# Patient Record
Sex: MALE | Race: WHITE | Employment: OTHER | URBAN - NONMETROPOLITAN AREA
[De-identification: names, ages, dates, MRNs, and addresses within clinical notes are randomized per-mention and may not be internally consistent; named-entity substitution may affect disease eponyms.]

---

## 2019-06-25 ENCOUNTER — APPOINTMENT (OUTPATIENT)
Dept: GENERAL RADIOLOGY | Age: 49
End: 2019-06-25
Payer: MEDICARE

## 2019-06-25 ENCOUNTER — HOSPITAL ENCOUNTER (EMERGENCY)
Age: 49
Discharge: HOME | End: 2019-06-25
Attending: EMERGENCY MEDICINE
Payer: MEDICARE

## 2019-06-25 VITALS
SYSTOLIC BLOOD PRESSURE: 153 MMHG | OXYGEN SATURATION: 93 % | DIASTOLIC BLOOD PRESSURE: 90 MMHG | RESPIRATION RATE: 18 BRPM | HEART RATE: 96 BPM | TEMPERATURE: 97.8 F

## 2019-06-25 DIAGNOSIS — R56.9 SEIZURE (HCC): Primary | ICD-10-CM

## 2019-06-25 LAB
ANION GAP SERPL CALCULATED.3IONS-SCNC: 19 MMOL/L (ref 7–19)
BASOPHILS ABSOLUTE: 0.1 K/UL (ref 0–0.2)
BASOPHILS RELATIVE PERCENT: 0.8 % (ref 0–1)
BUN BLDV-MCNC: 14 MG/DL (ref 6–20)
CALCIUM SERPL-MCNC: 10.7 MG/DL (ref 8.6–10)
CHLORIDE BLD-SCNC: 97 MMOL/L (ref 98–111)
CO2: 21 MMOL/L (ref 22–29)
CREAT SERPL-MCNC: 2 MG/DL (ref 0.5–1.2)
EOSINOPHILS ABSOLUTE: 0.1 K/UL (ref 0–0.6)
EOSINOPHILS RELATIVE PERCENT: 1.2 % (ref 0–5)
ETHANOL: <10 MG/DL (ref 0–0.08)
GFR NON-AFRICAN AMERICAN: 36
GLUCOSE BLD-MCNC: 159 MG/DL (ref 74–109)
HCT VFR BLD CALC: 40 % (ref 42–52)
HEMOGLOBIN: 13.5 G/DL (ref 14–18)
INR BLD: 1.07 (ref 0.88–1.18)
LYMPHOCYTES ABSOLUTE: 1.2 K/UL (ref 1.1–4.5)
LYMPHOCYTES RELATIVE PERCENT: 11.9 % (ref 20–40)
MCH RBC QN AUTO: 29.2 PG (ref 27–31)
MCHC RBC AUTO-ENTMCNC: 33.8 G/DL (ref 33–37)
MCV RBC AUTO: 86.6 FL (ref 80–94)
MONOCYTES ABSOLUTE: 0.9 K/UL (ref 0–0.9)
MONOCYTES RELATIVE PERCENT: 8.7 % (ref 0–10)
NEUTROPHILS ABSOLUTE: 7.8 K/UL (ref 1.5–7.5)
NEUTROPHILS RELATIVE PERCENT: 77 % (ref 50–65)
PDW BLD-RTO: 16.4 % (ref 11.5–14.5)
PLATELET # BLD: 332 K/UL (ref 130–400)
PMV BLD AUTO: 8.8 FL (ref 9.4–12.4)
POTASSIUM REFLEX MAGNESIUM: 4.3 MMOL/L (ref 3.5–5)
PRO-BNP: 287 PG/ML (ref 0–450)
PROTHROMBIN TIME: 13.3 SEC (ref 12–14.6)
RBC # BLD: 4.62 M/UL (ref 4.7–6.1)
SODIUM BLD-SCNC: 137 MMOL/L (ref 136–145)
TROPONIN: <0.01 NG/ML (ref 0–0.03)
WBC # BLD: 10.2 K/UL (ref 4.8–10.8)

## 2019-06-25 PROCEDURE — 2580000003 HC RX 258: Performed by: EMERGENCY MEDICINE

## 2019-06-25 PROCEDURE — 36415 COLL VENOUS BLD VENIPUNCTURE: CPT

## 2019-06-25 PROCEDURE — 83880 ASSAY OF NATRIURETIC PEPTIDE: CPT

## 2019-06-25 PROCEDURE — 80048 BASIC METABOLIC PNL TOTAL CA: CPT

## 2019-06-25 PROCEDURE — 96374 THER/PROPH/DIAG INJ IV PUSH: CPT

## 2019-06-25 PROCEDURE — 85610 PROTHROMBIN TIME: CPT

## 2019-06-25 PROCEDURE — 71045 X-RAY EXAM CHEST 1 VIEW: CPT

## 2019-06-25 PROCEDURE — 85025 COMPLETE CBC W/AUTO DIFF WBC: CPT

## 2019-06-25 PROCEDURE — G0480 DRUG TEST DEF 1-7 CLASSES: HCPCS

## 2019-06-25 PROCEDURE — 99284 EMERGENCY DEPT VISIT MOD MDM: CPT | Performed by: EMERGENCY MEDICINE

## 2019-06-25 PROCEDURE — 99284 EMERGENCY DEPT VISIT MOD MDM: CPT

## 2019-06-25 PROCEDURE — 6370000000 HC RX 637 (ALT 250 FOR IP): Performed by: EMERGENCY MEDICINE

## 2019-06-25 PROCEDURE — 84484 ASSAY OF TROPONIN QUANT: CPT

## 2019-06-25 PROCEDURE — 6360000002 HC RX W HCPCS: Performed by: EMERGENCY MEDICINE

## 2019-06-25 PROCEDURE — 93005 ELECTROCARDIOGRAM TRACING: CPT

## 2019-06-25 RX ORDER — DIAZEPAM 5 MG/1
5 TABLET ORAL EVERY 6 HOURS PRN
Qty: 4 TABLET | Refills: 0 | Status: SHIPPED | OUTPATIENT
Start: 2019-06-25 | End: 2019-07-05

## 2019-06-25 RX ORDER — CLOPIDOGREL BISULFATE 75 MG/1
75 TABLET ORAL DAILY
COMMUNITY

## 2019-06-25 RX ORDER — CHLORDIAZEPOXIDE HYDROCHLORIDE 25 MG/1
50 CAPSULE, GELATIN COATED ORAL ONCE
Status: COMPLETED | OUTPATIENT
Start: 2019-06-25 | End: 2019-06-25

## 2019-06-25 RX ORDER — 0.9 % SODIUM CHLORIDE 0.9 %
1000 INTRAVENOUS SOLUTION INTRAVENOUS ONCE
Status: COMPLETED | OUTPATIENT
Start: 2019-06-25 | End: 2019-06-25

## 2019-06-25 RX ORDER — LORAZEPAM 2 MG/ML
2 INJECTION INTRAMUSCULAR ONCE
Status: COMPLETED | OUTPATIENT
Start: 2019-06-25 | End: 2019-06-25

## 2019-06-25 RX ADMIN — SODIUM CHLORIDE 1000 ML: 9 INJECTION, SOLUTION INTRAVENOUS at 15:46

## 2019-06-25 RX ADMIN — LORAZEPAM 2 MG: 2 INJECTION INTRAMUSCULAR; INTRAVENOUS at 15:45

## 2019-06-25 RX ADMIN — CHLORDIAZEPOXIDE HYDROCHLORIDE 50 MG: 25 CAPSULE ORAL at 15:45

## 2019-06-25 ASSESSMENT — ENCOUNTER SYMPTOMS
CHEST TIGHTNESS: 0
CONSTIPATION: 0
ABDOMINAL PAIN: 0
SHORTNESS OF BREATH: 0
DIARRHEA: 0
SORE THROAT: 0
ABDOMINAL DISTENTION: 0
NAUSEA: 0
BLOOD IN STOOL: 0
RHINORRHEA: 0
COUGH: 0
VOMITING: 0
BACK PAIN: 0
TROUBLE SWALLOWING: 0

## 2019-06-25 NOTE — ED PROVIDER NOTES
[facility] EMERGENCY DEPT  EMERGENCY DEPARTMENT ENCOUNTER      Pt Name: Nicolasa Holt  MRN: [id number]  [place] 1970  Date of evaluation: 6/25/2019  Provider: Renee León MD    CHIEF COMPLAINT       Chief Complaint   Patient presents with    Seizures     witnessed x2, pt was working outside, known hx of seizure       HISTORY OF PRESENT ILLNESS   (Location/Symptom, Timing/Onset, Context/Setting, Quality, Duration, Modifying Factors, Severity)  Note limiting factors. Nicolasa Holt is a 50 y.o. male who presents to the emergency department with     Seizure, via EMS. Patient was doing concrete work, pouring concrete, and then he remembers waking up with EMS being around. Bystanders state the patient had a seizure. Patient states that he had had seizures in the past, not one for a while, not under the care of neurologist and not on any antiepileptic drugs. Denies any prodromal symptoms prior to this. Feels his normal self right now. Of note patient does drink daily, used to drink approximately 30 beers a day, states he is down to 6 or 12 a day, last drink was yesterday. No other complaints from patient. Nursing Notes were reviewed. REVIEW OF SYSTEMS    (2-9 systems for level 4,10 or more for level 5)     Review of Systems   Constitutional: Negative for activity change, appetite change, chills and fever. HENT: Negative for congestion, ear pain, nosebleeds, rhinorrhea, sore throat and trouble swallowing. Respiratory: Negative for cough, chest tightness and shortness of breath. Cardiovascular: Negative for chest pain and palpitations. Gastrointestinal: Negative for abdominal distention, abdominal pain, blood in stool, constipation, diarrhea, nausea and vomiting. Genitourinary: Negative for difficulty urinating, dysuria and hematuria. Musculoskeletal: Negative for arthralgias, back pain, joint swelling, myalgias and neck pain. Skin: Negative for rash.    Neurological: Positive for likely benign early re-pole, no reciprocal changes, no ischemic changes the PEEP is in lead V2. There are no contiguous leads with ST elevations. Normal axis and intervals. RADIOLOGY:   Non-plain film images such as CT, Ultrasound and MRI are read by theradiologist. Plain radiographic images are visualized and preliminarily interpreted by the emergency physician with the below findings:      XR CHEST PORTABLE   Final Result   No acute cardiopulmonary process. Signed by Dr Stiven Sneed on 6/25/2019 3:26 PM          LABS:  Labs Reviewed   CBC WITH AUTO DIFFERENTIAL - Abnormal; Notable for the following components:       Result Value    RBC 4.62 (*)     Hemoglobin 13.5 (*)     Hematocrit 40.0 (*)     RDW 16.4 (*)     MPV 8.8 (*)     Neutrophils % 77.0 (*)     Lymphocytes % 11.9 (*)     Neutrophils # 7.8 (*)     All other components within normal limits   BASIC METABOLIC PANEL W/ REFLEX TO MG FOR LOW K - Abnormal; Notable for the following components:    Chloride 97 (*)     CO2 21 (*)     Glucose 159 (*)     CREATININE 2.0 (*)     GFR Non-African American 36 (*)     Calcium 10.7 (*)     All other components within normal limits   BRAIN NATRIURETIC PEPTIDE   PROTIME-INR   TROPONIN   ETHANOL     other labs were within normal range or not returned as of this dictation. EMERGENCY DEPARTMENT COURSE and DIFFERENTIAL DIAGNOSIS/MDM:   Vitals:    Vitals:    06/25/19 1459 06/25/19 1550   BP: 137/88    Pulse: 130    Resp: 18    Temp:  97.8 °F (36.6 °C)   SpO2: 93%        MDM  Number of Diagnoses or Management Options  Diagnosis management comments: 80-year-old male presenting with seizures, spoke with patient about possible alcohol withdrawal seizure, patient does not intend to stop drinking. Plan for IV, fluids supportive care, benzodiazepines and reevaluation, as patient does not plan to stop drinking he may be safe for discharge home to continue drinking to prevent any further alcohol withdrawal symptoms.       ED (electronically signed)  Attending Emergency Physician        Atul Banuelos MD  06/25/19 5701

## 2019-06-27 LAB
EKG P AXIS: 51 DEGREES
EKG P-R INTERVAL: 156 MS
EKG Q-T INTERVAL: 314 MS
EKG QRS DURATION: 96 MS
EKG QTC CALCULATION (BAZETT): 401 MS
EKG T AXIS: 53 DEGREES

## 2019-07-30 ENCOUNTER — TELEPHONE (OUTPATIENT)
Dept: NEUROSURGERY | Age: 49
End: 2019-07-30

## 2019-08-01 ENCOUNTER — TELEPHONE (OUTPATIENT)
Dept: NEUROSURGERY | Age: 49
End: 2019-08-01

## 2019-11-13 ENCOUNTER — TELEPHONE (OUTPATIENT)
Dept: NEUROSURGERY | Age: 49
End: 2019-11-13

## 2020-06-30 ENCOUNTER — NURSE TRIAGE (OUTPATIENT)
Dept: CALL CENTER | Facility: HOSPITAL | Age: 50
End: 2020-06-30

## 2020-06-30 NOTE — TELEPHONE ENCOUNTER
"Reviewed guideline with caller, advises he be seen within 4 hours for pain not relieved after taking pain medication. Caller agrees to follow care advice.     Reason for Disposition  • [1] SEVERE pain (e.g., excruciating, unable to do any normal activities) AND [2] not improved 2 hours after pain medicine    Additional Information  • Negative: Shock suspected (e.g., cold/pale/clammy skin, too weak to stand, low BP, rapid pulse)  • Negative: [1] Similar pain previously AND [2] it was from \"heart attack\"  • Negative: [1] Similar pain previously AND [2] it was from \"angina\" AND [3] not relieved by nitroglycerin  • Negative: Sounds like a life-threatening emergency to the triager  • Negative: Chest pain  • Negative: Toothache followed tooth injury  • Negative: Toothache or mouth pain after tooth extraction  • Negative: Canker sore (i.e., aphthous ulcer)  • Negative: Tongue is very swollen and tender  • Negative: [1] Face is swollen AND [2] fever  • Negative: Patient sounds very sick or weak to the triager    Answer Assessment - Initial Assessment Questions  1. LOCATION: \"Which tooth is hurting?\"  (e.g., right-side/left-side, upper/lower, front/back)      Three right top and bottom  2. ONSET: \"When did the toothache start?\"  (e.g., hours, days)       yesterday  3. SEVERITY: \"How bad is the toothache?\"  (Scale 1-10; mild, moderate or severe)    - MILD (1-3): doesn't interfere with chewing     - MODERATE (4-7): interferes with chewing, interferes with normal activities, awakens from sleep      - SEVERE (8-10): unable to eat, unable to do any normal activities, excruciating pain         10/10  4. SWELLING: \"Is there any visible swelling of your face?\"      No swelling   5. OTHER SYMPTOMS: \"Do you have any other symptoms?\" (e.g., fever)      no  6. PREGNANCY: \"Is there any chance you are pregnant?\" \"When was your last menstrual period?\"      na    Protocols used: TOOTHACHE-ADULT-AH      "

## 2020-09-11 ENCOUNTER — HOSPITAL ENCOUNTER (INPATIENT)
Age: 50
LOS: 3 days | Discharge: HOME | DRG: 897 | End: 2020-09-14
Attending: EMERGENCY MEDICINE | Admitting: INTERNAL MEDICINE
Payer: MEDICARE

## 2020-09-11 PROBLEM — F32.A DEPRESSION: Status: ACTIVE | Noted: 2020-09-11

## 2020-09-11 PROBLEM — E87.6 HYPOKALEMIA: Status: ACTIVE | Noted: 2020-09-11

## 2020-09-11 PROBLEM — F10.229 ALCOHOL INTOXICATION WITH MODERATE OR SEVERE USE DISORDER, WITH UNSPECIFIED COMPLICATION (HCC): Status: ACTIVE | Noted: 2020-09-11

## 2020-09-11 PROBLEM — G40.909 SEIZURE DISORDER (HCC): Status: ACTIVE | Noted: 2020-09-11

## 2020-09-11 PROBLEM — K21.9 GERD (GASTROESOPHAGEAL REFLUX DISEASE): Status: ACTIVE | Noted: 2020-09-11

## 2020-09-11 LAB
ALBUMIN SERPL-MCNC: 3.8 G/DL (ref 3.5–5.2)
ALP BLD-CCNC: 221 U/L (ref 40–130)
ALT SERPL-CCNC: 53 U/L (ref 5–41)
ANION GAP SERPL CALCULATED.3IONS-SCNC: 19 MMOL/L (ref 7–19)
AST SERPL-CCNC: 59 U/L (ref 5–40)
BASOPHILS ABSOLUTE: 0.1 K/UL (ref 0–0.2)
BASOPHILS RELATIVE PERCENT: 1 % (ref 0–1)
BILIRUB SERPL-MCNC: 0.5 MG/DL (ref 0.2–1.2)
BUN BLDV-MCNC: 5 MG/DL (ref 6–20)
CALCIUM SERPL-MCNC: 8.5 MG/DL (ref 8.6–10)
CHLORIDE BLD-SCNC: 93 MMOL/L (ref 98–111)
CO2: 24 MMOL/L (ref 22–29)
CREAT SERPL-MCNC: 0.9 MG/DL (ref 0.5–1.2)
EOSINOPHILS ABSOLUTE: 0.3 K/UL (ref 0–0.6)
EOSINOPHILS RELATIVE PERCENT: 2.8 % (ref 0–5)
ETHANOL: 332 MG/DL (ref 0–0.08)
GFR AFRICAN AMERICAN: >59
GFR NON-AFRICAN AMERICAN: >60
GLUCOSE BLD-MCNC: 159 MG/DL (ref 74–109)
HCT VFR BLD CALC: 39.2 % (ref 42–52)
HEMOGLOBIN: 13.1 G/DL (ref 14–18)
IMMATURE GRANULOCYTES #: 0 K/UL
LYMPHOCYTES ABSOLUTE: 2.6 K/UL (ref 1.1–4.5)
LYMPHOCYTES RELATIVE PERCENT: 27.8 % (ref 20–40)
MAGNESIUM: 1.8 MG/DL (ref 1.6–2.6)
MCH RBC QN AUTO: 30.6 PG (ref 27–31)
MCHC RBC AUTO-ENTMCNC: 33.4 G/DL (ref 33–37)
MCV RBC AUTO: 91.6 FL (ref 80–94)
MONOCYTES ABSOLUTE: 0.7 K/UL (ref 0–0.9)
MONOCYTES RELATIVE PERCENT: 7.8 % (ref 0–10)
NEUTROPHILS ABSOLUTE: 5.7 K/UL (ref 1.5–7.5)
NEUTROPHILS RELATIVE PERCENT: 60.3 % (ref 50–65)
PDW BLD-RTO: 13.5 % (ref 11.5–14.5)
PLATELET # BLD: 242 K/UL (ref 130–400)
PMV BLD AUTO: 9.3 FL (ref 9.4–12.4)
POTASSIUM SERPL-SCNC: 2.6 MMOL/L (ref 3.5–5)
RBC # BLD: 4.28 M/UL (ref 4.7–6.1)
SODIUM BLD-SCNC: 136 MMOL/L (ref 136–145)
TOTAL PROTEIN: 6.6 G/DL (ref 6.6–8.7)
WBC # BLD: 9.4 K/UL (ref 4.8–10.8)

## 2020-09-11 PROCEDURE — 2580000003 HC RX 258: Performed by: PHYSICIAN ASSISTANT

## 2020-09-11 PROCEDURE — 6370000000 HC RX 637 (ALT 250 FOR IP): Performed by: PHYSICIAN ASSISTANT

## 2020-09-11 PROCEDURE — G0480 DRUG TEST DEF 1-7 CLASSES: HCPCS

## 2020-09-11 PROCEDURE — 2580000003 HC RX 258: Performed by: EMERGENCY MEDICINE

## 2020-09-11 PROCEDURE — 1210000000 HC MED SURG R&B

## 2020-09-11 PROCEDURE — 85025 COMPLETE CBC W/AUTO DIFF WBC: CPT

## 2020-09-11 PROCEDURE — 36415 COLL VENOUS BLD VENIPUNCTURE: CPT

## 2020-09-11 PROCEDURE — 6360000002 HC RX W HCPCS: Performed by: EMERGENCY MEDICINE

## 2020-09-11 PROCEDURE — 99284 EMERGENCY DEPT VISIT MOD MDM: CPT

## 2020-09-11 PROCEDURE — 6370000000 HC RX 637 (ALT 250 FOR IP): Performed by: EMERGENCY MEDICINE

## 2020-09-11 PROCEDURE — 6360000002 HC RX W HCPCS: Performed by: PHYSICIAN ASSISTANT

## 2020-09-11 PROCEDURE — 83735 ASSAY OF MAGNESIUM: CPT

## 2020-09-11 PROCEDURE — 99283 EMERGENCY DEPT VISIT LOW MDM: CPT

## 2020-09-11 PROCEDURE — 99999 PR OFFICE/OUTPT VISIT,PROCEDURE ONLY: CPT | Performed by: EMERGENCY MEDICINE

## 2020-09-11 PROCEDURE — 2500000003 HC RX 250 WO HCPCS: Performed by: EMERGENCY MEDICINE

## 2020-09-11 PROCEDURE — 80053 COMPREHEN METABOLIC PANEL: CPT

## 2020-09-11 RX ORDER — MAGNESIUM SULFATE IN WATER 40 MG/ML
2 INJECTION, SOLUTION INTRAVENOUS PRN
Status: DISCONTINUED | OUTPATIENT
Start: 2020-09-11 | End: 2020-09-14 | Stop reason: HOSPADM

## 2020-09-11 RX ORDER — POTASSIUM CHLORIDE 20 MEQ/1
40 TABLET, EXTENDED RELEASE ORAL PRN
Status: DISCONTINUED | OUTPATIENT
Start: 2020-09-11 | End: 2020-09-14 | Stop reason: HOSPADM

## 2020-09-11 RX ORDER — POLYETHYLENE GLYCOL 3350 17 G/17G
17 POWDER, FOR SOLUTION ORAL DAILY PRN
Status: DISCONTINUED | OUTPATIENT
Start: 2020-09-11 | End: 2020-09-14 | Stop reason: HOSPADM

## 2020-09-11 RX ORDER — LORAZEPAM 1 MG/1
2 TABLET ORAL
Status: DISCONTINUED | OUTPATIENT
Start: 2020-09-11 | End: 2020-09-14 | Stop reason: HOSPADM

## 2020-09-11 RX ORDER — ONDANSETRON 2 MG/ML
4 INJECTION INTRAMUSCULAR; INTRAVENOUS EVERY 6 HOURS PRN
Status: DISCONTINUED | OUTPATIENT
Start: 2020-09-11 | End: 2020-09-14 | Stop reason: HOSPADM

## 2020-09-11 RX ORDER — ACETAMINOPHEN 325 MG/1
650 TABLET ORAL EVERY 6 HOURS PRN
Status: DISCONTINUED | OUTPATIENT
Start: 2020-09-11 | End: 2020-09-14 | Stop reason: HOSPADM

## 2020-09-11 RX ORDER — MULTIVITAMIN WITH IRON
1 TABLET ORAL DAILY
Status: DISCONTINUED | OUTPATIENT
Start: 2020-09-11 | End: 2020-09-14 | Stop reason: HOSPADM

## 2020-09-11 RX ORDER — PROMETHAZINE HYDROCHLORIDE 25 MG/1
12.5 TABLET ORAL EVERY 6 HOURS PRN
Status: DISCONTINUED | OUTPATIENT
Start: 2020-09-11 | End: 2020-09-14 | Stop reason: HOSPADM

## 2020-09-11 RX ORDER — LORAZEPAM 2 MG/ML
2 INJECTION INTRAMUSCULAR
Status: DISCONTINUED | OUTPATIENT
Start: 2020-09-11 | End: 2020-09-14 | Stop reason: HOSPADM

## 2020-09-11 RX ORDER — HYDROCODONE BITARTRATE AND ACETAMINOPHEN 10; 325 MG/1; MG/1
1 TABLET ORAL EVERY 6 HOURS PRN
COMMUNITY

## 2020-09-11 RX ORDER — SODIUM CHLORIDE 0.9 % (FLUSH) 0.9 %
10 SYRINGE (ML) INJECTION PRN
Status: DISCONTINUED | OUTPATIENT
Start: 2020-09-11 | End: 2020-09-14 | Stop reason: HOSPADM

## 2020-09-11 RX ORDER — SODIUM CHLORIDE 9 MG/ML
INJECTION, SOLUTION INTRAVENOUS CONTINUOUS
Status: DISCONTINUED | OUTPATIENT
Start: 2020-09-11 | End: 2020-09-14 | Stop reason: HOSPADM

## 2020-09-11 RX ORDER — CHLORDIAZEPOXIDE HYDROCHLORIDE 25 MG/1
25 CAPSULE, GELATIN COATED ORAL 4 TIMES DAILY
Status: COMPLETED | OUTPATIENT
Start: 2020-09-12 | End: 2020-09-12

## 2020-09-11 RX ORDER — OLANZAPINE 5 MG/1
5 TABLET ORAL NIGHTLY
COMMUNITY

## 2020-09-11 RX ORDER — LORAZEPAM 1 MG/1
4 TABLET ORAL
Status: DISCONTINUED | OUTPATIENT
Start: 2020-09-11 | End: 2020-09-14 | Stop reason: HOSPADM

## 2020-09-11 RX ORDER — POTASSIUM CHLORIDE 7.45 MG/ML
10 INJECTION INTRAVENOUS ONCE
Status: COMPLETED | OUTPATIENT
Start: 2020-09-11 | End: 2020-09-11

## 2020-09-11 RX ORDER — AMLODIPINE BESYLATE 10 MG/1
10 TABLET ORAL DAILY
COMMUNITY

## 2020-09-11 RX ORDER — FOLIC ACID 1 MG/1
1 TABLET ORAL DAILY
Status: DISCONTINUED | OUTPATIENT
Start: 2020-09-12 | End: 2020-09-14 | Stop reason: HOSPADM

## 2020-09-11 RX ORDER — LORAZEPAM 1 MG/1
3 TABLET ORAL
Status: DISCONTINUED | OUTPATIENT
Start: 2020-09-11 | End: 2020-09-14 | Stop reason: HOSPADM

## 2020-09-11 RX ORDER — ACETAMINOPHEN 650 MG/1
650 SUPPOSITORY RECTAL EVERY 6 HOURS PRN
Status: DISCONTINUED | OUTPATIENT
Start: 2020-09-11 | End: 2020-09-14 | Stop reason: HOSPADM

## 2020-09-11 RX ORDER — DIAZEPAM 10 MG/1
10 TABLET ORAL EVERY 6 HOURS PRN
Status: ON HOLD | COMMUNITY
End: 2020-10-20 | Stop reason: HOSPADM

## 2020-09-11 RX ORDER — LORAZEPAM 2 MG/ML
4 INJECTION INTRAMUSCULAR
Status: DISCONTINUED | OUTPATIENT
Start: 2020-09-11 | End: 2020-09-14 | Stop reason: HOSPADM

## 2020-09-11 RX ORDER — NICOTINE 21 MG/24HR
1 PATCH, TRANSDERMAL 24 HOURS TRANSDERMAL DAILY
Status: DISCONTINUED | OUTPATIENT
Start: 2020-09-11 | End: 2020-09-14 | Stop reason: HOSPADM

## 2020-09-11 RX ORDER — MAGNESIUM SULFATE IN WATER 40 MG/ML
2 INJECTION, SOLUTION INTRAVENOUS ONCE
Status: COMPLETED | OUTPATIENT
Start: 2020-09-11 | End: 2020-09-11

## 2020-09-11 RX ORDER — METOPROLOL SUCCINATE 25 MG/1
25 TABLET, EXTENDED RELEASE ORAL DAILY
Status: ON HOLD | COMMUNITY
End: 2020-09-14 | Stop reason: HOSPADM

## 2020-09-11 RX ORDER — THIAMINE MONONITRATE (VIT B1) 100 MG
100 TABLET ORAL DAILY
Status: DISCONTINUED | OUTPATIENT
Start: 2020-09-11 | End: 2020-09-14 | Stop reason: HOSPADM

## 2020-09-11 RX ORDER — LORAZEPAM 2 MG/ML
1 INJECTION INTRAMUSCULAR
Status: DISCONTINUED | OUTPATIENT
Start: 2020-09-11 | End: 2020-09-14 | Stop reason: HOSPADM

## 2020-09-11 RX ORDER — POTASSIUM CHLORIDE 7.45 MG/ML
10 INJECTION INTRAVENOUS PRN
Status: DISCONTINUED | OUTPATIENT
Start: 2020-09-11 | End: 2020-09-14 | Stop reason: HOSPADM

## 2020-09-11 RX ORDER — LISINOPRIL 40 MG/1
40 TABLET ORAL DAILY
COMMUNITY

## 2020-09-11 RX ORDER — LORAZEPAM 2 MG/ML
3 INJECTION INTRAMUSCULAR
Status: DISCONTINUED | OUTPATIENT
Start: 2020-09-11 | End: 2020-09-14 | Stop reason: HOSPADM

## 2020-09-11 RX ORDER — SODIUM CHLORIDE 0.9 % (FLUSH) 0.9 %
10 SYRINGE (ML) INJECTION EVERY 12 HOURS SCHEDULED
Status: DISCONTINUED | OUTPATIENT
Start: 2020-09-11 | End: 2020-09-14 | Stop reason: HOSPADM

## 2020-09-11 RX ORDER — ATORVASTATIN CALCIUM 10 MG/1
10 TABLET, FILM COATED ORAL DAILY
COMMUNITY

## 2020-09-11 RX ORDER — PANTOPRAZOLE SODIUM 40 MG/1
40 TABLET, DELAYED RELEASE ORAL
Status: DISCONTINUED | OUTPATIENT
Start: 2020-09-12 | End: 2020-09-14 | Stop reason: HOSPADM

## 2020-09-11 RX ORDER — LORAZEPAM 1 MG/1
1 TABLET ORAL
Status: DISCONTINUED | OUTPATIENT
Start: 2020-09-11 | End: 2020-09-14 | Stop reason: HOSPADM

## 2020-09-11 RX ADMIN — ENOXAPARIN SODIUM 40 MG: 40 INJECTION SUBCUTANEOUS at 18:35

## 2020-09-11 RX ADMIN — SODIUM CHLORIDE: 9 INJECTION, SOLUTION INTRAVENOUS at 17:48

## 2020-09-11 RX ADMIN — POTASSIUM CHLORIDE 10 MEQ: 10 INJECTION, SOLUTION INTRAVENOUS at 21:33

## 2020-09-11 RX ADMIN — POTASSIUM CHLORIDE 10 MEQ: 10 INJECTION, SOLUTION INTRAVENOUS at 17:16

## 2020-09-11 RX ADMIN — FOLIC ACID: 5 INJECTION, SOLUTION INTRAMUSCULAR; INTRAVENOUS; SUBCUTANEOUS at 16:24

## 2020-09-11 RX ADMIN — THIAMINE HCL TAB 100 MG 100 MG: 100 TAB at 18:35

## 2020-09-11 RX ADMIN — POTASSIUM CHLORIDE 10 MEQ: 10 INJECTION, SOLUTION INTRAVENOUS at 19:43

## 2020-09-11 RX ADMIN — POTASSIUM CHLORIDE 10 MEQ: 10 INJECTION, SOLUTION INTRAVENOUS at 18:35

## 2020-09-11 RX ADMIN — POTASSIUM BICARBONATE 40 MEQ: 782 TABLET, EFFERVESCENT ORAL at 16:24

## 2020-09-11 RX ADMIN — MAGNESIUM SULFATE HEPTAHYDRATE 2 G: 40 INJECTION, SOLUTION INTRAVENOUS at 18:37

## 2020-09-11 RX ADMIN — THERA TABS 1 TABLET: TAB at 18:35

## 2020-09-11 ASSESSMENT — ENCOUNTER SYMPTOMS
RHINORRHEA: 0
NAUSEA: 0
BACK PAIN: 0
COUGH: 0
DIARRHEA: 0
SHORTNESS OF BREATH: 0
SORE THROAT: 0
ABDOMINAL PAIN: 0
VOMITING: 0

## 2020-09-11 NOTE — H&P
Cheryl Obrien - History & Physical      PCP: No primary care provider on file. Date of Admission: 9/11/2020    Date of Service: 9/11/2020    Chief Complaint:  Alcohol intoxication     History Of Present Illness: The patient is a 52 y.o. male with PMH CAD s/p PCI to LAD in 2012, Seizures, HTN, GERD, HLD, depression  who presented to [facility] ED complaining of alcohol intoxication, depression and suicidal ideation. Mr. Marquise Vega states he's been drinking alcohol daily since the age of 12. Previously drank a 30 case of beer then weaned himself to a pint and a half of whiskey daily and now he states he's Oldjason BecerraSterling been drinking a pint\" every day. He smokes about 2 PPD. Tells me he wants to quit drinking because he knows it's going to kill him. States he feels depressed but denies suicidal/homicidal ideation. Additionally he does have history of a seizure disorder. He cannot tell me what medication he takes for this and has not seen a Neurologist \"in years\". Ethanol level is 332. Past Medical History:        Diagnosis Date    Alcohol abuse     Coronary artery disease     GERD (gastroesophageal reflux disease)     Hyperlipidemia     Hypertension     Seizures (HCC)      Past Surgical History:        Procedure Laterality Date    CORONARY ANGIOPLASTY WITH STENT PLACEMENT      ELBOW SURGERY      KNEE ARTHROSCOPY       Home Medications:  Prior to Admission medications    Medication Sig Start Date End Date Taking?  Authorizing Provider   OLANZapine (ZYPREXA) 5 MG tablet Take 5 mg by mouth nightly   Yes Historical Provider, MD   amLODIPine (NORVASC) 10 MG tablet Take 10 mg by mouth daily   Yes Historical Provider, MD   metoprolol succinate (TOPROL XL) 25 MG extended release tablet Take 25 mg by mouth daily   Yes Historical Provider, MD   atorvastatin (LIPITOR) 10 MG tablet Take 10 mg by mouth daily   Yes Historical Provider, MD   lisinopril (PRINIVIL;ZESTRIL) 40 MG tablet Take 40 mg by mouth daily Yes Historical Provider, MD   diazePAM (VALIUM) 10 MG tablet Take 10 mg by mouth every 6 hours as needed for Anxiety. Yes Historical Provider, MD   HYDROcodone-acetaminophen (NORCO)  MG per tablet Take 1 tablet by mouth every 6 hours as needed for Pain. Yes Historical Provider, MD   clopidogrel (PLAVIX) 75 MG tablet Take 75 mg by mouth daily   Yes Historical Provider, MD   NONFORMULARY BLOOD PRESSURE MEDICATION    Historical Provider, MD     Allergies:    Patient has no known allergies. Social History:    The patient currently lives at home. Tobacco:   reports that he has been smoking cigarettes. He has been smoking about 1.00 pack per day. He has never used smokeless tobacco.  Alcohol:   reports current alcohol use. Illicit Drugs: denies    Family History:  History reviewed. No pertinent family history. Review of Systems:   Constitutional / general:  Denies fever / chills / sweats  Head:  Denies headache / neck stiffness / trauma / visual change  Eyes:  Denies blurry vision / acute visual change or loss / itching / redness  ENT: Denies sore throat / hoarseness / nasal drainage / ear pain  CV:  Denies chest pain / palpitations/ orthopnea   Respiratory:  Denies cough / shortness of breath / sputum / hemoptysis  GI: Denies nausea / vomiting / abdominal pain / diarrhea / constipation  :  Denies dysuria / hesitancy / urgency / hematuria  +urinary retention   Neuro: Denies paralysis / syncope / seizure / dysphagia / headache / paresthesias  Musculoskeletal:  Denies muscle weakness /joint stiffness / pain  Vascular: Denies edema / claudication / varicosities  Heme / endocrine: Denies easy bruising / bleeding / excessive sweating / heat or cold intolerance  Psychiatric: +depression / Denies anxiety / insomnia / + mood changes  Skin:  Denies new rashes / lesions / skin hair or nail changes    14 point review of systems is negative except as specifically addressed above.     Physical Examination:  BP (!) LFT's-likely 2/2 chronic alcohol use, monitor      Tobacco use-nicotine patch ordered, counseled on cessation       HLD (hyperlipidemia)-hold statin for now with elevated LFT's      HTN (hypertension)-hypotensive upon arrival, hold antihypertensives and monitor      Seizure disorder (HCC)-not currently on antiepileptics per patient.  Has been on Keppra in the past. Will check with his pharmacy      GERD (gastroesophageal reflux disease)-PPI      Depression-consider Psychiatry evaluation when medically stable      Hypokalemia-replace, monitor    Further orders per clinical course/attending    Signed:  Khanh Cleary PA-C

## 2020-09-11 NOTE — PLAN OF CARE
Problem: Falls - Risk of:  Goal: Will remain free from falls  Description: Will remain free from falls  Outcome: Ongoing  Goal: Absence of physical injury  Description: Absence of physical injury  Outcome: Ongoing     Problem: Discharge Planning:  Goal: Discharged to appropriate level of care  Description: Discharged to appropriate level of care  Outcome: Ongoing     Problem: Fluid Volume - Deficit:  Goal: Absence of fluid volume deficit signs and symptoms  Description: Absence of fluid volume deficit signs and symptoms  Outcome: Ongoing     Problem: Nutrition Deficit:  Goal: Ability to achieve adequate nutritional intake will improve  Description: Ability to achieve adequate nutritional intake will improve  Outcome: Ongoing     Problem: Sleep Pattern Disturbance:  Goal: Appears well-rested  Description: Appears well-rested  Outcome: Ongoing     Problem: Violence - Risk of, Self/Other-Directed:  Goal: Knowledge of developmental care interventions  Description: Absence of violence  Outcome: Ongoing     Problem: Safety:  Goal: Ability to remain free from injury will improve  Description: Ability to remain free from injury will improve  Outcome: Ongoing     Problem: Self-Concept:  Goal: Level of anxiety will decrease  Description: Level of anxiety will decrease  Outcome: Ongoing  Goal: Ability to verbalize feelings about condition will improve  Description: Ability to verbalize feelings about condition will improve  Outcome: Ongoing

## 2020-09-11 NOTE — ED PROVIDER NOTES
[facility] 4 ONCOLOGY UNIT  eMERGENCY dEPARTMENT eNCOUnter      Pt Name: Gabrielle Anderson  MRN: [id number]  [place] 1970  Date of evaluation: 9/11/2020  Provider: Dre Pat MD    CHIEF COMPLAINT       Chief Complaint   Patient presents with    Alcohol Intoxication     \" I want to quit\" last drink \"10 minutes ago\"         HISTORY OF PRESENT ILLNESS   (Location/Symptom, Timing/Onset,Context/Setting, Quality, Duration, Modifying Factors, Severity)  Note limiting factors. Gabrielle Anderson is a 52 y.o. male who presents to the emergency department for alcohol intoxication. The patient has been a longtime alcohol abuser and prior was drinking a 30 pack/day but now is drinking multiple pints of whiskey per day. Tells me he is tried to quit within the last couple of months and stop for 1 day and had a seizure. However this is not exactly clear to me because he tells me that he also has had a prior history of seizures and is on Neurontin and another medication. He has not been admitted for alcohol withdrawal in the past.  Does have a prior history of bipolar disorder and admits that he drinks just so he can feel \"normal\". He denies any suicidal thoughts currently but does admit to some depression. HPI    NursingNotes were reviewed. REVIEW OF SYSTEMS    (2-9 systems for level 4, 10 or more for level 5)     Review of Systems   Constitutional: Negative for chills and fever. HENT: Negative for rhinorrhea and sore throat. Respiratory: Negative for cough and shortness of breath. Cardiovascular: Negative for chest pain. Gastrointestinal: Negative for abdominal pain, diarrhea, nausea and vomiting. Genitourinary: Negative for dysuria and frequency. Musculoskeletal: Negative for back pain and neck pain. Neurological: Negative for dizziness and headaches. Psychiatric/Behavioral: Negative for hallucinations and suicidal ideas. All other systems reviewed and are negative.            PAST MEDICALHISTORY Past Medical History:   Diagnosis Date    Alcohol abuse     Coronary artery disease     GERD (gastroesophageal reflux disease)     Hyperlipidemia     Hypertension     Seizures (HCC)          SURGICAL HISTORY       Past Surgical History:   Procedure Laterality Date    CORONARY ANGIOPLASTY WITH STENT PLACEMENT      ELBOW SURGERY      KNEE ARTHROSCOPY           CURRENT MEDICATIONS     Current Discharge Medication List      CONTINUE these medications which have NOT CHANGED    Details   OLANZapine (ZYPREXA) 5 MG tablet Take 5 mg by mouth nightly      amLODIPine (NORVASC) 10 MG tablet Take 10 mg by mouth daily      metoprolol succinate (TOPROL XL) 25 MG extended release tablet Take 25 mg by mouth daily      atorvastatin (LIPITOR) 10 MG tablet Take 10 mg by mouth daily      lisinopril (PRINIVIL;ZESTRIL) 40 MG tablet Take 40 mg by mouth daily      diazePAM (VALIUM) 10 MG tablet Take 10 mg by mouth every 6 hours as needed for Anxiety. HYDROcodone-acetaminophen (NORCO)  MG per tablet Take 1 tablet by mouth every 6 hours as needed for Pain. clopidogrel (PLAVIX) 75 MG tablet Take 75 mg by mouth daily      NONFORMULARY BLOOD PRESSURE MEDICATION             ALLERGIES     Patient has no known allergies. FAMILY HISTORY     History reviewed. No pertinent family history.        SOCIAL HISTORY       Social History     Socioeconomic History    Marital status:      Spouse name: None    Number of children: None    Years of education: None    Highest education level: None   Occupational History    None   Social Needs    Financial resource strain: None    Food insecurity     Worry: None     Inability: None    Transportation needs     Medical: None     Non-medical: None   Tobacco Use    Smoking status: Current Every Day Smoker     Packs/day: 1.00     Types: Cigarettes    Smokeless tobacco: Never Used   Substance and Sexual Activity    Alcohol use: Yes     Comment: alcoholic    Drug use: Not Currently    Sexual activity: None   Lifestyle    Physical activity     Days per week: None     Minutes per session: None    Stress: None   Relationships    Social connections     Talks on phone: None     Gets together: None     Attends Gnosticist service: None     Active member of club or organization: None     Attends meetings of clubs or organizations: None     Relationship status: None    Intimate partner violence     Fear of current or ex partner: None     Emotionally abused: None     Physically abused: None     Forced sexual activity: None   Other Topics Concern    None   Social History Narrative    None       SCREENINGS    Alondra Coma Scale  Eye Opening: Spontaneous  Best Verbal Response: Oriented  Best Motor Response: Obeys commands  Alondra Coma Scale Score: 15        PHYSICAL EXAM    (up to 7 for level 4, 8 or more for level 5)     ED Triage Vitals [09/11/20 1441]   BP Temp Temp src Pulse Resp SpO2 Height Weight   (!) 95/55 97 °F (36.1 °C) -- 87 18 95 % 6' (1.829 m) 278 lb (126.1 kg)       Physical Exam  Vitals signs reviewed. Constitutional:       General: He is not in acute distress. Appearance: Normal appearance. He is well-developed. He is not ill-appearing, toxic-appearing or diaphoretic. HENT:      Head: Normocephalic and atraumatic. Eyes:      Conjunctiva/sclera: Conjunctivae normal.   Neck:      Musculoskeletal: Normal range of motion and neck supple. Trachea: No tracheal deviation. Cardiovascular:      Rate and Rhythm: Normal rate and regular rhythm. Heart sounds: Normal heart sounds. No murmur. Pulmonary:      Breath sounds: Normal breath sounds. No wheezing or rales. Abdominal:      Palpations: Abdomen is soft. There is no mass. Tenderness: There is no abdominal tenderness. Musculoskeletal: Normal range of motion. Right lower leg: Edema present. Left lower leg: Edema present.       Comments: Trace bilateral lower extremity edema   Skin: General: Skin is warm and dry. Neurological:      Mental Status: He is alert and oriented to person, place, and time. Psychiatric:         Mood and Affect: Mood is depressed. Affect is flat. Thought Content: Thought content is not paranoid or delusional. Thought content does not include homicidal or suicidal ideation. DIAGNOSTIC RESULTS       No orders to display           LABS:  Labs Reviewed   CBC WITH AUTO DIFFERENTIAL - Abnormal; Notable for the following components:       Result Value    RBC 4.28 (*)     Hemoglobin 13.1 (*)     Hematocrit 39.2 (*)     MPV 9.3 (*)     All other components within normal limits   COMPREHENSIVE METABOLIC PANEL - Abnormal; Notable for the following components:    Potassium 2.6 (*)     Chloride 93 (*)     Glucose 159 (*)     BUN 5 (*)     Calcium 8.5 (*)     Alkaline Phosphatase 221 (*)     ALT 53 (*)     AST 59 (*)     All other components within normal limits    Narrative:     CALL  [place]  [facility] tel. ,  Chemistry results called to and read back by Kika Uribe in ED, 09/11/2020  15:39, by [id number] [facility] PANEL W/ REFLEX TO MG FOR LOW K   CBC   PHOSPHORUS       All other labs were within normal range or not returned as of this dictation.     EMERGENCY DEPARTMENT COURSE and DIFFERENTIAL DIAGNOSIS/MDM:   Vitals:    Vitals:    09/11/20 1441 09/11/20 1515 09/11/20 1731 09/11/20 1831   BP: (!) 95/55 (!) 93/43 122/77 127/62   Pulse: 87  83 90   Resp: 18  18 20   Temp: 97 °F (36.1 °C)  97.6 °F (36.4 °C) 97.9 °F (36.6 °C)   TempSrc:   Temporal Temporal   SpO2: 95%  96% 96%   Weight: 278 lb (126.1 kg)      Height: 6' (1.829 m)          MDM    Hx of etoh abuse, wants to quit, depressed but no suicidal, has had seizure in past when quit drinking but also has underlying sz disorder at baseline somewhat questionable, d/w Dr. Prachi Marx for admission and once medically clear will need to see [facility] as well      CONSULTS:  IP CONSULT TO SOCIAL WORK    PROCEDURES:  Unless otherwise noted below, none     Procedures    FINAL IMPRESSION      1. Acute alcoholic intoxication without complication ([address]    2. Hypokalemia          DISPOSITION/PLAN   DISPOSITION Decision To Admit 09/11/2020 07:33:02 PM      PATIENT REFERRED TO:  No follow-up provider specified.     DISCHARGE MEDICATIONS:  Current Discharge Medication List             (Please note that portions of this note were completed with a voice recognition program.  Efforts were made to edit thedictations but occasionally words are mis-transcribed.)    Diane Donaldson MD (electronically signed)  Attending Emergency Physician        Merlin Slipper, MD  09/11/20 7024

## 2020-09-12 LAB
ALBUMIN SERPL-MCNC: 3.6 G/DL (ref 3.5–5.2)
ALP BLD-CCNC: 230 U/L (ref 40–130)
ALT SERPL-CCNC: 57 U/L (ref 5–41)
AMPHETAMINE SCREEN, URINE: NEGATIVE
ANION GAP SERPL CALCULATED.3IONS-SCNC: 11 MMOL/L (ref 7–19)
AST SERPL-CCNC: 124 U/L (ref 5–40)
BARBITURATE SCREEN URINE: NEGATIVE
BENZODIAZEPINE SCREEN, URINE: POSITIVE
BILIRUB SERPL-MCNC: 0.6 MG/DL (ref 0.2–1.2)
BUN BLDV-MCNC: 5 MG/DL (ref 6–20)
CALCIUM SERPL-MCNC: 8.4 MG/DL (ref 8.6–10)
CANNABINOID SCREEN URINE: NEGATIVE
CHLORIDE BLD-SCNC: 100 MMOL/L (ref 98–111)
CO2: 29 MMOL/L (ref 22–29)
COCAINE METABOLITE SCREEN URINE: NEGATIVE
CREAT SERPL-MCNC: 0.5 MG/DL (ref 0.5–1.2)
GFR AFRICAN AMERICAN: >59
GFR NON-AFRICAN AMERICAN: >60
GLUCOSE BLD-MCNC: 113 MG/DL (ref 74–109)
HCT VFR BLD CALC: 36.9 % (ref 42–52)
HEMOGLOBIN: 12.4 G/DL (ref 14–18)
Lab: ABNORMAL
MCH RBC QN AUTO: 31 PG (ref 27–31)
MCHC RBC AUTO-ENTMCNC: 33.6 G/DL (ref 33–37)
MCV RBC AUTO: 92.3 FL (ref 80–94)
OPIATE SCREEN URINE: POSITIVE
PDW BLD-RTO: 13.3 % (ref 11.5–14.5)
PHOSPHORUS: 2.3 MG/DL (ref 2.5–4.5)
PLATELET # BLD: 215 K/UL (ref 130–400)
PMV BLD AUTO: 9.5 FL (ref 9.4–12.4)
POTASSIUM REFLEX MAGNESIUM: 3.6 MMOL/L (ref 3.5–5)
RBC # BLD: 4 M/UL (ref 4.7–6.1)
SODIUM BLD-SCNC: 140 MMOL/L (ref 136–145)
TOTAL PROTEIN: 6.3 G/DL (ref 6.6–8.7)
WBC # BLD: 5.1 K/UL (ref 4.8–10.8)

## 2020-09-12 PROCEDURE — 6370000000 HC RX 637 (ALT 250 FOR IP): Performed by: PHYSICIAN ASSISTANT

## 2020-09-12 PROCEDURE — 84100 ASSAY OF PHOSPHORUS: CPT

## 2020-09-12 PROCEDURE — 2500000003 HC RX 250 WO HCPCS: Performed by: INTERNAL MEDICINE

## 2020-09-12 PROCEDURE — 2580000003 HC RX 258: Performed by: PHYSICIAN ASSISTANT

## 2020-09-12 PROCEDURE — 85027 COMPLETE CBC AUTOMATED: CPT

## 2020-09-12 PROCEDURE — 6360000002 HC RX W HCPCS: Performed by: PHYSICIAN ASSISTANT

## 2020-09-12 PROCEDURE — 36415 COLL VENOUS BLD VENIPUNCTURE: CPT

## 2020-09-12 PROCEDURE — 1210000000 HC MED SURG R&B

## 2020-09-12 PROCEDURE — 80053 COMPREHEN METABOLIC PANEL: CPT

## 2020-09-12 PROCEDURE — 80307 DRUG TEST PRSMV CHEM ANLYZR: CPT

## 2020-09-12 PROCEDURE — 6370000000 HC RX 637 (ALT 250 FOR IP): Performed by: INTERNAL MEDICINE

## 2020-09-12 RX ORDER — CHLORDIAZEPOXIDE HYDROCHLORIDE 25 MG/1
25 CAPSULE, GELATIN COATED ORAL 3 TIMES DAILY
Status: COMPLETED | OUTPATIENT
Start: 2020-09-13 | End: 2020-09-13

## 2020-09-12 RX ORDER — HYDRALAZINE HYDROCHLORIDE 20 MG/ML
10 INJECTION INTRAMUSCULAR; INTRAVENOUS EVERY 4 HOURS PRN
Status: DISCONTINUED | OUTPATIENT
Start: 2020-09-12 | End: 2020-09-14 | Stop reason: HOSPADM

## 2020-09-12 RX ORDER — HYDROCODONE BITARTRATE AND ACETAMINOPHEN 10; 325 MG/1; MG/1
1 TABLET ORAL EVERY 6 HOURS PRN
Status: DISCONTINUED | OUTPATIENT
Start: 2020-09-12 | End: 2020-09-14 | Stop reason: HOSPADM

## 2020-09-12 RX ORDER — CLOPIDOGREL BISULFATE 75 MG/1
75 TABLET ORAL DAILY
Status: DISCONTINUED | OUTPATIENT
Start: 2020-09-12 | End: 2020-09-14 | Stop reason: HOSPADM

## 2020-09-12 RX ORDER — DIAZEPAM 10 MG/1
10 TABLET ORAL EVERY 8 HOURS PRN
Status: DISCONTINUED | OUTPATIENT
Start: 2020-09-12 | End: 2020-09-14 | Stop reason: HOSPADM

## 2020-09-12 RX ORDER — OLANZAPINE 10 MG/1
5 TABLET ORAL NIGHTLY
Status: DISCONTINUED | OUTPATIENT
Start: 2020-09-12 | End: 2020-09-14 | Stop reason: HOSPADM

## 2020-09-12 RX ORDER — ATORVASTATIN CALCIUM 10 MG/1
10 TABLET, FILM COATED ORAL DAILY
Status: DISCONTINUED | OUTPATIENT
Start: 2020-09-12 | End: 2020-09-14 | Stop reason: HOSPADM

## 2020-09-12 RX ORDER — LABETALOL HYDROCHLORIDE 5 MG/ML
10 INJECTION, SOLUTION INTRAVENOUS EVERY 4 HOURS PRN
Status: DISCONTINUED | OUTPATIENT
Start: 2020-09-12 | End: 2020-09-14 | Stop reason: HOSPADM

## 2020-09-12 RX ORDER — LISINOPRIL 20 MG/1
40 TABLET ORAL DAILY
Status: DISCONTINUED | OUTPATIENT
Start: 2020-09-12 | End: 2020-09-14 | Stop reason: HOSPADM

## 2020-09-12 RX ORDER — METOPROLOL SUCCINATE 25 MG/1
25 TABLET, EXTENDED RELEASE ORAL DAILY
Status: DISCONTINUED | OUTPATIENT
Start: 2020-09-12 | End: 2020-09-13

## 2020-09-12 RX ORDER — AMLODIPINE BESYLATE 10 MG/1
10 TABLET ORAL DAILY
Status: DISCONTINUED | OUTPATIENT
Start: 2020-09-12 | End: 2020-09-14 | Stop reason: HOSPADM

## 2020-09-12 RX ADMIN — OLANZAPINE 5 MG: 10 TABLET, FILM COATED ORAL at 20:04

## 2020-09-12 RX ADMIN — CHLORDIAZEPOXIDE HYDROCHLORIDE 25 MG: 25 CAPSULE ORAL at 06:19

## 2020-09-12 RX ADMIN — ENOXAPARIN SODIUM 40 MG: 40 INJECTION SUBCUTANEOUS at 17:35

## 2020-09-12 RX ADMIN — HYDROCODONE BITARTRATE AND ACETAMINOPHEN 1 TABLET: 10; 325 TABLET ORAL at 14:40

## 2020-09-12 RX ADMIN — THERA TABS 1 TABLET: TAB at 08:13

## 2020-09-12 RX ADMIN — POTASSIUM CHLORIDE 10 MEQ: 10 INJECTION, SOLUTION INTRAVENOUS at 00:31

## 2020-09-12 RX ADMIN — CLOPIDOGREL BISULFATE 75 MG: 75 TABLET ORAL at 08:44

## 2020-09-12 RX ADMIN — PANTOPRAZOLE SODIUM 40 MG: 40 TABLET, DELAYED RELEASE ORAL at 06:17

## 2020-09-12 RX ADMIN — HYDROCODONE BITARTRATE AND ACETAMINOPHEN 1 TABLET: 10; 325 TABLET ORAL at 20:04

## 2020-09-12 RX ADMIN — METOPROLOL SUCCINATE 25 MG: 25 TABLET, EXTENDED RELEASE ORAL at 08:44

## 2020-09-12 RX ADMIN — FOLIC ACID 1 MG: 1 TABLET ORAL at 08:13

## 2020-09-12 RX ADMIN — Medication 10 MG: at 23:48

## 2020-09-12 RX ADMIN — AMLODIPINE BESYLATE 10 MG: 10 TABLET ORAL at 08:44

## 2020-09-12 RX ADMIN — THIAMINE HCL TAB 100 MG 100 MG: 100 TAB at 08:13

## 2020-09-12 RX ADMIN — HYDROCODONE BITARTRATE AND ACETAMINOPHEN 1 TABLET: 10; 325 TABLET ORAL at 08:45

## 2020-09-12 RX ADMIN — LORAZEPAM 1 MG: 1 TABLET ORAL at 11:51

## 2020-09-12 RX ADMIN — LORAZEPAM 1 MG: 1 TABLET ORAL at 02:15

## 2020-09-12 RX ADMIN — LISINOPRIL 40 MG: 20 TABLET ORAL at 08:45

## 2020-09-12 RX ADMIN — CHLORDIAZEPOXIDE HYDROCHLORIDE 25 MG: 25 CAPSULE ORAL at 20:04

## 2020-09-12 RX ADMIN — CHLORDIAZEPOXIDE HYDROCHLORIDE 25 MG: 25 CAPSULE ORAL at 17:35

## 2020-09-12 RX ADMIN — ATORVASTATIN CALCIUM 10 MG: 10 TABLET, FILM COATED ORAL at 08:44

## 2020-09-12 RX ADMIN — SODIUM CHLORIDE: 9 INJECTION, SOLUTION INTRAVENOUS at 02:15

## 2020-09-12 RX ADMIN — CHLORDIAZEPOXIDE HYDROCHLORIDE 25 MG: 25 CAPSULE ORAL at 11:49

## 2020-09-12 ASSESSMENT — PAIN SCALES - GENERAL
PAINLEVEL_OUTOF10: 3
PAINLEVEL_OUTOF10: 3
PAINLEVEL_OUTOF10: 10
PAINLEVEL_OUTOF10: 8
PAINLEVEL_OUTOF10: 4
PAINLEVEL_OUTOF10: 8

## 2020-09-12 NOTE — PROGRESS NOTES
[facility] Hospitalists    Patient:  Philip Serrano  YOB: 1970  Date of Service: 9/12/2020  MRN: [id number]   Acct: [de-identified]   Primary Care Physician: No primary care provider on file. Advance Directive: Full Code  Admit Date: 9/11/2020       Hospital Day: 1  Portions of this note have been copied forward, however, changed to reflect the most current clinical status of this patient. CHIEF COMPLAINT: Alcohol intoxication     SUBJECTIVE: Mr. SLAUGHTER [facility] states he feels mildly anxious today and is tremulous at times. Denies headache, chest pain, heart palpitations. Denies SI/HI. Cumulative Hospital Course: The patient is a 52 y.o. male with PMH CAD s/p PCI to LAD in 2012, Seizures, HTN, GERD, HLD, depression  who presented to [facility] ED complaining of alcohol intoxication, depression and suicidal ideation. Mr. SLAUGHTER [facility] states he's been drinking alcohol daily since the age of 12. Previously drank a 30 case of beer then weaned himself to a pint and a half of whiskey daily and now he states he's Marlane Cap been drinking a pint\" every day. He smokes about 2 PPD. Tells me he wants to quit drinking because he knows it's going to kill him. States he feels depressed but denies suicidal/homicidal ideation. Additionally he does have history of a seizure disorder. He cannot tell me what medication he takes for this and has not seen a Neurologist \"in years\". Ethanol level is 332. Mr. SLAUGHTER [facility] was admitted to Hospitalist service and placed on CIWA protocol as well as Librium taper. Review of Systems:   14 point review of systems is negative except as specifically addressed above.     Objective:   VITALS:  BP (!) 188/94   Pulse 111   Temp 98.7 °F (37.1 °C) (Temporal)   Resp 20   Ht 6' (1.829 m)   Wt 278 lb (126.1 kg)   SpO2 94%   BMI 37.70 kg/m²   24HR INTAKE/OUTPUT:      Intake/Output Summary (Last 24 hours) at 9/12/2020 1410  Last data filed at 9/12/2020 1005  Gross per 24 hour   Intake 3480 ml   Output 500 ml   Net 2980 ml     General appearance: alert, appears stated age, cooperative and no distress, resting comfortably in bed  Head: Normocephalic, without obvious abnormality, atraumatic  Eyes: conjunctivae/corneas clear. PERRL, EOM's intact. Ears: normal external ears and nose, throat without exudate  Neck: no adenopathy, no carotid bruit, no JVD, supple, symmetrical, trachea midline   Lungs: coarse air entry without rhonchi, soft end expiratory wheezes   Heart: tachycardic, regular rhythm, S1, S2 normal, no murmur  Abdomen: soft, non-tender; non-distended, normal bowel sounds no masses, no organomegaly  Extremities:No lower extremity edema,  No erythema, no tenderness to palpation  Skin: Skin color, texture, turgor normal. No rashes or lesions  Lymphatic: No palpable lymph node enlargment  Neurologic: Alert and oriented X 3, normal strength and tone.  No focal deficits  Psychiatric: Calm, appropriate affect    Medications:      sodium chloride 125 mL/hr at 09/12/20 0215      amLODIPine  10 mg Oral Daily    atorvastatin  10 mg Oral Daily    clopidogrel  75 mg Oral Daily    lisinopril  40 mg Oral Daily    metoprolol succinate  25 mg Oral Daily    OLANZapine  5 mg Oral Nightly    sodium chloride flush  10 mL Intravenous 2 times per day    enoxaparin  40 mg Subcutaneous Q24H    thiamine  100 mg Oral Daily    multivitamin  1 tablet Oral Daily    nicotine  1 patch Transdermal Daily    folic acid  1 mg Oral Daily    chlordiazePOXIDE  25 mg Oral 4x Daily    pantoprazole  40 mg Oral QAM AC     diazePAM, HYDROcodone-acetaminophen, sodium chloride flush, acetaminophen **OR** acetaminophen, polyethylene glycol, promethazine **OR** ondansetron, potassium chloride **OR** potassium alternative oral replacement **OR** potassium chloride, magnesium sulfate, LORazepam **OR** LORazepam **OR** LORazepam **OR** LORazepam **OR** LORazepam **OR** LORazepam **OR** LORazepam **OR** LORazepam  DIET GENERAL;     Lab and other Data: Recent Labs     09/11/20  1457 09/12/20  0410   WBC 9.4 5.1   HGB 13.1* 12.4*    215     Recent Labs     09/11/20  1457 09/12/20  0410    140   K 2.6* 3.6   CL 93* 100   CO2 24 29   BUN 5* 5*   CREATININE 0.9 0.5   GLUCOSE 159* 113*     Recent Labs     09/11/20  1457 09/12/20  0410   AST 59* 124*   ALT 53* 57*   BILITOT 0.5 0.6   ALKPHOS 221* 230*     Assessment/Plan   Principal Problem:    Alcohol intoxication with moderate or severe use disorder, with unspecified complication (HCC)-Received banana bag in ED, continue IVF's, supplement thiamine, multivitamin, folic acid, CIWA protocol ordered, continue Librium taper     Active Problems:    Coronary atherosclerosis w/ history PCI-has been on Plavix in the past and patient uncertain if he still takes, home medications confirmed and resumed, Plavix continued       Elevated LFT's-likely 2/2 chronic alcohol use, if continues to worsen check acute hepatitis panel, monitor        Tobacco use-nicotine patch ordered, counseled on cessation        HLD (hyperlipidemia)-hold statin for now with elevated LFT's, check acute hepatitis panel       HTN (hypertension)-hypotensive upon arrival which has resolved. Zestril, Norvasc, Toprol XL ordered       Seizure disorder (HCC)-has been on Keppra in the past but not currently taking, continue seizure precautions       GERD (gastroesophageal reflux disease)-PPI       Depression-home meds resumed. Denies SI, improved mood today       Hypokalemia-resolved, monitor    Alcohol intoxication with withdrawal symptoms complicated by seizure history formerly on Keppra. Will monitor closely, continue seizure precautions. Once withdrawal symptoms subside and CIWA/Librium discontinued monitor for recurrent seizure prior to discharge.     DVT Prophylaxis: Lovenox    GI prophylaxis: Protonix    Chinyere Perez PA-C

## 2020-09-12 NOTE — PROGRESS NOTES
Comprehensive Nutrition Assessment    Type and Reason for Visit:  Initial, Positive Nutrition Screen    Nutrition Recommendations/Plan: continue current POC    Nutrition Assessment:  Well  nourished appearing gentleman, but at increased risk for nutritional compromise d/t Alcohol abuse and decreased po intake. Doesn't know why nursing said cultural/ethnic food preferences    Malnutrition Assessment:  Malnutrition Status:  No malnutrition    Context:  Acute Illness     Findings of the 6 clinical characteristics of malnutrition:  Energy Intake:  Mild decrease in energy intake (Comment)  Weight Loss:  No significant weight loss     Body Fat Loss:  No significant body fat loss     Muscle Mass Loss:  No significant muscle mass loss    Fluid Accumulation:  No significant fluid accumulation Extremities   Strength:  Not Performed    Nutrition Related Findings:  well nourished      Wounds:  None       Current Nutrition Therapies:    DIET GENERAL; Anthropometric Measures:  · Height: 6' (182.9 cm)  · Current Body Weight: 278 lb (126.1 kg)   · Admission Body Weight: 278 lb (126.1 kg)    · Usual Body Weight: 246 lb (111.6 kg)(2/11/2020)     · Ideal Body Weight: 178 lbs; % Ideal Body Weight     · BMI: 37.7  · Adjusted Body Weight:  ; No Adjustment   · BMI Categories:     Obese class II    Nutrition Diagnosis:   · Inadequate protein-energy intake related to acute injury/trauma, psychological cause or life stress as evidenced by poor intake prior to admission      Nutrition Interventions:   Food and/or Nutrient Delivery:  Continue Current Diet  Nutrition Education/Counseling:  No recommendation at this time   Coordination of Nutrition Care:  Continued Inpatient Monitoring    Goals:  pointake 50% or greater.   Weight stable       Nutrition Monitoring and Evaluation:   Behavioral-Environmental Outcomes:  Readiness for Change   Food/Nutrient Intake Outcomes:  Food and Nutrient Intake  Physical Signs/Symptoms Outcomes: Biochemical Data, Skin, Weight     Discharge Planning:     Too soon to determine     Electronically signed by Karry Phalen, MS, RD, LD on 9/12/20 at 11:18 AM CDT    Contact: [phone]

## 2020-09-13 LAB
ALBUMIN SERPL-MCNC: 3.7 G/DL (ref 3.5–5.2)
ALP BLD-CCNC: 212 U/L (ref 40–130)
ALT SERPL-CCNC: 46 U/L (ref 5–41)
ANION GAP SERPL CALCULATED.3IONS-SCNC: 13 MMOL/L (ref 7–19)
AST SERPL-CCNC: 64 U/L (ref 5–40)
BILIRUB SERPL-MCNC: 0.5 MG/DL (ref 0.2–1.2)
BUN BLDV-MCNC: 4 MG/DL (ref 6–20)
CALCIUM SERPL-MCNC: 8.6 MG/DL (ref 8.6–10)
CHLORIDE BLD-SCNC: 101 MMOL/L (ref 98–111)
CO2: 26 MMOL/L (ref 22–29)
CREAT SERPL-MCNC: 0.5 MG/DL (ref 0.5–1.2)
GFR AFRICAN AMERICAN: >59
GFR NON-AFRICAN AMERICAN: >60
GLUCOSE BLD-MCNC: 108 MG/DL (ref 74–109)
MAGNESIUM: 1.7 MG/DL (ref 1.6–2.6)
POTASSIUM REFLEX MAGNESIUM: 3.1 MMOL/L (ref 3.5–5)
SODIUM BLD-SCNC: 140 MMOL/L (ref 136–145)
TOTAL PROTEIN: 6 G/DL (ref 6.6–8.7)

## 2020-09-13 PROCEDURE — 6370000000 HC RX 637 (ALT 250 FOR IP): Performed by: PHYSICIAN ASSISTANT

## 2020-09-13 PROCEDURE — 36415 COLL VENOUS BLD VENIPUNCTURE: CPT

## 2020-09-13 PROCEDURE — 1210000000 HC MED SURG R&B

## 2020-09-13 PROCEDURE — 6360000002 HC RX W HCPCS: Performed by: PHYSICIAN ASSISTANT

## 2020-09-13 PROCEDURE — 99222 1ST HOSP IP/OBS MODERATE 55: CPT | Performed by: PSYCHIATRY & NEUROLOGY

## 2020-09-13 PROCEDURE — 6370000000 HC RX 637 (ALT 250 FOR IP): Performed by: INTERNAL MEDICINE

## 2020-09-13 PROCEDURE — 6370000000 HC RX 637 (ALT 250 FOR IP): Performed by: PSYCHIATRY & NEUROLOGY

## 2020-09-13 PROCEDURE — 2500000003 HC RX 250 WO HCPCS: Performed by: INTERNAL MEDICINE

## 2020-09-13 PROCEDURE — 83735 ASSAY OF MAGNESIUM: CPT

## 2020-09-13 PROCEDURE — 80053 COMPREHEN METABOLIC PANEL: CPT

## 2020-09-13 PROCEDURE — 6360000002 HC RX W HCPCS: Performed by: INTERNAL MEDICINE

## 2020-09-13 RX ORDER — LORAZEPAM 2 MG/ML
1 INJECTION INTRAMUSCULAR PRN
Status: DISCONTINUED | OUTPATIENT
Start: 2020-09-13 | End: 2020-09-14 | Stop reason: HOSPADM

## 2020-09-13 RX ORDER — METOPROLOL SUCCINATE 50 MG/1
50 TABLET, EXTENDED RELEASE ORAL DAILY
Status: DISCONTINUED | OUTPATIENT
Start: 2020-09-13 | End: 2020-09-14 | Stop reason: HOSPADM

## 2020-09-13 RX ORDER — ISOSORBIDE MONONITRATE 30 MG/1
30 TABLET, EXTENDED RELEASE ORAL DAILY
Status: DISCONTINUED | OUTPATIENT
Start: 2020-09-13 | End: 2020-09-14 | Stop reason: HOSPADM

## 2020-09-13 RX ORDER — LEVETIRACETAM 500 MG/1
500 TABLET ORAL 2 TIMES DAILY
Status: DISCONTINUED | OUTPATIENT
Start: 2020-09-13 | End: 2020-09-14 | Stop reason: HOSPADM

## 2020-09-13 RX ADMIN — HYDROCODONE BITARTRATE AND ACETAMINOPHEN 1 TABLET: 10; 325 TABLET ORAL at 23:59

## 2020-09-13 RX ADMIN — OLANZAPINE 5 MG: 10 TABLET, FILM COATED ORAL at 19:51

## 2020-09-13 RX ADMIN — LISINOPRIL 40 MG: 20 TABLET ORAL at 08:08

## 2020-09-13 RX ADMIN — HYDROCODONE BITARTRATE AND ACETAMINOPHEN 1 TABLET: 10; 325 TABLET ORAL at 11:23

## 2020-09-13 RX ADMIN — THERA TABS 1 TABLET: TAB at 08:08

## 2020-09-13 RX ADMIN — THIAMINE HCL TAB 100 MG 100 MG: 100 TAB at 08:07

## 2020-09-13 RX ADMIN — ACETAMINOPHEN 650 MG: 325 TABLET, FILM COATED ORAL at 21:41

## 2020-09-13 RX ADMIN — FOLIC ACID 1 MG: 1 TABLET ORAL at 08:07

## 2020-09-13 RX ADMIN — ACETAMINOPHEN 650 MG: 325 TABLET, FILM COATED ORAL at 15:34

## 2020-09-13 RX ADMIN — ATORVASTATIN CALCIUM 10 MG: 10 TABLET, FILM COATED ORAL at 08:08

## 2020-09-13 RX ADMIN — CHLORDIAZEPOXIDE HYDROCHLORIDE 25 MG: 25 CAPSULE ORAL at 08:08

## 2020-09-13 RX ADMIN — ISOSORBIDE MONONITRATE 30 MG: 30 TABLET, EXTENDED RELEASE ORAL at 10:17

## 2020-09-13 RX ADMIN — AMLODIPINE BESYLATE 10 MG: 10 TABLET ORAL at 08:08

## 2020-09-13 RX ADMIN — ACETAMINOPHEN 650 MG: 325 TABLET, FILM COATED ORAL at 08:14

## 2020-09-13 RX ADMIN — Medication 10 MG: at 08:08

## 2020-09-13 RX ADMIN — CHLORDIAZEPOXIDE HYDROCHLORIDE 25 MG: 25 CAPSULE ORAL at 19:51

## 2020-09-13 RX ADMIN — CLOPIDOGREL BISULFATE 75 MG: 75 TABLET ORAL at 08:08

## 2020-09-13 RX ADMIN — LORAZEPAM 1 MG: 1 TABLET ORAL at 08:08

## 2020-09-13 RX ADMIN — CHLORDIAZEPOXIDE HYDROCHLORIDE 25 MG: 25 CAPSULE ORAL at 14:59

## 2020-09-13 RX ADMIN — PANTOPRAZOLE SODIUM 40 MG: 40 TABLET, DELAYED RELEASE ORAL at 06:18

## 2020-09-13 RX ADMIN — HYDROCODONE BITARTRATE AND ACETAMINOPHEN 1 TABLET: 10; 325 TABLET ORAL at 17:36

## 2020-09-13 RX ADMIN — HYDRALAZINE HYDROCHLORIDE 10 MG: 20 INJECTION INTRAMUSCULAR; INTRAVENOUS at 02:49

## 2020-09-13 RX ADMIN — ENOXAPARIN SODIUM 40 MG: 40 INJECTION SUBCUTANEOUS at 17:36

## 2020-09-13 RX ADMIN — HYDROCODONE BITARTRATE AND ACETAMINOPHEN 1 TABLET: 10; 325 TABLET ORAL at 05:16

## 2020-09-13 RX ADMIN — POTASSIUM CHLORIDE 40 MEQ: 1500 TABLET, EXTENDED RELEASE ORAL at 06:18

## 2020-09-13 RX ADMIN — LEVETIRACETAM 500 MG: 500 TABLET ORAL at 20:12

## 2020-09-13 RX ADMIN — METOPROLOL SUCCINATE 50 MG: 50 TABLET, EXTENDED RELEASE ORAL at 08:08

## 2020-09-13 ASSESSMENT — PAIN DESCRIPTION - LOCATION
LOCATION: GENERALIZED

## 2020-09-13 ASSESSMENT — PAIN SCALES - GENERAL
PAINLEVEL_OUTOF10: 5
PAINLEVEL_OUTOF10: 8
PAINLEVEL_OUTOF10: 4
PAINLEVEL_OUTOF10: 0
PAINLEVEL_OUTOF10: 5
PAINLEVEL_OUTOF10: 9
PAINLEVEL_OUTOF10: 4
PAINLEVEL_OUTOF10: 0
PAINLEVEL_OUTOF10: 0
PAINLEVEL_OUTOF10: 10
PAINLEVEL_OUTOF10: 10
PAINLEVEL_OUTOF10: 0

## 2020-09-13 ASSESSMENT — PAIN - FUNCTIONAL ASSESSMENT
PAIN_FUNCTIONAL_ASSESSMENT: ACTIVITIES ARE NOT PREVENTED

## 2020-09-13 ASSESSMENT — PAIN DESCRIPTION - ORIENTATION
ORIENTATION: OTHER (COMMENT)

## 2020-09-13 ASSESSMENT — PAIN DESCRIPTION - DESCRIPTORS
DESCRIPTORS: ACHING

## 2020-09-13 ASSESSMENT — PAIN DESCRIPTION - FREQUENCY
FREQUENCY: CONTINUOUS

## 2020-09-13 ASSESSMENT — PAIN DESCRIPTION - PAIN TYPE
TYPE: CHRONIC PAIN

## 2020-09-13 ASSESSMENT — PAIN DESCRIPTION - DIRECTION
RADIATING_TOWARDS: NO

## 2020-09-13 ASSESSMENT — PAIN DESCRIPTION - ONSET
ONSET: ON-GOING

## 2020-09-13 ASSESSMENT — PAIN DESCRIPTION - PROGRESSION
CLINICAL_PROGRESSION: NOT CHANGED

## 2020-09-14 VITALS
TEMPERATURE: 97.3 F | SYSTOLIC BLOOD PRESSURE: 152 MMHG | OXYGEN SATURATION: 96 % | RESPIRATION RATE: 20 BRPM | BODY MASS INDEX: 37.65 KG/M2 | HEART RATE: 89 BPM | WEIGHT: 278 LBS | HEIGHT: 72 IN | DIASTOLIC BLOOD PRESSURE: 85 MMHG

## 2020-09-14 PROBLEM — E87.6 HYPOKALEMIA: Status: RESOLVED | Noted: 2020-09-11 | Resolved: 2020-09-14

## 2020-09-14 PROBLEM — F10.939 ALCOHOL WITHDRAWAL (HCC): Status: ACTIVE | Noted: 2020-09-14

## 2020-09-14 PROBLEM — F10.229 ALCOHOL INTOXICATION WITH MODERATE OR SEVERE USE DISORDER, WITH UNSPECIFIED COMPLICATION (HCC): Status: RESOLVED | Noted: 2020-09-11 | Resolved: 2020-09-14

## 2020-09-14 LAB
ALBUMIN SERPL-MCNC: 3.5 G/DL (ref 3.5–5.2)
ALP BLD-CCNC: 175 U/L (ref 40–130)
ALT SERPL-CCNC: 38 U/L (ref 5–41)
ANION GAP SERPL CALCULATED.3IONS-SCNC: 12 MMOL/L (ref 7–19)
AST SERPL-CCNC: 46 U/L (ref 5–40)
BILIRUB SERPL-MCNC: 0.4 MG/DL (ref 0.2–1.2)
BUN BLDV-MCNC: 8 MG/DL (ref 6–20)
CALCIUM SERPL-MCNC: 8.7 MG/DL (ref 8.6–10)
CHLORIDE BLD-SCNC: 104 MMOL/L (ref 98–111)
CO2: 24 MMOL/L (ref 22–29)
CREAT SERPL-MCNC: 0.5 MG/DL (ref 0.5–1.2)
GFR AFRICAN AMERICAN: >59
GFR NON-AFRICAN AMERICAN: >60
GLUCOSE BLD-MCNC: 105 MG/DL (ref 74–109)
MAGNESIUM: 1.8 MG/DL (ref 1.6–2.6)
POTASSIUM REFLEX MAGNESIUM: 3.1 MMOL/L (ref 3.5–5)
SODIUM BLD-SCNC: 140 MMOL/L (ref 136–145)
TOTAL PROTEIN: 6 G/DL (ref 6.6–8.7)

## 2020-09-14 PROCEDURE — 95819 EEG AWAKE AND ASLEEP: CPT | Performed by: PSYCHIATRY & NEUROLOGY

## 2020-09-14 PROCEDURE — 80053 COMPREHEN METABOLIC PANEL: CPT

## 2020-09-14 PROCEDURE — 36415 COLL VENOUS BLD VENIPUNCTURE: CPT

## 2020-09-14 PROCEDURE — 2580000003 HC RX 258: Performed by: PHYSICIAN ASSISTANT

## 2020-09-14 PROCEDURE — 99232 SBSQ HOSP IP/OBS MODERATE 35: CPT | Performed by: PSYCHIATRY & NEUROLOGY

## 2020-09-14 PROCEDURE — 83735 ASSAY OF MAGNESIUM: CPT

## 2020-09-14 PROCEDURE — 6370000000 HC RX 637 (ALT 250 FOR IP): Performed by: PHYSICIAN ASSISTANT

## 2020-09-14 PROCEDURE — 6370000000 HC RX 637 (ALT 250 FOR IP): Performed by: PSYCHIATRY & NEUROLOGY

## 2020-09-14 PROCEDURE — 95819 EEG AWAKE AND ASLEEP: CPT

## 2020-09-14 PROCEDURE — 6370000000 HC RX 637 (ALT 250 FOR IP): Performed by: INTERNAL MEDICINE

## 2020-09-14 RX ORDER — MULTIVITAMIN WITH IRON
1 TABLET ORAL DAILY
Qty: 30 TABLET | Refills: 0 | Status: SHIPPED | OUTPATIENT
Start: 2020-09-15

## 2020-09-14 RX ORDER — LEVETIRACETAM 500 MG/1
500 TABLET ORAL 2 TIMES DAILY
Qty: 60 TABLET | Refills: 3 | Status: SHIPPED | OUTPATIENT
Start: 2020-09-14

## 2020-09-14 RX ORDER — METOPROLOL SUCCINATE 50 MG/1
50 TABLET, EXTENDED RELEASE ORAL DAILY
Qty: 30 TABLET | Refills: 3 | Status: SHIPPED | OUTPATIENT
Start: 2020-09-15

## 2020-09-14 RX ORDER — CHLORDIAZEPOXIDE HYDROCHLORIDE 25 MG/1
25 CAPSULE, GELATIN COATED ORAL SEE ADMIN INSTRUCTIONS
Qty: 3 CAPSULE | Refills: 0 | Status: SHIPPED | OUTPATIENT
Start: 2020-09-15 | End: 2020-09-16

## 2020-09-14 RX ORDER — FOLIC ACID 1 MG/1
1 TABLET ORAL DAILY
Qty: 30 TABLET | Refills: 3 | Status: SHIPPED | OUTPATIENT
Start: 2020-09-15

## 2020-09-14 RX ORDER — NICOTINE 21 MG/24HR
1 PATCH, TRANSDERMAL 24 HOURS TRANSDERMAL DAILY
Qty: 30 PATCH | Refills: 3 | Status: SHIPPED | OUTPATIENT
Start: 2020-09-15 | End: 2020-10-17 | Stop reason: ALTCHOICE

## 2020-09-14 RX ORDER — LANOLIN ALCOHOL/MO/W.PET/CERES
100 CREAM (GRAM) TOPICAL DAILY
Qty: 30 TABLET | Refills: 3 | Status: SHIPPED | OUTPATIENT
Start: 2020-09-15

## 2020-09-14 RX ORDER — ISOSORBIDE MONONITRATE 30 MG/1
30 TABLET, EXTENDED RELEASE ORAL DAILY
Qty: 30 TABLET | Refills: 1 | Status: SHIPPED | OUTPATIENT
Start: 2020-09-15

## 2020-09-14 RX ADMIN — AMLODIPINE BESYLATE 10 MG: 10 TABLET ORAL at 08:15

## 2020-09-14 RX ADMIN — FOLIC ACID 1 MG: 1 TABLET ORAL at 08:15

## 2020-09-14 RX ADMIN — CLOPIDOGREL BISULFATE 75 MG: 75 TABLET ORAL at 08:15

## 2020-09-14 RX ADMIN — PANTOPRAZOLE SODIUM 40 MG: 40 TABLET, DELAYED RELEASE ORAL at 05:33

## 2020-09-14 RX ADMIN — THIAMINE HCL TAB 100 MG 100 MG: 100 TAB at 08:15

## 2020-09-14 RX ADMIN — SODIUM CHLORIDE: 9 INJECTION, SOLUTION INTRAVENOUS at 04:36

## 2020-09-14 RX ADMIN — LISINOPRIL 40 MG: 20 TABLET ORAL at 08:14

## 2020-09-14 RX ADMIN — HYDROCODONE BITARTRATE AND ACETAMINOPHEN 1 TABLET: 10; 325 TABLET ORAL at 06:21

## 2020-09-14 RX ADMIN — POTASSIUM CHLORIDE 40 MEQ: 1500 TABLET, EXTENDED RELEASE ORAL at 06:21

## 2020-09-14 RX ADMIN — METOPROLOL SUCCINATE 50 MG: 50 TABLET, EXTENDED RELEASE ORAL at 08:15

## 2020-09-14 RX ADMIN — ATORVASTATIN CALCIUM 10 MG: 10 TABLET, FILM COATED ORAL at 08:15

## 2020-09-14 RX ADMIN — ISOSORBIDE MONONITRATE 30 MG: 30 TABLET, EXTENDED RELEASE ORAL at 08:15

## 2020-09-14 RX ADMIN — LEVETIRACETAM 500 MG: 500 TABLET ORAL at 08:14

## 2020-09-14 RX ADMIN — HYDROCODONE BITARTRATE AND ACETAMINOPHEN 1 TABLET: 10; 325 TABLET ORAL at 12:43

## 2020-09-14 RX ADMIN — ACETAMINOPHEN 650 MG: 325 TABLET, FILM COATED ORAL at 11:32

## 2020-09-14 RX ADMIN — THERA TABS 1 TABLET: TAB at 08:15

## 2020-09-14 ASSESSMENT — PAIN DESCRIPTION - LOCATION
LOCATION: GENERALIZED
LOCATION: GENERALIZED

## 2020-09-14 ASSESSMENT — PAIN DESCRIPTION - ONSET
ONSET: ON-GOING
ONSET: ON-GOING

## 2020-09-14 ASSESSMENT — PAIN SCALES - GENERAL
PAINLEVEL_OUTOF10: 9
PAINLEVEL_OUTOF10: 9
PAINLEVEL_OUTOF10: 3
PAINLEVEL_OUTOF10: 7
PAINLEVEL_OUTOF10: 0

## 2020-09-14 ASSESSMENT — PAIN DESCRIPTION - FREQUENCY
FREQUENCY: CONTINUOUS
FREQUENCY: CONTINUOUS

## 2020-09-14 ASSESSMENT — PAIN DESCRIPTION - PROGRESSION
CLINICAL_PROGRESSION: NOT CHANGED
CLINICAL_PROGRESSION: NOT CHANGED

## 2020-09-14 ASSESSMENT — PAIN DESCRIPTION - DESCRIPTORS
DESCRIPTORS: NAGGING
DESCRIPTORS: ACHING

## 2020-09-14 ASSESSMENT — PAIN DESCRIPTION - PAIN TYPE
TYPE: CHRONIC PAIN
TYPE: CHRONIC PAIN

## 2020-09-14 ASSESSMENT — PAIN DESCRIPTION - ORIENTATION: ORIENTATION: OTHER (COMMENT)

## 2020-09-14 ASSESSMENT — PAIN - FUNCTIONAL ASSESSMENT
PAIN_FUNCTIONAL_ASSESSMENT: ACTIVITIES ARE NOT PREVENTED
PAIN_FUNCTIONAL_ASSESSMENT: ACTIVITIES ARE NOT PREVENTED

## 2020-09-14 ASSESSMENT — PAIN DESCRIPTION - DIRECTION: RADIATING_TOWARDS: NO

## 2020-09-14 NOTE — PROGRESS NOTES
[facility] Neurology Progress Note      Patient:   Rosa M Kirby  MR#:    [id number]   Room:    [facility]-   YOB: 1970  Date of Progress Note: 9/14/2020  Time of Note                           7:28 AM  Consulting Physician:  Kristi Farrar DO  Attending Physician: Mariel Garcia MD      INTERVAL HISTORY:  No acute events, no seizures, no new complaints this am.     REVIEW OF SYSTEMS:  Constitutional: No fevers No chills  Neck:No stiffness  Respiratory: No shortness of breath  Cardiovascular: No chest pain No palpitations  Gastrointestinal: No abdominal pain    Genitourinary: No Dysuria  Neurological: No headache, no confusion    PHYSICAL EXAM:    Constitutional -   BP (!) 188/110   Pulse 90   Temp 97.7 °F (36.5 °C) (Temporal)   Resp 20   Ht 6' (1.829 m)   Wt 278 lb (126.1 kg)   SpO2 98%   BMI 37.70 kg/m²   General appearance: No acute distress   EYES -   Conjunctiva normal  Pupillary exam as below, see CN exam in the neurologic exam  ENT-    No scars, masses, or lesions over external nose or ears  Hearing normal bilaterally to finger rub  Neck-   No neck masses noted  Thyroid normal   No jugular vein distension  Cardiovascular -   No clubbing, cyanosis, or edema   Pulmonary-   Good expansion, normal effort without use of accessory muscles  Musculoskeletal -   No significant wasting of muscles noted  Gait as below, see gait exam in the neurologic exam  Muscle strength, tone, stability as below see the motor exam in the neurologic exam.   No bony deformities  Skin -   Warm, dry, and intact to inspection and palpation.     No rash, erythema, or pallor  Psychiatric -   Mood, affect, and behavior appear normal    Memory as below see mental status examination in the neurologic exam      NEUROLOGICAL EXAM    Mental status   [x] Awake, alert, oriented   [x] Affect attention and concentration appear appropriate  [x] Recent and remote memory appears unremarkable  [x] Speech normal without dysarthria or aphasia, comprehension and repetition intact. COMMENTS:   Cranial Nerves [x] No VF deficit to confrontation  [x] PERRLA, EOMI, no nystagmus, conjugate eye movements, no ptosis  [x] Face symmetric  [x] Facial sensation intact  [x] Tongue midline no atrophy or fasciculations present  [x] Palate midline, hearing to finger rub normal  [x] Shoulder shrug and SCM testing normal  COMMENTS:   Motor   [x] 5/5 strength x 4 extremities  [x] Normal bulk and tone  [x] No tremor present  [x] No rigidity or bradykinesia noted  COMMENTS:   Sensory  [x] Sensation intact to light touch, pin prick, vibration, and proprioception BLE  [] Sensation intact to light touch, pin prick, vibration, and proprioception BUE  COMMENTS:   Coordination [x] FTN normal bilaterally   [] HTS normal bilaterally  [] CHUY normal.   COMMENTS:   Reflexes  [x] Symmetric and non-pathological  [x] Toes downgoing bilaterally  [x] No clonus present  COMMENTS:   Gait                  [] Normal steady gait    [] Ataxic    [] Spastic     [] Magnetic     [] Shuffling  [x] Not assessed  COMMENTS:       LABS/IMAGING:    Lab Results   Component Value Date    WBC 5.1 09/12/2020    HGB 12.4 (L) 09/12/2020    HCT 36.9 (L) 09/12/2020    MCV 92.3 09/12/2020     09/12/2020     Lab Results   Component Value Date     09/14/2020    K 3.1 (L) 09/14/2020     09/14/2020    CO2 24 09/14/2020    BUN 8 09/14/2020    CREATININE 0.5 09/14/2020    GLUCOSE 105 09/14/2020    CALCIUM 8.7 09/14/2020    PROT 6.0 (L) 09/14/2020    LABALBU 3.5 09/14/2020    BILITOT 0.4 09/14/2020    ALKPHOS 175 (H) 09/14/2020    AST 46 (H) 09/14/2020    ALT 38 09/14/2020    LABGLOM >60 09/14/2020    GFRAA >59 09/14/2020     Lab Results   Component Value Date    INR 1.07 06/25/2019    PROTIME 13.3 06/25/2019       RECORD REVIEW:   Previous medical records, medications were reviewed at today's visit. Nursing/physician notes, imaging, labs and vitals reviewed.  PT,OT and/or speech

## 2020-09-14 NOTE — CARE COORDINATION
Met with patient to discuss discharge plans. Current Readmission Risk score is 15%. Patient lives at home alone. His mother provides support, when needed. She lives two miles away from patient. Patient does not have any durable medical equipment and is not receiving Home Health services. He reports that he is able to afford his medications at this time. At discharge, his mother will provide transportation. Provided patient with list of Addiction facilities, both inpatient and outpatient. Patient states \"he doesn't need anything right now. Everything is going good right now. \"  Asked patient to keep list for future reference.   Electronically signed by Campos Garza RN on 9/14/2020 at 9:36 AM

## 2020-09-14 NOTE — PROCEDURES
ADULT INPATIENT ELECTROENCEPHALOGRAM REPORT    Patient:   Geovanny Centeno  MR#:    [id number]  Room #:    INPATIENT  YOB: 1970  Date of Evaluation:  9/14/2020  Primary Physician:     TYRONE Lindquist CNP   Referring Physician:   Jacquie Ledezma DO      CLINICAL INFORMATION:     This patient is a 52 y.o. male with a history of seizures. MEDICATIONS:     See MAR. RECORDING CONDITIONS:     This EEG was performed utilizing standard International 10-20 System of electrode placement, with additional channels monitored for eye movement. One channel electrocardiogram was monitored. Data was obtained, stored, and interpreted according to ACNS guidelines (J Clin Neurophysiol 2006;23(2):) utilizing referential montage recording, with reformatting to longitudinal, transverse bipolar, and referential montages as necessary for interpretation, along with the digital/automated EEG analysis. Patient tolerated entire procedure well. Photic stimulation and hyperventilation were utilized as activation procedures unless otherwise specified below. E.E.G. DESCRIPTION:     The resting predominant posterior background frequency is a 9-10 Hz 30-40 uV rhythm. No overt focal, lateralizing, or paroxysmal abnormalities were noted through the recording. Drowsiness was demonstrated by slow rolling eye movements followed by a loss of the background waking activities. Onset of stage I sleep was demonstrated by gradual disappearance of background waking rhythms with gradual symmetric mixed frequency 4-7 Hz slowing. Stage II sleep was characterized by vertex transients, sleep-spindles, and K-complexes, at times with shifting asymmetry demonstrated. Hyperventilation was not performed. Photic stimulation was performed and had little change on the recording. No ECG abnormalities were noted. Muscle, motion, and eye movement artifacts were noted.        EEG INTERPRETATION:    Normal EEG for age in the awake,

## 2020-09-14 NOTE — CONSULTS
disease     GERD (gastroesophageal reflux disease)     Hyperlipidemia     Hypertension     Seizures ([address]        PAST SURGICAL HISTORY:      Procedure Laterality Date    CORONARY ANGIOPLASTY WITH STENT PLACEMENT      ELBOW SURGERY      KNEE ARTHROSCOPY         SOCIAL HISTORY:   TOBACCO:   reports that he has been smoking cigarettes. He has been smoking about 1.00 pack per day. He has never used smokeless tobacco.  ETOH:   reports current alcohol use. DRUG:    Social History     Substance and Sexual Activity   Drug Use Not Currently       FAMILY HISTORY:   History reviewed. No pertinent family history.     MEDICATIONS:      Current Facility-Administered Medications:     metoprolol succinate (TOPROL XL) extended release tablet 50 mg, 50 mg, Oral, Daily, Indigo Casarez MD, 50 mg at 09/13/20 0808    isosorbide mononitrate (IMDUR) extended release tablet 30 mg, 30 mg, Oral, Daily, Indigo Casarez MD, 30 mg at 09/13/20 1017    amLODIPine (NORVASC) tablet 10 mg, 10 mg, Oral, Daily, Indigo Casarez MD, 10 mg at 09/13/20 1301    atorvastatin (LIPITOR) tablet 10 mg, 10 mg, Oral, Daily, Indigo Casarez MD, 10 mg at 09/13/20 0808    clopidogrel (PLAVIX) tablet 75 mg, 75 mg, Oral, Daily, Indigo Casarez MD, 75 mg at 09/13/20 0808    diazePAM (VALIUM) tablet 10 mg, 10 mg, Oral, Q8H PRN, Indigo Casarez MD    HYDROcodone-acetaminophen [facility])  MG per tablet 1 tablet, 1 tablet, Oral, Q6H PRN, Indigo Casarez MD, 1 tablet at 09/13/20 1736    lisinopril (PRINIVIL;ZESTRIL) tablet 40 mg, 40 mg, Oral, Daily, Indigo Casarez MD, 40 mg at 09/13/20 0808    OLANZapine (ZYPREXA) tablet 5 mg, 5 mg, Oral, Nightly, Indigo Casarez MD, 5 mg at 09/13/20 1951    labetalol (NORMODYNE;TRANDATE) injection 10 mg, 10 mg, Intravenous, Q4H PRN, Indigo Casarez MD, 10 mg at 09/13/20 0808    hydrALAZINE (APRESOLINE) injection 10 mg, 10 mg, Intravenous, Q4H PRN, Indigo Casarez MD, 10 mg at 09/13/20 0249    sodium chloride flush LORazepam (ATIVAN) tablet 3 mg, 3 mg, Oral, Q1H PRN **OR** LORazepam (ATIVAN) injection 3 mg, 3 mg, Intravenous, Q1H PRN **OR** LORazepam (ATIVAN) tablet 4 mg, 4 mg, Oral, Q1H PRN **OR** LORazepam (ATIVAN) injection 4 mg, 4 mg, Intravenous, Q1H PRN, Matt Mccullough PA-C    folic acid (FOLVITE) tablet 1 mg, 1 mg, Oral, Daily, Matt Mccullough PA-C, 1 mg at 09/13/20 0807    pantoprazole (PROTONIX) tablet 40 mg, 40 mg, Oral, QAM AC, Matt Mccullough PA-C, 40 mg at 09/13/20 0903    ALLERGIES:    Patient has no known allergies. PHYSICAL EXAM:    Constitutional -   BP (!) 159/91   Pulse 96   Temp 97.5 °F (36.4 °C) (Temporal)   Resp 16   Ht 6' (1.829 m)   Wt 278 lb (126.1 kg)   SpO2 98%   BMI 37.70 kg/m²   General appearance: No acute distress   EYES -   Conjunctiva normal  Pupillary exam as below, see CN exam in the neurologic exam  ENT-    No scars, masses, or lesions over external nose or ears  Oropharynx without erythema, palate midline  Cardiovascular -   No clubbing, cyanosis, or edema   Pulmonary-   Good expansion, normal effort without use of accessory muscles  Musculoskeletal -   No significant wasting of muscles noted  Gait as below, see gait exam in the neurologic exam  Muscle strength, tone, stability as below see the motor exam in the neurologic exam.   No bony deformities  Skin -   Warm, dry, and intact to inspection and palpation. No rash, erythema, or pallor  Psychiatric -   Mood, affect, and behavior appear normal    Memory as below see mental status examination in the neurologic exam      NEUROLOGICAL EXAM    Mental status   [x] Awake, alert, oriented   [x] Affect attention and concentration appear appropriate  [x] Recent and remote memory appears unremarkable  [x] Speech normal without dysarthria or aphasia, comprehension and repetition intact.    COMMENTS:   Cranial Nerves [x] No VF deficit to confrontation  [x] PERRLA, EOMI, no nystagmus, conjugate eye movements, no ptosis  [x] Face symmetric  [x] Facial sensation intact  [x] Tongue midline no atrophy or fasciculations present  [x] Palate midline, hearing to finger rub normal  [x] Shoulder shrug and SCM testing normal  COMMENTS:   Motor   [x] 5/5 strength x 4 extremities  [x] Normal bulk and tone  [x] No tremor present  [x] No rigidity or bradykinesia noted  COMMENTS:   Sensory  [x] Sensation intact to light touch, pin prick, vibration, and proprioception BLE  [] Sensation intact to light touch, pin prick, vibration, and proprioception BUE  COMMENTS:   Coordination [x] FTN normal bilaterally   [] HTS normal bilaterally  [] CHUY normal.   COMMENTS:   Reflexes  [x] Symmetric and non-pathological  [x] Toes downgoing bilaterally  [x] No clonus present  COMMENTS:   Gait                  [] Normal steady gait    [] Ataxic    [] Spastic     [] Magnetic     [] Shuffling  [x] Not assessed  COMMENTS:       LABS/IMAGING:    As below and per HPI    Recent Labs     09/11/20 1457 09/12/20  0410   WBC 9.4 5.1   HGB 13.1* 12.4*    215     Recent Labs     09/11/20 1457 09/12/20  0410 09/13/20  0305    140 140   K 2.6* 3.6 3.1*   CL 93* 100 101   CO2 24 29 26   BUN 5* 5* 4*   CREATININE 0.9 0.5 0.5   GLUCOSE 159* 113* 108     Recent Labs     09/11/20 1457 09/12/20  0410 09/13/20  0305   AST 59* 124* 64*   ALT 53* 57* 46*   BILITOT 0.5 0.6 0.5   ALKPHOS 221* 230* 212*       ASSESSMENT:  52 y.o. admitted with alcohol intoxication and withdrawal symptoms. He also has an underlying seizure history that is likely substance induced. No current seizures noted, but does have a propensity to have seizures during times of alcohol withdrawal.       PLAN:  1. Will add Keppra 500 mg bid for now as his seizures typically occur during times of withdrawal.   2.  EEG  3. MRI brain as an outpatient, exam is non-focal and he has no current focal neurologic complaints, but does need imaging to complete his seizure workup which can be pursued as an outpatient. 4.  Epilepsy precautions and seizure first aid discussed. No driving, heights, swimming, tub baths, open flames, or heavy machinery. 5.  Seizure precautions, prn ativan. 6.  Librium, CIWA, thiamine. Please feel free to call with any questions. [phone] (cell phone).     Pete Listen, DO  Board Certified Neurology

## 2020-09-14 NOTE — DISCHARGE SUMMARY
Gabrielle Anderson  :  1970  MRN:  [id number]    Admit date:  2020  Discharge date:   2020    Discharging Physician:  Dr. Todd Tolentino Directive: Full Code    Consults: neurology    Primary Care Physician:  Enid Baltazar, APRN - CNP    Discharge Diagnoses:  Principal Problem (Resolved):    Alcohol intoxication with moderate or severe use disorder, with unspecified complication ([facility]  Active Problems:    Coronary atherosclerosis    HLD (hyperlipidemia)    HTN (hypertension)    Seizure disorder (HCC)    GERD (gastroesophageal reflux disease)    Depression    Alcohol withdrawal ([facility] [address]  Resolved Problems:    Hypokalemia  Portions of this note have been copied forward, however, changed to reflect the most current clinical status of this patient. Hospital Course: The patient is a 52 y.o. male with PMH CAD s/p PCI to LAD in , Seizures, HTN, GERD, HLD, depression  who presented to [facility] ED complaining of alcohol intoxication, depression and suicidal ideation. Mr. Zee Herman states he's been drinking alcohol daily since the age of 12. Previously drank a 30 case of beer then weaned himself to a pint and a half of whiskey daily and now he states he's [place] been drinking a pint\" every day. He smokes about 2 PPD. Tells me he wants to quit drinking because he knows it's going to kill him. States he feels depressed but denies suicidal/homicidal ideation. Additionally he does have history of a seizure disorder. He cannot tell me what medication he takes for this and has not seen a Neurologist \"in years\". Ethanol level is 332. Mr. Zee Herman was admitted to Hospitalist service and placed on CIWA protocol as well as Librium taper.  Neurology consulted and EEG performed and read as normal. Neuro clearing pt for discharge as well as attending hospitalist.       Significant Diagnostic Studies:   EEG  2020  -Normal EEG for age in the awake, drowsy, and sleep states    Pertinent Labs:   CBC:   Recent Labs 09/12/20  0410   WBC 5.1   HGB 12.4*        BMP:    Recent Labs     09/12/20  0410 09/13/20  0305 09/14/20  0421    140 140   K 3.6 3.1* 3.1*    101 104   CO2 29 26 24   BUN 5* 4* 8   CREATININE 0.5 0.5 0.5   GLUCOSE 113* 108 105       Physical Exam:  Vital Signs: BP (!) 152/85   Pulse 89   Temp 97.3 °F (36.3 °C) (Temporal)   Resp 20   Ht 6' (1.829 m)   Wt 278 lb (126.1 kg)   SpO2 96%   BMI 37.70 kg/m²   General appearance:. Alert and Cooperative   HEENT: Normocephalic. Chest: clear to auscultation bilaterally without wheezes or rhonchi. Cardiac: Normal heart tones with regular rate and rhythm, S1, S2 normal. No murmurs, gallops, or rubs auscultated. Abdomen:soft, non-tender; non-distended normal bowel sounds no masses, no organomegaly. Extremities: No clubbing or cyanosis. No peripheral edema. Peripheral pulses palpable. Neurologic: Grossly intact. Discharge Medications:       [address] Medication Instructions [id number]    Printed on:09/14/20 1630   Medication Information                      amLODIPine (NORVASC) 10 MG tablet  Take 10 mg by mouth daily             atorvastatin (LIPITOR) 10 MG tablet  Take 10 mg by mouth daily             chlordiazePOXIDE (LIBRIUM) 25 MG capsule  Take 1 capsule by mouth See Admin Instructions for 1 day. Starting on 9/15/20 Take 25mg twice daily x1 day, then 25mg daily x1 day             clopidogrel (PLAVIX) 75 MG tablet  Take 75 mg by mouth daily             diazePAM (VALIUM) 10 MG tablet  Take 10 mg by mouth every 6 hours as needed for Anxiety.              folic acid (FOLVITE) 1 MG tablet  Take 1 tablet by mouth daily             HYDROcodone-acetaminophen (NORCO)  MG per tablet  Take 1 tablet by mouth every 6 hours as needed for Pain.             isosorbide mononitrate (IMDUR) 30 MG extended release tablet  Take 1 tablet by mouth daily             levETIRAcetam (KEPPRA) 500 MG tablet  Take 1 tablet by mouth 2 times daily             lisinopril (PRINIVIL;ZESTRIL) 40 MG tablet  Take 40 mg by mouth daily             metoprolol succinate (TOPROL XL) 50 MG extended release tablet  Take 1 tablet by mouth daily             Multiple Vitamin (MULTIVITAMIN) TABS tablet  Take 1 tablet by mouth daily             nicotine (NICODERM CQ) 21 MG/24HR  Place 1 patch onto the skin daily             OLANZapine (ZYPREXA) 5 MG tablet  Take 5 mg by mouth nightly             vitamin B-1 100 MG tablet  Take 1 tablet by mouth daily                 Discharge Instructions: Follow up with PCP in 5-7days. Take medications as directed. Resume activity as tolerated. Diet: DIET GENERAL;     Disposition: Patient is medically stable and will be discharged home. Time spent on discharge 35 minutes spent in assessing patient, reviewing medications, discussion with nursing, confirming safe discharge plan and preparation of discharge summary.     Signed:  Tara Cantu PA-C

## 2020-09-14 NOTE — PROGRESS NOTES
12 hour chart check review completed. Electronically signed by Leonor Moncada LPN on 3/74/7064 at 5:96 AM

## 2020-09-14 NOTE — PLAN OF CARE
Problem: Falls - Risk of:  Goal: Will remain free from falls  Description: Will remain free from falls  Outcome: Ongoing  Goal: Absence of physical injury  Description: Absence of physical injury  Outcome: Ongoing     Problem: Discharge Planning:  Goal: Discharged to appropriate level of care  Description: Discharged to appropriate level of care  Outcome: Ongoing     Problem: Fluid Volume - Deficit:  Goal: Absence of fluid volume deficit signs and symptoms  Description: Absence of fluid volume deficit signs and symptoms  Outcome: Ongoing     Problem: Nutrition Deficit:  Goal: Ability to achieve adequate nutritional intake will improve  Description: Ability to achieve adequate nutritional intake will improve  Outcome: Ongoing     Problem: Sleep Pattern Disturbance:  Goal: Appears well-rested  Description: Appears well-rested  Outcome: Ongoing     Problem: Violence - Risk of, Self/Other-Directed:  Goal: Knowledge of developmental care interventions  Description: Absence of violence  Outcome: Ongoing     Problem: Safety:  Goal: Ability to remain free from injury will improve  Description: Ability to remain free from injury will improve  Outcome: Ongoing     Problem: Self-Concept:  Goal: Level of anxiety will decrease  Description: Level of anxiety will decrease  Outcome: Ongoing  Goal: Ability to verbalize feelings about condition will improve  Description: Ability to verbalize feelings about condition will improve  Outcome: Ongoing     Problem: Pain:  Goal: Pain level will decrease  Description: Pain level will decrease  Outcome: Ongoing  Goal: Control of acute pain  Description: Control of acute pain  Outcome: Ongoing  Goal: Control of chronic pain  Description: Control of chronic pain  Outcome: Ongoing

## 2020-10-17 ENCOUNTER — HOSPITAL ENCOUNTER (OUTPATIENT)
Age: 50
Setting detail: OBSERVATION
Discharge: HOME | End: 2020-10-20
Attending: EMERGENCY MEDICINE | Admitting: INTERNAL MEDICINE
Payer: MEDICARE

## 2020-10-17 PROBLEM — R45.851 SUICIDAL IDEATION: Status: ACTIVE | Noted: 2020-10-17

## 2020-10-17 LAB
ACETAMINOPHEN LEVEL: <15 UG/ML
ALBUMIN SERPL-MCNC: 4.7 G/DL (ref 3.5–5.2)
ALP BLD-CCNC: 120 U/L (ref 40–130)
ALT SERPL-CCNC: 51 U/L (ref 5–41)
AMPHETAMINE SCREEN, URINE: NEGATIVE
ANION GAP SERPL CALCULATED.3IONS-SCNC: 13 MMOL/L (ref 7–19)
AST SERPL-CCNC: 52 U/L (ref 5–40)
BARBITURATE SCREEN URINE: NEGATIVE
BASOPHILS ABSOLUTE: 0.1 K/UL (ref 0–0.2)
BASOPHILS RELATIVE PERCENT: 0.9 % (ref 0–1)
BENZODIAZEPINE SCREEN, URINE: POSITIVE
BILIRUB SERPL-MCNC: 0.3 MG/DL (ref 0.2–1.2)
BUN BLDV-MCNC: 7 MG/DL (ref 6–20)
CALCIUM SERPL-MCNC: 9 MG/DL (ref 8.6–10)
CANNABINOID SCREEN URINE: NEGATIVE
CHLORIDE BLD-SCNC: 95 MMOL/L (ref 98–111)
CO2: 22 MMOL/L (ref 22–29)
COCAINE METABOLITE SCREEN URINE: NEGATIVE
CREAT SERPL-MCNC: 0.8 MG/DL (ref 0.5–1.2)
EOSINOPHILS ABSOLUTE: 0.3 K/UL (ref 0–0.6)
EOSINOPHILS RELATIVE PERCENT: 3.5 % (ref 0–5)
ETHANOL: 62 MG/DL (ref 0–0.08)
GFR AFRICAN AMERICAN: >59
GFR NON-AFRICAN AMERICAN: >60
GLUCOSE BLD-MCNC: 113 MG/DL (ref 74–109)
HCT VFR BLD CALC: 40.9 % (ref 42–52)
HEMOGLOBIN: 13.7 G/DL (ref 14–18)
IMMATURE GRANULOCYTES #: 0 K/UL
LYMPHOCYTES ABSOLUTE: 2.5 K/UL (ref 1.1–4.5)
LYMPHOCYTES RELATIVE PERCENT: 29 % (ref 20–40)
Lab: ABNORMAL
MCH RBC QN AUTO: 30.4 PG (ref 27–31)
MCHC RBC AUTO-ENTMCNC: 33.5 G/DL (ref 33–37)
MCV RBC AUTO: 90.7 FL (ref 80–94)
MONOCYTES ABSOLUTE: 0.7 K/UL (ref 0–0.9)
MONOCYTES RELATIVE PERCENT: 8.4 % (ref 0–10)
NEUTROPHILS ABSOLUTE: 5 K/UL (ref 1.5–7.5)
NEUTROPHILS RELATIVE PERCENT: 58 % (ref 50–65)
OPIATE SCREEN URINE: POSITIVE
PDW BLD-RTO: 13.8 % (ref 11.5–14.5)
PLATELET # BLD: 260 K/UL (ref 130–400)
PMV BLD AUTO: 8.8 FL (ref 9.4–12.4)
POTASSIUM REFLEX MAGNESIUM: 4.2 MMOL/L (ref 3.5–5)
RBC # BLD: 4.51 M/UL (ref 4.7–6.1)
SALICYLATE, SERUM: <3 MG/DL (ref 3–10)
SODIUM BLD-SCNC: 130 MMOL/L (ref 136–145)
TOTAL PROTEIN: 7.5 G/DL (ref 6.6–8.7)
WBC # BLD: 8.6 K/UL (ref 4.8–10.8)

## 2020-10-17 PROCEDURE — 80053 COMPREHEN METABOLIC PANEL: CPT

## 2020-10-17 PROCEDURE — 99999 PR OFFICE/OUTPT VISIT,PROCEDURE ONLY: CPT | Performed by: EMERGENCY MEDICINE

## 2020-10-17 PROCEDURE — 85025 COMPLETE CBC W/AUTO DIFF WBC: CPT

## 2020-10-17 PROCEDURE — G0480 DRUG TEST DEF 1-7 CLASSES: HCPCS

## 2020-10-17 PROCEDURE — 99283 EMERGENCY DEPT VISIT LOW MDM: CPT

## 2020-10-17 PROCEDURE — 99284 EMERGENCY DEPT VISIT MOD MDM: CPT

## 2020-10-17 PROCEDURE — 1210000000 HC MED SURG R&B

## 2020-10-17 PROCEDURE — 6370000000 HC RX 637 (ALT 250 FOR IP): Performed by: NURSE PRACTITIONER

## 2020-10-17 PROCEDURE — 80307 DRUG TEST PRSMV CHEM ANLYZR: CPT

## 2020-10-17 PROCEDURE — 36415 COLL VENOUS BLD VENIPUNCTURE: CPT

## 2020-10-17 RX ORDER — LORAZEPAM 1 MG/1
3 TABLET ORAL
Status: DISCONTINUED | OUTPATIENT
Start: 2020-10-17 | End: 2020-10-20 | Stop reason: HOSPADM

## 2020-10-17 RX ORDER — OLANZAPINE 5 MG/1
5 TABLET ORAL NIGHTLY
Status: DISCONTINUED | OUTPATIENT
Start: 2020-10-18 | End: 2020-10-20 | Stop reason: HOSPADM

## 2020-10-17 RX ORDER — ATORVASTATIN CALCIUM 10 MG/1
10 TABLET, FILM COATED ORAL NIGHTLY
Status: DISCONTINUED | OUTPATIENT
Start: 2020-10-18 | End: 2020-10-20 | Stop reason: HOSPADM

## 2020-10-17 RX ORDER — LORAZEPAM 1 MG/1
1 TABLET ORAL
Status: DISCONTINUED | OUTPATIENT
Start: 2020-10-17 | End: 2020-10-20 | Stop reason: HOSPADM

## 2020-10-17 RX ORDER — ISOSORBIDE MONONITRATE 30 MG/1
30 TABLET, EXTENDED RELEASE ORAL DAILY
Status: DISCONTINUED | OUTPATIENT
Start: 2020-10-18 | End: 2020-10-20 | Stop reason: HOSPADM

## 2020-10-17 RX ORDER — LORAZEPAM 2 MG/ML
3 INJECTION INTRAMUSCULAR
Status: DISCONTINUED | OUTPATIENT
Start: 2020-10-17 | End: 2020-10-20 | Stop reason: HOSPADM

## 2020-10-17 RX ORDER — SODIUM CHLORIDE 0.9 % (FLUSH) 0.9 %
10 SYRINGE (ML) INJECTION PRN
Status: DISCONTINUED | OUTPATIENT
Start: 2020-10-17 | End: 2020-10-18

## 2020-10-17 RX ORDER — LORAZEPAM 2 MG/ML
4 INJECTION INTRAMUSCULAR
Status: DISCONTINUED | OUTPATIENT
Start: 2020-10-17 | End: 2020-10-20 | Stop reason: HOSPADM

## 2020-10-17 RX ORDER — LEVETIRACETAM 500 MG/1
500 TABLET ORAL 2 TIMES DAILY
Status: DISCONTINUED | OUTPATIENT
Start: 2020-10-17 | End: 2020-10-20 | Stop reason: HOSPADM

## 2020-10-17 RX ORDER — LORAZEPAM 2 MG/ML
1 INJECTION INTRAMUSCULAR
Status: DISCONTINUED | OUTPATIENT
Start: 2020-10-17 | End: 2020-10-20 | Stop reason: HOSPADM

## 2020-10-17 RX ORDER — SODIUM CHLORIDE 0.9 % (FLUSH) 0.9 %
10 SYRINGE (ML) INJECTION EVERY 12 HOURS SCHEDULED
Status: DISCONTINUED | OUTPATIENT
Start: 2020-10-18 | End: 2020-10-18

## 2020-10-17 RX ORDER — LORAZEPAM 1 MG/1
4 TABLET ORAL
Status: DISCONTINUED | OUTPATIENT
Start: 2020-10-17 | End: 2020-10-20 | Stop reason: HOSPADM

## 2020-10-17 RX ORDER — LORAZEPAM 1 MG/1
2 TABLET ORAL
Status: DISCONTINUED | OUTPATIENT
Start: 2020-10-17 | End: 2020-10-20 | Stop reason: HOSPADM

## 2020-10-17 RX ORDER — DIAZEPAM 10 MG/1
10 TABLET ORAL EVERY 6 HOURS PRN
Status: DISCONTINUED | OUTPATIENT
Start: 2020-10-17 | End: 2020-10-18

## 2020-10-17 RX ORDER — LISINOPRIL 20 MG/1
40 TABLET ORAL DAILY
Status: DISCONTINUED | OUTPATIENT
Start: 2020-10-18 | End: 2020-10-20 | Stop reason: HOSPADM

## 2020-10-17 RX ORDER — METOPROLOL SUCCINATE 50 MG/1
50 TABLET, EXTENDED RELEASE ORAL DAILY
Status: DISCONTINUED | OUTPATIENT
Start: 2020-10-18 | End: 2020-10-20 | Stop reason: HOSPADM

## 2020-10-17 RX ORDER — LORAZEPAM 2 MG/ML
2 INJECTION INTRAMUSCULAR
Status: DISCONTINUED | OUTPATIENT
Start: 2020-10-17 | End: 2020-10-20 | Stop reason: HOSPADM

## 2020-10-17 RX ORDER — THIAMINE MONONITRATE (VIT B1) 100 MG
100 TABLET ORAL DAILY
Status: DISCONTINUED | OUTPATIENT
Start: 2020-10-18 | End: 2020-10-20 | Stop reason: HOSPADM

## 2020-10-17 RX ORDER — NICOTINE 21 MG/24HR
1 PATCH, TRANSDERMAL 24 HOURS TRANSDERMAL DAILY
Status: DISCONTINUED | OUTPATIENT
Start: 2020-10-17 | End: 2020-10-18

## 2020-10-17 RX ORDER — NICOTINE 21 MG/24HR
1 PATCH, TRANSDERMAL 24 HOURS TRANSDERMAL DAILY
Status: DISCONTINUED | OUTPATIENT
Start: 2020-10-18 | End: 2020-10-20 | Stop reason: HOSPADM

## 2020-10-17 RX ORDER — CLOPIDOGREL BISULFATE 75 MG/1
75 TABLET ORAL DAILY
Status: DISCONTINUED | OUTPATIENT
Start: 2020-10-18 | End: 2020-10-20 | Stop reason: HOSPADM

## 2020-10-17 RX ORDER — AMLODIPINE BESYLATE 10 MG/1
10 TABLET ORAL DAILY
Status: DISCONTINUED | OUTPATIENT
Start: 2020-10-18 | End: 2020-10-20 | Stop reason: HOSPADM

## 2020-10-17 RX ORDER — MULTIVITAMIN WITH IRON
1 TABLET ORAL DAILY
Status: DISCONTINUED | OUTPATIENT
Start: 2020-10-18 | End: 2020-10-20 | Stop reason: HOSPADM

## 2020-10-17 RX ORDER — FOLIC ACID 1 MG/1
1 TABLET ORAL DAILY
Status: DISCONTINUED | OUTPATIENT
Start: 2020-10-18 | End: 2020-10-20 | Stop reason: HOSPADM

## 2020-10-17 ASSESSMENT — ENCOUNTER SYMPTOMS
COUGH: 0
RHINORRHEA: 0
DIARRHEA: 0
VOMITING: 0
NAUSEA: 0
ABDOMINAL PAIN: 0
SORE THROAT: 0
TROUBLE SWALLOWING: 0
CONSTIPATION: 0
SHORTNESS OF BREATH: 0

## 2020-10-18 LAB
ANION GAP SERPL CALCULATED.3IONS-SCNC: 11 MMOL/L (ref 7–19)
BUN BLDV-MCNC: 6 MG/DL (ref 6–20)
CALCIUM SERPL-MCNC: 9.3 MG/DL (ref 8.6–10)
CHLORIDE BLD-SCNC: 102 MMOL/L (ref 98–111)
CO2: 24 MMOL/L (ref 22–29)
CREAT SERPL-MCNC: 0.9 MG/DL (ref 0.5–1.2)
ETHANOL: <10 MG/DL (ref 0–0.08)
GFR AFRICAN AMERICAN: >59
GFR NON-AFRICAN AMERICAN: >60
GLUCOSE BLD-MCNC: 105 MG/DL (ref 74–109)
HCT VFR BLD CALC: 38.9 % (ref 42–52)
HEMOGLOBIN: 12.9 G/DL (ref 14–18)
MCH RBC QN AUTO: 30.3 PG (ref 27–31)
MCHC RBC AUTO-ENTMCNC: 33.2 G/DL (ref 33–37)
MCV RBC AUTO: 91.3 FL (ref 80–94)
PDW BLD-RTO: 13.8 % (ref 11.5–14.5)
PLATELET # BLD: 246 K/UL (ref 130–400)
PMV BLD AUTO: 9 FL (ref 9.4–12.4)
POTASSIUM REFLEX MAGNESIUM: 4.3 MMOL/L (ref 3.5–5)
RBC # BLD: 4.26 M/UL (ref 4.7–6.1)
SODIUM BLD-SCNC: 137 MMOL/L (ref 136–145)
WBC # BLD: 6.3 K/UL (ref 4.8–10.8)

## 2020-10-18 PROCEDURE — 6370000000 HC RX 637 (ALT 250 FOR IP): Performed by: HOSPITALIST

## 2020-10-18 PROCEDURE — 85027 COMPLETE CBC AUTOMATED: CPT

## 2020-10-18 PROCEDURE — 6370000000 HC RX 637 (ALT 250 FOR IP): Performed by: PSYCHIATRY & NEUROLOGY

## 2020-10-18 PROCEDURE — 80048 BASIC METABOLIC PNL TOTAL CA: CPT

## 2020-10-18 PROCEDURE — 36415 COLL VENOUS BLD VENIPUNCTURE: CPT

## 2020-10-18 PROCEDURE — G0378 HOSPITAL OBSERVATION PER HR: HCPCS

## 2020-10-18 PROCEDURE — 1210000000 HC MED SURG R&B

## 2020-10-18 PROCEDURE — 2580000003 HC RX 258: Performed by: HOSPITALIST

## 2020-10-18 PROCEDURE — G0480 DRUG TEST DEF 1-7 CLASSES: HCPCS

## 2020-10-18 PROCEDURE — 6360000002 HC RX W HCPCS: Performed by: HOSPITALIST

## 2020-10-18 PROCEDURE — 99221 1ST HOSP IP/OBS SF/LOW 40: CPT | Performed by: PSYCHIATRY & NEUROLOGY

## 2020-10-18 PROCEDURE — 96372 THER/PROPH/DIAG INJ SC/IM: CPT

## 2020-10-18 RX ORDER — NALOXONE HYDROCHLORIDE 0.4 MG/ML
0.4 INJECTION, SOLUTION INTRAMUSCULAR; INTRAVENOUS; SUBCUTANEOUS PRN
Status: DISCONTINUED | OUTPATIENT
Start: 2020-10-18 | End: 2020-10-20 | Stop reason: HOSPADM

## 2020-10-18 RX ORDER — HYDROXYZINE HYDROCHLORIDE 25 MG/1
25 TABLET, FILM COATED ORAL 3 TIMES DAILY PRN
Status: DISCONTINUED | OUTPATIENT
Start: 2020-10-18 | End: 2020-10-20 | Stop reason: HOSPADM

## 2020-10-18 RX ORDER — POLYETHYLENE GLYCOL 3350 17 G/17G
17 POWDER, FOR SOLUTION ORAL DAILY PRN
Status: DISCONTINUED | OUTPATIENT
Start: 2020-10-18 | End: 2020-10-20 | Stop reason: HOSPADM

## 2020-10-18 RX ORDER — ONDANSETRON 4 MG/1
4 TABLET, ORALLY DISINTEGRATING ORAL EVERY 8 HOURS PRN
Status: DISCONTINUED | OUTPATIENT
Start: 2020-10-18 | End: 2020-10-20 | Stop reason: HOSPADM

## 2020-10-18 RX ORDER — LANOLIN ALCOHOL/MO/W.PET/CERES
3 CREAM (GRAM) TOPICAL NIGHTLY
Status: DISCONTINUED | OUTPATIENT
Start: 2020-10-18 | End: 2020-10-20 | Stop reason: HOSPADM

## 2020-10-18 RX ORDER — SODIUM CHLORIDE 0.9 % (FLUSH) 0.9 %
10 SYRINGE (ML) INJECTION EVERY 12 HOURS SCHEDULED
Status: DISCONTINUED | OUTPATIENT
Start: 2020-10-18 | End: 2020-10-20 | Stop reason: HOSPADM

## 2020-10-18 RX ORDER — HYDROXYZINE HYDROCHLORIDE 10 MG/1
10 TABLET, FILM COATED ORAL 3 TIMES DAILY PRN
Status: DISCONTINUED | OUTPATIENT
Start: 2020-10-18 | End: 2020-10-18

## 2020-10-18 RX ORDER — POTASSIUM CHLORIDE 20 MEQ/1
40 TABLET, EXTENDED RELEASE ORAL PRN
Status: DISCONTINUED | OUTPATIENT
Start: 2020-10-18 | End: 2020-10-20 | Stop reason: HOSPADM

## 2020-10-18 RX ORDER — SODIUM CHLORIDE 0.9 % (FLUSH) 0.9 %
10 SYRINGE (ML) INJECTION PRN
Status: DISCONTINUED | OUTPATIENT
Start: 2020-10-18 | End: 2020-10-20 | Stop reason: HOSPADM

## 2020-10-18 RX ORDER — HYDROCODONE BITARTRATE AND ACETAMINOPHEN 10; 325 MG/1; MG/1
1 TABLET ORAL EVERY 6 HOURS PRN
Status: COMPLETED | OUTPATIENT
Start: 2020-10-18 | End: 2020-10-19

## 2020-10-18 RX ORDER — MAGNESIUM SULFATE IN WATER 40 MG/ML
2 INJECTION, SOLUTION INTRAVENOUS PRN
Status: DISCONTINUED | OUTPATIENT
Start: 2020-10-18 | End: 2020-10-20 | Stop reason: HOSPADM

## 2020-10-18 RX ORDER — POTASSIUM CHLORIDE 7.45 MG/ML
10 INJECTION INTRAVENOUS PRN
Status: DISCONTINUED | OUTPATIENT
Start: 2020-10-18 | End: 2020-10-20 | Stop reason: HOSPADM

## 2020-10-18 RX ORDER — ONDANSETRON 2 MG/ML
4 INJECTION INTRAMUSCULAR; INTRAVENOUS EVERY 6 HOURS PRN
Status: DISCONTINUED | OUTPATIENT
Start: 2020-10-18 | End: 2020-10-20 | Stop reason: HOSPADM

## 2020-10-18 RX ADMIN — SODIUM CHLORIDE, PRESERVATIVE FREE 10 ML: 5 INJECTION INTRAVENOUS at 20:22

## 2020-10-18 RX ADMIN — ATORVASTATIN CALCIUM 10 MG: 10 TABLET, FILM COATED ORAL at 20:21

## 2020-10-18 RX ADMIN — HYDROCODONE BITARTRATE AND ACETAMINOPHEN 1 TABLET: 10; 325 TABLET ORAL at 16:34

## 2020-10-18 RX ADMIN — Medication 3 MG: at 20:22

## 2020-10-18 RX ADMIN — ISOSORBIDE MONONITRATE 30 MG: 30 TABLET, EXTENDED RELEASE ORAL at 10:41

## 2020-10-18 RX ADMIN — SODIUM CHLORIDE, PRESERVATIVE FREE 10 ML: 5 INJECTION INTRAVENOUS at 10:43

## 2020-10-18 RX ADMIN — HYDROCODONE BITARTRATE AND ACETAMINOPHEN 1 TABLET: 10; 325 TABLET ORAL at 10:41

## 2020-10-18 RX ADMIN — ENOXAPARIN SODIUM 40 MG: 40 INJECTION SUBCUTANEOUS at 10:41

## 2020-10-18 RX ADMIN — LEVETIRACETAM 500 MG: 500 TABLET ORAL at 01:22

## 2020-10-18 RX ADMIN — METOPROLOL SUCCINATE 50 MG: 50 TABLET, EXTENDED RELEASE ORAL at 10:41

## 2020-10-18 RX ADMIN — LISINOPRIL 40 MG: 20 TABLET ORAL at 10:40

## 2020-10-18 RX ADMIN — CLOPIDOGREL BISULFATE 75 MG: 75 TABLET ORAL at 10:41

## 2020-10-18 RX ADMIN — AMLODIPINE BESYLATE 10 MG: 10 TABLET ORAL at 10:41

## 2020-10-18 RX ADMIN — LEVETIRACETAM 500 MG: 500 TABLET ORAL at 20:21

## 2020-10-18 RX ADMIN — THIAMINE HCL TAB 100 MG 100 MG: 100 TAB at 10:41

## 2020-10-18 RX ADMIN — LEVETIRACETAM 500 MG: 500 TABLET ORAL at 10:41

## 2020-10-18 RX ADMIN — FOLIC ACID 1 MG: 1 TABLET ORAL at 10:41

## 2020-10-18 RX ADMIN — THERA TABS 1 TABLET: TAB at 10:42

## 2020-10-18 RX ADMIN — HYDROCODONE BITARTRATE AND ACETAMINOPHEN 1 TABLET: 10; 325 TABLET ORAL at 22:37

## 2020-10-18 ASSESSMENT — PAIN SCALES - GENERAL
PAINLEVEL_OUTOF10: 8
PAINLEVEL_OUTOF10: 8
PAINLEVEL_OUTOF10: 1
PAINLEVEL_OUTOF10: 10

## 2020-10-18 NOTE — ED PROVIDER NOTES
[facility] EMERGENCY DEPT  eMERGENCY dEPARTMENT eNCOUnter      Pt Name: Gurpreet Carrillo  MRN: [id number]  [place] 1970  Date of evaluation: 10/17/2020  Provider: TYRONE Hills    CHIEF COMPLAINT       Chief Complaint   Patient presents with   [address] Problem     depression worse today with SI with several plans    Alcohol Problem     pint of whiskey and some rubbing alcohol today         HISTORY OF PRESENT ILLNESS   (Location/Symptom, Timing/Onset,Context/Setting, Quality, Duration, Modifying Factors, Severity)  Note limiting factors. Gurpreet Cousin is a 52 y.o. male who presents to the emergency department with complaints of depression with suicidal thoughts. Patient states that he has chronic depression/bipolar disease. He is followed by Dr. Ellie Calvo.  He states he drinks alcohol daily. His family relates that he will drink as much as he can get. Today has had 2 pints of whiskey and drank rubbing alcohol. He relates he has been having suicidal thoughts of cutting his wrist or shooting self in head with a gun. He was brought in for evaluation by his mother. He relates he has attempted to overdose in the past but taking aspirin and also a bottle of Benadryl. He states he did not seek treatment after either attempt. He states that it is been a long time since he attempted. He denies any increased stressors. He states he has a daughter that does not talk to him. He has no friends and nobody cares for him except for his mother. He denies any auditory or visual hallucinations. He denies any drug use. HPI    NursingNotes were reviewed. REVIEW OF SYSTEMS    (2-9 systems for level 4, 10 or more for level 5)     Review of Systems   Constitutional: Negative for activity change, appetite change, fatigue, fever and unexpected weight change. HENT: Negative for ear pain, rhinorrhea, sore throat and trouble swallowing. Eyes: Negative for visual disturbance.    Respiratory: Negative for cough and shortness of breath. Cardiovascular: Negative for chest pain, palpitations and leg swelling. Gastrointestinal: Negative for abdominal pain, constipation, diarrhea, nausea and vomiting. Genitourinary: Negative for flank pain. Musculoskeletal: Negative for arthralgias, myalgias, neck pain and neck stiffness. Neurological: Negative for headaches. Psychiatric/Behavioral: Positive for dysphoric mood and suicidal ideas. Negative for decreased concentration, hallucinations and sleep disturbance. The patient is not nervous/anxious. A complete review of systems was performed and is negative except as noted above in the HPI. PAST MEDICAL HISTORY     Past Medical History:   Diagnosis Date    Alcohol abuse     Coronary artery disease     GERD (gastroesophageal reflux disease)     Hyperlipidemia     Hypertension     Seizures (HCC)          SURGICAL HISTORY       Past Surgical History:   Procedure Laterality Date    CORONARY ANGIOPLASTY WITH STENT PLACEMENT      ELBOW SURGERY      KNEE ARTHROSCOPY           CURRENT MEDICATIONS       Previous Medications    AMLODIPINE (NORVASC) 10 MG TABLET    Take 10 mg by mouth daily    ATORVASTATIN (LIPITOR) 10 MG TABLET    Take 10 mg by mouth daily    CLOPIDOGREL (PLAVIX) 75 MG TABLET    Take 75 mg by mouth daily    DIAZEPAM (VALIUM) 10 MG TABLET    Take 10 mg by mouth every 6 hours as needed for Anxiety. FOLIC ACID (FOLVITE) 1 MG TABLET    Take 1 tablet by mouth daily    HYDROCODONE-ACETAMINOPHEN (NORCO)  MG PER TABLET    Take 1 tablet by mouth every 6 hours as needed for Pain.     ISOSORBIDE MONONITRATE (IMDUR) 30 MG EXTENDED RELEASE TABLET    Take 1 tablet by mouth daily    LEVETIRACETAM (KEPPRA) 500 MG TABLET    Take 1 tablet by mouth 2 times daily    LISINOPRIL (PRINIVIL;ZESTRIL) 40 MG TABLET    Take 40 mg by mouth daily    METOPROLOL SUCCINATE (TOPROL XL) 50 MG EXTENDED RELEASE TABLET    Take 1 tablet by mouth daily Appearance: Normal appearance. HENT:      Head: Normocephalic and atraumatic. Nose: Nose normal.      Mouth/Throat:      Mouth: Mucous membranes are moist.      Pharynx: Oropharynx is clear. Eyes:      Conjunctiva/sclera: Conjunctivae normal.   Neck:      Musculoskeletal: Normal range of motion and neck supple. Cardiovascular:      Rate and Rhythm: Normal rate and regular rhythm. Pulses: Normal pulses. Heart sounds: Normal heart sounds. Pulmonary:      Effort: Pulmonary effort is normal.      Breath sounds: Normal breath sounds. Abdominal:      General: Bowel sounds are normal. There is no distension. Palpations: Abdomen is soft. Tenderness: There is no abdominal tenderness. There is no guarding. Musculoskeletal: Normal range of motion. Skin:     General: Skin is warm. Neurological:      Mental Status: He is alert and oriented to person, place, and time. Psychiatric:         Attention and Perception: Attention normal.         Mood and Affect: Mood is depressed. Speech: Speech normal.         Behavior: Behavior normal. Behavior is cooperative. Thought Content:  Thought content normal.         Cognition and Memory: Cognition normal.         Judgment: Judgment normal.         DIAGNOSTIC RESULTS     EKG: All EKG's are interpreted by the Emergency Department Physician who either signs or Co-signs this chart in the absence of a cardiologist.        RADIOLOGY:   Non-plain film images such as CT, Ultrasound and MRI are read by the radiologist. Eliverto Ode images are visualized and preliminarily interpreted by the emergency physician with the below findings:        Interpretation per the Radiologist below, if available at the time of this note:    No orders to display         ED BEDSIDE ULTRASOUND:   Performed by ED Physician - none    LABS:  Labs Reviewed   CBC WITH AUTO DIFFERENTIAL - Abnormal; Notable for the following components:       Result Value    RBC 4.51 (*)     Hemoglobin 13.7 (*)     Hematocrit 40.9 (*)     MPV 8.8 (*)     All other components within normal limits   COMPREHENSIVE METABOLIC PANEL W/ REFLEX TO MG FOR LOW K - Abnormal; Notable for the following components:    Sodium 130 (*)     Chloride 95 (*)     Glucose 113 (*)     ALT 51 (*)     AST 52 (*)     All other components within normal limits   URINE DRUG SCREEN - Abnormal; Notable for the following components:    Benzodiazepine Screen, Urine Positive (*)     Opiate Scrn, Ur Positive (*)     All other components within normal limits   ETHANOL   ACETAMINOPHEN LEVEL   SALICYLATE LEVEL       All other labs were within normal range or not returned as of this dictation. EMERGENCY DEPARTMENT COURSE and DIFFERENTIALDIAGNOSIS/MDM:   Vitals:    Vitals:    10/17/20 2009   BP: 117/76   Pulse: 110   Resp: 18   Temp: 98.2 °F (36.8 °C)   SpO2: 93%   Weight: 278 lb (126.1 kg)   Height: 6' (1.829 m)       MDM  Number of Diagnoses or Management Options  Acute alcoholic intoxication without complication ([facility] [address]):   Depression with suicidal ideation:   Diagnosis management comments: Discussed case with Dr. Karyn Alberto the ER attending who is going to follow-up on patient after psych evaluation is complete and disposition. Amount and/or Complexity of Data Reviewed  Clinical lab tests: ordered and reviewed          CONSULTS:  IP CONSULT TO PSYCHIATRY    PROCEDURES:  Unless otherwise notedbelow, none     Procedures    FINAL IMPRESSION     1. Depression with suicidal ideation    2.  Acute alcoholic intoxication without complication ([facility] [address]          DISPOSITION/PLAN   DISPOSITION        PATIENT REFERRED TO:  @FUP@    DISCHARGE MEDICATIONS:  New Prescriptions    No medications on file          (Please note that portions of this note were completed with a voice recognition program.  Efforts were made to edit the dictations butoccasionally words are mis-transcribed.)    TYRONE Shearer (electronically signed)  AttendingEmergency Physician         Caity Ortgea, APRN  10/17/20 6744

## 2020-10-18 NOTE — BH NOTE
and also a bottle of Benadryl. He states he did not seek treatment after either attempt. He states that it is been a long time since he attempted. He denies any increased stressors. He states he has a daughter that does not talk to him. He has no friends and nobody cares for him except for his mother. He denies any auditory or visual hallucinations. He denies any drug use. Duration of symptoms:     Current Stressors: family, marital and drug and alcohol    C-SSRS Completed: yes    SI:  admits to   Plan: yes   Past SI attempts: no   If yes, when and how many times:  Describe suicide attempts:   HI: denies  If yes describe:   Delusions: denies  If yes describe:   Hallucinations: denies   If yes describe:   Risk of Harm to self: Self injurious/self mutilation behaviors:  no   If yes explain:   Was it within the past 6 months:    Risk of Harm to others: no   If yes explain:   Was it within the past 6 months:     Trauma History: denies    Anxiety 1-10:    Explain if increased:   Depression 1-10:    Explain if increased:   Level of function outside hospital decreased: no   If yes explain:       Psychiatric Hospitalizations: No   Where & When:   Outpatient Psychiatric Treatment:    Family History:    Family history of mental illness:no   Family members with suicide attempt: no and yes   If yes explain (attempted or completed): Uncle by OD. Substance Abuse History:     SBIRT Completed: yes  Brief Intervention completed if needed:  (Yes/No)    Current ETOH LEVELS: 62    ETOH Usage:     Amount drinking daily: heavy    Date of last drink: 10/17/2020  Longest period of sobriety:Vague response. Shares after a rehab that was \"clean for a few years\". Substance/Chemical Abuse/Recreational Drug History:  Substance used: denies  Date of last substance use:    Tobacco Use: no and yes   History of rehab treatment: yes  How many times in rehab:once  Last time in rehab:unknow  Family history of substance abuse:    Opiates: It was discussed with pt they would not be receiving opiates unless they were within 3 days post surgery/acute injury. Patient voiced understanding and agreed. Psychiatric Review Of Systems:     Recent Sleep changes: no   Recent appetite changes: no   Recent weight changes/Pounds gained (+) or lost (-): no      Medical History:     Medical Diagnosis/Issues: HTN, Hyperlipidemia, CAD, Seizures, Chronic Pain  CT today in ED:no  Use of 02 or CPAP: no  Ambulatory: yes  Independent or Need assistance with Self Care:     PCP: TYRONE Castaneda - CNP     Current Medications:   Scheduled Meds:   Current Facility-Administered Medications:     nicotine (NICODERM CQ) 21 MG/24HR 1 patch, 1 patch, Transdermal, Daily, TYRONE Sorensen    Current Outpatient Medications:     isosorbide mononitrate (IMDUR) 30 MG extended release tablet, Take 1 tablet by mouth daily, Disp: 30 tablet, Rfl: 1    levETIRAcetam (KEPPRA) 500 MG tablet, Take 1 tablet by mouth 2 times daily, Disp: 60 tablet, Rfl: 3    metoprolol succinate (TOPROL XL) 50 MG extended release tablet, Take 1 tablet by mouth daily, Disp: 30 tablet, Rfl: 3    folic acid (FOLVITE) 1 MG tablet, Take 1 tablet by mouth daily, Disp: 30 tablet, Rfl: 3    Multiple Vitamin (MULTIVITAMIN) TABS tablet, Take 1 tablet by mouth daily, Disp: 30 tablet, Rfl: 0    vitamin B-1 100 MG tablet, Take 1 tablet by mouth daily, Disp: 30 tablet, Rfl: 3    OLANZapine (ZYPREXA) 5 MG tablet, Take 5 mg by mouth nightly, Disp: , Rfl:     amLODIPine (NORVASC) 10 MG tablet, Take 10 mg by mouth daily, Disp: , Rfl:     atorvastatin (LIPITOR) 10 MG tablet, Take 10 mg by mouth daily, Disp: , Rfl:     lisinopril (PRINIVIL;ZESTRIL) 40 MG tablet, Take 40 mg by mouth daily, Disp: , Rfl:     diazePAM (VALIUM) 10 MG tablet, Take 10 mg by mouth every 6 hours as needed for Anxiety. , Disp: , Rfl:     HYDROcodone-acetaminophen (NORCO)  MG per tablet, Take 1 tablet by mouth every 6 hours as needed for Pain., Disp: , Rfl:     clopidogrel (PLAVIX) 75 MG tablet, Take 75 mg by mouth daily, Disp: , Rfl:      Mental Status Evaluation:     Appearance:  age appropriate   Behavior:  Noted to have trembling. Calm during interview. Speech:  normal pitch and normal volume   Mood:  anxious and depressed   Affect:  normal   Thought Process:  circumstantial   Thought Content:  suicidal   Sensorium:  person, place, time/date, situation, day of week and year   Cognition:  grossly intact   Insight:  impaired due to  Alcohol use, depression. Collateral Information:     Patient refuses collateral at this time. Name:   Relationship:   Phone Number:   Collateral:     Current living arrangement: With MotherSenait Chand  Current Support System: Mother  Employment:disabled for approximately 10 years. Disposition:     Choose one of the options below for disposition:     1. Decision to admit to [facility]: no    If yes, which unit Adult or Geriatric Unit:    Is patient voluntary: yes   If no has a 72 hold been initiated:   Admission Diagnosis:   Patient admitted to medical floor for alcohol wd observation with a psychiatric consult. Does the patient have a guardian or Medical POA: no    Has the guardian been notified or Medical POA: na        Other follow up information provided: Patient reports guns in Mother's household available to him. Recommended that guns be removed from household before discharged. Patient agrees.        Kenyatta Dillon RN

## 2020-10-18 NOTE — H&P
Hypertension     Seizures (HCC)      Past Psychiatric History:  As per above    Past Surgical History:   Procedure Laterality Date    CORONARY ANGIOPLASTY WITH STENT PLACEMENT      ELBOW SURGERY      KNEE ARTHROSCOPY       Social History     Tobacco History     Smoking Status  Current Every Day Smoker Smoking Frequency  1 pack/day Smoking Tobacco Type  Cigarettes    Smokeless Tobacco Use  Never Used    Tobacco Comment  pint a day          Alcohol History     Alcohol Use Status  Yes Comment  alcoholic          Drug Use     Drug Use Status  Not Currently           Family History:  Deferred    Allergies:  No Known Allergies    Current Facility-Administered Medications   Medication Dose Route Frequency Provider Last Rate Last Dose    sodium chloride flush 0.9 % injection 10 mL  10 mL Intravenous 2 times per day Uriel Noble MD        sodium chloride flush 0.9 % injection 10 mL  10 mL Intravenous PRN Uriel Noble MD        enoxaparin (LOVENOX) injection 40 mg  40 mg Subcutaneous Daily Uriel Noble MD        magnesium sulfate 2 g in 50 mL IVPB premix  2 g Intravenous PRN Uriel Noble MD        potassium chloride (KLOR-CON M) extended release tablet 40 mEq  40 mEq Oral PRN Uriel Noble MD        Or    potassium bicarb-citric acid (EFFER-K) effervescent tablet 40 mEq  40 mEq Oral PRN Uriel Noble MD        Or    potassium chloride 10 mEq/100 mL IVPB (Peripheral Line)  10 mEq Intravenous PRN Uriel Noble MD        polyethylene glycol [facility]) packet 17 g  17 g Oral Daily PRN Uriel Noble MD        ondansetron (ZOFRAN-ODT) disintegrating tablet 4 mg  4 mg Oral Q8H PRN Uriel Noble MD        Or    ondansetron [facility]) injection 4 mg  4 mg Intravenous Q6H PRN Uriel Noble MD        HYDROcodone-acetaminophen [facility])  MG per tablet 1 tablet  1 tablet Oral Q6H PRORLY Noble MD        naloxone [facility]) injection 0.4 mg  0.4 mg Intravenous PRORLY Noble MD        vitamin B-1 extended release tablet Take 1 tablet by mouth daily 9/15/20  Yes Roger Dietz MD   levETIRAcetam (KEPPRA) 500 MG tablet Take 1 tablet by mouth 2 times daily 9/14/20  Yes Roger Dietz MD   metoprolol succinate (TOPROL XL) 50 MG extended release tablet Take 1 tablet by mouth daily 9/15/20  Yes Roger Dietz MD   folic acid (FOLVITE) 1 MG tablet Take 1 tablet by mouth daily 9/15/20  Yes Roger Dietz MD   Multiple Vitamin (MULTIVITAMIN) TABS tablet Take 1 tablet by mouth daily 9/15/20  Yes Roger Dietz MD   vitamin B-1 100 MG tablet Take 1 tablet by mouth daily 9/15/20  Yes Roger Dietz MD   OLANZapine (ZYPREXA) 5 MG tablet Take 5 mg by mouth nightly   Yes Historical Provider, MD   amLODIPine (NORVASC) 10 MG tablet Take 10 mg by mouth daily   Yes Historical Provider, MD   atorvastatin (LIPITOR) 10 MG tablet Take 10 mg by mouth daily   Yes Historical Provider, MD   lisinopril (PRINIVIL;ZESTRIL) 40 MG tablet Take 40 mg by mouth daily   Yes Historical Provider, MD   diazePAM (VALIUM) 10 MG tablet Take 10 mg by mouth every 6 hours as needed for Anxiety. Yes Historical Provider, MD   HYDROcodone-acetaminophen (NORCO)  MG per tablet Take 1 tablet by mouth every 6 hours as needed for Pain. Yes Historical Provider, MD   clopidogrel (PLAVIX) 75 MG tablet Take 75 mg by mouth daily   Yes Historical Provider, MD         OBJECTIVE:    Vitals:    10/17/20 2009   BP: 117/76   Pulse: 110   Resp: 18   Temp: 98.2 °F (36.8 °C)   SpO2: 93%   breathing on room air    /76   Pulse 110   Temp 98.2 °F (36.8 °C)   Resp 18   Ht 6' (1.829 m)   Wt 278 lb (126.1 kg)   SpO2 93%   BMI 37.70 kg/m²     No intake or output data in the 24 hours ending 10/17/20 2313    Physical Exam  Vitals signs reviewed. Constitutional:       General: He is not in acute distress. Appearance: Normal appearance. He is normal weight. He is not ill-appearing or toxic-appearing. HENT:      Head: Normocephalic and atraumatic. Nose: No congestion or rhinorrhea. Eyes:      General:         Right eye: No discharge. Left eye: No discharge. Neck:      Musculoskeletal: Neck supple. Comments: Trachea appears midline  Cardiovascular:      Rate and Rhythm: Normal rate and regular rhythm. Heart sounds: No murmur. No friction rub. No gallop. Pulmonary:      Effort: Pulmonary effort is normal. No respiratory distress. Breath sounds: No stridor. No wheezing, rhonchi or rales. Chest:      Chest wall: No tenderness. Abdominal:      General: Bowel sounds are normal.      Palpations: Abdomen is soft. Tenderness: There is no abdominal tenderness. There is no guarding or rebound. Musculoskeletal:      Right lower leg: No edema. Left lower leg: No edema. Comments: Has no wasting of fat or muscle stores   Skin:     General: Skin is warm. Comments: nondiaphoretic   Neurological:      Mental Status: He is alert and oriented to person, place, and time. Psychiatric:         Mood and Affect: Mood normal.         Behavior: Behavior normal.       LABORATORY DATA:    CBC:   Recent Labs     10/17/20  2030   WBC 8.6   HGB 13.7*   HCT 40.9*        BMP:   Recent Labs     10/17/20  2030   *   K 4.2   CL 95*   CO2 22   BUN 7   CREATININE 0.8   CALCIUM 9.0     Hepatic Profile:   Recent Labs     10/17/20  2030   AST 52*   ALT 51*   BILITOT 0.3   ALKPHOS 120       IMAGING:  No results found.         ASESSMENTS & PLANS:    Suicidal Ideation: wanted to overdose on meds or cut himself or shoot himself   Alcohol Dependence withOUT Withdrawal: drinks about a pint a day, states he has anxiety when he doesn't drink but nothing more, had withdrawal seizure and last was stated to be a year ago  Tobacco Dependence: 1 PPD  Sitter   Suicide precautions  Psych consultant on case as per EP  No sign whatsoever of withdrawal, but will order a CIWA scale anyway  Multivit & Thiamine & Folic Acid  Nicoderm 57CQE patches  Counseled for tobacco cessation >3 minutes      Chronic Medical Problems:  Continue medical regimen as indicated  OLANZapine  5 mg Oral Nightly   [START ON 10/18/2020] metoprolol succinate  50 mg Oral Daily   [START ON 10/18/2020] lisinopril  40 mg Oral Daily   levETIRAcetam  500 mg Oral BID   [START ON 10/18/2020] isosorbide mononitrate  30 mg Oral Daily   [START ON 10/18/2020] clopidogrel  75 mg Oral Daily   atorvastatin  10 mg Oral Nightly   [START ON 10/18/2020] amLODIPine  10 mg Oral Daily       Supoportive and Prophylactic Txx:  DVT Prophylaxis: Lovenox SQ  GI (PUD) Ppx: not indicated  PT consult for evalutation and Txx as indicated: not indicated  DIET GENERAL; Safety Tray; Safety Tray (Disposables No Utensils)  sodium chloride flush, magnesium sulfate, potassium chloride **OR** potassium alternative oral replacement **OR** potassium chloride, polyethylene glycol, ondansetron **OR** ondansetron, sodium chloride flush, LORazepam **OR** LORazepam **OR** LORazepam **OR** LORazepam **OR** LORazepam **OR** LORazepam **OR** LORazepam **OR** LORazepam, diazePAM      Care time of >45 minutes  Pt seen/examined and admitted to inpatient status. Inpatient status is used for patients with an expected LOS extending past two midnights due to medical therapy and or critical care needs, otherwise patients are placed to OBServation status. Signed:  Electronically signed by Genesis Otero MD on 10/18/20 at 07:52 CDT.

## 2020-10-18 NOTE — PLAN OF CARE
Problem: Suicide risk  Description: Suicide risk  Goal: Provide patient with safe environment  Description: Provide patient with safe environment  Outcome: Ongoing     Problem: Falls - Risk of:  Goal: Will remain free from falls  Description: Will remain free from falls  Outcome: Ongoing  Goal: Absence of physical injury  Description: Absence of physical injury  Outcome: Ongoing

## 2020-10-18 NOTE — PROGRESS NOTES
[facility]    Patient:  Lisandro Burns  YOB: 1970  Date of Service: 10/18/2020  MRN: [id number]   Acct: [de-identified]   Primary Care Physician: TYRONE Salgado CNP  Advance Directive: Full Code  Admit Date: 10/17/2020       Hospital Day: 1      CHIEF COMPLAINT:     Chief Complaint   Patient presents with   [address] Problem     depression worse today with SI with several plans    Alcohol Problem     pint of whiskey and some rubbing alcohol today       10/18/2020 2:56 PM  Subjective / Interval History:   10/18/2020  Seen and examined this AM.  One-to-one sitter at bedside for safety. Laying comfortably in bed in no apparent acute distress. No acute changes or acute overnight event reported. Review of Systems:   Review of Systems  ROS: 14 point review of systems is negative except as specifically addressed above.     DIET GENERAL; Safety Tray; Safety Tray (Disposables No Utensils)  No intake or output data in the 24 hours ending 10/18/20 5066    Medications:  Current Facility-Administered Medications   Medication Dose Route Frequency Provider Last Rate Last Dose    sodium chloride flush 0.9 % injection 10 mL  10 mL Intravenous 2 times per day Alcon Gottlieb MD   10 mL at 10/18/20 1043    sodium chloride flush 0.9 % injection 10 mL  10 mL Intravenous PRN Alcon Gottlieb MD        enoxaparin (LOVENOX) injection 40 mg  40 mg Subcutaneous Daily Alcon Gottlieb MD   40 mg at 10/18/20 1041    magnesium sulfate 2 g in 50 mL IVPB premix  2 g Intravenous PRN Alcon Gottlieb MD        potassium chloride (KLOR-CON M) extended release tablet 40 mEq  40 mEq Oral PRN Alcon Gottlieb MD        Or    potassium bicarb-citric acid (EFFER-K) effervescent tablet 40 mEq  40 mEq Oral PRN Alcon Gottlieb MD        Or    potassium chloride 10 mEq/100 mL IVPB (Peripheral Line)  10 mEq Intravenous PRN Alcon Gottlieb MD        polyethylene glycol [facility]) packet 17 g  17 g Oral Daily PRN Ne Ibarra MD        ondansetron (ZOFRAN-ODT) disintegrating tablet 4 mg  4 mg Oral Q8H PRN Ne Ibarra MD        Or    ondansetron [facility]) injection 4 mg  4 mg Intravenous Q6H PRN Ne Ibarra MD        HYDROcodone-acetaminophen [facility])  MG per tablet 1 tablet  1 tablet Oral Q6H PRN Ne Ibarra MD   1 tablet at 10/18/20 1041    naloxone (NARCAN) injection 0.4 mg  0.4 mg Intravenous PRN Ne Ibarra MD        melatonin tablet 3 mg  3 mg Oral Nightly John Jenkins MD        hydrOXYzine (ATARAX) tablet 25 mg  25 mg Oral TID PRN John Jenkins MD        vitamin B-1 (THIAMINE) tablet 100 mg  100 mg Oral Daily Ne Ibarra MD   100 mg at 10/18/20 1041    multivitamin 1 tablet  1 tablet Oral Daily Ne Ibarra MD   1 tablet at 34/52/99 3059    folic acid (FOLVITE) tablet 1 mg  1 mg Oral Daily Ne Ibarra MD   1 mg at 10/18/20 1041    LORazepam (ATIVAN) tablet 1 mg  1 mg Oral Q1H PRN Ne Ibarra MD        Or    LORazepam (ATIVAN) injection 1 mg  1 mg Intravenous Q1H PRN Ne Ibarra MD        Or    LORazepam (ATIVAN) tablet 2 mg  2 mg Oral Q1H PRN Ne Ibarra MD        Or    LORazepam (ATIVAN) injection 2 mg  2 mg Intravenous Q1H PRN Ne Ibarra MD        Or    LORazepam (ATIVAN) tablet 3 mg  3 mg Oral Q1H PRN Ne Ibarra MD        Or    LORazepam (ATIVAN) injection 3 mg  3 mg Intravenous Q1H PRN Ne Ibarra MD        Or    LORazepam (ATIVAN) tablet 4 mg  4 mg Oral Q1H PRN Ne Ibarra MD        Or    LORazepam (ATIVAN) injection 4 mg  4 mg Intravenous Q1H PRN Ne Ibarra MD        nicotine (NICODERM CQ) 21 MG/24HR 1 patch  1 patch Transdermal Daily Ne Ibarra MD   1 patch at 10/18/20 1041    OLANZapine (ZYPREXA) tablet 5 mg  5 mg Oral Nightly Ne Ibarra MD        metoprolol succinate (TOPROL XL) extended release tablet 50 mg  50 mg Oral Daily Ne Ibarra MD   50 mg at 10/18/20 1041    lisinopril (PRINIVIL;ZESTRIL) tablet 40 mg  40 mg Oral Daily Loyda De Leon MD   40 mg at 10/18/20 1040    levETIRAcetam (KEPPRA) tablet 500 mg  500 mg Oral BID Loyda De Leon MD   500 mg at 10/18/20 1041    isosorbide mononitrate (IMDUR) extended release tablet 30 mg  30 mg Oral Daily Loyda De Leon MD   30 mg at 10/18/20 1041    clopidogrel (PLAVIX) tablet 75 mg  75 mg Oral Daily Loyda De Leon MD   75 mg at 10/18/20 1041    atorvastatin (LIPITOR) tablet 10 mg  10 mg Oral Nightly Loyda De Leon MD        amLODIPine (NORVASC) tablet 10 mg  10 mg Oral Daily Loyda De Leon MD   10 mg at 10/18/20 1041           sodium chloride flush  10 mL Intravenous 2 times per day    enoxaparin  40 mg Subcutaneous Daily    melatonin  3 mg Oral Nightly    thiamine  100 mg Oral Daily    multivitamin  1 tablet Oral Daily    folic acid  1 mg Oral Daily    nicotine  1 patch Transdermal Daily    OLANZapine  5 mg Oral Nightly    metoprolol succinate  50 mg Oral Daily    lisinopril  40 mg Oral Daily    levETIRAcetam  500 mg Oral BID    isosorbide mononitrate  30 mg Oral Daily    clopidogrel  75 mg Oral Daily    atorvastatin  10 mg Oral Nightly    amLODIPine  10 mg Oral Daily     sodium chloride flush, magnesium sulfate, potassium chloride **OR** potassium alternative oral replacement **OR** potassium chloride, polyethylene glycol, ondansetron **OR** ondansetron, HYDROcodone-acetaminophen, naloxone, hydrOXYzine, LORazepam **OR** LORazepam **OR** LORazepam **OR** LORazepam **OR** LORazepam **OR** LORazepam **OR** LORazepam **OR** LORazepam  DIET GENERAL; Safety Tray; Safety Tray (Disposables No Utensils)       Labs:   CBC with DIFF:  Recent Labs     10/17/20  2030 10/18/20  0146   WBC 8.6 6.3   RBC 4.51* 4.26*   HGB 13.7* 12.9*   HCT 40.9* 38.9*   MCV 90.7 91.3   MCH 30.4 30.3   MCHC 33.5 33.2   RDW 13.8 13.8    246   MPV 8.8* 9.0*   NEUTOPHILPCT 58.0  --    LYMPHOPCT 29.0  --    MONOPCT 8.4  --    EOSRELPCT 3.5  --    BASOPCT 0.9  -- NEUTROABS 5.0  --    LYMPHSABS 2.5  --    MONOSABS 0.70  --    EOSABS 0.30  --    BASOSABS 0.10  --        CMP/BMP:  Recent Labs     10/17/20  2030 10/18/20  0146   * 137   K 4.2 4.3   CL 95* 102   CO2 22 24   ANIONGAP 13 11   GLUCOSE 113* 105   BUN 7 6   CREATININE 0.8 0.9   LABGLOM >60 >60   CALCIUM 9.0 9.3   PROT 7.5  --    LABALBU 4.7  --    BILITOT 0.3  --    ALKPHOS 120  --    ALT 51*  --    AST 52*  --          CRP:  No results for input(s): CRP in the last 72 hours. Sed Rate:  No results for input(s): SEDRATE in the last 72 hours. HgBA1c:  No components found for: HGBA1C  FLP:  No results found for: TRIG, HDL, LDLCALC, LDLDIRECT, LABVLDL  TSH:  No results found for: TSH  Troponin T: No results for input(s): TROPONINI in the last 72 hours. Pro-BNP: No results for input(s): BNP in the last 72 hours. INR: No results for input(s): INR in the last 72 hours. ABGs: No results found for: PHART, PO2ART, ZLD3PTT  UA:No results for input(s): NITRITE, COLORU, PHUR, LABCAST, WBCUA, RBCUA, MUCUS, TRICHOMONAS, YEAST, BACTERIA, CLARITYU, SPECGRAV, LEUKOCYTESUR, UROBILINOGEN, BILIRUBINUR, BLOODU, GLUCOSEU, AMORPHOUS in the last 72 hours. Invalid input(s): Eveline Paddy      Culture Results:    No results for input(s): CXSURG in the last 720 hours. Blood Culture Recent: No results for input(s): BC in the last 720 hours. Cultures:   No results for input(s): CULTURE in the last 72 hours. No results for input(s): BC, Kim Jose in the last 72 hours. No results for input(s): CXSURG in the last 72 hours. No results for input(s): MG, PHOS in the last 72 hours.   Recent Labs     10/17/20  2030   AST 52*   ALT 51*   BILITOT 0.3   ALKPHOS 120           Objective:   Vitals:   /60   Pulse 91   Temp 95.7 °F (35.4 °C)   Resp 20   Ht 6' (1.829 m)   Wt 250 lb 9.6 oz (113.7 kg)   SpO2 96%   BMI 33.99 kg/m²       Patient Vitals for the past 24 hrs:   BP Temp Temp src Pulse Resp SpO2 Height Weight 10/18/20 1431 111/60 95.7 °F (35.4 °C) -- 91 20 96 % -- --   10/18/20 0638 130/84 98.5 °F (36.9 °C) -- 111 18 96 % -- --   10/17/20 2335 130/78 96.2 °F (35.7 °C) Temporal 77 20 97 % 6' (1.829 m) 250 lb 9.6 oz (113.7 kg)   10/17/20 2315 128/81 98.2 °F (36.8 °C) Temporal 78 14 97 % -- --   10/17/20 2009 117/76 98.2 °F (36.8 °C) -- 110 18 93 % 6' (1.829 m) 278 lb (126.1 kg)       24HR INTAKE/OUTPUT:  No intake or output data in the 24 hours ending 10/18/20 1456    Physical Exam  Vitals signs and nursing note reviewed. Constitutional:       General: He is not in acute distress. Appearance: Normal appearance. He is not ill-appearing, toxic-appearing or diaphoretic. HENT:      Head: Normocephalic and atraumatic. Right Ear: External ear normal.      Left Ear: External ear normal.      Nose: Nose normal. No congestion or rhinorrhea. Mouth/Throat:      Mouth: Mucous membranes are moist.      Pharynx: Oropharynx is clear. Eyes:      General: No scleral icterus. Right eye: No discharge. Left eye: No discharge. Extraocular Movements: Extraocular movements intact. Conjunctiva/sclera: Conjunctivae normal.      Pupils: Pupils are equal, round, and reactive to light. Neck:      Musculoskeletal: Normal range of motion and neck supple. No neck rigidity or muscular tenderness. Vascular: No carotid bruit. Cardiovascular:      Rate and Rhythm: Normal rate and regular rhythm. Pulses: Normal pulses. Heart sounds: Normal heart sounds. No murmur. No friction rub. No gallop. Pulmonary:      Effort: Pulmonary effort is normal. No respiratory distress. Breath sounds: Normal breath sounds. No stridor. No wheezing, rhonchi or rales. Chest:      Chest wall: No tenderness. Abdominal:      General: Bowel sounds are normal. There is no distension. Palpations: Abdomen is soft. Tenderness: There is no abdominal tenderness. There is no guarding or rebound. Musculoskeletal: Normal range of motion. General: No swelling, tenderness, deformity or signs of injury. Right lower leg: No edema. Left lower leg: No edema. Skin:     General: Skin is warm and dry. Capillary Refill: Capillary refill takes less than 2 seconds. Coloration: Skin is not jaundiced or pale. Findings: No bruising or erythema. Neurological:      General: No focal deficit present. Mental Status: He is alert and oriented to person, place, and time. Cranial Nerves: No cranial nerve deficit. Sensory: No sensory deficit. Motor: No weakness. Coordination: Coordination normal.   Psychiatric:         Mood and Affect: Mood normal.         Behavior: Behavior normal.         Thought Content: Thought content normal.         Judgment: Judgment normal.         Assessment/plan:     Patient Active Problem List    Diagnosis Date Noted    Suicidal ideation 10/17/2020    Alcohol withdrawal ([facility] 09/14/2020    Seizure disorder ([facility] 09/11/2020    GERD (gastroesophageal reflux disease) 09/11/2020    Depression 09/11/2020    Coronary atherosclerosis 11/29/2016    HLD (hyperlipidemia) 11/29/2016    HTN (hypertension) 11/29/2016       Hospital Problems           Last Modified POA    Suicidal ideation 10/17/2020 Yes          Active Problems:    Suicidal ideation  Resolved Problems:    * No resolved hospital problems. *      SI, with the plan  ETOH Withdrawal   Suicide plan reportedly includes; cutting of wrists, and shooting himself in the head.  CIWA Protocol   Larazepam PRN as per CIWA Score   Banana Bag (Thiamine, Folic Acid, Multivatamins)   Seizure Precautions   Suicide precautions   Psych consulted      History of seizure disorder  · Levetiracetam 500 mg p.o. twice daily  · Seizure precautions          Continue management of other chronic medical conditions - see above and orders.             Advance Directive: Full Code    DIET GENERAL; Safety

## 2020-10-18 NOTE — CONSULTS
[facility]  Psychiatry Consult    Reason for Consult: Concern for sucidality    19-year-old white male with history of depression and alcohol abuse, seizures, chronic pain, CAD, HTN, admitted for suicidal ideation. Patient has history of withdrawal seizures, previously managed by medicine, was admitted to the medical floor for withdrawal management this time. UDS positive for benzodiazepine and opiate, BAL 62 on admission. Patient came with hyponatremia which has resolved. Patient presents with dysphoric affect when seen today. Mother and brother, as well as sitter, are in the room. Patient is interviewed one-on-one. He is  cooperative. Presents with dysphoric affect. Tearful. States he has been depressed in the setting of his social stressors. Claims his wife left him in February, and later he lost his house due to mold. He is currently staying with his mother. Patient states he also recently lost his brother who  of alcohol abuse complications. He reports suicidal ideation which comes and goes. \"I have multiple plans. If not for my mom, I would probably hurt myself. \"   Patient states he drinks whenever he has access to alcohol, up to 1 pint daily. At this time he reports headache and tremor as his only withdrawal symptoms. Patient has not been seeing a psychiatrist or therapist.  He has been on Symbyax for many years which he feels is somewhat effective. Psychiatric History:    Diagnoses: Depression  Suicide attempts/gestures: Denies   Prior hospitalizations: 2 N - years ago  Medication trials: on Symbyax (olanzapine/fluoxetine) for 12 years   Mental health contact: Lost to follow-up   Head trauma: Denies    Substance Use History:  Daily EtOH use - up to 1 pint on some days. \"Drinking all my life\". No h/i rehab treatment. Does not like AA. Used meth in his early 42's. Smokes 1 PPD. Allergies:  Patient has no known allergies.     Medical History:  Past Medical History:   Diagnosis Date    Alcohol abuse     Coronary artery disease     GERD (gastroesophageal reflux disease)     Hyperlipidemia     Hypertension     Seizures (HCC)        Family History:  History of alcoholism - both brothers. No SAs. Social History:  , lives with mother. On disability for back pain. Review of Systems: 14 point review of systems negative except as described above    Vitals:    10/18/20 0638   BP: 130/84   Pulse: 111   Resp: 18   Temp: 98.5 °F (36.9 °C)   SpO2: 96%       Mental Status  Appearance: Disheveled and in hospital attire. Made good eye contact. Behavior: Cooperative, tearful  Speech: Normal in tone, volume, and quality. No slurring, dysarthria or pressured speech noted. Mood: \"Depressed\"   Affect: Mood congruent. Thought Process: Appears linear  Thought Content: Endorses suicidal ideation. Denies homicidal ideation. No overt delusions or paranoia appreciated. Perceptions: Denies auditory or visual hallucinations at present time. Not responding to internal stimuli. Concentration: Intact. Orientation: to person, place, date, and situation. Language: Intact. Fund of information: Intact. Memory: Recent and remote appear intact. Impulsivity: Questionable. Neurovegitative: Poor appetite and sleep. Insight: Impaired. Judgment: Impaired. Assessment  DSM-5 DIAGNOSIS:  Impression   Depression unspecified  Suicidal ideation  Alcohol use disorder, severe, with withdrawal  Tobacco use disorder    Patient endorses suicidal ideation. He is high risk for a complicated withdrawal given his history. Continue on CIWA. Continue on one-to-one observation. May give Zyprexa at nighttime to help with mood stabilization and sleep. Discontinue Valium. May give as needed Atarax for episodic anxiety and melatonin for sleep. We will follow. Thank you for consulting our service. Please call us with questions.       Pita Davison MD

## 2020-10-18 NOTE — ED PROVIDER NOTES
Attending Supervisory Note/Shared Visit   I have personally performed a face to face diagnostic evaluation on this patient. I have reviewed the mid-levels findings and agree. Briefly, patient presents to the ED for evaluation regarding feelings of depression. States that he is been having some thoughts about wanting to harm himself. Patient does report that he has a history of alcohol abuse and is been drinking heavily. He describes thoughts about wanting to harm himself by cutting his wrist or shooting himself. He does have a prior history of suicide attempt. On my exam: Patient is alert and speaking. He is in no acute respiratory distress. I have reviewed patient's vital signs which reveal evidence of mild tachycardia with a heart rate of 105. His electrolytes look okay except serum sodium 130. Patient has been seen and evaluated by mental health professional and after discussion with on-call psychiatry, Dr. Marcus Espino, patient was recommended for admission to the medicine service prior to psychiatric admission due to prior history of alcohol withdrawal and previous alcohol withdrawal seizures. Patient does not demonstrate signs or symptoms of acute alcohol withdrawal at this time. His vital signs are stable. He is not tremulous or hypertensive. Case was discussed with Dr. Núñez Staff who is agreeable to accept patient to the hospitalist service with the above plan of care. FINAL IMPRESSION      1. Depression with suicidal ideation    2.  Acute [facility] intoxication without [facility] ([facility]          Bren Nye MD  Attending Emergency Physician        Bren Nye MD  10/18/20 1013

## 2020-10-18 NOTE — PROGRESS NOTES
4 Eyes Skin Assessment    Lisandro Burns is being assessed upon: Admission    I agree that I, Jess Alex, along with Rosaura Waterman (either 2 RN's or 1 LPN and 1 RN) have performed a thorough Head to Toe Skin Assessment on the patient. ALL assessment sites listed below have been assessed. Areas assessed by both nurses:     [x]   Head, Face, and Ears   [x]   Shoulders, Back, and Chest  [x]   Arms, Elbows, and Hands   [x]   Coccyx, Sacrum, and Ischium  [x]   Legs, Feet, and Heels    Does the Patient have Skin Breakdown?  No    Justus Prevention initiated: No  Wound Care Orders initiated: No    [facility] nurse consulted for Pressure Injury (Stage 3,4, Unstageable, DTI, NWPT, and Complex wounds) and New or Established Ostomies: No        Primary Nurse eSignature: Jess Alex RN on 10/18/2020 at 3:22 AM      Co-Signer eSignature: Electronically signed by Kye Galvan RN on 10/18/20 at 3:26 AM CDT

## 2020-10-19 PROCEDURE — 6370000000 HC RX 637 (ALT 250 FOR IP): Performed by: INTERNAL MEDICINE

## 2020-10-19 PROCEDURE — 6370000000 HC RX 637 (ALT 250 FOR IP): Performed by: HOSPITALIST

## 2020-10-19 PROCEDURE — 96372 THER/PROPH/DIAG INJ SC/IM: CPT

## 2020-10-19 PROCEDURE — G0378 HOSPITAL OBSERVATION PER HR: HCPCS

## 2020-10-19 PROCEDURE — 2580000003 HC RX 258: Performed by: HOSPITALIST

## 2020-10-19 PROCEDURE — 6370000000 HC RX 637 (ALT 250 FOR IP): Performed by: PSYCHIATRY & NEUROLOGY

## 2020-10-19 PROCEDURE — 6360000002 HC RX W HCPCS: Performed by: HOSPITALIST

## 2020-10-19 RX ORDER — METOPROLOL SUCCINATE 50 MG/1
50 TABLET, EXTENDED RELEASE ORAL DAILY
Status: CANCELLED | OUTPATIENT
Start: 2020-10-20

## 2020-10-19 RX ORDER — ONDANSETRON 4 MG/1
4 TABLET, ORALLY DISINTEGRATING ORAL EVERY 8 HOURS PRN
Status: CANCELLED | OUTPATIENT
Start: 2020-10-19

## 2020-10-19 RX ORDER — ISOSORBIDE MONONITRATE 30 MG/1
30 TABLET, EXTENDED RELEASE ORAL DAILY
Status: CANCELLED | OUTPATIENT
Start: 2020-10-20

## 2020-10-19 RX ORDER — LORAZEPAM 2 MG/ML
2 INJECTION INTRAMUSCULAR
Status: CANCELLED | OUTPATIENT
Start: 2020-10-19

## 2020-10-19 RX ORDER — NALOXONE HYDROCHLORIDE 0.4 MG/ML
0.4 INJECTION, SOLUTION INTRAMUSCULAR; INTRAVENOUS; SUBCUTANEOUS PRN
Status: CANCELLED | OUTPATIENT
Start: 2020-10-19

## 2020-10-19 RX ORDER — SODIUM CHLORIDE 0.9 % (FLUSH) 0.9 %
10 SYRINGE (ML) INJECTION PRN
Status: CANCELLED | OUTPATIENT
Start: 2020-10-19

## 2020-10-19 RX ORDER — LANOLIN ALCOHOL/MO/W.PET/CERES
3 CREAM (GRAM) TOPICAL NIGHTLY
Status: CANCELLED | OUTPATIENT
Start: 2020-10-19

## 2020-10-19 RX ORDER — ONDANSETRON 2 MG/ML
4 INJECTION INTRAMUSCULAR; INTRAVENOUS EVERY 6 HOURS PRN
Status: CANCELLED | OUTPATIENT
Start: 2020-10-19

## 2020-10-19 RX ORDER — LORAZEPAM 2 MG/ML
1 INJECTION INTRAMUSCULAR
Status: CANCELLED | OUTPATIENT
Start: 2020-10-19

## 2020-10-19 RX ORDER — POTASSIUM CHLORIDE 20 MEQ/1
40 TABLET, EXTENDED RELEASE ORAL PRN
Status: CANCELLED | OUTPATIENT
Start: 2020-10-19

## 2020-10-19 RX ORDER — LORAZEPAM 2 MG/ML
4 INJECTION INTRAMUSCULAR
Status: CANCELLED | OUTPATIENT
Start: 2020-10-19

## 2020-10-19 RX ORDER — LORAZEPAM 1 MG/1
1 TABLET ORAL
Status: CANCELLED | OUTPATIENT
Start: 2020-10-19

## 2020-10-19 RX ORDER — AMLODIPINE BESYLATE 10 MG/1
10 TABLET ORAL DAILY
Status: CANCELLED | OUTPATIENT
Start: 2020-10-20

## 2020-10-19 RX ORDER — HYDROCODONE BITARTRATE AND ACETAMINOPHEN 10; 325 MG/1; MG/1
1 TABLET ORAL EVERY 6 HOURS PRN
Status: CANCELLED | OUTPATIENT
Start: 2020-10-19

## 2020-10-19 RX ORDER — THIAMINE MONONITRATE (VIT B1) 100 MG
100 TABLET ORAL DAILY
Status: CANCELLED | OUTPATIENT
Start: 2020-10-20

## 2020-10-19 RX ORDER — CLOPIDOGREL BISULFATE 75 MG/1
75 TABLET ORAL DAILY
Status: CANCELLED | OUTPATIENT
Start: 2020-10-20

## 2020-10-19 RX ORDER — ATORVASTATIN CALCIUM 10 MG/1
10 TABLET, FILM COATED ORAL NIGHTLY
Status: CANCELLED | OUTPATIENT
Start: 2020-10-19

## 2020-10-19 RX ORDER — POTASSIUM CHLORIDE 7.45 MG/ML
10 INJECTION INTRAVENOUS PRN
Status: CANCELLED | OUTPATIENT
Start: 2020-10-19

## 2020-10-19 RX ORDER — OLANZAPINE 2.5 MG/1
5 TABLET ORAL NIGHTLY
Status: CANCELLED | OUTPATIENT
Start: 2020-10-19

## 2020-10-19 RX ORDER — MAGNESIUM SULFATE IN WATER 40 MG/ML
2 INJECTION, SOLUTION INTRAVENOUS PRN
Status: CANCELLED | OUTPATIENT
Start: 2020-10-19

## 2020-10-19 RX ORDER — LEVETIRACETAM 500 MG/1
500 TABLET ORAL 2 TIMES DAILY
Status: CANCELLED | OUTPATIENT
Start: 2020-10-19

## 2020-10-19 RX ORDER — FOLIC ACID 1 MG/1
1 TABLET ORAL DAILY
Status: CANCELLED | OUTPATIENT
Start: 2020-10-20

## 2020-10-19 RX ORDER — HYDROCODONE BITARTRATE AND ACETAMINOPHEN 10; 325 MG/1; MG/1
1 TABLET ORAL EVERY 6 HOURS PRN
Status: DISCONTINUED | OUTPATIENT
Start: 2020-10-19 | End: 2020-10-20 | Stop reason: HOSPADM

## 2020-10-19 RX ORDER — LORAZEPAM 1 MG/1
3 TABLET ORAL
Status: CANCELLED | OUTPATIENT
Start: 2020-10-19

## 2020-10-19 RX ORDER — LORAZEPAM 1 MG/1
4 TABLET ORAL
Status: CANCELLED | OUTPATIENT
Start: 2020-10-19

## 2020-10-19 RX ORDER — LISINOPRIL 20 MG/1
40 TABLET ORAL DAILY
Status: CANCELLED | OUTPATIENT
Start: 2020-10-20

## 2020-10-19 RX ORDER — POLYETHYLENE GLYCOL 3350 17 G/17G
17 POWDER, FOR SOLUTION ORAL DAILY PRN
Status: CANCELLED | OUTPATIENT
Start: 2020-10-19

## 2020-10-19 RX ORDER — SODIUM CHLORIDE 0.9 % (FLUSH) 0.9 %
10 SYRINGE (ML) INJECTION EVERY 12 HOURS SCHEDULED
Status: CANCELLED | OUTPATIENT
Start: 2020-10-19

## 2020-10-19 RX ORDER — LORAZEPAM 1 MG/1
2 TABLET ORAL
Status: CANCELLED | OUTPATIENT
Start: 2020-10-19

## 2020-10-19 RX ORDER — NICOTINE 21 MG/24HR
1 PATCH, TRANSDERMAL 24 HOURS TRANSDERMAL DAILY
Status: CANCELLED | OUTPATIENT
Start: 2020-10-20

## 2020-10-19 RX ORDER — LORAZEPAM 2 MG/ML
3 INJECTION INTRAMUSCULAR
Status: CANCELLED | OUTPATIENT
Start: 2020-10-19

## 2020-10-19 RX ORDER — MULTIVITAMIN WITH IRON
1 TABLET ORAL DAILY
Status: CANCELLED | OUTPATIENT
Start: 2020-10-20

## 2020-10-19 RX ORDER — HYDROXYZINE HYDROCHLORIDE 25 MG/1
25 TABLET, FILM COATED ORAL 3 TIMES DAILY PRN
Status: CANCELLED | OUTPATIENT
Start: 2020-10-19

## 2020-10-19 RX ADMIN — LEVETIRACETAM 500 MG: 500 TABLET ORAL at 20:04

## 2020-10-19 RX ADMIN — SODIUM CHLORIDE, PRESERVATIVE FREE 10 ML: 5 INJECTION INTRAVENOUS at 09:00

## 2020-10-19 RX ADMIN — METOPROLOL SUCCINATE 50 MG: 50 TABLET, EXTENDED RELEASE ORAL at 08:59

## 2020-10-19 RX ADMIN — THERA TABS 1 TABLET: TAB at 08:58

## 2020-10-19 RX ADMIN — SODIUM CHLORIDE, PRESERVATIVE FREE 10 ML: 5 INJECTION INTRAVENOUS at 20:05

## 2020-10-19 RX ADMIN — HYDROCODONE BITARTRATE AND ACETAMINOPHEN 1 TABLET: 10; 325 TABLET ORAL at 20:04

## 2020-10-19 RX ADMIN — ISOSORBIDE MONONITRATE 30 MG: 30 TABLET, EXTENDED RELEASE ORAL at 08:58

## 2020-10-19 RX ADMIN — HYDROCODONE BITARTRATE AND ACETAMINOPHEN 1 TABLET: 10; 325 TABLET ORAL at 05:32

## 2020-10-19 RX ADMIN — CLOPIDOGREL BISULFATE 75 MG: 75 TABLET ORAL at 08:58

## 2020-10-19 RX ADMIN — ATORVASTATIN CALCIUM 10 MG: 10 TABLET, FILM COATED ORAL at 20:04

## 2020-10-19 RX ADMIN — AMLODIPINE BESYLATE 10 MG: 10 TABLET ORAL at 08:58

## 2020-10-19 RX ADMIN — THIAMINE HCL TAB 100 MG 100 MG: 100 TAB at 08:58

## 2020-10-19 RX ADMIN — LISINOPRIL 40 MG: 20 TABLET ORAL at 08:59

## 2020-10-19 RX ADMIN — HYDROCODONE BITARTRATE AND ACETAMINOPHEN 1 TABLET: 10; 325 TABLET ORAL at 17:37

## 2020-10-19 RX ADMIN — LEVETIRACETAM 500 MG: 500 TABLET ORAL at 08:59

## 2020-10-19 RX ADMIN — OLANZAPINE 5 MG: 5 TABLET, FILM COATED ORAL at 20:37

## 2020-10-19 RX ADMIN — Medication 3 MG: at 20:04

## 2020-10-19 RX ADMIN — ENOXAPARIN SODIUM 40 MG: 40 INJECTION SUBCUTANEOUS at 09:00

## 2020-10-19 RX ADMIN — FOLIC ACID 1 MG: 1 TABLET ORAL at 08:59

## 2020-10-19 RX ADMIN — HYDROCODONE BITARTRATE AND ACETAMINOPHEN 1 TABLET: 10; 325 TABLET ORAL at 11:36

## 2020-10-19 ASSESSMENT — PAIN SCALES - GENERAL
PAINLEVEL_OUTOF10: 10
PAINLEVEL_OUTOF10: 6
PAINLEVEL_OUTOF10: 3
PAINLEVEL_OUTOF10: 10
PAINLEVEL_OUTOF10: 9
PAINLEVEL_OUTOF10: 2

## 2020-10-19 NOTE — PLAN OF CARE
Problem: Suicide risk  Goal: Provide patient with safe environment  Description: Provide patient with safe environment  Outcome: Ongoing     Problem: Falls - Risk of:  Goal: Will remain free from falls  Description: Will remain free from falls  Outcome: Ongoing  Goal: Absence of physical injury  Description: Absence of physical injury  Outcome: Ongoing

## 2020-10-19 NOTE — PROGRESS NOTES
Chelo Marcelino was verified with pt's pharmacy. He is prescribed Norco 10/325 q6h PRN by Dr. Yuvonne Bernheim office.

## 2020-10-19 NOTE — PROGRESS NOTES
[facility] Hospitalists Progress Note    Patient:  Pearl Chris  YOB: 1970  Date of Service: 10/19/2020  MRN: [id number]   Acct: [de-identified]   Primary Care Physician: Dusty Saint, APRN - CNP  Advance Directive: Full Code  Admit Date: 10/17/2020       Hospital Day: 2      CHIEF COMPLAINT:     Chief Complaint   Patient presents with   [address] Problem     depression worse today with SI with several plans    Alcohol Problem     pint of whiskey and some rubbing alcohol today       10/19/2020 4:27 PM  Subjective / Interval History:   10/19/2020  Seen and examined this AM.  Doing well. Denies any acute complaints or distress at this time. No acute change acute overnight event reported. 10/18/2020  Seen and examined this AM.  One-to-one sitter at bedside for safety. Laying comfortably in bed in no apparent acute distress. No acute changes or acute overnight event reported. Brief summary  Mr. Donovan, a 77-year-old male, history of CAD, HTN, EtOH abuse, depression, and seizures, presenting to the [address] [place] [address] ED on account of suicidal ideation with the plan. Urine toxicology, positive for benzodiazepine, as well as opiate. Blood alcohol level of 62 on presentation, however repeat alcohol level of < 10.      Review of Systems:   Review of Systems  ROS: 14 point review of systems is negative except as specifically addressed above.     DIET GENERAL; Safety Tray; Safety Tray (Disposables No Utensils)    Intake/Output Summary (Last 24 hours) at 10/19/2020 1627  Last data filed at 10/19/2020 1300  Gross per 24 hour   Intake 580 ml   Output --   Net 580 ml       Medications:  Current Facility-Administered Medications   Medication Dose Route Frequency Provider Last Rate Last Dose    sodium chloride flush 0.9 % injection 10 mL  10 mL Intravenous 2 times per day Nazia Su MD   10 mL at 10/19/20 0900    sodium chloride flush 0.9 % injection 10 mL  10 mL Intravenous PRN Nazia Su MD  enoxaparin (LOVENOX) injection 40 mg  40 mg Subcutaneous Daily Black Edwards MD   40 mg at 10/19/20 0900    magnesium sulfate 2 g in 50 mL IVPB premix  2 g Intravenous PRN Black Edwards MD        potassium chloride (KLOR-CON M) extended release tablet 40 mEq  40 mEq Oral PRN Black Edwards MD        Or    potassium bicarb-citric acid (EFFER-K) effervescent tablet 40 mEq  40 mEq Oral PRN Black Edwards MD        Or    potassium chloride 10 mEq/100 mL IVPB (Peripheral Line)  10 mEq Intravenous PRN Black Edwards MD        polyethylene glycol [facility]) packet 17 g  17 g Oral Daily PRN Black Edwards MD        ondansetron (ZOFRAN-ODT) disintegrating tablet 4 mg  4 mg Oral Q8H PRN Black Edwards MD        Or    ondansetron [facility]) injection 4 mg  4 mg Intravenous Q6H PRN Black Edwards MD        HYDROcodone-acetaminophen [facility])  MG per tablet 1 tablet  1 tablet Oral Q6H PRN Black Edwards MD   1 tablet at 10/19/20 1136    naloxone (NARCAN) injection 0.4 mg  0.4 mg Intravenous PRN Black Edwards MD        melatonin tablet 3 mg  3 mg Oral Nightly Angelica Callahan MD   3 mg at 10/18/20 2022    hydrOXYzine (ATARAX) tablet 25 mg  25 mg Oral TID PRN Angelica Callahan MD        vitamin B-1 (THIAMINE) tablet 100 mg  100 mg Oral Daily Black Edwards MD   100 mg at 10/19/20 8928    multivitamin 1 tablet  1 tablet Oral Daily Black Edwards MD   1 tablet at 19/46/30 0816    folic acid (FOLVITE) tablet 1 mg  1 mg Oral Daily Black Edwards MD   1 mg at 10/19/20 0859    LORazepam (ATIVAN) tablet 1 mg  1 mg Oral Q1H PRN Black Edwards MD        Or    LORazepam (ATIVAN) injection 1 mg  1 mg Intravenous Q1H PRN Black Edwards MD        Or    LORazepam (ATIVAN) tablet 2 mg  2 mg Oral Q1H PRN Black Edwards MD        Or    LORazepam (ATIVAN) injection 2 mg  2 mg Intravenous Q1H PRN Black Edwards MD        Or    LORazepam (ATIVAN) tablet 3 mg  3 mg Oral Q1H PRN Black Edwards MD        Or    LORazepam ondansetron **OR** ondansetron, HYDROcodone-acetaminophen, naloxone, hydrOXYzine, LORazepam **OR** LORazepam **OR** LORazepam **OR** LORazepam **OR** LORazepam **OR** LORazepam **OR** LORazepam **OR** LORazepam  DIET GENERAL; Safety Tray; Safety Tray (Disposables No Utensils)       Labs:   CBC with DIFF:  Recent Labs     10/17/20  2030 10/18/20  0146   WBC 8.6 6.3   RBC 4.51* 4.26*   HGB 13.7* 12.9*   HCT 40.9* 38.9*   MCV 90.7 91.3   MCH 30.4 30.3   MCHC 33.5 33.2   RDW 13.8 13.8    246   MPV 8.8* 9.0*   NEUTOPHILPCT 58.0  --    LYMPHOPCT 29.0  --    MONOPCT 8.4  --    EOSRELPCT 3.5  --    BASOPCT 0.9  --    NEUTROABS 5.0  --    LYMPHSABS 2.5  --    MONOSABS 0.70  --    EOSABS 0.30  --    BASOSABS 0.10  --        CMP/BMP:  Recent Labs     10/17/20  2030 10/18/20  0146   * 137   K 4.2 4.3   CL 95* 102   CO2 22 24   ANIONGAP 13 11   GLUCOSE 113* 105   BUN 7 6   CREATININE 0.8 0.9   LABGLOM >60 >60   CALCIUM 9.0 9.3   PROT 7.5  --    LABALBU 4.7  --    BILITOT 0.3  --    ALKPHOS 120  --    ALT 51*  --    AST 52*  --          CRP:  No results for input(s): CRP in the last 72 hours. Sed Rate:  No results for input(s): SEDRATE in the last 72 hours. HgBA1c:  No components found for: HGBA1C  FLP:  No results found for: TRIG, HDL, LDLCALC, LDLDIRECT, LABVLDL  TSH:  No results found for: TSH  Troponin T: No results for input(s): TROPONINI in the last 72 hours. Pro-BNP: No results for input(s): BNP in the last 72 hours. INR: No results for input(s): INR in the last 72 hours. ABGs: No results found for: PHART, PO2ART, IGS4RXP  UA:No results for input(s): NITRITE, COLORU, PHUR, LABCAST, WBCUA, RBCUA, MUCUS, TRICHOMONAS, YEAST, BACTERIA, CLARITYU, SPECGRAV, LEUKOCYTESUR, UROBILINOGEN, BILIRUBINUR, BLOODU, GLUCOSEU, AMORPHOUS in the last 72 hours. Invalid input(s): Oneil Plump      Culture Results:    No results for input(s): CXSURG in the last 720 hours.     Blood Culture Recent: No results for input(s): BC in the last 720 hours. Cultures:   No results for input(s): CULTURE in the last 72 hours. No results for input(s): BC, Adan Jewel in the last 72 hours. No results for input(s): CXSURG in the last 72 hours. No results for input(s): MG, PHOS in the last 72 hours. Recent Labs     10/17/20  2030   AST 52*   ALT 51*   BILITOT 0.3   ALKPHOS 120           Objective:   Vitals:   BP (!) 142/89   Pulse 91   Temp 96 °F (35.6 °C)   Resp 20   Ht 6' (1.829 m)   Wt 250 lb 9.6 oz (113.7 kg)   SpO2 93%   BMI 33.99 kg/m²       Patient Vitals for the past 24 hrs:   BP Temp Temp src Pulse Resp SpO2   10/19/20 1042 (!) 142/89 96 °F (35.6 °C) -- 91 20 93 %   10/19/20 0913 -- -- -- 98 -- --   10/19/20 0718 (!) 148/87 97.3 °F (36.3 °C) -- 84 20 92 %   10/19/20 0120 132/84 97.4 °F (36.3 °C) Temporal 85 16 95 %   10/18/20 1934 111/65 -- Temporal 88 16 95 %       24HR INTAKE/OUTPUT:      Intake/Output Summary (Last 24 hours) at 10/19/2020 1627  Last data filed at 10/19/2020 1300  Gross per 24 hour   Intake 580 ml   Output --   Net 580 ml       Physical Exam  Vitals signs and nursing note reviewed. Constitutional:       General: He is not in acute distress. Appearance: Normal appearance. He is not ill-appearing, toxic-appearing or diaphoretic. HENT:      Head: Normocephalic and atraumatic. Right Ear: External ear normal.      Left Ear: External ear normal.      Nose: Nose normal. No congestion or rhinorrhea. Mouth/Throat:      Mouth: Mucous membranes are moist.      Pharynx: Oropharynx is clear. Eyes:      General: No scleral icterus. Right eye: No discharge. Left eye: No discharge. Extraocular Movements: Extraocular movements intact. Conjunctiva/sclera: Conjunctivae normal.      Pupils: Pupils are equal, round, and reactive to light. Neck:      Musculoskeletal: Normal range of motion and neck supple. No neck rigidity or muscular tenderness. Vascular: No carotid bruit. GERD (gastroesophageal reflux disease)    Depression  Resolved Problems:    * No resolved hospital problems. *      SI, with the plan  ETOH Withdrawal   Suicide plan reportedly includes; cutting of wrists, and shooting himself in the head.  CIWA Protocol   Larazepam PRN as per CIWA Score   Banana Bag (Thiamine, Folic Acid, Multivatamins)   Seizure Precautions   Suicide precautions  · Seen by Psych  · Patient currently clinically stable for discharge/transfer to inpatient psych, given patient has never required any lorazepam in >24 hours. ,  Pending psych reevaluation    History of seizure disorder  · Levetiracetam 500 mg p.o. twice daily  · Seizure precautions        Continue management of other chronic medical conditions - see above and orders. Advance Directive: Full Code    DIET GENERAL; Safety Tray; Safety Tray (Disposables No Utensils)     DVT prophylaxis: Enoxaparin    Consults Made:   IP CONSULT TO PSYCHIATRY  IP CONSULT TO SOCIAL WORK  IP CONSULT TO SOCIAL WORK    Discharge planning: tbd  Patient currently clinically stable for discharge/transfer to inpatient psych, given patient has never required any lorazepam in > 24 hours. Pending psych reevaluation    Time Spent is 25 mins in the examination, evaluation, counseling and review of medications, assessment and plan.      Electronically signed by   Ethel Mi MD, MPH,   Internal Medicine Hospitalist   10/19/2020 4:27 PM

## 2020-10-20 VITALS
DIASTOLIC BLOOD PRESSURE: 75 MMHG | TEMPERATURE: 97.4 F | HEIGHT: 72 IN | HEART RATE: 85 BPM | WEIGHT: 250.6 LBS | BODY MASS INDEX: 33.94 KG/M2 | OXYGEN SATURATION: 95 % | SYSTOLIC BLOOD PRESSURE: 122 MMHG | RESPIRATION RATE: 20 BRPM

## 2020-10-20 PROBLEM — R45.851 DEPRESSION WITH SUICIDAL IDEATION: Status: ACTIVE | Noted: 2020-09-11

## 2020-10-20 LAB
ANION GAP SERPL CALCULATED.3IONS-SCNC: 8 MMOL/L (ref 7–19)
BASOPHILS ABSOLUTE: 0.1 K/UL (ref 0–0.2)
BASOPHILS RELATIVE PERCENT: 0.8 % (ref 0–1)
BUN BLDV-MCNC: 7 MG/DL (ref 6–20)
CALCIUM SERPL-MCNC: 9.4 MG/DL (ref 8.6–10)
CHLORIDE BLD-SCNC: 103 MMOL/L (ref 98–111)
CO2: 26 MMOL/L (ref 22–29)
CREAT SERPL-MCNC: 0.8 MG/DL (ref 0.5–1.2)
EOSINOPHILS ABSOLUTE: 0.2 K/UL (ref 0–0.6)
EOSINOPHILS RELATIVE PERCENT: 2.5 % (ref 0–5)
GFR AFRICAN AMERICAN: >59
GFR NON-AFRICAN AMERICAN: >60
GLUCOSE BLD-MCNC: 117 MG/DL (ref 74–109)
HCT VFR BLD CALC: 39.8 % (ref 42–52)
HEMOGLOBIN: 12.8 G/DL (ref 14–18)
IMMATURE GRANULOCYTES #: 0 K/UL
LYMPHOCYTES ABSOLUTE: 1.5 K/UL (ref 1.1–4.5)
LYMPHOCYTES RELATIVE PERCENT: 24.8 % (ref 20–40)
MCH RBC QN AUTO: 30 PG (ref 27–31)
MCHC RBC AUTO-ENTMCNC: 32.2 G/DL (ref 33–37)
MCV RBC AUTO: 93.2 FL (ref 80–94)
MONOCYTES ABSOLUTE: 0.5 K/UL (ref 0–0.9)
MONOCYTES RELATIVE PERCENT: 8.8 % (ref 0–10)
NEUTROPHILS ABSOLUTE: 3.7 K/UL (ref 1.5–7.5)
NEUTROPHILS RELATIVE PERCENT: 62.9 % (ref 50–65)
PDW BLD-RTO: 14.1 % (ref 11.5–14.5)
PLATELET # BLD: 243 K/UL (ref 130–400)
PMV BLD AUTO: 9.4 FL (ref 9.4–12.4)
POTASSIUM REFLEX MAGNESIUM: 4.1 MMOL/L (ref 3.5–5)
RBC # BLD: 4.27 M/UL (ref 4.7–6.1)
SODIUM BLD-SCNC: 137 MMOL/L (ref 136–145)
WBC # BLD: 5.9 K/UL (ref 4.8–10.8)

## 2020-10-20 PROCEDURE — 6370000000 HC RX 637 (ALT 250 FOR IP): Performed by: HOSPITALIST

## 2020-10-20 PROCEDURE — 96372 THER/PROPH/DIAG INJ SC/IM: CPT

## 2020-10-20 PROCEDURE — 99215 OFFICE O/P EST HI 40 MIN: CPT | Performed by: PSYCHIATRY & NEUROLOGY

## 2020-10-20 PROCEDURE — 6360000002 HC RX W HCPCS: Performed by: HOSPITALIST

## 2020-10-20 PROCEDURE — 36415 COLL VENOUS BLD VENIPUNCTURE: CPT

## 2020-10-20 PROCEDURE — 80048 BASIC METABOLIC PNL TOTAL CA: CPT

## 2020-10-20 PROCEDURE — G0378 HOSPITAL OBSERVATION PER HR: HCPCS

## 2020-10-20 PROCEDURE — 6370000000 HC RX 637 (ALT 250 FOR IP): Performed by: INTERNAL MEDICINE

## 2020-10-20 PROCEDURE — 85025 COMPLETE CBC W/AUTO DIFF WBC: CPT

## 2020-10-20 PROCEDURE — 2580000003 HC RX 258: Performed by: HOSPITALIST

## 2020-10-20 RX ORDER — NICOTINE 21 MG/24HR
1 PATCH, TRANSDERMAL 24 HOURS TRANSDERMAL DAILY
Qty: 30 PATCH | Refills: 3 | Status: SHIPPED | OUTPATIENT
Start: 2020-10-21

## 2020-10-20 RX ORDER — HYDROXYZINE HYDROCHLORIDE 25 MG/1
25 TABLET, FILM COATED ORAL 3 TIMES DAILY PRN
Qty: 30 TABLET | Refills: 0 | Status: SHIPPED | OUTPATIENT
Start: 2020-10-20 | End: 2020-10-30

## 2020-10-20 RX ADMIN — AMLODIPINE BESYLATE 10 MG: 10 TABLET ORAL at 08:00

## 2020-10-20 RX ADMIN — HYDROCODONE BITARTRATE AND ACETAMINOPHEN 1 TABLET: 10; 325 TABLET ORAL at 03:04

## 2020-10-20 RX ADMIN — HYDROCODONE BITARTRATE AND ACETAMINOPHEN 1 TABLET: 10; 325 TABLET ORAL at 09:54

## 2020-10-20 RX ADMIN — CLOPIDOGREL BISULFATE 75 MG: 75 TABLET ORAL at 08:00

## 2020-10-20 RX ADMIN — LISINOPRIL 40 MG: 20 TABLET ORAL at 08:00

## 2020-10-20 RX ADMIN — ISOSORBIDE MONONITRATE 30 MG: 30 TABLET, EXTENDED RELEASE ORAL at 08:00

## 2020-10-20 RX ADMIN — ENOXAPARIN SODIUM 40 MG: 40 INJECTION SUBCUTANEOUS at 08:00

## 2020-10-20 RX ADMIN — METOPROLOL SUCCINATE 50 MG: 50 TABLET, EXTENDED RELEASE ORAL at 07:59

## 2020-10-20 RX ADMIN — HYDROCODONE BITARTRATE AND ACETAMINOPHEN 1 TABLET: 10; 325 TABLET ORAL at 16:02

## 2020-10-20 RX ADMIN — THERA TABS 1 TABLET: TAB at 08:00

## 2020-10-20 RX ADMIN — LEVETIRACETAM 500 MG: 500 TABLET ORAL at 07:59

## 2020-10-20 RX ADMIN — THIAMINE HCL TAB 100 MG 100 MG: 100 TAB at 07:59

## 2020-10-20 RX ADMIN — SODIUM CHLORIDE, PRESERVATIVE FREE 10 ML: 5 INJECTION INTRAVENOUS at 08:05

## 2020-10-20 RX ADMIN — FOLIC ACID 1 MG: 1 TABLET ORAL at 08:00

## 2020-10-20 ASSESSMENT — PAIN SCALES - GENERAL
PAINLEVEL_OUTOF10: 10
PAINLEVEL_OUTOF10: 6
PAINLEVEL_OUTOF10: 10

## 2020-10-20 NOTE — SUICIDE SAFETY PLAN
SAFETY PLAN    A suicide Safety Plan is a document that supports someone when they are having thoughts of suicide. Warning Signs that indicate a suicidal crisis may be developing: What (situations, thoughts, feelings, body sensations, behaviors, etc.) do you experience that lets you know you are beginning to think about suicide? 1. Increased alcohol intake    Internal Coping Strategies:  What things can I do (relaxation techniques, hobbies, physical activities, etc.) to take my mind off my problems without contacting another person? 1. Prayer  2. [employer] and social settings that provide distraction: Who can I call or where can I go to distract me? 1. Name: Oma Johnston  Phone: Has in phone  2. Name: Alisha Davis  Phone: [phone]  3. Place: [facility] whom I can ask for help: Who can I call when I need help - for example, friends, family, clergy, someone else? 1. Name: Oma Johnston                Phone: Has in phone  2. Name: Alisha Davis  Phone: [phone]      Professionals or [facility] agencies I can contact during a crisis: Who can I call for help - for example, my doctor, my psychiatrist, my psychologist, a mental health provider, a suicide hotline? 1. Clinician Name: [id number]   Phone: [phone]      Clinician Pager or Emergency Contact #: [phone]    2. Clinician Name: [address]   Phone: [phone]      Clinician Pager or Emergency Contact #: [phone]    3. Suicide Prevention Lifeline: 5-978-061-TALK (7187)    4. [facility] : [phone] Emergency Department      Emergency Services Address: [place] [address] [address] [facility]  Emergency Services Phone: [phone]    Making the environment safe: How can I make my environment (house/apartment/living space) safer? For example, can I remove guns, medications, and other items? 1.  Remove all guns and weapons from the home. 2. Discard any extra medications in the home.

## 2020-10-20 NOTE — BH NOTE
Department of Psychiatry  Attending Progress Note      SUBJECTIVE:    52years old male with previous psychiatric history of depression and alcohol use disorder, complicated by history of withdrawal seizures from alcohol, chronic pain, CAD and hypertension, who has been admitted to medical services with alcohol intoxication, blood alcohol level 62, and suicidal ideations. During the initial evaluation in ER, the patient had hyponatremia, which was addressed by primary treatment team during this hospital stay. Patient has been seen in his room with presence of his mother and one-to-one sitter. Patient reported that his condition significantly improved during this hospital stay. He denies withdrawal symptoms from alcohol today. Patient reported improved quality of sleep, good appetite, denies any other affective symptomatology during the interview. He denies feeling of depression, anxiety or psychosis. Patient denies current active suicidal or homicidal ideations, denies any plans. Also, he denies any auditory and visual hallucinations. Discussed with patient different treatment options of alcohol use disorder. Informed the patient about medication assisted treatment for alcohol use disorder, as well as rehabilitation treatment. Patient reported that he was involved in rehab treatment for alcohol use disorder 30 years ago and at present time he is looking for admission to rehab facility, again. He contacted with rehab facility in [place], [place], where he wants to be admitted after discharge from the hospital.  Patient stated that he is not open for any medications for alcohol use disorder at the present time, however, he would consider it in the future, if rehab treatment would be not successful.     OBJECTIVE    Physical  VITALS:  /75   Pulse 85   Temp 97.4 °F (36.3 °C)   Resp 20   Ht 6' (1.829 m)   Wt 250 lb 9.6 oz (113.7 kg)   SpO2 95%   BMI 33.99 kg/m²   TEMPERATURE:  Current - Temp: 97.4 °F (36.3 °C); Max - Temp  Av.9 °F (36.1 °C)  Min: 96.5 °F (35.8 °C)  Max: 97.4 °F (36.3 °C)  RESPIRATIONS RANGE: Resp  Av  Min: 16  Max: 20  PULSE RANGE: Pulse  Av  Min: 83  Max: 89  BLOOD PRESSURE RANGE:  Systolic (77FZN), MNA:744 , Min:122 , UWX:819   ; Diastolic (84JGP), QVV:59, Min:75, Max:93    PULSE OXIMETRY RANGE: SpO2  Av.8 %  Min: 90 %  Max: 95 %      Mental Status Examination:   Appearance: Ppoperly groomed, unshaved, wearing casual civilian clothes. Made good eye contact. Behavior: Calm, cooperative, friendly and socially appropriate. No psychomotor retardation or agitation appreciated. Gait unremarkable. Speech: Normal in tone, volume, and quality. Mood: \"Much better\"   Affect: Mood congruent. Range is full. Thought Process: Appears linear and goal oriented. Thought Content: Patient does not have any current active suicidal and homicidal ideations. No overt delusions or paranoia appreciated. Perceptions: Seems patient does not have any auditory or visual hallucinations at present time. Patient did not appear to be responding to internal stimuli. No overt psychosis. Executive Functions: Appear intact. Concentration: Slightly decreased. Reasoning: Appears mildly impaired based on interaction from interview   Orientation: to person, place, date, and situation. Alert. Language: Intact. Fund of information: Intact. Memory: recent and remote appear intact. Impulsivity: Improved  Neurovegitative: Fair appetite, fair sleep.   Insight: Present  Judgment: Improved    Data  Labs:    CBC:   Lab Results   Component Value Date    WBC 5.9 10/20/2020    RBC 4.27 10/20/2020    HGB 12.8 10/20/2020    HCT 39.8 10/20/2020    MCV 93.2 10/20/2020    MCH 30.0 10/20/2020    MCHC 32.2 10/20/2020    RDW 14.1 10/20/2020     10/20/2020    MPV 9.4 10/20/2020     CMP:    Lab Results   Component Value Date     10/20/2020    K 4.1 10/20/2020     10/20/2020 CO2 26 10/20/2020    BUN 7 10/20/2020    CREATININE 0.8 10/20/2020    GFRAA >59 10/20/2020    LABGLOM >60 10/20/2020    GLUCOSE 117 10/20/2020    PROT 7.5 10/17/2020    LABALBU 4.7 10/17/2020    CALCIUM 9.4 10/20/2020    BILITOT 0.3 10/17/2020    ALKPHOS 120 10/17/2020    AST 52 10/17/2020    ALT 51 10/17/2020       Medications  Current Facility-Administered Medications: HYDROcodone-acetaminophen (NORCO)  MG per tablet 1 tablet, 1 tablet, Oral, Q6H PRN  sodium chloride flush 0.9 % injection 10 mL, 10 mL, Intravenous, 2 times per day  sodium chloride flush 0.9 % injection 10 mL, 10 mL, Intravenous, PRN  enoxaparin (LOVENOX) injection 40 mg, 40 mg, Subcutaneous, Daily  magnesium sulfate 2 g in 50 mL IVPB premix, 2 g, Intravenous, PRN  potassium chloride (KLOR-CON M) extended release tablet 40 mEq, 40 mEq, Oral, PRN **OR** potassium bicarb-citric acid (EFFER-K) effervescent tablet 40 mEq, 40 mEq, Oral, PRN **OR** potassium chloride 10 mEq/100 mL IVPB (Peripheral Line), 10 mEq, Intravenous, PRN  polyethylene glycol (GLYCOLAX) packet 17 g, 17 g, Oral, Daily PRN  ondansetron (ZOFRAN-ODT) disintegrating tablet 4 mg, 4 mg, Oral, Q8H PRN **OR** ondansetron (ZOFRAN) injection 4 mg, 4 mg, Intravenous, Q6H PRN  naloxone (NARCAN) injection 0.4 mg, 0.4 mg, Intravenous, PRN  melatonin tablet 3 mg, 3 mg, Oral, Nightly  hydrOXYzine (ATARAX) tablet 25 mg, 25 mg, Oral, TID PRN  vitamin B-1 (THIAMINE) tablet 100 mg, 100 mg, Oral, Daily  multivitamin 1 tablet, 1 tablet, Oral, Daily  folic acid (FOLVITE) tablet 1 mg, 1 mg, Oral, Daily  LORazepam (ATIVAN) tablet 1 mg, 1 mg, Oral, Q1H PRN **OR** LORazepam (ATIVAN) injection 1 mg, 1 mg, Intravenous, Q1H PRN **OR** LORazepam (ATIVAN) tablet 2 mg, 2 mg, Oral, Q1H PRN **OR** LORazepam (ATIVAN) injection 2 mg, 2 mg, Intravenous, Q1H PRN **OR** LORazepam (ATIVAN) tablet 3 mg, 3 mg, Oral, Q1H PRN **OR** LORazepam (ATIVAN) injection 3 mg, 3 mg, Intravenous, Q1H PRN **OR** LORazepam (ATIVAN) tablet 4 mg, 4 mg, Oral, Q1H PRN **OR** LORazepam (ATIVAN) injection 4 mg, 4 mg, Intravenous, Q1H PRN  nicotine (NICODERM CQ) 21 MG/24HR 1 patch, 1 patch, Transdermal, Daily  OLANZapine (ZYPREXA) tablet 5 mg, 5 mg, Oral, Nightly  metoprolol succinate (TOPROL XL) extended release tablet 50 mg, 50 mg, Oral, Daily  lisinopril (PRINIVIL;ZESTRIL) tablet 40 mg, 40 mg, Oral, Daily  levETIRAcetam (KEPPRA) tablet 500 mg, 500 mg, Oral, BID  isosorbide mononitrate (IMDUR) extended release tablet 30 mg, 30 mg, Oral, Daily  clopidogrel (PLAVIX) tablet 75 mg, 75 mg, Oral, Daily  atorvastatin (LIPITOR) tablet 10 mg, 10 mg, Oral, Nightly  amLODIPine (NORVASC) tablet 10 mg, 10 mg, Oral, Daily    ASSESSMENT AND PLAN    DSM-5 DIAGNOSIS:   Substance-induced mood disorder  Alcohol use disorder, severe, complicated by history of withdrawal seizures  Suicidal ideations, resolved  Tobacco use disorder, severe    Recommendations:  1. Currently patient is not suicidal, homicidal or psychotic. He denies any withdrawal symptoms from alcohol. Patient does not meet criteria for admission to psychiatric unit. 2.  Patient has a capacity of making his own medical decisions. 3.  Recommended to discontinue one-to-one sitter, at least primary treatment team has other reasons or concerns. 4.  Patient can be discharged from the hospital, when he is medically stable. 5.  Psychiatry will sign off today.

## 2020-10-20 NOTE — DISCHARGE SUMMARY
Zuleyma   :  1970  MRN:  [id number]    Admit date:  10/17/2020  Discharge date:  10/20/2020       Admitting Physician:  Stephanie Loomis MD    Advance Directive: Full Code    Consults Made:   IP CONSULT TO PSYCHIATRY  IP CONSULT TO SOCIAL WORK  IP CONSULT TO SOCIAL WORK      Primary Care Physician:  Joel Arreguin, APRN - CNP    Discharge Diagnoses:  Principal Problem:    Suicidal ideation  Active Problems:    Alcohol withdrawal ([facility]    HLD (hyperlipidemia)    HTN (hypertension)    Seizure disorder ([facility]    GERD (gastroesophageal reflux disease)    Depression with suicidal ideation  Resolved Problems:    * No resolved hospital problems. *        Pertinent Labs:  CBC with DIFF:  Recent Labs     10/17/20  2030 10/18/20  0146 10/20/20  0821   WBC 8.6 6.3 5.9   RBC 4.51* 4.26* 4.27*   HGB 13.7* 12.9* 12.8*   HCT 40.9* 38.9* 39.8*   MCV 90.7 91.3 93.2   MCH 30.4 30.3 30.0   MCHC 33.5 33.2 32.2*   RDW 13.8 13.8 14.1    246 243   MPV 8.8* 9.0* 9.4   NEUTOPHILPCT 58.0  --  62.9   LYMPHOPCT 29.0  --  24.8   MONOPCT 8.4  --  8.8   EOSRELPCT 3.5  --  2.5   BASOPCT 0.9  --  0.8   NEUTROABS 5.0  --  3.7   LYMPHSABS 2.5  --  1.5   MONOSABS 0.70  --  0.50   EOSABS 0.30  --  0.20   BASOSABS 0.10  --  0.10       CMP/BMP:  Recent Labs     10/17/20  2030 10/18/20  0146 10/20/20  0821   * 137 137   K 4.2 4.3 4.1   CL 95* 102 103   CO2 22 24 26   ANIONGAP 13 11 8   GLUCOSE 113* 105 117*   BUN 7 6 7   CREATININE 0.8 0.9 0.8   LABGLOM >60 >60 >60   CALCIUM 9.0 9.3 9.4   PROT 7.5  --   --    LABALBU 4.7  --   --    BILITOT 0.3  --   --    ALKPHOS 120  --   --    ALT 51*  --   --    AST 52*  --   --          CRP:  No results for input(s): CRP in the last 72 hours. Sed Rate:  No results for input(s): SEDRATE in the last 72 hours.       HgBA1c:  No components found for: HGBA1C  FLP:  No results found for: TRIG, HDL, LDLCALC, LDLDIRECT, LABVLDL  TSH:  No results found for: TSH  Troponin T: No results for input(s): TROPONINI in the last 72 hours. Pro-BNP: No results for input(s): BNP in the last 72 hours. INR: No results for input(s): INR in the last 72 hours. ABGs: No results found for: PHART, PO2ART, ZAM8TGP  UA:No results for input(s): NITRITE, COLORU, PHUR, LABCAST, WBCUA, RBCUA, MUCUS, TRICHOMONAS, YEAST, BACTERIA, CLARITYU, SPECGRAV, LEUKOCYTESUR, UROBILINOGEN, BILIRUBINUR, BLOODU, GLUCOSEU, AMORPHOUS in the last 72 hours. Invalid input(s): Ulzachary Bryce      Culture Results:    No results for input(s): CXSURG in the last 720 hours. Blood Culture Recent: No results for input(s): BC in the last 720 hours. Cultures:   No results for input(s): CULTURE in the last 72 hours. No results for input(s): BC, Federico Konig in the last 72 hours. No results for input(s): CXSURG in the last 72 hours. No results for input(s): MG, PHOS in the last 72 hours. Recent Labs     10/17/20  2030   AST 52*   ALT 51*   BILITOT 0.3   ALKPHOS 120           Significant Diagnostic Studies:   No results found. Hospital Course:   Mr. [facility]TER [facility], a 66-year-old male, history of CAD, HTN, EtOH abuse, depression, and seizures, presenting to the [address] [place] [address] ED on account of suicidal ideation with the plan. Urine toxicology, positive for benzodiazepine, as well as opiate. Blood alcohol level of 62 on presentation, however repeat alcohol level of < 10.      SI, with the plan  ETOH Withdrawal  · Suicide plan reportedly includes; cutting of wrists, and shooting himself in the head. · CIWA Protocol  · Larazepam PRN as per CIWA Score  · Banana Bag (Thiamine, Folic Acid, Multivatamins)  · Seizure Precautions  · Suicide precautions  · Seen by Psych  · Patient currently clinically stable for discharge/transfer to inpatient psych, given patient has never required any lorazepam or past 24 hours. Patient reevaluated by psych (10/20/2020)  · Patient deemed not suicidal, or homicidal this time.   · Patient does not meet requirement for admission to inpatient psych  · Okay to discontinue one-to-one sitter from psych standpoint  · Okay for discharge from psych standpoint        History of seizure disorder  · Levetiracetam 500 mg p.o. twice daily  · Seizure precautions              Continued management of other chronic medical conditions - see above and orders. Physical Exam:  Vital Signs: /75   Pulse 85   Temp 97.4 °F (36.3 °C)   Resp 20   Ht 6' (1.829 m)   Wt 250 lb 9.6 oz (113.7 kg)   SpO2 95%   BMI 33.99 kg/m²   Physical Exam  General appearance: alert, appears stated age and cooperative  HEENT: Head: Normocephalic, no lesions, without obvious abnormality. Neck: no carotid bruit, no JVD and supple, symmetrical, trachea midline  Lungs: Patient in no acute respiratory distress, No increased work of breathing clear to auscultation bilaterally  Heart: regular rate and rhythm, S1, S2 normal, no murmur, click, rub or gallop  Abdomen: soft, non-tender; bowel sounds normal; no masses,  no organomegaly  Extremities: extremities normal, atraumatic, no cyanosis or edema  2+ pulses in bilateral lower extremities  Neurologic: Mental status: Alert, oriented, thought content appropriate  Skin: Skin Warm, dry with , color, texture, turgor normal. No rashes or lesions or nodules.      Discharge Medications:       [address] Medication Instructions KMT:[id number]    Printed on:10/20/20 1618   Medication Information                      amLODIPine (NORVASC) 10 MG tablet  Take 10 mg by mouth daily             atorvastatin (LIPITOR) 10 MG tablet  Take 10 mg by mouth daily             clopidogrel (PLAVIX) 75 MG tablet  Take 75 mg by mouth daily             folic acid (FOLVITE) 1 MG tablet  Take 1 tablet by mouth daily             HYDROcodone-acetaminophen (NORCO)  MG per tablet  Take 1 tablet by mouth every 6 hours as needed for Pain.             hydrOXYzine (ATARAX) 25 MG tablet  Take 1 tablet by mouth 3 times daily as needed for Anxiety             isosorbide mononitrate (IMDUR) 30 MG extended release tablet  Take 1 tablet by mouth daily             levETIRAcetam (KEPPRA) 500 MG tablet  Take 1 tablet by mouth 2 times daily             lisinopril (PRINIVIL;ZESTRIL) 40 MG tablet  Take 40 mg by mouth daily             metoprolol succinate (TOPROL XL) 50 MG extended release tablet  Take 1 tablet by mouth daily             Multiple Vitamin (MULTIVITAMIN) TABS tablet  Take 1 tablet by mouth daily             nicotine (NICODERM CQ) 21 MG/24HR  Place 1 patch onto the skin daily             OLANZapine (ZYPREXA) 5 MG tablet  Take 5 mg by mouth nightly             vitamin B-1 100 MG tablet  Take 1 tablet by mouth daily                 Discharge Instructions: Follow up with TYRONE Peng CNP in 7 days. Take medications as directed. Resume activity as tolerated. Diet: DIET GENERAL; Safety Tray; Safety Tray (Disposables No Utensils)        DISCHARGE STATUS:    Condition: Good  Disposition: Patient is medically stable and will be discharged /plans for to inpatient psych    Extended Emergency Contact Information  Primary Emergency Contact: madeleine barfield  Home Phone: [phone]  Relation: Parent   needed? No       Time Spent on discharge is more than 32 mins in the examination, evaluation, counseling and review of medications and discharge plan. Electronically signed by   Doretha Bower MD, MPH,   Internal Medicine Hospitalist   10/20/2020 4:11 PM      Thank you TYRONE Peng CNP for the opportunity to be involved in this patient's care. If you have any questions or concerns please feel free to contact me at [phone]        EMR Dragon/Transcription disclaimer:   Much of this encounter note is an electronic transcription/translation of spoken language to printed text.  The electronic translation of spoken language may permit erroneous, or at times, nonsensical words or phrases to be inadvertently transcribed; although attempts have made to review the note for such errors, some may still exist.

## 2020-10-20 NOTE — CARE COORDINATION
Pt lives at home with family support; SW gave listing of addiction tx facilities    Electronically signed by Ruchi Valverde [facility] on 10/20/2020 at 4:29 PM

## 2020-10-20 NOTE — PROGRESS NOTES
Date: 10/20/20  Patient room:  0319/319-01  Patient MRN number: [id number]    C-SSRS completed  Patient suicidal - no   Since you were last asked, have you actually had thoughts about killing yourself? No    Since you were last asked, have you done anything, started to do anything, or prepared to do anything to end your life? No  Patient suicidal -   no  1:1 sitter at bedside    Spoke to Dr. Capps Coma: Patient denies SI, HI, AVH, not delusional or psychotic. Patient denies any symptoms of alcohol withdrawal, CIWA this am was zero. Patient does request information about alcohol rehab and if financial assistance is available for rehab due to cost keeping him from inpatient rehab in the past.  Patient okay to discharge from psychiatry point of view, please have  meet with patient on options for rehabilitation before discharge. Sitter is not needed for psychiatric reasons; however, continuation/discontinuation of sitter should be determined by patient's treatment team to meet patient medical needs. Called the floor with decision. Spoke with nurse.     Notes:    Electronically signed by Nurys Damon RN on 10/20/2020 at 12:04 PM

## 2020-10-20 NOTE — PROGRESS NOTES
Collateral obtained from: Harpreet Fine, Patient's mother    Immediate Stressors & Time Episode Began: Loss of brother earlier this year and loss of wife this year as well. Patient reports \"I had drank too much started feeling suicidal, haven't had any thoughts for the last two days about hurting myself\". Patient reports history of bipolar disorder diagnosis and has been compliant with his medications. Diagnosis/Hx of compliance with meds: yes compliant with medications    Tx Hx/Past hospitalizations: No previous psychiatric admissions, rehab for drug abuse in the past in [place], [place]. Family hx of psychiatric issues: Mother has history of depression    Substance Abuse: History of drug abuse in the past. Current    Pending Legal: n/a    Safety Issues (Weapons? Hx of attempts): 2 guns in the home. Mother informed it is recommended that these weapons be removed from the home. Mother reports to writer that she will remove guns from home and have another family member keep the guns. Support system/Medication Managed by: The importance of medication management and locking extra medication in a secured location was explained and reccommended to collateral.    Who does the pt live with: Lives with mother, plans to discharge home with mother from hospital.    Additional Info: Mother comfortable with patient coming home from hospital and staying with her at her home.     Electronically signed by Zohaib Lux RN on 10/20/2020 at 11:58 AM

## 2020-10-20 NOTE — PLAN OF CARE
Problem: Suicide risk  Goal: Provide patient with safe environment  Description: Provide patient with safe environment  Outcome: Ongoing     Problem: Falls - Risk of:  Goal: Will remain free from falls  Description: Will remain free from falls  Outcome: Ongoing  Goal: Absence of physical injury  Description: Absence of physical injury  Outcome: Ongoing   Electronically signed by Jake Sweeney RN on 10/20/2020 at 3:45 AM

## 2020-11-06 ENCOUNTER — APPOINTMENT (OUTPATIENT)
Dept: GENERAL RADIOLOGY | Age: 50
DRG: 897 | End: 2020-11-06
Payer: MEDICARE

## 2020-11-06 ENCOUNTER — HOSPITAL ENCOUNTER (INPATIENT)
Age: 50
LOS: 3 days | Discharge: HOME | DRG: 897 | End: 2020-11-09
Attending: EMERGENCY MEDICINE | Admitting: INTERNAL MEDICINE
Payer: MEDICARE

## 2020-11-06 PROBLEM — F10.10 ETOH ABUSE: Status: ACTIVE | Noted: 2020-11-06

## 2020-11-06 LAB
ACETAMINOPHEN LEVEL: <15 UG/ML
ALBUMIN SERPL-MCNC: 4.3 G/DL (ref 3.5–5.2)
ALP BLD-CCNC: 105 U/L (ref 40–130)
ALT SERPL-CCNC: 33 U/L (ref 5–41)
AMPHETAMINE SCREEN, URINE: NEGATIVE
ANION GAP SERPL CALCULATED.3IONS-SCNC: 16 MMOL/L (ref 7–19)
AST SERPL-CCNC: 31 U/L (ref 5–40)
BARBITURATE SCREEN URINE: NEGATIVE
BASOPHILS ABSOLUTE: 0.1 K/UL (ref 0–0.2)
BASOPHILS RELATIVE PERCENT: 1 % (ref 0–1)
BENZODIAZEPINE SCREEN, URINE: POSITIVE
BILIRUB SERPL-MCNC: <0.2 MG/DL (ref 0.2–1.2)
BUN BLDV-MCNC: 10 MG/DL (ref 6–20)
CALCIUM SERPL-MCNC: 9.2 MG/DL (ref 8.6–10)
CANNABINOID SCREEN URINE: NEGATIVE
CHLORIDE BLD-SCNC: 99 MMOL/L (ref 98–111)
CO2: 21 MMOL/L (ref 22–29)
COCAINE METABOLITE SCREEN URINE: NEGATIVE
CREAT SERPL-MCNC: 1.1 MG/DL (ref 0.5–1.2)
EOSINOPHILS ABSOLUTE: 0 K/UL (ref 0–0.6)
EOSINOPHILS RELATIVE PERCENT: 0.4 % (ref 0–5)
ETHANOL: 308 MG/DL (ref 0–0.08)
GFR AFRICAN AMERICAN: >59
GFR NON-AFRICAN AMERICAN: >60
GLUCOSE BLD-MCNC: 157 MG/DL (ref 74–109)
HCT VFR BLD CALC: 39.1 % (ref 42–52)
HEMOGLOBIN: 13 G/DL (ref 14–18)
IMMATURE GRANULOCYTES #: 0 K/UL
LYMPHOCYTES ABSOLUTE: 2.2 K/UL (ref 1.1–4.5)
LYMPHOCYTES RELATIVE PERCENT: 20.7 % (ref 20–40)
Lab: ABNORMAL
MCH RBC QN AUTO: 29.6 PG (ref 27–31)
MCHC RBC AUTO-ENTMCNC: 33.2 G/DL (ref 33–37)
MCV RBC AUTO: 89.1 FL (ref 80–94)
MONOCYTES ABSOLUTE: 0.5 K/UL (ref 0–0.9)
MONOCYTES RELATIVE PERCENT: 4.9 % (ref 0–10)
NEUTROPHILS ABSOLUTE: 7.9 K/UL (ref 1.5–7.5)
NEUTROPHILS RELATIVE PERCENT: 72.7 % (ref 50–65)
OPIATE SCREEN URINE: POSITIVE
PDW BLD-RTO: 13.2 % (ref 11.5–14.5)
PLATELET # BLD: 350 K/UL (ref 130–400)
PMV BLD AUTO: 9.2 FL (ref 9.4–12.4)
POTASSIUM SERPL-SCNC: 3.4 MMOL/L (ref 3.5–5)
RBC # BLD: 4.39 M/UL (ref 4.7–6.1)
SALICYLATE, SERUM: <3 MG/DL (ref 3–10)
SARS-COV-2, NAAT: NOT DETECTED
SODIUM BLD-SCNC: 136 MMOL/L (ref 136–145)
TOTAL PROTEIN: 6.8 G/DL (ref 6.6–8.7)
WBC # BLD: 10.8 K/UL (ref 4.8–10.8)

## 2020-11-06 PROCEDURE — 6360000002 HC RX W HCPCS: Performed by: EMERGENCY MEDICINE

## 2020-11-06 PROCEDURE — 71045 X-RAY EXAM CHEST 1 VIEW: CPT

## 2020-11-06 PROCEDURE — 2580000003 HC RX 258: Performed by: EMERGENCY MEDICINE

## 2020-11-06 PROCEDURE — 36415 COLL VENOUS BLD VENIPUNCTURE: CPT

## 2020-11-06 PROCEDURE — G0480 DRUG TEST DEF 1-7 CLASSES: HCPCS

## 2020-11-06 PROCEDURE — 6370000000 HC RX 637 (ALT 250 FOR IP): Performed by: INTERNAL MEDICINE

## 2020-11-06 PROCEDURE — 99999 PR OFFICE/OUTPT VISIT,PROCEDURE ONLY: CPT | Performed by: EMERGENCY MEDICINE

## 2020-11-06 PROCEDURE — 6360000002 HC RX W HCPCS: Performed by: INTERNAL MEDICINE

## 2020-11-06 PROCEDURE — 1210000000 HC MED SURG R&B

## 2020-11-06 PROCEDURE — U0002 COVID-19 LAB TEST NON-CDC: HCPCS

## 2020-11-06 PROCEDURE — 99284 EMERGENCY DEPT VISIT MOD MDM: CPT

## 2020-11-06 PROCEDURE — 85025 COMPLETE CBC W/AUTO DIFF WBC: CPT

## 2020-11-06 PROCEDURE — G0378 HOSPITAL OBSERVATION PER HR: HCPCS

## 2020-11-06 PROCEDURE — 94640 AIRWAY INHALATION TREATMENT: CPT

## 2020-11-06 PROCEDURE — 96374 THER/PROPH/DIAG INJ IV PUSH: CPT

## 2020-11-06 PROCEDURE — 80053 COMPREHEN METABOLIC PANEL: CPT

## 2020-11-06 PROCEDURE — 80307 DRUG TEST PRSMV CHEM ANLYZR: CPT

## 2020-11-06 PROCEDURE — 2500000003 HC RX 250 WO HCPCS: Performed by: EMERGENCY MEDICINE

## 2020-11-06 PROCEDURE — 2580000003 HC RX 258: Performed by: INTERNAL MEDICINE

## 2020-11-06 PROCEDURE — 96375 TX/PRO/DX INJ NEW DRUG ADDON: CPT

## 2020-11-06 RX ORDER — METOPROLOL SUCCINATE 50 MG/1
50 TABLET, EXTENDED RELEASE ORAL DAILY
Status: DISCONTINUED | OUTPATIENT
Start: 2020-11-07 | End: 2020-11-09 | Stop reason: HOSPADM

## 2020-11-06 RX ORDER — LORAZEPAM 2 MG/ML
2 INJECTION INTRAMUSCULAR
Status: DISCONTINUED | OUTPATIENT
Start: 2020-11-06 | End: 2020-11-09 | Stop reason: HOSPADM

## 2020-11-06 RX ORDER — ISOSORBIDE MONONITRATE 30 MG/1
30 TABLET, EXTENDED RELEASE ORAL DAILY
Status: DISCONTINUED | OUTPATIENT
Start: 2020-11-06 | End: 2020-11-09 | Stop reason: HOSPADM

## 2020-11-06 RX ORDER — POTASSIUM CHLORIDE 7.45 MG/ML
10 INJECTION INTRAVENOUS PRN
Status: DISCONTINUED | OUTPATIENT
Start: 2020-11-06 | End: 2020-11-09 | Stop reason: HOSPADM

## 2020-11-06 RX ORDER — PROMETHAZINE HYDROCHLORIDE 25 MG/1
12.5 TABLET ORAL EVERY 6 HOURS PRN
Status: DISCONTINUED | OUTPATIENT
Start: 2020-11-06 | End: 2020-11-09 | Stop reason: HOSPADM

## 2020-11-06 RX ORDER — NICOTINE 21 MG/24HR
1 PATCH, TRANSDERMAL 24 HOURS TRANSDERMAL DAILY
Status: DISCONTINUED | OUTPATIENT
Start: 2020-11-06 | End: 2020-11-09 | Stop reason: HOSPADM

## 2020-11-06 RX ORDER — SODIUM CHLORIDE 9 MG/ML
INJECTION, SOLUTION INTRAVENOUS CONTINUOUS
Status: DISCONTINUED | OUTPATIENT
Start: 2020-11-06 | End: 2020-11-09 | Stop reason: HOSPADM

## 2020-11-06 RX ORDER — LEVETIRACETAM 500 MG/1
500 TABLET ORAL 2 TIMES DAILY
Status: DISCONTINUED | OUTPATIENT
Start: 2020-11-06 | End: 2020-11-09 | Stop reason: HOSPADM

## 2020-11-06 RX ORDER — FOLIC ACID 1 MG/1
1 TABLET ORAL DAILY
Status: DISCONTINUED | OUTPATIENT
Start: 2020-11-06 | End: 2020-11-09 | Stop reason: HOSPADM

## 2020-11-06 RX ORDER — LORAZEPAM 1 MG/1
2 TABLET ORAL
Status: DISCONTINUED | OUTPATIENT
Start: 2020-11-06 | End: 2020-11-09 | Stop reason: HOSPADM

## 2020-11-06 RX ORDER — ACETAMINOPHEN 325 MG/1
650 TABLET ORAL EVERY 6 HOURS PRN
Status: DISCONTINUED | OUTPATIENT
Start: 2020-11-06 | End: 2020-11-09 | Stop reason: HOSPADM

## 2020-11-06 RX ORDER — MULTIVITAMIN WITH IRON
1 TABLET ORAL DAILY
Status: DISCONTINUED | OUTPATIENT
Start: 2020-11-06 | End: 2020-11-09 | Stop reason: HOSPADM

## 2020-11-06 RX ORDER — HYDROCODONE BITARTRATE AND ACETAMINOPHEN 10; 325 MG/1; MG/1
1 TABLET ORAL EVERY 6 HOURS PRN
Status: DISCONTINUED | OUTPATIENT
Start: 2020-11-06 | End: 2020-11-09 | Stop reason: HOSPADM

## 2020-11-06 RX ORDER — ONDANSETRON 2 MG/ML
4 INJECTION INTRAMUSCULAR; INTRAVENOUS EVERY 6 HOURS PRN
Status: DISCONTINUED | OUTPATIENT
Start: 2020-11-06 | End: 2020-11-09 | Stop reason: HOSPADM

## 2020-11-06 RX ORDER — LORAZEPAM 2 MG/ML
3 INJECTION INTRAMUSCULAR
Status: DISCONTINUED | OUTPATIENT
Start: 2020-11-06 | End: 2020-11-09 | Stop reason: HOSPADM

## 2020-11-06 RX ORDER — AMLODIPINE BESYLATE 10 MG/1
10 TABLET ORAL DAILY
Status: DISCONTINUED | OUTPATIENT
Start: 2020-11-07 | End: 2020-11-09 | Stop reason: HOSPADM

## 2020-11-06 RX ORDER — LORAZEPAM 1 MG/1
4 TABLET ORAL
Status: DISCONTINUED | OUTPATIENT
Start: 2020-11-06 | End: 2020-11-09 | Stop reason: HOSPADM

## 2020-11-06 RX ORDER — LISINOPRIL 10 MG/1
40 TABLET ORAL DAILY
Status: DISCONTINUED | OUTPATIENT
Start: 2020-11-07 | End: 2020-11-09 | Stop reason: HOSPADM

## 2020-11-06 RX ORDER — THIAMINE HYDROCHLORIDE 100 MG/ML
100 INJECTION, SOLUTION INTRAMUSCULAR; INTRAVENOUS DAILY
Status: DISCONTINUED | OUTPATIENT
Start: 2020-11-06 | End: 2020-11-09 | Stop reason: HOSPADM

## 2020-11-06 RX ORDER — BUDESONIDE 0.5 MG/2ML
0.5 INHALANT ORAL 2 TIMES DAILY
Status: DISCONTINUED | OUTPATIENT
Start: 2020-11-06 | End: 2020-11-09 | Stop reason: HOSPADM

## 2020-11-06 RX ORDER — POTASSIUM CHLORIDE 20 MEQ/1
40 TABLET, EXTENDED RELEASE ORAL PRN
Status: DISCONTINUED | OUTPATIENT
Start: 2020-11-06 | End: 2020-11-09 | Stop reason: HOSPADM

## 2020-11-06 RX ORDER — ARFORMOTEROL TARTRATE 15 UG/2ML
15 SOLUTION RESPIRATORY (INHALATION) 2 TIMES DAILY
Status: DISCONTINUED | OUTPATIENT
Start: 2020-11-06 | End: 2020-11-09 | Stop reason: HOSPADM

## 2020-11-06 RX ORDER — POLYETHYLENE GLYCOL 3350 17 G/17G
17 POWDER, FOR SOLUTION ORAL DAILY PRN
Status: DISCONTINUED | OUTPATIENT
Start: 2020-11-06 | End: 2020-11-09 | Stop reason: HOSPADM

## 2020-11-06 RX ORDER — LORAZEPAM 1 MG/1
3 TABLET ORAL
Status: DISCONTINUED | OUTPATIENT
Start: 2020-11-06 | End: 2020-11-09 | Stop reason: HOSPADM

## 2020-11-06 RX ORDER — LORAZEPAM 1 MG/1
1 TABLET ORAL
Status: DISCONTINUED | OUTPATIENT
Start: 2020-11-06 | End: 2020-11-09 | Stop reason: HOSPADM

## 2020-11-06 RX ORDER — LORAZEPAM 2 MG/ML
1 INJECTION INTRAMUSCULAR
Status: DISCONTINUED | OUTPATIENT
Start: 2020-11-06 | End: 2020-11-09 | Stop reason: HOSPADM

## 2020-11-06 RX ORDER — ATORVASTATIN CALCIUM 10 MG/1
10 TABLET, FILM COATED ORAL DAILY
Status: DISCONTINUED | OUTPATIENT
Start: 2020-11-06 | End: 2020-11-09 | Stop reason: HOSPADM

## 2020-11-06 RX ORDER — MAGNESIUM SULFATE IN WATER 40 MG/ML
2 INJECTION, SOLUTION INTRAVENOUS PRN
Status: DISCONTINUED | OUTPATIENT
Start: 2020-11-06 | End: 2020-11-09 | Stop reason: HOSPADM

## 2020-11-06 RX ORDER — ACETAMINOPHEN 650 MG/1
650 SUPPOSITORY RECTAL EVERY 6 HOURS PRN
Status: DISCONTINUED | OUTPATIENT
Start: 2020-11-06 | End: 2020-11-09 | Stop reason: HOSPADM

## 2020-11-06 RX ORDER — LORAZEPAM 2 MG/ML
4 INJECTION INTRAMUSCULAR
Status: DISCONTINUED | OUTPATIENT
Start: 2020-11-06 | End: 2020-11-09 | Stop reason: HOSPADM

## 2020-11-06 RX ORDER — CLOPIDOGREL BISULFATE 75 MG/1
75 TABLET ORAL DAILY
Status: DISCONTINUED | OUTPATIENT
Start: 2020-11-06 | End: 2020-11-09 | Stop reason: HOSPADM

## 2020-11-06 RX ORDER — OLANZAPINE 5 MG/1
5 TABLET ORAL NIGHTLY
Status: DISCONTINUED | OUTPATIENT
Start: 2020-11-06 | End: 2020-11-09 | Stop reason: HOSPADM

## 2020-11-06 RX ORDER — SODIUM CHLORIDE 0.9 % (FLUSH) 0.9 %
10 SYRINGE (ML) INJECTION EVERY 12 HOURS SCHEDULED
Status: DISCONTINUED | OUTPATIENT
Start: 2020-11-06 | End: 2020-11-09 | Stop reason: HOSPADM

## 2020-11-06 RX ORDER — IPRATROPIUM BROMIDE AND ALBUTEROL SULFATE 2.5; .5 MG/3ML; MG/3ML
1 SOLUTION RESPIRATORY (INHALATION)
Status: DISCONTINUED | OUTPATIENT
Start: 2020-11-06 | End: 2020-11-09 | Stop reason: HOSPADM

## 2020-11-06 RX ORDER — SODIUM CHLORIDE 0.9 % (FLUSH) 0.9 %
10 SYRINGE (ML) INJECTION PRN
Status: DISCONTINUED | OUTPATIENT
Start: 2020-11-06 | End: 2020-11-09 | Stop reason: HOSPADM

## 2020-11-06 RX ADMIN — FOLIC ACID: 5 INJECTION, SOLUTION INTRAMUSCULAR; INTRAVENOUS; SUBCUTANEOUS at 12:18

## 2020-11-06 RX ADMIN — SODIUM CHLORIDE, PRESERVATIVE FREE 10 ML: 5 INJECTION INTRAVENOUS at 20:40

## 2020-11-06 RX ADMIN — HYDROCODONE BITARTRATE AND ACETAMINOPHEN 1 TABLET: 10; 325 TABLET ORAL at 15:20

## 2020-11-06 RX ADMIN — THIAMINE HYDROCHLORIDE 100 MG: 100 INJECTION, SOLUTION INTRAMUSCULAR; INTRAVENOUS at 15:25

## 2020-11-06 RX ADMIN — LORAZEPAM 1 MG: 1 TABLET ORAL at 21:27

## 2020-11-06 RX ADMIN — SODIUM CHLORIDE: 9 INJECTION, SOLUTION INTRAVENOUS at 15:19

## 2020-11-06 RX ADMIN — IPRATROPIUM BROMIDE AND ALBUTEROL SULFATE 1 AMPULE: .5; 3 SOLUTION RESPIRATORY (INHALATION) at 21:16

## 2020-11-06 RX ADMIN — LEVETIRACETAM 500 MG: 500 TABLET ORAL at 20:40

## 2020-11-06 RX ADMIN — HYDROCODONE BITARTRATE AND ACETAMINOPHEN 1 TABLET: 10; 325 TABLET ORAL at 21:26

## 2020-11-06 RX ADMIN — BUDESONIDE 500 MCG: 0.5 INHALANT RESPIRATORY (INHALATION) at 21:12

## 2020-11-06 RX ADMIN — OLANZAPINE 5 MG: 5 TABLET ORAL at 20:40

## 2020-11-06 RX ADMIN — ARFORMOTEROL TARTRATE 15 MCG: 15 SOLUTION RESPIRATORY (INHALATION) at 21:12

## 2020-11-06 ASSESSMENT — ENCOUNTER SYMPTOMS
SORE THROAT: 0
VOMITING: 0
NAUSEA: 0
ABDOMINAL PAIN: 0
BACK PAIN: 0
COUGH: 0
DIARRHEA: 0
SHORTNESS OF BREATH: 0
RHINORRHEA: 0

## 2020-11-06 ASSESSMENT — PAIN DESCRIPTION - PAIN TYPE: TYPE: ACUTE PAIN;CHRONIC PAIN

## 2020-11-06 ASSESSMENT — PAIN SCALES - GENERAL
PAINLEVEL_OUTOF10: 6
PAINLEVEL_OUTOF10: 8
PAINLEVEL_OUTOF10: 10
PAINLEVEL_OUTOF10: 2

## 2020-11-06 ASSESSMENT — PAIN DESCRIPTION - LOCATION: LOCATION: BACK

## 2020-11-06 NOTE — ED NOTES
Patient came in with mother. States he drank a 2.5 pints of fireball this morning. Patients mother states that she had remove all of his firearms and knifes because he was attempting to kill himself. Patient has stated several times that he's suicidal, but wants help. Patient is very cooperative. Placed in purple scrubs. Sitter at bedside.       Isaac Rivas RN  11/06/20 [id number]

## 2020-11-06 NOTE — ED PROVIDER NOTES
HISTORY       Social History     Socioeconomic History    Marital status:      Spouse name: None    Number of children: None    Years of education: None    Highest education level: None   Occupational History    None   Social Needs    Financial resource strain: None    Food insecurity     Worry: None     Inability: None    Transportation needs     Medical: None     Non-medical: None   Tobacco Use    Smoking status: Current Every Day Smoker     Packs/day: 1.00     Types: Cigarettes    Smokeless tobacco: Never Used    Tobacco comment: pint a day   Substance and Sexual Activity    Alcohol use: Yes     Comment: alcoholic    Drug use: Not Currently    Sexual activity: None   Lifestyle    Physical activity     Days per week: None     Minutes per session: None    Stress: None   Relationships    Social connections     Talks on phone: None     Gets together: None     Attends Buddhism service: None     Active member of club or organization: None     Attends meetings of clubs or organizations: None     Relationship status: None    Intimate partner violence     Fear of current or ex partner: None     Emotionally abused: None     Physically abused: None     Forced sexual activity: None   Other Topics Concern    None   Social History Narrative    None       SCREENINGS    [place] Coma Scale  Eye Opening: Spontaneous  Best Verbal Response: Oriented  Best Motor Response: Obeys commands  [place] Coma Scale Score: 15        PHYSICAL EXAM    (up to 7 for level 4, 8 or more for level 5)     ED Triage Vitals [11/06/20 1052]   BP Temp Temp src Pulse Resp SpO2 Height Weight   99/69 97.6 °F (36.4 °C) -- 104 18 95 % 6' (1.829 m) 265 lb (120.2 kg)       Physical Exam  Vitals signs and nursing note reviewed. Constitutional:       General: He is not in acute distress. Appearance: He is well-developed. He is not ill-appearing, toxic-appearing or diaphoretic.       Comments: Seems intoxicated slightly slurred speech   HENT:      Head: Normocephalic and atraumatic. Nose: Nose normal.      Mouth/Throat:      Mouth: Mucous membranes are moist.   Eyes:      Conjunctiva/sclera: Conjunctivae normal.   Neck:      Musculoskeletal: Normal range of motion and neck supple. Trachea: No tracheal deviation. Cardiovascular:      Rate and Rhythm: Normal rate and regular rhythm. Heart sounds: Normal heart sounds. No murmur. Pulmonary:      Breath sounds: Normal breath sounds. No wheezing or rales. Abdominal:      Palpations: Abdomen is soft. There is no mass. Tenderness: There is no abdominal tenderness. Musculoskeletal: Normal range of motion. Skin:     General: Skin is warm and dry. Neurological:      Mental Status: He is alert and oriented to person, place, and time. Cranial Nerves: No facial asymmetry. Motor: No abnormal muscle tone. Psychiatric:         Mood and Affect: Affect is flat. Behavior: Behavior is cooperative. Thought Content: Thought content is not paranoid or delusional. Thought content includes suicidal ideation. Thought content does not include homicidal ideation. Thought content includes suicidal plan. DIAGNOSTIC RESULTS         No orders to display           LABS:  Labs Reviewed   CBC WITH AUTO DIFFERENTIAL - Abnormal; Notable for the following components:       Result Value    RBC 4.39 (*)     Hemoglobin 13.0 (*)     Hematocrit 39.1 (*)     MPV 9.2 (*)     Neutrophils % 72.7 (*)     Neutrophils Absolute 7.9 (*)     All other components within normal limits   COMPREHENSIVE METABOLIC PANEL - Abnormal; Notable for the following components:    Potassium 3.4 (*)     CO2 21 (*)     Glucose 157 (*)     All other components within normal limits   ACETAMINOPHEN LEVEL   ETHANOL   SALICYLATE LEVEL   URINE DRUG SCREEN       All other labs were within normal range or not returned as of this dictation.     EMERGENCY DEPARTMENT COURSE and DIFFERENTIAL DIAGNOSIS/MDM:   Vitals:    Vitals:    11/06/20 1052 11/06/20 1247   BP: 99/69 112/61   Pulse: 104 89   Resp: 18 18   Temp: 97.6 °F (36.4 °C) 97.4 °F (36.3 °C)   TempSrc:  Temporal   SpO2: 95% 91%   Weight: 265 lb (120.2 kg)    Height: 6' (1.829 m)        MDM  Number of Diagnoses or Management Options     Amount and/or Complexity of Data Reviewed  Clinical lab tests: ordered and reviewed  Discuss the patient with other providers: yes      VSS, etoh intoxication, SI w/plan, hx of withdrawal and seizure, given hx and level of 308 have d/w hospitalist for medical admission and once clear BH can see patient      CONSULTS:  IP CONSULT TO SOCIAL WORK    PROCEDURES:  Unless otherwise noted below, none     Procedures    FINAL IMPRESSION      1. Depression with suicidal ideation    2. Acute alcoholic intoxication without complication ([facility]          DISPOSITION/PLAN   DISPOSITION Admitted 11/06/2020 12:05:07 PM      PATIENT REFERRED TO:  No follow-up provider specified.     DISCHARGE MEDICATIONS:  Current Discharge Medication List             (Please note that portions of this note were completed with a voice recognition program.  Efforts were made to edit thedictations but occasionally words are mis-transcribed.)    John Dickerson MD (electronically signed)  Attending Emergency Physician        Lacey Sorto MD  11/06/20 [phone]

## 2020-11-06 NOTE — H&P
levETIRAcetam (KEPPRA) 500 MG tablet Take 1 tablet by mouth 2 times daily 9/14/20   Pro Johnson MD   metoprolol succinate (TOPROL XL) 50 MG extended release tablet Take 1 tablet by mouth daily 9/15/20   Pro Johnson MD   folic acid (FOLVITE) 1 MG tablet Take 1 tablet by mouth daily 9/15/20   Pro Johnson MD   Multiple Vitamin (MULTIVITAMIN) TABS tablet Take 1 tablet by mouth daily 9/15/20   Pro Johnson MD   vitamin B-1 100 MG tablet Take 1 tablet by mouth daily 9/15/20   Pro Johnson MD   OLANZapine (ZYPREXA) 5 MG tablet Take 5 mg by mouth nightly    Historical Provider, MD   amLODIPine (NORVASC) 10 MG tablet Take 10 mg by mouth daily    Historical Provider, MD   atorvastatin (LIPITOR) 10 MG tablet Take 10 mg by mouth daily    Historical Provider, MD   lisinopril (PRINIVIL;ZESTRIL) 40 MG tablet Take 40 mg by mouth daily    Historical Provider, MD   clopidogrel (PLAVIX) 75 MG tablet Take 75 mg by mouth daily    Historical Provider, MD       Allergies:    Patient has no known allergies. Social History:   Tobacco:   reports that he has been smoking cigarettes. He has been smoking about 1.00 pack per day. He has never used smokeless tobacco.  Alcohol:   reports current alcohol use. Illicit Drugs: denies    Family History:  History reviewed. No pertinent family history.     Review of Systems:   Constitutional / general:  No fever / chills / sweats  Head:  No headache  Eyes:  No blurry vision  ENT: No sore throat  CV:  No chest pain  Respiratory:  No shortness of breath  GI: No nausea / vomiting / abdominal pain / diarrhea / constipation  :  No dysuria  Neuro: No syncope / seizure  Musculoskeletal:  No muscle weakness  Vascular: No edema  Heme / endocrine: No easy bruising / bleeding  Psychiatric:  +suicidal ideation  Skin:  No new rashes        Physical Examination:  /61   Pulse 89   Temp 97.4 °F (36.3 °C) (Temporal)   Resp 18   Ht 6' (1.829 m)   Wt 265 lb (120.2 kg)   SpO2 91%   BMI 35.94 kg/m²        General appearance: Alert  Head: NC/AT  Eyes: EOMI   Ears: normal external ears  Neck: Supple  Lungs: BLAE, coarse breath sounds b/l   Heart: regular rate and rhythm, S1, S2 normal, no murmurs appreciated  Abdomen: BS+, soft, NT, ND  Extremities: no edema, pulses+  Skin: warm  Neurologic: AAOx3 (person, place, time), gross motor and sensory function intact  Psychiatric:  Mood appropriate        Diagnostic Data:     CBC:  Recent Labs     11/06/20  1121   WBC 10.8   HGB 13.0*   HCT 39.1*        BMP:  Recent Labs     11/06/20  1121      K 3.4*   CL 99   CO2 21*   BUN 10   CREATININE 1.1   CALCIUM 9.2     Recent Labs     11/06/20  1121   AST 31   ALT 33   BILITOT <0.2   ALKPHOS 105     Coag Panel: No results for input(s): INR, PROTIME, APTT in the last 72 hours. Cardiac Enzymes: No results for input(s): Abelardo Han in the last 72 hours. ABGs:No results found for: PHART, PO2ART, QGT4UZL  Urinalysis:No results found for: [place], [address], BACTERIA, RBCUA, BLOODU, [address], [address] [place] Problems    Diagnosis Date Noted    ETOH abuse [F10.10] 11/06/2020    COPD (chronic obstructive [facility] disease) ([facility] [J44.9]     Suicidal ideation [R45.851] 10/17/2020    GERD (gastroesophageal reflux disease) [K21.9] 09/11/2020    Seizure disorder ([facility] [G40.909] 09/11/2020    Depression with suicidal ideation [F32.9, R45.851] 09/11/2020    HLD (hyperlipidemia) [E78.5] 11/29/2016    HTN (hypertension) [I10] 11/29/2016    Coronary artery disease [I25.10] 11/29/2016       MPRESSION / PLAN:  Principal Problem:    ETOH abuse  Active Problems:    Coronary artery disease    HLD (hyperlipidemia)    HTN (hypertension)    Seizure disorder (HCC)    GERD (gastroesophageal reflux disease)    Depression with suicidal ideation    Suicidal ideation    COPD (chronic obstructive pulmonary disease) ([address]  Resolved Problems:    * No resolved hospital problems. *    EtOH Abuse: CIWA.  Folic Acid/Thiamine/MVI. IVF. Fall/Seizure precautions. Repeat EtOH level in AM.    Depression/Suicidal Ideation: Suicide precautions. Sitter at bedside. Psychiatry consult when no longer intoxicated. CAD: Plavix/Staitn. HTN: Home mediations resumed. Monitor BP and adjust medications PRN. HLD: Statin. Seizure d/o: Keppra. COPD: Not in acute exacerbation. Bronchodilators. O2 PRN. Supportive management. DVT ppx: Lovenox  Full Code.     Henrry Ibarra MD  11/6/2020

## 2020-11-07 LAB
ALBUMIN SERPL-MCNC: 3.9 G/DL (ref 3.5–5.2)
ALP BLD-CCNC: 97 U/L (ref 40–130)
ALT SERPL-CCNC: 28 U/L (ref 5–41)
ANION GAP SERPL CALCULATED.3IONS-SCNC: 12 MMOL/L (ref 7–19)
AST SERPL-CCNC: 27 U/L (ref 5–40)
BASOPHILS ABSOLUTE: 0.1 K/UL (ref 0–0.2)
BASOPHILS RELATIVE PERCENT: 1.3 % (ref 0–1)
BILIRUB SERPL-MCNC: 0.4 MG/DL (ref 0.2–1.2)
BUN BLDV-MCNC: 9 MG/DL (ref 6–20)
CALCIUM SERPL-MCNC: 8.7 MG/DL (ref 8.6–10)
CHLORIDE BLD-SCNC: 100 MMOL/L (ref 98–111)
CHOLESTEROL, TOTAL: 168 MG/DL (ref 160–199)
CO2: 24 MMOL/L (ref 22–29)
CREAT SERPL-MCNC: 0.7 MG/DL (ref 0.5–1.2)
EOSINOPHILS ABSOLUTE: 0.2 K/UL (ref 0–0.6)
EOSINOPHILS RELATIVE PERCENT: 2.8 % (ref 0–5)
ETHANOL: <10 MG/DL (ref 0–0.08)
GFR AFRICAN AMERICAN: >59
GFR NON-AFRICAN AMERICAN: >60
GLUCOSE BLD-MCNC: 107 MG/DL (ref 74–109)
HBA1C MFR BLD: 5.9 % (ref 4–6)
HCT VFR BLD CALC: 38.7 % (ref 42–52)
HDLC SERPL-MCNC: 36 MG/DL (ref 55–121)
HEMOGLOBIN: 13 G/DL (ref 14–18)
IMMATURE GRANULOCYTES #: 0 K/UL
LDL CHOLESTEROL CALCULATED: ABNORMAL MG/DL
LDL CHOLESTEROL DIRECT: 73 MG/DL
LYMPHOCYTES ABSOLUTE: 1.9 K/UL (ref 1.1–4.5)
LYMPHOCYTES RELATIVE PERCENT: 30.7 % (ref 20–40)
MAGNESIUM: 2 MG/DL (ref 1.6–2.6)
MCH RBC QN AUTO: 29.6 PG (ref 27–31)
MCHC RBC AUTO-ENTMCNC: 33.6 G/DL (ref 33–37)
MCV RBC AUTO: 88.2 FL (ref 80–94)
MONOCYTES ABSOLUTE: 0.4 K/UL (ref 0–0.9)
MONOCYTES RELATIVE PERCENT: 7 % (ref 0–10)
NEUTROPHILS ABSOLUTE: 3.5 K/UL (ref 1.5–7.5)
NEUTROPHILS RELATIVE PERCENT: 58 % (ref 50–65)
PDW BLD-RTO: 13.1 % (ref 11.5–14.5)
PLATELET # BLD: 276 K/UL (ref 130–400)
PMV BLD AUTO: 9.3 FL (ref 9.4–12.4)
POTASSIUM SERPL-SCNC: 3.7 MMOL/L (ref 3.5–5)
RBC # BLD: 4.39 M/UL (ref 4.7–6.1)
SODIUM BLD-SCNC: 136 MMOL/L (ref 136–145)
TOTAL PROTEIN: 6.5 G/DL (ref 6.6–8.7)
TRIGL SERPL-MCNC: 470 MG/DL (ref 0–149)
WBC # BLD: 6.1 K/UL (ref 4.8–10.8)

## 2020-11-07 PROCEDURE — 83721 ASSAY OF BLOOD LIPOPROTEIN: CPT

## 2020-11-07 PROCEDURE — G0378 HOSPITAL OBSERVATION PER HR: HCPCS

## 2020-11-07 PROCEDURE — 80061 LIPID PANEL: CPT

## 2020-11-07 PROCEDURE — 2500000003 HC RX 250 WO HCPCS: Performed by: INTERNAL MEDICINE

## 2020-11-07 PROCEDURE — 96372 THER/PROPH/DIAG INJ SC/IM: CPT

## 2020-11-07 PROCEDURE — 99221 1ST HOSP IP/OBS SF/LOW 40: CPT | Performed by: PSYCHIATRY & NEUROLOGY

## 2020-11-07 PROCEDURE — 83735 ASSAY OF MAGNESIUM: CPT

## 2020-11-07 PROCEDURE — G0480 DRUG TEST DEF 1-7 CLASSES: HCPCS

## 2020-11-07 PROCEDURE — 36415 COLL VENOUS BLD VENIPUNCTURE: CPT

## 2020-11-07 PROCEDURE — 2580000003 HC RX 258: Performed by: INTERNAL MEDICINE

## 2020-11-07 PROCEDURE — 6370000000 HC RX 637 (ALT 250 FOR IP): Performed by: INTERNAL MEDICINE

## 2020-11-07 PROCEDURE — 94640 AIRWAY INHALATION TREATMENT: CPT

## 2020-11-07 PROCEDURE — 6360000002 HC RX W HCPCS: Performed by: INTERNAL MEDICINE

## 2020-11-07 PROCEDURE — 1210000000 HC MED SURG R&B

## 2020-11-07 PROCEDURE — 96376 TX/PRO/DX INJ SAME DRUG ADON: CPT

## 2020-11-07 PROCEDURE — 80053 COMPREHEN METABOLIC PANEL: CPT

## 2020-11-07 PROCEDURE — 85025 COMPLETE CBC W/AUTO DIFF WBC: CPT

## 2020-11-07 PROCEDURE — 83036 HEMOGLOBIN GLYCOSYLATED A1C: CPT

## 2020-11-07 RX ADMIN — LEVETIRACETAM 500 MG: 500 TABLET ORAL at 20:04

## 2020-11-07 RX ADMIN — THIAMINE HYDROCHLORIDE 100 MG: 100 INJECTION, SOLUTION INTRAMUSCULAR; INTRAVENOUS at 09:35

## 2020-11-07 RX ADMIN — ATORVASTATIN CALCIUM 10 MG: 10 TABLET, FILM COATED ORAL at 09:34

## 2020-11-07 RX ADMIN — FOLIC ACID: 5 INJECTION, SOLUTION INTRAMUSCULAR; INTRAVENOUS; SUBCUTANEOUS at 09:36

## 2020-11-07 RX ADMIN — ARFORMOTEROL TARTRATE 15 MCG: 15 SOLUTION RESPIRATORY (INHALATION) at 19:10

## 2020-11-07 RX ADMIN — IPRATROPIUM BROMIDE AND ALBUTEROL SULFATE 1 AMPULE: .5; 3 SOLUTION RESPIRATORY (INHALATION) at 19:10

## 2020-11-07 RX ADMIN — LEVETIRACETAM 500 MG: 500 TABLET ORAL at 09:34

## 2020-11-07 RX ADMIN — AMLODIPINE BESYLATE 10 MG: 10 TABLET ORAL at 09:34

## 2020-11-07 RX ADMIN — OLANZAPINE 5 MG: 5 TABLET ORAL at 20:04

## 2020-11-07 RX ADMIN — IPRATROPIUM BROMIDE AND ALBUTEROL SULFATE 1 AMPULE: .5; 3 SOLUTION RESPIRATORY (INHALATION) at 14:22

## 2020-11-07 RX ADMIN — ARFORMOTEROL TARTRATE 15 MCG: 15 SOLUTION RESPIRATORY (INHALATION) at 07:54

## 2020-11-07 RX ADMIN — LORAZEPAM 1 MG: 1 TABLET ORAL at 20:04

## 2020-11-07 RX ADMIN — HYDROCODONE BITARTRATE AND ACETAMINOPHEN 1 TABLET: 10; 325 TABLET ORAL at 17:51

## 2020-11-07 RX ADMIN — METOPROLOL SUCCINATE 50 MG: 50 TABLET, EXTENDED RELEASE ORAL at 09:35

## 2020-11-07 RX ADMIN — HYDROCODONE BITARTRATE AND ACETAMINOPHEN 1 TABLET: 10; 325 TABLET ORAL at 11:44

## 2020-11-07 RX ADMIN — SODIUM CHLORIDE: 9 INJECTION, SOLUTION INTRAVENOUS at 18:23

## 2020-11-07 RX ADMIN — SODIUM CHLORIDE, PRESERVATIVE FREE 10 ML: 5 INJECTION INTRAVENOUS at 09:35

## 2020-11-07 RX ADMIN — CLOPIDOGREL BISULFATE 75 MG: 75 TABLET ORAL at 09:34

## 2020-11-07 RX ADMIN — IPRATROPIUM BROMIDE AND ALBUTEROL SULFATE 1 AMPULE: .5; 3 SOLUTION RESPIRATORY (INHALATION) at 11:19

## 2020-11-07 RX ADMIN — BUDESONIDE 500 MCG: 0.5 INHALANT RESPIRATORY (INHALATION) at 19:10

## 2020-11-07 RX ADMIN — ENOXAPARIN SODIUM 40 MG: 40 INJECTION SUBCUTANEOUS at 09:35

## 2020-11-07 RX ADMIN — ISOSORBIDE MONONITRATE 30 MG: 30 TABLET, EXTENDED RELEASE ORAL at 09:34

## 2020-11-07 RX ADMIN — BUDESONIDE 500 MCG: 0.5 INHALANT RESPIRATORY (INHALATION) at 07:54

## 2020-11-07 RX ADMIN — THERA TABS 1 TABLET: TAB at 09:34

## 2020-11-07 RX ADMIN — HYDROCODONE BITARTRATE AND ACETAMINOPHEN 1 TABLET: 10; 325 TABLET ORAL at 05:29

## 2020-11-07 RX ADMIN — LISINOPRIL 40 MG: 10 TABLET ORAL at 09:34

## 2020-11-07 RX ADMIN — FOLIC ACID 1 MG: 1 TABLET ORAL at 09:34

## 2020-11-07 RX ADMIN — SODIUM CHLORIDE, PRESERVATIVE FREE 10 ML: 5 INJECTION INTRAVENOUS at 20:04

## 2020-11-07 RX ADMIN — IPRATROPIUM BROMIDE AND ALBUTEROL SULFATE 1 AMPULE: .5; 3 SOLUTION RESPIRATORY (INHALATION) at 07:48

## 2020-11-07 ASSESSMENT — PAIN SCALES - GENERAL
PAINLEVEL_OUTOF10: 10
PAINLEVEL_OUTOF10: 0
PAINLEVEL_OUTOF10: 10
PAINLEVEL_OUTOF10: 10
PAINLEVEL_OUTOF10: 8

## 2020-11-07 ASSESSMENT — PAIN DESCRIPTION - PAIN TYPE: TYPE: ACUTE PAIN

## 2020-11-07 NOTE — CARE COORDINATION
EASTON met with pt at bedside regarding alcohol rehab. SW asked pt if he had ever been to rehab before. Pt stated no. SW asked pt if this was something he would think about. Pt stated yes. SW provided pt a list of local alcohol rehabs. SW informed pt that she would call the facilities for him, but they will ask SW questions about the pt that she doesn't know, therefore pt will have to call himself. Pt understood. SW asked pt if he had any questions. Pt stated no. EASTON informed pt to please let her know if he has any issues with calling or has any questions. Pt stated he would.

## 2020-11-07 NOTE — CONSULTS
[facility]  Psychiatry Consult    Reason for Consult: Concern suicidal ideations    The primary source(s) of information include(s):  Patient      The patient is a 52 y.o. male with previous psychiatric history of depression, alcohol use disorder, complicated by history of withdrawal seizures from alcohol, chronic pain syndrome, hypertension, coronary artery disease, who was admitted to medical services secondary to alcohol intoxication, blood alcohol level 308 and suicidal ideations. Patient has been seen in his room with presence of one-to-one sitter. Patient reported that after last discharge from the hospital 2.5 weeks ago, he stayed sober for 2 weeks, but then relapsed in drinking alcohol due to having stresses at home with his wife. Patient stated that he was drinking pint of vodka a day during the last 5-7 days. Patient stated that yesterday he became drunk and started to experience suicidal ideations and decided to come to the hospital and ask for help. Today during the interview patient denies significant affective symptomatology, he endorses withdrawal symptoms from alcohol - hand tremor, body shakes, headache, increased level of anxiety, irritability. Patient denies current active suicidal or homicidal ideations, denies any plans. Also, he denies any auditory and visual hallucinations, denies any paranoid ideations. Discussed with patient different treatment options. Patient expressed a desire to get into rehabilitation treatment for alcohol use disorder after discharge from the hospital.    PSYCHIATRIC HISTORY:  Diagnoses: Alcohol use disorder, depression  Suicide attempts/gestures: Denies   Prior hospitalizations: Denies   Medication trials: Norco, Zyprexa. Mental health contact: Primary care provider manages patient's medications. Allergies:  Patient has no known allergies. Mental Status  Appearance: Properly groomed, wearing hospital attire.   Made good eye contact. Behavior: Slightly anxious, cooperative with interview, socially appropriate. Mild psychomotor agitation appreciated. Speech: Normal in tone, volume, and quality. No pressured speech noted. Mood: \"Anxious\"   Affect: Mood congruent. Range is restricted. Thought Process: Appears linear and goal oriented. Thought Content: Patient does not have any current active suicidal and homicidal ideations. No overt delusions or paranoia appreciated. Perceptions: Seems patient does not have any auditory or visual hallucinations at present time. Patient did not appear to be responding to internal stimuli. No overt psychosis. Executive Functions: Appear mildly impaired. Concentration: Decreased. Reasoning: Appears mildly impaired based on interaction from interview   Orientation: to person, place, date, and situation. Alert. Language: Intact. Fund of information: Intact. Memory: recent and remote appear intact. Impulsivity: Limited. Insight: Impaired. Judgment: Impaired. Assessment  DSM 5 DIAGNOSIS:  Substance-induced mood disorder  Alcohol use disorder, severe, complicated by history of withdrawal seizures  Suicidal ideations, resolved  Tobacco use disorder, severe  Chronic pain syndrome      Recommendations  1. Currently patient is not suicidal, homicidal or psychotic. Patient does not meet criteria for psychiatric hospitalization. 2. Patient does have a capacity of making his own medical decisions. 3.  Recommended to discontinue one-to-one sitter, at least primary treatment team has other reasons or concerns. 4.  Patient endorses withdrawal symptoms from alcohol. Considering that patient has a history of withdrawal seizures from alcohol, he is still at high risk of having withdrawal seizures. I would recommend to keep patient in the hospital at least for 48 hours before considering discharge from the hospital and meanwhile, address withdrawal symptoms from alcohol.   5.  Patient can be discharged from the hospital when he is medically stable.  please provide patient with available rehab facilities in our area for admission, per patient's request  6. Psychiatry will sign off today.     Bethany Saint, MD

## 2020-11-07 NOTE — PROGRESS NOTES
[facility]        Hospitalist Progress Note  11/7/2020 9:48 AM  Subjective:   Admit Date: 11/6/2020  PCP: TYRONE Ramirez CNP    Chief Complaint: EtOH intoxication/Suicidal ideation with plan    Subjective: Patient seen and examined at bedside with sitter present. Denies chest pain or SOB. States he feels ok and slept well overnight. Tremulous on examination. Cumulative Hospital History: The patient is a 52 y.o. male with a PMH of CAD s/p stenting (>1 year ago - only on plavix at home), HTN, seizure d/o, depression, EtOH abuse, COPD who continues to smoke who presented to the ED complaining of alcohol intoxication and suicidal ideation with a plan. Repeat EtOH level <10. Psychiatry consulted. ROS: 14 point review of systems is negative except as specifically addressed above.     DIET CARB CONTROL; Safety Tray; Safety Tray (Disposables)    Intake/Output Summary (Last 24 hours) at 11/7/2020 0948  Last data filed at 11/6/2020 1918  Gross per 24 hour   Intake 720 ml   Output --   Net 720 ml     Medications:   sodium chloride 125 mL/hr at 11/06/20 1519     Current Facility-Administered Medications   Medication Dose Route Frequency Provider Last Rate Last Dose    sodium chloride flush 0.9 % injection 10 mL  10 mL Intravenous 2 times per day Wilver Craig MD   10 mL at 11/07/20 0935    sodium chloride flush 0.9 % injection 10 mL  10 mL Intravenous PRN Wilver Craig MD   10 mL at 11/06/20 2040    acetaminophen (TYLENOL) tablet 650 mg  650 mg Oral Q6H PRN Wilver Craig MD        Or    acetaminophen (TYLENOL) suppository 650 mg  650 mg Rectal Q6H PRN Wilver Craig MD        polyethylene glycol [facility]) packet 17 g  17 g Oral Daily PRN Wilver Craig MD        promethazine (PHENERGAN) tablet 12.5 mg  12.5 mg Oral Q6H PRORLY Craig MD        Or    [facility]dansetron [facility]) injection 4 mg  4 mg Intravenous Q6H PRN Wilver Craig MD        enoxaparin (LOVENOX) injection 40 mg  40 mg Subcutaneous Daily Hyacinth Mcclellan MD   40 mg at 11/07/20 0935    multivitamin 1 tablet  1 tablet Oral Daily Hyacinth Mcclellan MD   1 tablet at 11/07/20 0934    thiamine (B-1) injection 100 mg  100 mg Intravenous Daily Hyacinth Mcclellan MD   100 mg at 11/07/20 0935    magnesium sulfate 2 g in 50 mL IVPB premix  2 g Intravenous PRN Hyacinth Mcclellan MD        potassium chloride (KLOR-CON M) extended release tablet 40 mEq  40 mEq Oral PRN Hyacinth Mcclellan MD        Or    potassium bicarb-citric acid (EFFER-K) effervescent tablet 40 mEq  40 mEq Oral PRN Hyacinth Mcclellan MD        Or    potassium chloride 10 mEq/100 mL IVPB (Peripheral Line)  10 mEq Intravenous PRN Hyacinth Mcclellan MD        LORazepam (ATIVAN) tablet 1 mg  1 mg Oral Q1H PRN Hyacinth Mcclellan MD   1 mg at 11/06/20 2127    Or    LORazepam (ATIVAN) injection 1 mg  1 mg Intravenous Q1H PRN Hyacinth Mcclellan MD        Or    LORazepam (ATIVAN) tablet 2 mg  2 mg Oral Q1H PRN Hyacinth Mcclellan MD        Or    LORazepam (ATIVAN) injection 2 mg  2 mg Intravenous Q1H PRN Hyacinth Mcclellan MD        Or    LORazepam (ATIVAN) tablet 3 mg  3 mg Oral Q1H PRN Hyacinth Mcclellan MD        Or    LORazepam (ATIVAN) injection 3 mg  3 mg Intravenous Q1H PRN Hyacinth Mcclellan MD        Or    LORazepam (ATIVAN) tablet 4 mg  4 mg Oral Q1H PRN Hyacinth Mcclellan MD        Or    LORazepam (ATIVAN) injection 4 mg  4 mg Intravenous Q1H PRN Hyacinth Mcclellan MD        folic acid (FOLVITE) tablet 1 mg  1 mg Oral Daily Hyacinth Mcclellan MD   1 mg at 11/07/20 0934    sodium chloride 0.9 % 8,721 mL with folic acid 1 mg, adult multi-vitamin with vitamin k 10 mL, thiamine 100 mg   Intravenous Daily Hyacinth Mcclellan  mL/hr at 11/07/20 0936      0.9 % sodium chloride infusion   Intravenous Continuous Hyacinth Mcclellan  mL/hr at 11/06/20 1519      amLODIPine (NORVASC) tablet 10 mg  10 mg Oral Daily Hyacinth Mcclellan MD   10 mg at 11/07/20 0934    atorvastatin (LIPITOR) tablet 10 mg  10 mg Oral Daily Hyacinth Mcclellan MD   10 mg at No results for input(s): TROPONINI in the last 72 hours. INR: No results for input(s): INR in the last 72 hours. Lactic Acid: No results for input(s): LACTA in the last 72 hours. Objective:   Vitals: BP (!) 180/92   Pulse 96   Temp 98.9 °F (37.2 °C)   Resp 19   Ht 6' (1.829 m)   Wt 265 lb (120.2 kg)   SpO2 97%   BMI 35.94 kg/m²   24HR INTAKE/OUTPUT:      Intake/Output Summary (Last 24 hours) at 11/7/2020 0948  Last data filed at 11/6/2020 1918  Gross per 24 hour   Intake 720 ml   Output --   Net 720 ml     General appearance: Alert  Head: NC/AT  Eyes: EOMI   Ears: normal external ears  Neck: Supple  Lungs: BLAE, coarse breath sounds b/l   Heart: regular rate and rhythm, S1, S2 normal, no murmurs appreciated  Abdomen: BS+, soft, NT, ND  Extremities: no edema, pulses+  Skin: warm  Neurologic: AAOx3 (person, place, time), gross motor and sensory function intact  Psychiatric:  Mood appropriate    Assessment and Plan:   Principal Problem:    ETOH abuse  Active Problems:    Coronary artery disease    HLD (hyperlipidemia)    HTN (hypertension)    Seizure disorder (HCC)    GERD (gastroesophageal reflux disease)    Depression with suicidal ideation    Suicidal ideation    COPD (chronic obstructive pulmonary disease) ([facility] [address]  Resolved Problems:    * No resolved hospital problems. *    EtOH Abuse/withdrawal: CIWA. Folic Acid/Thiamine/MVI. IVF. Fall/Seizure precautions. Repeat EtOH level <10.     Depression/Suicidal Ideation: Suicide precautions. Sitter at bedside. Psychiatry consult placed today.     CAD: Plavix/Staitn.     HTN: Home mediations resumed. Monitor BP and adjust medications PRN.     HLD: Statin.     Seizure d/o: Keppra.     COPD: Not in acute exacerbation. Bronchodilators. O2 PRN.     Advance Directive: Full Code    DVT prophylaxis: Lovenox    Discharge planning: TBD      Signed:  Javier Rascon MD 11/7/2020 9:48 AM  Rounding Hospitalist

## 2020-11-08 LAB
ALBUMIN SERPL-MCNC: 3.7 G/DL (ref 3.5–5.2)
ALP BLD-CCNC: 88 U/L (ref 40–130)
ALT SERPL-CCNC: 29 U/L (ref 5–41)
ANION GAP SERPL CALCULATED.3IONS-SCNC: 13 MMOL/L (ref 7–19)
AST SERPL-CCNC: 26 U/L (ref 5–40)
BASOPHILS ABSOLUTE: 0.1 K/UL (ref 0–0.2)
BASOPHILS RELATIVE PERCENT: 1.5 % (ref 0–1)
BILIRUB SERPL-MCNC: 0.3 MG/DL (ref 0.2–1.2)
BUN BLDV-MCNC: 5 MG/DL (ref 6–20)
CALCIUM SERPL-MCNC: 8.6 MG/DL (ref 8.6–10)
CHLORIDE BLD-SCNC: 106 MMOL/L (ref 98–111)
CO2: 21 MMOL/L (ref 22–29)
CREAT SERPL-MCNC: 0.8 MG/DL (ref 0.5–1.2)
EOSINOPHILS ABSOLUTE: 0.3 K/UL (ref 0–0.6)
EOSINOPHILS RELATIVE PERCENT: 6.6 % (ref 0–5)
GFR AFRICAN AMERICAN: >59
GFR NON-AFRICAN AMERICAN: >60
GLUCOSE BLD-MCNC: 103 MG/DL (ref 74–109)
HCT VFR BLD CALC: 36.1 % (ref 42–52)
HEMOGLOBIN: 12 G/DL (ref 14–18)
IMMATURE GRANULOCYTES #: 0 K/UL
LYMPHOCYTES ABSOLUTE: 1.6 K/UL (ref 1.1–4.5)
LYMPHOCYTES RELATIVE PERCENT: 31.3 % (ref 20–40)
MAGNESIUM: 1.9 MG/DL (ref 1.6–2.6)
MCH RBC QN AUTO: 29.9 PG (ref 27–31)
MCHC RBC AUTO-ENTMCNC: 33.2 G/DL (ref 33–37)
MCV RBC AUTO: 90 FL (ref 80–94)
MONOCYTES ABSOLUTE: 0.3 K/UL (ref 0–0.9)
MONOCYTES RELATIVE PERCENT: 6.6 % (ref 0–10)
NEUTROPHILS ABSOLUTE: 2.8 K/UL (ref 1.5–7.5)
NEUTROPHILS RELATIVE PERCENT: 53.6 % (ref 50–65)
PDW BLD-RTO: 13.2 % (ref 11.5–14.5)
PLATELET # BLD: 237 K/UL (ref 130–400)
PMV BLD AUTO: 9.4 FL (ref 9.4–12.4)
POTASSIUM SERPL-SCNC: 3.9 MMOL/L (ref 3.5–5)
RBC # BLD: 4.01 M/UL (ref 4.7–6.1)
SODIUM BLD-SCNC: 140 MMOL/L (ref 136–145)
TOTAL PROTEIN: 5.6 G/DL (ref 6.6–8.7)
WBC # BLD: 5.2 K/UL (ref 4.8–10.8)

## 2020-11-08 PROCEDURE — 80053 COMPREHEN METABOLIC PANEL: CPT

## 2020-11-08 PROCEDURE — G0378 HOSPITAL OBSERVATION PER HR: HCPCS

## 2020-11-08 PROCEDURE — 1210000000 HC MED SURG R&B

## 2020-11-08 PROCEDURE — 6360000002 HC RX W HCPCS: Performed by: INTERNAL MEDICINE

## 2020-11-08 PROCEDURE — 2580000003 HC RX 258: Performed by: INTERNAL MEDICINE

## 2020-11-08 PROCEDURE — 94640 AIRWAY INHALATION TREATMENT: CPT

## 2020-11-08 PROCEDURE — 6370000000 HC RX 637 (ALT 250 FOR IP): Performed by: INTERNAL MEDICINE

## 2020-11-08 PROCEDURE — 2500000003 HC RX 250 WO HCPCS: Performed by: INTERNAL MEDICINE

## 2020-11-08 PROCEDURE — 36415 COLL VENOUS BLD VENIPUNCTURE: CPT

## 2020-11-08 PROCEDURE — 83735 ASSAY OF MAGNESIUM: CPT

## 2020-11-08 PROCEDURE — 96372 THER/PROPH/DIAG INJ SC/IM: CPT

## 2020-11-08 PROCEDURE — 96376 TX/PRO/DX INJ SAME DRUG ADON: CPT

## 2020-11-08 PROCEDURE — 85025 COMPLETE CBC W/AUTO DIFF WBC: CPT

## 2020-11-08 RX ADMIN — FOLIC ACID: 5 INJECTION, SOLUTION INTRAMUSCULAR; INTRAVENOUS; SUBCUTANEOUS at 07:57

## 2020-11-08 RX ADMIN — LEVETIRACETAM 500 MG: 500 TABLET ORAL at 07:56

## 2020-11-08 RX ADMIN — BUDESONIDE 500 MCG: 0.5 INHALANT RESPIRATORY (INHALATION) at 18:33

## 2020-11-08 RX ADMIN — LEVETIRACETAM 500 MG: 500 TABLET ORAL at 20:06

## 2020-11-08 RX ADMIN — LORAZEPAM 1 MG: 1 TABLET ORAL at 20:08

## 2020-11-08 RX ADMIN — IPRATROPIUM BROMIDE AND ALBUTEROL SULFATE 1 AMPULE: .5; 3 SOLUTION RESPIRATORY (INHALATION) at 07:08

## 2020-11-08 RX ADMIN — FOLIC ACID 1 MG: 1 TABLET ORAL at 07:56

## 2020-11-08 RX ADMIN — HYDROCODONE BITARTRATE AND ACETAMINOPHEN 1 TABLET: 10; 325 TABLET ORAL at 23:58

## 2020-11-08 RX ADMIN — ARFORMOTEROL TARTRATE 15 MCG: 15 SOLUTION RESPIRATORY (INHALATION) at 18:33

## 2020-11-08 RX ADMIN — THERA TABS 1 TABLET: TAB at 07:56

## 2020-11-08 RX ADMIN — IPRATROPIUM BROMIDE AND ALBUTEROL SULFATE 1 AMPULE: .5; 3 SOLUTION RESPIRATORY (INHALATION) at 18:33

## 2020-11-08 RX ADMIN — ACETAMINOPHEN 650 MG: 325 TABLET ORAL at 14:20

## 2020-11-08 RX ADMIN — ENOXAPARIN SODIUM 40 MG: 40 INJECTION SUBCUTANEOUS at 07:57

## 2020-11-08 RX ADMIN — OLANZAPINE 5 MG: 5 TABLET ORAL at 20:06

## 2020-11-08 RX ADMIN — IPRATROPIUM BROMIDE AND ALBUTEROL SULFATE 1 AMPULE: .5; 3 SOLUTION RESPIRATORY (INHALATION) at 10:55

## 2020-11-08 RX ADMIN — LISINOPRIL 40 MG: 10 TABLET ORAL at 07:57

## 2020-11-08 RX ADMIN — ARFORMOTEROL TARTRATE 15 MCG: 15 SOLUTION RESPIRATORY (INHALATION) at 07:21

## 2020-11-08 RX ADMIN — BUDESONIDE 500 MCG: 0.5 INHALANT RESPIRATORY (INHALATION) at 07:21

## 2020-11-08 RX ADMIN — CLOPIDOGREL BISULFATE 75 MG: 75 TABLET ORAL at 07:57

## 2020-11-08 RX ADMIN — AMLODIPINE BESYLATE 10 MG: 10 TABLET ORAL at 07:56

## 2020-11-08 RX ADMIN — HYDROCODONE BITARTRATE AND ACETAMINOPHEN 1 TABLET: 10; 325 TABLET ORAL at 12:01

## 2020-11-08 RX ADMIN — IPRATROPIUM BROMIDE AND ALBUTEROL SULFATE 1 AMPULE: .5; 3 SOLUTION RESPIRATORY (INHALATION) at 15:23

## 2020-11-08 RX ADMIN — METOPROLOL SUCCINATE 50 MG: 50 TABLET, EXTENDED RELEASE ORAL at 07:57

## 2020-11-08 RX ADMIN — ATORVASTATIN CALCIUM 10 MG: 10 TABLET, FILM COATED ORAL at 07:56

## 2020-11-08 RX ADMIN — ISOSORBIDE MONONITRATE 30 MG: 30 TABLET, EXTENDED RELEASE ORAL at 07:56

## 2020-11-08 RX ADMIN — HYDROCODONE BITARTRATE AND ACETAMINOPHEN 1 TABLET: 10; 325 TABLET ORAL at 18:13

## 2020-11-08 RX ADMIN — HYDROCODONE BITARTRATE AND ACETAMINOPHEN 1 TABLET: 10; 325 TABLET ORAL at 05:19

## 2020-11-08 RX ADMIN — SODIUM CHLORIDE, PRESERVATIVE FREE 10 ML: 5 INJECTION INTRAVENOUS at 20:06

## 2020-11-08 RX ADMIN — THIAMINE HYDROCHLORIDE 100 MG: 100 INJECTION, SOLUTION INTRAMUSCULAR; INTRAVENOUS at 07:56

## 2020-11-08 ASSESSMENT — PAIN SCALES - GENERAL
PAINLEVEL_OUTOF10: 2
PAINLEVEL_OUTOF10: 10
PAINLEVEL_OUTOF10: 2
PAINLEVEL_OUTOF10: 7
PAINLEVEL_OUTOF10: 0
PAINLEVEL_OUTOF10: 10
PAINLEVEL_OUTOF10: 8

## 2020-11-08 NOTE — PROGRESS NOTES
[facility]        Hospitalist Progress Note  11/8/2020 9:25 AM  Subjective:   Admit Date: 11/6/2020  PCP: TYRONE Salinas CNP    Chief Complaint: EtOH intoxication/Suicidal ideation with plan    Subjective: Patient seen and examined at bedside with sitter present. Denies chest pain or SOB. Feels well. Watching TV. Cumulative Hospital History: The patient is a 52 y.o. male with a PMH of CAD s/p stenting (>1 year ago - only on plavix at home), HTN, seizure d/o, depression, EtOH abuse, COPD who continues to smoke who presented to the ED complaining of alcohol intoxication and suicidal ideation with a plan. Repeat EtOH level <10. Psychiatry consulted. ROS: 14 point review of systems is negative except as specifically addressed above.     DIET CARB CONTROL; Safety Tray; Safety Tray (Disposables)    Intake/Output Summary (Last 24 hours) at 11/8/2020 0925  Last data filed at 11/8/2020 0129  Gross per 24 hour   Intake 1320 ml   Output --   Net 1320 ml     Medications:   sodium chloride 125 mL/hr at 11/07/20 1823     Current Facility-Administered Medications   Medication Dose Route Frequency Provider Last Rate Last Dose    sodium chloride flush 0.9 % injection 10 mL  10 mL Intravenous 2 times per day Umm Ha MD   10 mL at 11/07/20 2004    sodium chloride flush 0.9 % injection 10 mL  10 mL Intravenous PRN Umm Ha MD   10 mL at 11/06/20 2040    acetaminophen (TYLENOL) tablet 650 mg  650 mg Oral Q6H PRN Umm Ha MD        Or    acetaminophen (TYLENOL) suppository 650 mg  650 mg Rectal Q6H PRN Umm Ha MD        polyethylene glycol [facility]) packet 17 g  17 g Oral Daily PRN Umm Ha MD        promethazine (PHENERGAN) tablet 12.5 mg  12.5 mg Oral Q6H PRN Umm Ha MD        Or    ondansetron [facility]) injection 4 mg  4 mg Intravenous Q6H PRN Umm Ha MD        enoxaparin (LOVENOX) injection 40 mg  40 mg Subcutaneous Daily Umm Ha MD   40 mg at 11/08/20 0757    multivitamin 1 tablet  1 tablet Oral Daily Galileo De La Vega MD   1 tablet at 11/08/20 0756    thiamine (B-1) injection 100 mg  100 mg Intravenous Daily Galileo De La Vega MD   100 mg at 11/08/20 0756    magnesium sulfate 2 g in 50 mL IVPB premix  2 g Intravenous PRN Galileo De La Vega MD        potassium chloride (KLOR-CON M) extended release tablet 40 mEq  40 mEq Oral PRN Galileo De La Vega MD        Or    potassium bicarb-citric acid (EFFER-K) effervescent tablet 40 mEq  40 mEq Oral PRN Galileo De La Vega MD        Or    potassium chloride 10 mEq/100 mL IVPB (Peripheral Line)  10 mEq Intravenous PRN Galileo De La Vega MD        LORazepam (ATIVAN) tablet 1 mg  1 mg Oral Q1H PRN Galileo De La Vega MD   1 mg at 11/07/20 2004    Or    LORazepam (ATIVAN) injection 1 mg  1 mg Intravenous Q1H PRN Galileo De La Vega MD        Or    LORazepam (ATIVAN) tablet 2 mg  2 mg Oral Q1H PRN Galileo De La Vega MD        Or    LORazepam (ATIVAN) injection 2 mg  2 mg Intravenous Q1H PRN Galileo De La Vega MD        Or    LORazepam (ATIVAN) tablet 3 mg  3 mg Oral Q1H PRN Galileo De La Vega MD        Or    LORazepam (ATIVAN) injection 3 mg  3 mg Intravenous Q1H PRN Galileo De La Vega MD        Or    LORazepam (ATIVAN) tablet 4 mg  4 mg Oral Q1H PRN Galileo De La Vega MD        Or    LORazepam (ATIVAN) injection 4 mg  4 mg Intravenous Q1H PRN Galileo De La Vega MD        folic acid (FOLVITE) tablet 1 mg  1 mg Oral Daily Galileo De La Vega MD   1 mg at 11/08/20 0756    sodium chloride 0.9 % 7,696 mL with folic acid 1 mg, adult multi-vitamin with vitamin k 10 mL, thiamine 100 mg   Intravenous Daily Galileo De La Vega  mL/hr at 11/08/20 0757      0.9 % sodium chloride infusion   Intravenous Continuous Galileo De La Vega  mL/hr at 11/07/20 1823      amLODIPine (NORVASC) tablet 10 mg  10 mg Oral Daily Galileo De La Vega MD   10 mg at 11/08/20 0756    atorvastatin (LIPITOR) tablet 10 mg  10 mg Oral Daily Galileo De La Vega MD   10 mg at 11/08/20 0756    clopidogrel (PLAVIX) tablet 75 mg ABGs:No results for input(s): PH, PO2, PCO2, HCO3, BE, O2SAT in the last 72 hours. Troponin T: No results for input(s): TROPONINI in the last 72 hours. INR: No results for input(s): INR in the last 72 hours. Lactic Acid: No results for input(s): LACTA in the last 72 hours. Objective:   Vitals: BP (!) 190/98 Comment: steve TRACY notified  Pulse 96   Temp 98.5 °F (36.9 °C) (Temporal)   Resp 18   Ht 6' (1.829 m)   Wt 258 lb 9.6 oz (117.3 kg)   SpO2 96%   BMI 35.07 kg/m²   24HR INTAKE/OUTPUT:      Intake/Output Summary (Last 24 hours) at 11/8/2020 2201  Last data filed at 11/8/2020 0129  Gross per 24 hour   Intake 1320 ml   Output --   Net 1320 ml     General appearance: Alert  Head: NC/AT  Eyes: EOMI   Ears: normal external ears  Neck: Supple  Lungs: BLAE, coarse breath sounds b/l   Heart: regular rate and rhythm, S1, S2 normal, no murmurs appreciated  Abdomen: BS+, soft, NT, ND  Extremities: no edema, pulses+  Skin: warm  Neurologic: AAOx3 (person, place, time), gross motor and sensory function intact  Psychiatric:  Mood appropriate    Assessment and Plan:   Principal Problem:    ETOH abuse  Active Problems:    Coronary artery disease    HLD (hyperlipidemia)    HTN (hypertension)    Seizure disorder (HCC)    GERD (gastroesophageal reflux disease)    Depression with suicidal ideation    Suicidal ideation    COPD (chronic obstructive pulmonary disease) ([facility] [address]  Resolved Problems:    * No resolved hospital problems. *    EtOH Abuse/withdrawal: CIWA. Folic Acid/Thiamine/MVI. IVF. Fall/Seizure precautions. Required minimal ativan overnight.     Depression/Suicidal Ideation: Psychiatry has evaluated during admission - no indication for inpatient psych.     CAD: Plavix/Staitn.     HTN: Home mediations resumed. Monitor BP and adjust medications PRN.     HLD: Statin.     Seizure d/o: Keppra.     COPD: Not in acute exacerbation. Bronchodilators. O2 PRN.     Advance Directive: Full Code    DVT prophylaxis: Lovenox    Discharge planning: TBD - likely home in AM.      Signed:  Umm Ha MD 11/8/2020 9:25 AM  Rounding Hospitalist

## 2020-11-08 NOTE — PLAN OF CARE
Problem: Falls - Risk of:  Goal: Will remain free from falls  Description: Will remain free from falls  Outcome: Ongoing  Goal: Absence of physical injury  Description: Absence of physical injury  Outcome: Ongoing     Problem: Suicide risk  Goal: Provide patient with safe environment  Description: Provide patient with safe environment  Outcome: Ongoing     Problem: Pain:  Goal: Pain level will decrease  Description: Pain level will decrease  Outcome: Ongoing  Goal: Control of acute pain  Description: Control of acute pain  Outcome: Ongoing  Goal: Control of chronic pain  Description: Control of chronic pain  Outcome: Ongoing   Electronically signed by George Preciado RN on 11/8/2020 at 2:09 AM

## 2020-11-08 NOTE — PROGRESS NOTES
Pt awake, alert and oriented x4. He states \"I think I'm going to get to go home today. \" He was given coffee and had no concerns at this time.

## 2020-11-09 VITALS
OXYGEN SATURATION: 93 % | BODY MASS INDEX: 35.03 KG/M2 | DIASTOLIC BLOOD PRESSURE: 92 MMHG | HEIGHT: 72 IN | TEMPERATURE: 98.5 F | RESPIRATION RATE: 16 BRPM | SYSTOLIC BLOOD PRESSURE: 156 MMHG | WEIGHT: 258.6 LBS | HEART RATE: 90 BPM

## 2020-11-09 LAB
ALBUMIN SERPL-MCNC: 3.7 G/DL (ref 3.5–5.2)
ALP BLD-CCNC: 81 U/L (ref 40–130)
ALT SERPL-CCNC: 32 U/L (ref 5–41)
ANION GAP SERPL CALCULATED.3IONS-SCNC: 11 MMOL/L (ref 7–19)
AST SERPL-CCNC: 31 U/L (ref 5–40)
BASOPHILS ABSOLUTE: 0.1 K/UL (ref 0–0.2)
BASOPHILS RELATIVE PERCENT: 1.2 % (ref 0–1)
BILIRUB SERPL-MCNC: <0.2 MG/DL (ref 0.2–1.2)
BUN BLDV-MCNC: 5 MG/DL (ref 6–20)
CALCIUM SERPL-MCNC: 8.6 MG/DL (ref 8.6–10)
CHLORIDE BLD-SCNC: 106 MMOL/L (ref 98–111)
CO2: 22 MMOL/L (ref 22–29)
CREAT SERPL-MCNC: 0.8 MG/DL (ref 0.5–1.2)
EOSINOPHILS ABSOLUTE: 0.4 K/UL (ref 0–0.6)
EOSINOPHILS RELATIVE PERCENT: 7.4 % (ref 0–5)
GFR AFRICAN AMERICAN: >59
GFR NON-AFRICAN AMERICAN: >60
GLUCOSE BLD-MCNC: 107 MG/DL (ref 74–109)
HCT VFR BLD CALC: 34.7 % (ref 42–52)
HEMOGLOBIN: 11.6 G/DL (ref 14–18)
IMMATURE GRANULOCYTES #: 0 K/UL
LYMPHOCYTES ABSOLUTE: 1.9 K/UL (ref 1.1–4.5)
LYMPHOCYTES RELATIVE PERCENT: 32.2 % (ref 20–40)
MAGNESIUM: 1.8 MG/DL (ref 1.6–2.6)
MCH RBC QN AUTO: 30.1 PG (ref 27–31)
MCHC RBC AUTO-ENTMCNC: 33.4 G/DL (ref 33–37)
MCV RBC AUTO: 90.1 FL (ref 80–94)
MONOCYTES ABSOLUTE: 0.4 K/UL (ref 0–0.9)
MONOCYTES RELATIVE PERCENT: 7.2 % (ref 0–10)
NEUTROPHILS ABSOLUTE: 3.1 K/UL (ref 1.5–7.5)
NEUTROPHILS RELATIVE PERCENT: 51.8 % (ref 50–65)
PDW BLD-RTO: 13.2 % (ref 11.5–14.5)
PLATELET # BLD: 228 K/UL (ref 130–400)
PMV BLD AUTO: 9.3 FL (ref 9.4–12.4)
POTASSIUM SERPL-SCNC: 3.9 MMOL/L (ref 3.5–5)
RBC # BLD: 3.85 M/UL (ref 4.7–6.1)
SODIUM BLD-SCNC: 139 MMOL/L (ref 136–145)
TOTAL PROTEIN: 5.9 G/DL (ref 6.6–8.7)
WBC # BLD: 6 K/UL (ref 4.8–10.8)

## 2020-11-09 PROCEDURE — 6360000002 HC RX W HCPCS: Performed by: INTERNAL MEDICINE

## 2020-11-09 PROCEDURE — 94640 AIRWAY INHALATION TREATMENT: CPT

## 2020-11-09 PROCEDURE — 6370000000 HC RX 637 (ALT 250 FOR IP): Performed by: INTERNAL MEDICINE

## 2020-11-09 PROCEDURE — 85025 COMPLETE CBC W/AUTO DIFF WBC: CPT

## 2020-11-09 PROCEDURE — 83735 ASSAY OF MAGNESIUM: CPT

## 2020-11-09 PROCEDURE — 36415 COLL VENOUS BLD VENIPUNCTURE: CPT

## 2020-11-09 PROCEDURE — 80053 COMPREHEN METABOLIC PANEL: CPT

## 2020-11-09 RX ADMIN — BUDESONIDE 500 MCG: 0.5 INHALANT RESPIRATORY (INHALATION) at 06:40

## 2020-11-09 RX ADMIN — THERA TABS 1 TABLET: TAB at 08:34

## 2020-11-09 RX ADMIN — FOLIC ACID 1 MG: 1 TABLET ORAL at 08:35

## 2020-11-09 RX ADMIN — LISINOPRIL 40 MG: 10 TABLET ORAL at 08:34

## 2020-11-09 RX ADMIN — ATORVASTATIN CALCIUM 10 MG: 10 TABLET, FILM COATED ORAL at 08:35

## 2020-11-09 RX ADMIN — ARFORMOTEROL TARTRATE 15 MCG: 15 SOLUTION RESPIRATORY (INHALATION) at 06:40

## 2020-11-09 RX ADMIN — IPRATROPIUM BROMIDE AND ALBUTEROL SULFATE 1 AMPULE: .5; 3 SOLUTION RESPIRATORY (INHALATION) at 06:28

## 2020-11-09 RX ADMIN — METOPROLOL SUCCINATE 50 MG: 50 TABLET, EXTENDED RELEASE ORAL at 08:34

## 2020-11-09 RX ADMIN — AMLODIPINE BESYLATE 10 MG: 10 TABLET ORAL at 08:34

## 2020-11-09 RX ADMIN — CLOPIDOGREL BISULFATE 75 MG: 75 TABLET ORAL at 08:35

## 2020-11-09 RX ADMIN — LEVETIRACETAM 500 MG: 500 TABLET ORAL at 08:35

## 2020-11-09 RX ADMIN — ISOSORBIDE MONONITRATE 30 MG: 30 TABLET, EXTENDED RELEASE ORAL at 08:34

## 2020-11-09 NOTE — PLAN OF CARE
Problem: Falls - Risk of:  Goal: Will remain free from falls  Description: Will remain free from falls  Outcome: Ongoing  Goal: Absence of physical injury  Description: Absence of physical injury  Outcome: Ongoing     Problem: Suicide risk  Goal: Provide patient with safe environment  Description: Provide patient with safe environment  Outcome: Ongoing     Problem: Pain:  Goal: Pain level will decrease  Description: Pain level will decrease  Outcome: Ongoing  Goal: Control of acute pain  Description: Control of acute pain  Outcome: Ongoing  Goal: Control of chronic pain  Description: Control of chronic pain  Outcome: Ongoing   Electronically signed by George Preciado RN on 11/9/2020 at 3:58 AM

## 2020-11-09 NOTE — PROGRESS NOTES
Pt was assisted to the chair using a Sara[employer] with the help of GAMALIEL Javier. Pt tolerated well and was able to stand with minimal assist. He stated that \"it is so good to finally get out of that bed. \"  Sister remained at bedside and assists pt with care.

## 2020-11-09 NOTE — DISCHARGE SUMMARY
Discharge Summary      Leandra Mahoney  :  1970  MRN:  [id number]    Admit date:  2020  Discharge date:      Admitting Physician:  Valente Patel MD    Advance Directive: Full Code    Consults: psychiatry    Primary Care Physician:  iKara Davidson APRN - CNP    Discharge Diagnoses:  Principal Problem:    ETOH abuse  Active Problems:    Coronary artery disease    HLD (hyperlipidemia)    HTN (hypertension)    Seizure disorder (HCC)    GERD (gastroesophageal reflux disease)    Depression with suicidal ideation    Suicidal ideation    COPD (chronic obstructive pulmonary disease) ([facility] [address]  Resolved Problems:    * No resolved hospital problems. *      Significant Diagnostic Studies:   Xr Chest Portable    Result Date: 2020  XR CHEST PORTABLE 2020 7:59 PM History: Short of breath. One view chest x-ray compared with 2019. The heart size is normal. The mediastinum is within normal limits. The lungs are adequately expanded with no pneumonia or pneumothorax. There is mild chronic interstitial disease with scattered calcified granulomas. There is no significant pleural fluid. No congestive failure changes. 1. Stable mild chronic change. 2. No acute disease. Signed by Dr Oseas Rondon on 2020 8:00 PM      Pertinent Labs:   CBC:   Recent Labs     20  0409 20  0154 20  0152   WBC 6.1 5.2 6.0   HGB 13.0* 12.0* 11.6*    237 228     BMP:    Recent Labs     20  0409 20  0154 20  0152    140 139   K 3.7 3.9 3.9    106 106   CO2 24 21* 22   BUN 9 5* 5*   CREATININE 0.7 0.8 0.8   GLUCOSE 107 103 107     INR: No results for input(s): INR in the last 72 hours. ABGs:No results for input(s): PH, PO2, PCO2, HCO3, BE, O2SAT in the last 72 hours. Lactic Acid:No results for input(s): LACTA in the last 72 hours. Procedures: None  Hospital Course:  The patient is N 80 y.o. male with a PMH of CAD s/p stenting (>1 year ago - only on plavix at home), HTN, seizure d/o, depression, EtOH abuse, COPD who continues to smoke who presented to the ED complaining of alcohol intoxication and suicidal ideation with a plan. Repeat EtOH level <10. Psychiatry consulted. Patient does not meet criteria for inpatient psychiatric hospitalization. Patient with minimal alcohol withdrawal requiring very little Ativan. Patient is currently hemodynamically stable for discharge home today to follow-up with PCP and psychiatry as an outpatient.     Physical Exam:  Vitals: BP (!) 156/92   Pulse 90   Temp 98.5 °F (36.9 °C) (Temporal)   Resp 16   Ht 6' (1.829 m)   Wt 258 lb 9.6 oz (117.3 kg)   SpO2 93%   BMI 35.07 kg/m²   24HR INTAKE/OUTPUT:      Intake/Output Summary (Last 24 hours) at 11/9/2020 6396  Last data filed at 11/9/2020 0418  Gross per 24 hour   Intake 1360 ml   Output --   Net 1360 ml     General appearance: Alert  Head: NC/AT  Eyes: EOMI   Ears: normal external ears  Neck: Supple  Lungs: BLAE, no wheezing   Heart: regular rate and rhythm, S1, S2 normal, no murmurs appreciated  Abdomen: BS+, soft, NT, ND  Extremities: no edema, pulses+  Skin: warm  Neurologic: AAOx3 (person, place, time), gross motor and sensory function intact  Psychiatric:  Mood appropriate       Discharge Medications:       [place], [address] [place] [address] Medication Instructions RBA:[id number]    Printed on:11/09/20 8983   Medication Information                      amLODIPine (NORVASC) 10 MG tablet  Take 10 mg by mouth daily             atorvastatin (LIPITOR) 10 MG tablet  Take 10 mg by mouth daily             clopidogrel (PLAVIX) 75 MG tablet  Take 75 mg by mouth daily             folic acid (FOLVITE) 1 MG tablet  Take 1 tablet by mouth daily             HYDROcodone-acetaminophen (NORCO)  MG per tablet  Take 1 tablet by mouth every 6 hours as needed for Pain.             isosorbide mononitrate (IMDUR) 30 MG extended release tablet  Take 1 tablet by mouth daily             levETIRAcetam (KEPPRA) 500 MG tablet  Take 1 tablet by mouth 2 times daily             lisinopril (PRINIVIL;ZESTRIL) 40 MG tablet  Take 40 mg by mouth daily             metoprolol succinate (TOPROL XL) 50 MG extended release tablet  Take 1 tablet by mouth daily             Multiple Vitamin (MULTIVITAMIN) TABS tablet  Take 1 tablet by mouth daily             nicotine (NICODERM CQ) 21 MG/24HR  Place 1 patch onto the skin daily             OLANZapine (ZYPREXA) 5 MG tablet  Take 5 mg by mouth nightly             vitamin B-1 100 MG tablet  Take 1 tablet by mouth daily                 Discharge Instructions: Follow up with TYRONE Cervantes CNP in 7 days. Take medications as directed. Resume activity as tolerated. Diet: DIET CARB CONTROL; Safety Tray; Safety Tray (Disposables)     Disposition: Patient is medically stable and will be discharged Home. Time spent on discharge 35 minutes.     Signed:  Jamel Daniels MD 11/9/2020 9:18 AM

## 2021-01-11 ENCOUNTER — HOSPITAL ENCOUNTER (INPATIENT)
Age: 51
LOS: 2 days | Discharge: HOME | DRG: 897 | End: 2021-01-14
Attending: EMERGENCY MEDICINE | Admitting: INTERNAL MEDICINE
Payer: MEDICARE

## 2021-01-11 DIAGNOSIS — R45.851 DEPRESSION WITH SUICIDAL IDEATION: Primary | ICD-10-CM

## 2021-01-11 DIAGNOSIS — F10.920 ACUTE ALCOHOLIC INTOXICATION WITHOUT COMPLICATION (HCC): ICD-10-CM

## 2021-01-11 DIAGNOSIS — F32.A DEPRESSION WITH SUICIDAL IDEATION: Primary | ICD-10-CM

## 2021-01-11 PROBLEM — F10.121 ACUTE ALCOHOL INTOXICATION DELIRIUM WITH MILD USE DISORDER (HCC): Status: ACTIVE | Noted: 2021-01-11

## 2021-01-11 LAB
ALBUMIN SERPL-MCNC: 4.6 G/DL (ref 3.5–5.2)
ALP BLD-CCNC: 119 U/L (ref 40–130)
ALT SERPL-CCNC: 27 U/L (ref 5–41)
ANION GAP SERPL CALCULATED.3IONS-SCNC: 13 MMOL/L (ref 7–19)
AST SERPL-CCNC: 25 U/L (ref 5–40)
BASOPHILS ABSOLUTE: 0.1 K/UL (ref 0–0.2)
BASOPHILS RELATIVE PERCENT: 1.4 % (ref 0–1)
BILIRUB SERPL-MCNC: <0.2 MG/DL (ref 0.2–1.2)
BUN BLDV-MCNC: 8 MG/DL (ref 6–20)
CALCIUM SERPL-MCNC: 8.9 MG/DL (ref 8.6–10)
CHLORIDE BLD-SCNC: 108 MMOL/L (ref 98–111)
CO2: 22 MMOL/L (ref 22–29)
CREAT SERPL-MCNC: 0.7 MG/DL (ref 0.5–1.2)
EOSINOPHILS ABSOLUTE: 0.3 K/UL (ref 0–0.6)
EOSINOPHILS RELATIVE PERCENT: 4.1 % (ref 0–5)
ETHANOL: 309 MG/DL (ref 0–0.08)
GFR AFRICAN AMERICAN: >59
GFR NON-AFRICAN AMERICAN: >60
GLUCOSE BLD-MCNC: 130 MG/DL (ref 74–109)
HCT VFR BLD CALC: 41.4 % (ref 42–52)
HEMOGLOBIN: 13.4 G/DL (ref 14–18)
IMMATURE GRANULOCYTES #: 0 K/UL
LYMPHOCYTES ABSOLUTE: 2.4 K/UL (ref 1.1–4.5)
LYMPHOCYTES RELATIVE PERCENT: 36.9 % (ref 20–40)
MCH RBC QN AUTO: 28.9 PG (ref 27–31)
MCHC RBC AUTO-ENTMCNC: 32.4 G/DL (ref 33–37)
MCV RBC AUTO: 89.2 FL (ref 80–94)
MONOCYTES ABSOLUTE: 0.7 K/UL (ref 0–0.9)
MONOCYTES RELATIVE PERCENT: 10.1 % (ref 0–10)
NEUTROPHILS ABSOLUTE: 3.1 K/UL (ref 1.5–7.5)
NEUTROPHILS RELATIVE PERCENT: 47.2 % (ref 50–65)
PDW BLD-RTO: 15.7 % (ref 11.5–14.5)
PLATELET # BLD: 296 K/UL (ref 130–400)
PMV BLD AUTO: 8.6 FL (ref 9.4–12.4)
POTASSIUM SERPL-SCNC: 3.6 MMOL/L (ref 3.5–5)
RBC # BLD: 4.64 M/UL (ref 4.7–6.1)
SARS-COV-2, NAAT: NOT DETECTED
SODIUM BLD-SCNC: 143 MMOL/L (ref 136–145)
TOTAL PROTEIN: 7.4 G/DL (ref 6.6–8.7)
WBC # BLD: 6.6 K/UL (ref 4.8–10.8)

## 2021-01-11 PROCEDURE — 99283 EMERGENCY DEPT VISIT LOW MDM: CPT

## 2021-01-11 PROCEDURE — 36415 COLL VENOUS BLD VENIPUNCTURE: CPT

## 2021-01-11 PROCEDURE — 6370000000 HC RX 637 (ALT 250 FOR IP): Performed by: EMERGENCY MEDICINE

## 2021-01-11 PROCEDURE — 80053 COMPREHEN METABOLIC PANEL: CPT

## 2021-01-11 PROCEDURE — 6360000002 HC RX W HCPCS: Performed by: EMERGENCY MEDICINE

## 2021-01-11 PROCEDURE — 2500000003 HC RX 250 WO HCPCS: Performed by: EMERGENCY MEDICINE

## 2021-01-11 PROCEDURE — 6360000002 HC RX W HCPCS

## 2021-01-11 PROCEDURE — 2580000003 HC RX 258: Performed by: EMERGENCY MEDICINE

## 2021-01-11 PROCEDURE — 85025 COMPLETE CBC W/AUTO DIFF WBC: CPT

## 2021-01-11 PROCEDURE — 2580000003 HC RX 258: Performed by: INTERNAL MEDICINE

## 2021-01-11 PROCEDURE — U0002 COVID-19 LAB TEST NON-CDC: HCPCS

## 2021-01-11 PROCEDURE — 96374 THER/PROPH/DIAG INJ IV PUSH: CPT

## 2021-01-11 PROCEDURE — 82077 ASSAY SPEC XCP UR&BREATH IA: CPT

## 2021-01-11 PROCEDURE — G0378 HOSPITAL OBSERVATION PER HR: HCPCS

## 2021-01-11 RX ORDER — 0.9 % SODIUM CHLORIDE 0.9 %
500 INTRAVENOUS SOLUTION INTRAVENOUS ONCE
Status: COMPLETED | OUTPATIENT
Start: 2021-01-11 | End: 2021-01-11

## 2021-01-11 RX ORDER — LORAZEPAM 2 MG/ML
1 INJECTION INTRAMUSCULAR ONCE
Status: COMPLETED | OUTPATIENT
Start: 2021-01-11 | End: 2021-01-11

## 2021-01-11 RX ORDER — ACETAMINOPHEN 500 MG
1000 TABLET ORAL ONCE
Status: COMPLETED | OUTPATIENT
Start: 2021-01-11 | End: 2021-01-11

## 2021-01-11 RX ORDER — LORAZEPAM 2 MG/ML
INJECTION INTRAMUSCULAR
Status: COMPLETED
Start: 2021-01-11 | End: 2021-01-11

## 2021-01-11 RX ADMIN — LORAZEPAM 1 MG: 2 INJECTION INTRAMUSCULAR; INTRAVENOUS at 22:53

## 2021-01-11 RX ADMIN — SODIUM CHLORIDE 500 ML: 9 INJECTION, SOLUTION INTRAVENOUS at 22:52

## 2021-01-11 RX ADMIN — ACETAMINOPHEN 1000 MG: 500 TABLET, FILM COATED ORAL at 22:22

## 2021-01-11 RX ADMIN — LORAZEPAM 1 MG: 2 INJECTION INTRAMUSCULAR at 22:53

## 2021-01-11 RX ADMIN — FOLIC ACID: 5 INJECTION, SOLUTION INTRAMUSCULAR; INTRAVENOUS; SUBCUTANEOUS at 22:22

## 2021-01-11 ASSESSMENT — ENCOUNTER SYMPTOMS
SHORTNESS OF BREATH: 0
NAUSEA: 0
ABDOMINAL PAIN: 0
VOMITING: 0

## 2021-01-11 ASSESSMENT — PAIN SCALES - GENERAL: PAINLEVEL_OUTOF10: 8

## 2021-01-11 NOTE — LETTER
Alvaro Day  at [facility]  [phone] phone  [phone] fax  [phone] CELL ([address])    Alvaro Day  at [facility]  [phone] phone  [phone] fax  [phone] CELL ([address])

## 2021-01-12 ENCOUNTER — APPOINTMENT (OUTPATIENT)
Dept: GENERAL RADIOLOGY | Age: 51
DRG: 897 | End: 2021-01-12
Payer: MEDICARE

## 2021-01-12 LAB
AMPHETAMINE SCREEN, URINE: NEGATIVE
BARBITURATE SCREEN URINE: NEGATIVE
BENZODIAZEPINE SCREEN, URINE: POSITIVE
BILIRUBIN URINE: NEGATIVE
BLOOD, URINE: NEGATIVE
CANNABINOID SCREEN URINE: NEGATIVE
CLARITY: CLEAR
COCAINE METABOLITE SCREEN URINE: NEGATIVE
COLOR: YELLOW
ETHANOL: 183 MG/DL (ref 0–0.08)
GLUCOSE URINE: NEGATIVE MG/DL
KETONES, URINE: NEGATIVE MG/DL
LEUKOCYTE ESTERASE, URINE: NEGATIVE
Lab: ABNORMAL
NITRITE, URINE: NEGATIVE
OPIATE SCREEN URINE: POSITIVE
PH UA: 6 (ref 5–8)
PROTEIN UA: ABNORMAL MG/DL
SPECIFIC GRAVITY UA: 1.02 (ref 1–1.03)
UROBILINOGEN, URINE: 0.2 E.U./DL

## 2021-01-12 PROCEDURE — 6370000000 HC RX 637 (ALT 250 FOR IP): Performed by: INTERNAL MEDICINE

## 2021-01-12 PROCEDURE — 71045 X-RAY EXAM CHEST 1 VIEW: CPT

## 2021-01-12 PROCEDURE — 6360000002 HC RX W HCPCS: Performed by: INTERNAL MEDICINE

## 2021-01-12 PROCEDURE — 81003 URINALYSIS AUTO W/O SCOPE: CPT

## 2021-01-12 PROCEDURE — 82077 ASSAY SPEC XCP UR&BREATH IA: CPT

## 2021-01-12 PROCEDURE — 36415 COLL VENOUS BLD VENIPUNCTURE: CPT

## 2021-01-12 PROCEDURE — 2500000003 HC RX 250 WO HCPCS: Performed by: INTERNAL MEDICINE

## 2021-01-12 PROCEDURE — 1210000000 HC MED SURG R&B

## 2021-01-12 PROCEDURE — P9047 ALBUMIN (HUMAN), 25%, 50ML: HCPCS | Performed by: INTERNAL MEDICINE

## 2021-01-12 PROCEDURE — 80307 DRUG TEST PRSMV CHEM ANLYZR: CPT

## 2021-01-12 PROCEDURE — 2580000003 HC RX 258: Performed by: INTERNAL MEDICINE

## 2021-01-12 RX ORDER — NICOTINE 21 MG/24HR
1 PATCH, TRANSDERMAL 24 HOURS TRANSDERMAL DAILY
Status: DISCONTINUED | OUTPATIENT
Start: 2021-01-12 | End: 2021-01-14 | Stop reason: HOSPADM

## 2021-01-12 RX ORDER — LORAZEPAM 2 MG/ML
1 INJECTION INTRAMUSCULAR EVERY 4 HOURS PRN
Status: DISCONTINUED | OUTPATIENT
Start: 2021-01-12 | End: 2021-01-14 | Stop reason: HOSPADM

## 2021-01-12 RX ORDER — LORAZEPAM 2 MG/ML
3 INJECTION INTRAMUSCULAR
Status: DISCONTINUED | OUTPATIENT
Start: 2021-01-12 | End: 2021-01-14 | Stop reason: HOSPADM

## 2021-01-12 RX ORDER — ACETAMINOPHEN 650 MG/1
650 SUPPOSITORY RECTAL EVERY 6 HOURS PRN
Status: DISCONTINUED | OUTPATIENT
Start: 2021-01-12 | End: 2021-01-14 | Stop reason: HOSPADM

## 2021-01-12 RX ORDER — SODIUM CHLORIDE 0.9 % (FLUSH) 0.9 %
10 SYRINGE (ML) INJECTION EVERY 12 HOURS SCHEDULED
Status: DISCONTINUED | OUTPATIENT
Start: 2021-01-12 | End: 2021-01-14 | Stop reason: HOSPADM

## 2021-01-12 RX ORDER — LEVETIRACETAM 500 MG/1
500 TABLET ORAL 2 TIMES DAILY
Status: DISCONTINUED | OUTPATIENT
Start: 2021-01-12 | End: 2021-01-14 | Stop reason: HOSPADM

## 2021-01-12 RX ORDER — LORAZEPAM 1 MG/1
4 TABLET ORAL
Status: DISCONTINUED | OUTPATIENT
Start: 2021-01-12 | End: 2021-01-14 | Stop reason: HOSPADM

## 2021-01-12 RX ORDER — FOLIC ACID 1 MG/1
1 TABLET ORAL DAILY
Status: DISCONTINUED | OUTPATIENT
Start: 2021-01-12 | End: 2021-01-12

## 2021-01-12 RX ORDER — LORAZEPAM 1 MG/1
1 TABLET ORAL
Status: DISCONTINUED | OUTPATIENT
Start: 2021-01-12 | End: 2021-01-14 | Stop reason: HOSPADM

## 2021-01-12 RX ORDER — MULTIVITAMIN WITH IRON
1 TABLET ORAL DAILY
Status: DISCONTINUED | OUTPATIENT
Start: 2021-01-12 | End: 2021-01-14 | Stop reason: HOSPADM

## 2021-01-12 RX ORDER — ISOSORBIDE MONONITRATE 30 MG/1
30 TABLET, EXTENDED RELEASE ORAL DAILY
Status: DISCONTINUED | OUTPATIENT
Start: 2021-01-12 | End: 2021-01-14 | Stop reason: HOSPADM

## 2021-01-12 RX ORDER — ALBUMIN (HUMAN) 12.5 G/50ML
25 SOLUTION INTRAVENOUS ONCE
Status: COMPLETED | OUTPATIENT
Start: 2021-01-12 | End: 2021-01-12

## 2021-01-12 RX ORDER — SODIUM CHLORIDE 9 MG/ML
INJECTION, SOLUTION INTRAVENOUS CONTINUOUS
Status: DISCONTINUED | OUTPATIENT
Start: 2021-01-12 | End: 2021-01-14 | Stop reason: HOSPADM

## 2021-01-12 RX ORDER — LORAZEPAM 1 MG/1
2 TABLET ORAL
Status: DISCONTINUED | OUTPATIENT
Start: 2021-01-12 | End: 2021-01-14 | Stop reason: HOSPADM

## 2021-01-12 RX ORDER — METOPROLOL SUCCINATE 50 MG/1
50 TABLET, EXTENDED RELEASE ORAL DAILY
Status: DISCONTINUED | OUTPATIENT
Start: 2021-01-12 | End: 2021-01-14 | Stop reason: HOSPADM

## 2021-01-12 RX ORDER — LORAZEPAM 1 MG/1
3 TABLET ORAL
Status: DISCONTINUED | OUTPATIENT
Start: 2021-01-12 | End: 2021-01-14 | Stop reason: HOSPADM

## 2021-01-12 RX ORDER — PROMETHAZINE HYDROCHLORIDE 12.5 MG/1
12.5 TABLET ORAL EVERY 6 HOURS PRN
Status: DISCONTINUED | OUTPATIENT
Start: 2021-01-12 | End: 2021-01-14 | Stop reason: HOSPADM

## 2021-01-12 RX ORDER — LORAZEPAM 2 MG/ML
1 INJECTION INTRAMUSCULAR
Status: DISCONTINUED | OUTPATIENT
Start: 2021-01-12 | End: 2021-01-14 | Stop reason: HOSPADM

## 2021-01-12 RX ORDER — HYDROCODONE BITARTRATE AND ACETAMINOPHEN 10; 325 MG/1; MG/1
1 TABLET ORAL EVERY 6 HOURS PRN
Status: DISCONTINUED | OUTPATIENT
Start: 2021-01-12 | End: 2021-01-14 | Stop reason: HOSPADM

## 2021-01-12 RX ORDER — ACETAMINOPHEN 325 MG/1
650 TABLET ORAL EVERY 6 HOURS PRN
Status: DISCONTINUED | OUTPATIENT
Start: 2021-01-12 | End: 2021-01-14 | Stop reason: HOSPADM

## 2021-01-12 RX ORDER — LORAZEPAM 2 MG/ML
2 INJECTION INTRAMUSCULAR
Status: DISCONTINUED | OUTPATIENT
Start: 2021-01-12 | End: 2021-01-14 | Stop reason: HOSPADM

## 2021-01-12 RX ORDER — SODIUM CHLORIDE 0.9 % (FLUSH) 0.9 %
10 SYRINGE (ML) INJECTION PRN
Status: DISCONTINUED | OUTPATIENT
Start: 2021-01-12 | End: 2021-01-14 | Stop reason: HOSPADM

## 2021-01-12 RX ORDER — LANOLIN ALCOHOL/MO/W.PET/CERES
100 CREAM (GRAM) TOPICAL DAILY
Status: DISCONTINUED | OUTPATIENT
Start: 2021-01-12 | End: 2021-01-12

## 2021-01-12 RX ORDER — ONDANSETRON 2 MG/ML
4 INJECTION INTRAMUSCULAR; INTRAVENOUS EVERY 6 HOURS PRN
Status: DISCONTINUED | OUTPATIENT
Start: 2021-01-12 | End: 2021-01-14 | Stop reason: HOSPADM

## 2021-01-12 RX ORDER — FOLIC ACID 1 MG/1
1 TABLET ORAL DAILY
Status: DISCONTINUED | OUTPATIENT
Start: 2021-01-12 | End: 2021-01-14 | Stop reason: HOSPADM

## 2021-01-12 RX ORDER — GAUZE BANDAGE 2" X 2"
100 BANDAGE TOPICAL DAILY
Status: DISCONTINUED | OUTPATIENT
Start: 2021-01-12 | End: 2021-01-14 | Stop reason: HOSPADM

## 2021-01-12 RX ORDER — OLANZAPINE 5 MG/1
5 TABLET ORAL NIGHTLY
Status: DISCONTINUED | OUTPATIENT
Start: 2021-01-12 | End: 2021-01-14 | Stop reason: HOSPADM

## 2021-01-12 RX ORDER — LORAZEPAM 2 MG/ML
4 INJECTION INTRAMUSCULAR
Status: DISCONTINUED | OUTPATIENT
Start: 2021-01-12 | End: 2021-01-14 | Stop reason: HOSPADM

## 2021-01-12 RX ORDER — POLYETHYLENE GLYCOL 3350 17 G/17G
17 POWDER, FOR SOLUTION ORAL DAILY PRN
Status: DISCONTINUED | OUTPATIENT
Start: 2021-01-12 | End: 2021-01-14 | Stop reason: HOSPADM

## 2021-01-12 RX ORDER — MULTIVITAMIN WITH IRON
1 TABLET ORAL DAILY
Status: DISCONTINUED | OUTPATIENT
Start: 2021-01-12 | End: 2021-01-12

## 2021-01-12 RX ORDER — AMLODIPINE BESYLATE 10 MG/1
10 TABLET ORAL DAILY
Status: DISCONTINUED | OUTPATIENT
Start: 2021-01-12 | End: 2021-01-14 | Stop reason: HOSPADM

## 2021-01-12 RX ADMIN — FOLIC ACID 1 MG: 1 TABLET ORAL at 08:18

## 2021-01-12 RX ADMIN — AMLODIPINE BESYLATE 10 MG: 10 TABLET ORAL at 08:18

## 2021-01-12 RX ADMIN — HYDROCODONE BITARTRATE AND ACETAMINOPHEN 1 TABLET: 10; 325 TABLET ORAL at 01:36

## 2021-01-12 RX ADMIN — HYDROCODONE BITARTRATE AND ACETAMINOPHEN 1 TABLET: 10; 325 TABLET ORAL at 21:17

## 2021-01-12 RX ADMIN — HYDROCODONE BITARTRATE AND ACETAMINOPHEN 1 TABLET: 10; 325 TABLET ORAL at 08:21

## 2021-01-12 RX ADMIN — LEVETIRACETAM 500 MG: 500 TABLET ORAL at 01:35

## 2021-01-12 RX ADMIN — THERA TABS 1 TABLET: TAB at 08:18

## 2021-01-12 RX ADMIN — SODIUM CHLORIDE, PRESERVATIVE FREE 10 ML: 5 INJECTION INTRAVENOUS at 19:39

## 2021-01-12 RX ADMIN — LEVETIRACETAM 500 MG: 500 TABLET ORAL at 19:29

## 2021-01-12 RX ADMIN — FAMOTIDINE 20 MG: 10 INJECTION, SOLUTION INTRAVENOUS at 07:53

## 2021-01-12 RX ADMIN — LORAZEPAM 1 MG: 2 INJECTION INTRAMUSCULAR; INTRAVENOUS at 22:39

## 2021-01-12 RX ADMIN — THIAMINE HCL TAB 100 MG 100 MG: 100 TAB at 08:18

## 2021-01-12 RX ADMIN — FAMOTIDINE 20 MG: 10 INJECTION, SOLUTION INTRAVENOUS at 01:35

## 2021-01-12 RX ADMIN — SODIUM CHLORIDE, PRESERVATIVE FREE 10 ML: 5 INJECTION INTRAVENOUS at 01:36

## 2021-01-12 RX ADMIN — SODIUM CHLORIDE: 9 INJECTION, SOLUTION INTRAVENOUS at 09:44

## 2021-01-12 RX ADMIN — FOLIC ACID: 5 INJECTION, SOLUTION INTRAMUSCULAR; INTRAVENOUS; SUBCUTANEOUS at 02:17

## 2021-01-12 RX ADMIN — METOPROLOL SUCCINATE 50 MG: 50 TABLET, EXTENDED RELEASE ORAL at 08:18

## 2021-01-12 RX ADMIN — FAMOTIDINE 20 MG: 10 INJECTION, SOLUTION INTRAVENOUS at 19:29

## 2021-01-12 RX ADMIN — OLANZAPINE 5 MG: 5 TABLET ORAL at 19:29

## 2021-01-12 RX ADMIN — ALBUMIN (HUMAN) 25 G: 0.25 INJECTION, SOLUTION INTRAVENOUS at 01:40

## 2021-01-12 RX ADMIN — HYDROCODONE BITARTRATE AND ACETAMINOPHEN 1 TABLET: 10; 325 TABLET ORAL at 15:03

## 2021-01-12 RX ADMIN — SODIUM CHLORIDE, PRESERVATIVE FREE 10 ML: 5 INJECTION INTRAVENOUS at 08:33

## 2021-01-12 RX ADMIN — ISOSORBIDE MONONITRATE 30 MG: 30 TABLET, EXTENDED RELEASE ORAL at 08:18

## 2021-01-12 RX ADMIN — SODIUM CHLORIDE: 9 INJECTION, SOLUTION INTRAVENOUS at 19:39

## 2021-01-12 RX ADMIN — ENOXAPARIN SODIUM 40 MG: 40 INJECTION SUBCUTANEOUS at 01:49

## 2021-01-12 RX ADMIN — OLANZAPINE 5 MG: 5 TABLET ORAL at 01:35

## 2021-01-12 RX ADMIN — SODIUM CHLORIDE, PRESERVATIVE FREE 10 ML: 5 INJECTION INTRAVENOUS at 22:40

## 2021-01-12 RX ADMIN — LEVETIRACETAM 500 MG: 500 TABLET ORAL at 08:18

## 2021-01-12 ASSESSMENT — PAIN SCALES - GENERAL
PAINLEVEL_OUTOF10: 6
PAINLEVEL_OUTOF10: 3
PAINLEVEL_OUTOF10: 6
PAINLEVEL_OUTOF10: 10
PAINLEVEL_OUTOF10: 8

## 2021-01-12 ASSESSMENT — PAIN DESCRIPTION - ORIENTATION: ORIENTATION: RIGHT

## 2021-01-12 ASSESSMENT — PAIN DESCRIPTION - DESCRIPTORS: DESCRIPTORS: ACHING;DISCOMFORT

## 2021-01-12 ASSESSMENT — PAIN DESCRIPTION - PAIN TYPE: TYPE: CHRONIC PAIN

## 2021-01-12 ASSESSMENT — PAIN DESCRIPTION - LOCATION: LOCATION: KNEE

## 2021-01-12 NOTE — PROGRESS NOTES
4 Eyes Skin Assessment    Meka Herzog is being assessed upon: Admission    I agree that I, Tyesha Corona, along with [facility] RN (either 2 RN's or 1 LPN and 1 RN) have performed a thorough Head to Toe Skin Assessment on the patient. ALL assessment sites listed below have been assessed. Areas assessed by both nurses:     [x]   Head, Face, and Ears   [x]   Shoulders, Back, and Chest  [x]   Arms, Elbows, and Hands   [x]   Coccyx, Sacrum, and Ischium  [x]   Legs, Feet, and Heels    Does the Patient have Skin Breakdown? No    Justus Prevention initiated: No  Wound Care Orders initiated: No    WOC nurse consulted for Pressure Injury (Stage 3,4, Unstageable, DTI, NWPT, and Complex wounds) and New or Established Ostomies: No    Healing abrasions to bilateral knees from fall.     Primary Nurse eSignature: Tyesha Corona RN on 1/12/2021 at 12:40 AM      Co-Signer eSignature: Electronically signed by Lissa Kim RN on 1/12/21 at 12:42 AM CST

## 2021-01-12 NOTE — PROGRESS NOTES
Jakub Bob arrived to room # 95 [id number]. Presented with: Alcohol Intoxication/ SI  Mental Status: Patient is disoriented and alert. Vitals:    01/11/21 2354   BP: (!) 153/99   Pulse: 95   Resp: 16   Temp: 98.1 °F (36.7 °C)   SpO2: 96%     Patient safety contract and falls prevention contract reviewed with patient Yes. Oriented Patient to room. Call light within reach. Yes.   Needs, issues or concerns expressed at this time: no.      Electronically signed by Miri Eckert RN on 1/12/2021 at 12:08 AM

## 2021-01-12 NOTE — PLAN OF CARE
Problem: Falls - Risk of:  Goal: Will remain free from falls  Description: Will remain free from falls  Outcome: Ongoing  Goal: Absence of physical injury  Description: Absence of physical injury  Outcome: Ongoing     Problem: Suicide risk  Goal: Provide patient with safe environment  Description: Provide patient with safe environment  Outcome: Ongoing

## 2021-01-12 NOTE — PROGRESS NOTES
[facility] Hospital[facility]        Hospitalist Progress Note  1/12/2021 10:37 AM  Subjective:   Admit Date: 1/11/2021  PCP: TYRONE Flanagan CNP    Chief Complaint: Alcohol intoxication, suicidal ideation    Subjective: Patient seen and examined at bedside. Denies chest pain or shortness of breath. Appears relatively comfortable. Cumulative Hospital History: 49-year-old obese male with a past medical history of alcohol and tobacco abuse presented to the hospital for alcohol intoxication and suicidal ideation with a plan. Psychiatry consulted on admission. ROS: 14 point review of systems is negative except as specifically addressed above.     Diet NPO Effective Now Exceptions are: Ice Chips, Sips with Meds, Popsicles    Intake/Output Summary (Last 24 hours) at 1/12/2021 1037  Last data filed at 1/12/2021 0630  Gross per 24 hour   Intake 311.41 ml   Output --   Net 311.41 ml     Medications:   folic acid, thiamine, multi-vitamin with vitamin K infusion      sodium chloride 100 mL/hr at 01/12/21 0944     Current Facility-Administered Medications   Medication Dose Route Frequency Provider Last Rate Last Admin    LORazepam (ATIVAN) injection 1 mg  1 mg Intravenous Q4H PRN Gladis Roberts MD        amLODIPine (NORVASC) tablet 10 mg  10 mg Oral Daily Gladis Roberts MD   10 mg at 01/12/21 0818    isosorbide mononitrate (IMDUR) extended release tablet 30 mg  30 mg Oral Daily Gladis Roberts MD   30 mg at 01/12/21 0818    levETIRAcetam (KEPPRA) tablet 500 mg  500 mg Oral BID Gladis Roberts MD   500 mg at 01/12/21 0818    metoprolol succinate (TOPROL XL) extended release tablet 50 mg  50 mg Oral Daily Gladis Roberts MD   50 mg at 01/12/21 0818    nicotine (NICODERM CQ) 21 MG/24HR 1 patch  1 patch Transdermal Daily Gladis Roberts MD   1 patch at 01/12/21 0818    OLANZapine (ZYPREXA) tablet 5 mg  5 mg Oral Nightly Gladis Roberts MD   5 mg at 01/12/21 0149    HYDROcodone-acetaminophen (NORCO)  MG per tablet 1 tablet  1 tablet Oral Q6H PRN Sarah Watts MD   1 tablet at 72/97/01 1142    folic acid (FOLVITE) tablet 1 mg  1 mg Oral Daily Vidhya Burnham MD   1 mg at 01/12/21 0818    multivitamin 1 tablet  1 tablet Oral Daily Vidhya Burnham MD   1 tablet at 01/12/21 0818    0.9 % sodium chloride infusion   Intravenous Continuous Vidhya Burnham  mL/hr at 01/12/21 0944 New Bag at 01/12/21 0944    thiamine mononitrate tablet 100 mg  100 mg Oral Daily Vidhya Burnham MD   100 mg at 01/12/21 0818        Labs:     Recent Labs     01/11/21 2117   WBC 6.6   RBC 4.64*   HGB 13.4*   HCT 41.4*   MCV 89.2   MCH 28.9   MCHC 32.4*        Recent Labs     01/11/21 2117      K 3.6   ANIONGAP 13      CO2 22   BUN 8   CREATININE 0.7   GLUCOSE 130*   CALCIUM 8.9     No results for input(s): MG, PHOS in the last 72 hours. Recent Labs     01/11/21 2117   AST 25   ALT 27   BILITOT <0.2   ALKPHOS 119     ABGs:No results for input(s): PH, PO2, PCO2, HCO3, BE, O2SAT in the last 72 hours. Troponin T: No results for input(s): TROPONINI in the last 72 hours. INR: No results for input(s): INR in the last 72 hours. Lactic Acid: No results for input(s): LACTA in the last 72 hours. Objective:   Vitals: BP (!) 148/88   Pulse 121   Temp 98.9 °F (37.2 °C) (Temporal)   Resp 20   Ht 6' (1.829 m)   Wt 259 lb 2 oz (117.5 kg)   SpO2 96%   BMI 35.14 kg/m²   24HR INTAKE/OUTPUT:      Intake/Output Summary (Last 24 hours) at 1/12/2021 1037  Last data filed at 1/12/2021 0630  Gross per 24 hour   Intake 311.41 ml   Output --   Net 311.41 ml     General appearance: Alert  HEENT: AT/NC  Lungs: no increased work of breathing, BLAE  Heart: RRR  Abdomen: BS+  Extremities: pulses+  Neurologic: Alert  Skin: warm    Assessment and Plan:    Active Problems:    Coronary artery disease    HLD (hyperlipidemia)    Seizure disorder (HCC)    Depression with suicidal ideation    Alcohol withdrawal ([facility] [address]    ETOH abuse    COPD (chronic obstructive pulmonary disease) (HCC)    Acute alcohol intoxication delirium with mild use disorder ([facility] [address]  Resolved Problems:    * No resolved hospital problems. *    EtOH/tobacco abuse: [facility] protocol. Nicotine patch. MVI/folate/thiamine. IV fluids. Seizure precautions. Fall precautions. Suicidal ideation: Psychiatry consulted. Sitter at bedside. Suicide precautions. Supportive management.     Advance Directive: Full Code    DVT prophylaxis: Lovenox    Discharge planning: TBD      Signed:  Addis Hall MD 1/12/2021 10:37 AM  Rounding Hospitalist

## 2021-01-12 NOTE — ED NOTES
Intoxicated, patient's family states that he was suicidal at home, drank anywhere from a fifth to a gallon [facility] [address] [facility] [address] [place] [address]  01/11/21 2127
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
## 2021-01-12 NOTE — ED NOTES
Bed: 17  Expected date:   Expected time:   Means of arrival:   Comments:  ETOH SI        Marie Hawley, ROSSANA  01/11/21 2107

## 2021-01-12 NOTE — ED PROVIDER NOTES
Hypertension     Seizures ([facility] [address]          SURGICAL HISTORY       Past Surgical History:   Procedure Laterality Date    COLONOSCOPY      CORONARY ANGIOPLASTY WITH STENT PLACEMENT      ELBOW SURGERY      ENDOSCOPY, COLON, DIAGNOSTIC      KNEE ARTHROSCOPY           CURRENT MEDICATIONS     Previous Medications    AMLODIPINE (NORVASC) 10 MG TABLET    Take 10 mg by mouth daily    ATORVASTATIN (LIPITOR) 10 MG TABLET    Take 10 mg by mouth daily    CLOPIDOGREL (PLAVIX) 75 MG TABLET    Take 75 mg by mouth daily    FOLIC ACID (FOLVITE) 1 MG TABLET    Take 1 tablet by mouth daily    HYDROCODONE-ACETAMINOPHEN (NORCO)  MG PER TABLET    Take 1 tablet by mouth every 6 hours as needed for Pain. ISOSORBIDE MONONITRATE (IMDUR) 30 MG EXTENDED RELEASE TABLET    Take 1 tablet by mouth daily    LEVETIRACETAM (KEPPRA) 500 MG TABLET    Take 1 tablet by mouth 2 times daily    LISINOPRIL (PRINIVIL;ZESTRIL) 40 MG TABLET    Take 40 mg by mouth daily    METOPROLOL SUCCINATE (TOPROL XL) 50 MG EXTENDED RELEASE TABLET    Take 1 tablet by mouth daily    MULTIPLE VITAMIN (MULTIVITAMIN) TABS TABLET    Take 1 tablet by mouth daily    NICOTINE (NICODERM CQ) 21 MG/24HR    Place 1 patch onto the skin daily    OLANZAPINE (ZYPREXA) 5 MG TABLET    Take 5 mg by mouth nightly    VITAMIN B-1 100 MG TABLET    Take 1 tablet by mouth daily       ALLERGIES     Patient has no known allergies. FAMILY HISTORY     History reviewed. No pertinent family history.        SOCIAL HISTORY       Social History     Socioeconomic History    Marital status:      Spouse name: None    Number of children: None    Years of education: None    Highest education level: None   Occupational History    None   Social Needs    Financial resource strain: None    Food insecurity     Worry: None     Inability: None    Transportation needs     Medical: None     Non-medical: None   Tobacco Use    Smoking status: Current Every Day Smoker     Packs/day: 1.00 Types: Cigarettes    Smokeless tobacco: Never Used    Tobacco comment: pint a day   Substance and Sexual Activity    Alcohol use: Yes     Comment: alcoholic    Drug use: Not Currently    Sexual activity: None   Lifestyle    Physical activity     Days per week: None     Minutes per session: None    Stress: None   Relationships    Social connections     Talks on phone: None     Gets together: None     Attends Tenri[employer] service: None     Active member of club or organization: None     Attends meetings of clubs or organizations: None     Relationship status: None    Intimate partner violence     Fear of current or ex partner: None     Emotionally abused: None     Physically abused: None     Forced sexual activity: None   Other Topics Concern    None   Social History Narrative    None       SCREENINGS             PHYSICAL EXAM    (up to 7 for level 4, 8 or more for level 5)     ED Triage Vitals [01/11/21 2112]   BP Temp Temp Source Pulse Resp SpO2 Height Weight   (!) 144/81 96.6 °F (35.9 °C) Infrared 101 20 95 % 6' (1.829 m) 280 lb (127 kg)       Physical Exam  Vitals signs and nursing note reviewed. HENT:      Head: Atraumatic. Mouth/Throat:      Mouth: Mucous membranes are not dry. Cardiovascular:      Rate and Rhythm: Normal rate and regular rhythm. Heart sounds: Normal heart sounds. Pulmonary:      Effort: Pulmonary effort is normal. No respiratory distress. Breath sounds: Normal breath sounds. Abdominal:      Palpations: Abdomen is soft. Tenderness: There is no abdominal tenderness. Neurological:      Mental Status: He is alert. Comments: Moving upper and lower extremities equally. No focal deficits identified. Psychiatric:         Behavior: Behavior is cooperative. Thought Content: Thought content includes suicidal ideation. Thought content includes suicidal plan.          DIAGNOSTIC RESULTS         LABS:  Labs Reviewed   CBC WITH AUTO DIFFERENTIAL - Abnormal; Notable for the following components:       Result Value    RBC 4.64 (*)     Hemoglobin 13.4 (*)     Hematocrit 41.4 (*)     MCHC 32.4 (*)     RDW 15.7 (*)     MPV 8.6 (*)     Neutrophils % 47.2 (*)     Monocytes % 10.1 (*)     Basophils % 1.4 (*)     All other components within normal limits   COMPREHENSIVE METABOLIC PANEL - Abnormal; Notable for the following components:    Glucose 130 (*)     All other components within normal limits   ETHANOL   URINE RT REFLEX TO CULTURE   URINE DRUG SCREEN   COVID-19       All other labs were within normal range or not returned as of this dictation. EMERGENCY DEPARTMENT COURSE and DIFFERENTIAL DIAGNOSIS/MDM:   Vitals:    Vitals:    01/11/21 2112   BP: (!) 144/81   Pulse: 101   Resp: 20   Temp: 96.6 °F (35.9 °C)   TempSrc: Infrared   SpO2: 95%   Weight: 280 lb (127 kg)   Height: 6' (1.829 m)       MDM    Reassessment    Patient was placed in a safe and secure environment here in the ED. He will undergo laboratory studies for medical screening. Given his statements regarding suicide he was placed on a one-to-one sitter. Patient serum alcohol is greater than 300 at this time. He will require admission to the medical service for stabilization prior to psychiatry consultation and evaluation. CONSULTS:    Case was discussed with Dr. Jessa Siu regarding inpatient admission to the hospitalist service. PROCEDURES:  Unless otherwise noted below, none     Procedures    FINAL IMPRESSION      1. Depression with suicidal ideation    2.  Acute alcoholic intoxication without complication ([facility] [address]          DISPOSITION/PLAN   DISPOSITION  Admitted      (Please note that portions of this note were completed with a voice recognition program.  Efforts were made to edit thedictations but occasionally words are mis-transcribed.)    Sheba Franklin MD (electronically signed)  Attending Emergency Physician          Sheba Franklin MD  01/11/21 2701
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
## 2021-01-13 LAB
ALBUMIN SERPL-MCNC: 3.8 G/DL (ref 3.5–5.2)
ALP BLD-CCNC: 97 U/L (ref 40–130)
ALT SERPL-CCNC: 21 U/L (ref 5–41)
ANION GAP SERPL CALCULATED.3IONS-SCNC: 10 MMOL/L (ref 7–19)
AST SERPL-CCNC: 20 U/L (ref 5–40)
BILIRUB SERPL-MCNC: 0.4 MG/DL (ref 0.2–1.2)
BUN BLDV-MCNC: 10 MG/DL (ref 6–20)
CALCIUM SERPL-MCNC: 8.2 MG/DL (ref 8.6–10)
CHLORIDE BLD-SCNC: 107 MMOL/L (ref 98–111)
CO2: 23 MMOL/L (ref 22–29)
CREAT SERPL-MCNC: 0.7 MG/DL (ref 0.5–1.2)
ETHANOL: <10 MG/DL (ref 0–0.08)
GFR AFRICAN AMERICAN: >59
GFR NON-AFRICAN AMERICAN: >60
GLUCOSE BLD-MCNC: 94 MG/DL (ref 74–109)
HCT VFR BLD CALC: 35.2 % (ref 42–52)
HEMOGLOBIN: 12 G/DL (ref 14–18)
INR BLD: 1.05 (ref 0.88–1.18)
LACTIC ACID: 1.4 MMOL/L (ref 0.5–1.9)
MCH RBC QN AUTO: 30 PG (ref 27–31)
MCHC RBC AUTO-ENTMCNC: 34.1 G/DL (ref 33–37)
MCV RBC AUTO: 88 FL (ref 80–94)
PDW BLD-RTO: 14.6 % (ref 11.5–14.5)
PLATELET # BLD: 229 K/UL (ref 130–400)
PMV BLD AUTO: 8.8 FL (ref 9.4–12.4)
POTASSIUM REFLEX MAGNESIUM: 3.6 MMOL/L (ref 3.5–5)
PROTHROMBIN TIME: 13.6 SEC (ref 12–14.6)
RBC # BLD: 4 M/UL (ref 4.7–6.1)
SODIUM BLD-SCNC: 140 MMOL/L (ref 136–145)
TOTAL PROTEIN: 5.8 G/DL (ref 6.6–8.7)
WBC # BLD: 5.3 K/UL (ref 4.8–10.8)

## 2021-01-13 PROCEDURE — 85610 PROTHROMBIN TIME: CPT

## 2021-01-13 PROCEDURE — 6360000002 HC RX W HCPCS: Performed by: INTERNAL MEDICINE

## 2021-01-13 PROCEDURE — 2500000003 HC RX 250 WO HCPCS: Performed by: INTERNAL MEDICINE

## 2021-01-13 PROCEDURE — 2580000003 HC RX 258: Performed by: INTERNAL MEDICINE

## 2021-01-13 PROCEDURE — 85027 COMPLETE CBC AUTOMATED: CPT

## 2021-01-13 PROCEDURE — 83605 ASSAY OF LACTIC ACID: CPT

## 2021-01-13 PROCEDURE — 1210000000 HC MED SURG R&B

## 2021-01-13 PROCEDURE — 80053 COMPREHEN METABOLIC PANEL: CPT

## 2021-01-13 PROCEDURE — 6370000000 HC RX 637 (ALT 250 FOR IP): Performed by: INTERNAL MEDICINE

## 2021-01-13 PROCEDURE — 82077 ASSAY SPEC XCP UR&BREATH IA: CPT

## 2021-01-13 PROCEDURE — 36415 COLL VENOUS BLD VENIPUNCTURE: CPT

## 2021-01-13 PROCEDURE — 99221 1ST HOSP IP/OBS SF/LOW 40: CPT | Performed by: PSYCHIATRY & NEUROLOGY

## 2021-01-13 RX ADMIN — ENOXAPARIN SODIUM 40 MG: 40 INJECTION SUBCUTANEOUS at 08:09

## 2021-01-13 RX ADMIN — LEVETIRACETAM 500 MG: 500 TABLET ORAL at 08:09

## 2021-01-13 RX ADMIN — OLANZAPINE 5 MG: 5 TABLET ORAL at 21:03

## 2021-01-13 RX ADMIN — LEVETIRACETAM 500 MG: 500 TABLET ORAL at 21:03

## 2021-01-13 RX ADMIN — THIAMINE HCL TAB 100 MG 100 MG: 100 TAB at 08:09

## 2021-01-13 RX ADMIN — HYDROCODONE BITARTRATE AND ACETAMINOPHEN 1 TABLET: 10; 325 TABLET ORAL at 03:58

## 2021-01-13 RX ADMIN — LORAZEPAM 1 MG: 1 TABLET ORAL at 15:57

## 2021-01-13 RX ADMIN — FAMOTIDINE 20 MG: 10 INJECTION, SOLUTION INTRAVENOUS at 21:04

## 2021-01-13 RX ADMIN — HYDROCODONE BITARTRATE AND ACETAMINOPHEN 1 TABLET: 10; 325 TABLET ORAL at 15:51

## 2021-01-13 RX ADMIN — LORAZEPAM 1 MG: 1 TABLET ORAL at 21:03

## 2021-01-13 RX ADMIN — SODIUM CHLORIDE, PRESERVATIVE FREE 10 ML: 5 INJECTION INTRAVENOUS at 08:12

## 2021-01-13 RX ADMIN — HYDROCODONE BITARTRATE AND ACETAMINOPHEN 1 TABLET: 10; 325 TABLET ORAL at 10:01

## 2021-01-13 RX ADMIN — METOPROLOL SUCCINATE 50 MG: 50 TABLET, EXTENDED RELEASE ORAL at 08:09

## 2021-01-13 RX ADMIN — FAMOTIDINE 20 MG: 10 INJECTION, SOLUTION INTRAVENOUS at 08:15

## 2021-01-13 RX ADMIN — ACETAMINOPHEN 650 MG: 325 TABLET ORAL at 12:37

## 2021-01-13 RX ADMIN — ISOSORBIDE MONONITRATE 30 MG: 30 TABLET, EXTENDED RELEASE ORAL at 08:09

## 2021-01-13 RX ADMIN — THERA TABS 1 TABLET: TAB at 08:09

## 2021-01-13 RX ADMIN — HYDROCODONE BITARTRATE AND ACETAMINOPHEN 1 TABLET: 10; 325 TABLET ORAL at 21:49

## 2021-01-13 RX ADMIN — AMLODIPINE BESYLATE 10 MG: 10 TABLET ORAL at 08:09

## 2021-01-13 RX ADMIN — FOLIC ACID 1 MG: 1 TABLET ORAL at 08:09

## 2021-01-13 RX ADMIN — SODIUM CHLORIDE, PRESERVATIVE FREE 10 ML: 5 INJECTION INTRAVENOUS at 21:03

## 2021-01-13 ASSESSMENT — PAIN SCALES - GENERAL
PAINLEVEL_OUTOF10: 6
PAINLEVEL_OUTOF10: 4
PAINLEVEL_OUTOF10: 3
PAINLEVEL_OUTOF10: 6
PAINLEVEL_OUTOF10: 10
PAINLEVEL_OUTOF10: 6
PAINLEVEL_OUTOF10: 3

## 2021-01-13 NOTE — PROGRESS NOTES
[facility]        Hospitalist Progress Note  1/13/2021 10:29 AM  Subjective:   Admit Date: 1/11/2021  PCP: TYRONE Goldman CNP    Chief Complaint: Alcohol intoxication, suicidal ideation    Subjective: Patient seen and examined at bedside. Comfortable. Sitter at bedside. Cumulative Hospital History: 55-year-old obese male with a past medical history of alcohol and tobacco abuse presented to the hospital for alcohol intoxication and suicidal ideation with a plan. Psychiatry consulted on admission. ROS: 14 point review of systems is negative except as specifically addressed above. DIET CARB CONTROL;     Intake/Output Summary (Last 24 hours) at 1/13/2021 1029  Last data filed at 1/13/2021 0800  Gross per 24 hour   Intake 810 ml   Output 200 ml   Net 610 ml     Medications:   folic acid, thiamine, multi-vitamin with vitamin K infusion      sodium chloride 100 mL/hr at 01/12/21 1939     Current Facility-Administered Medications   Medication Dose Route Frequency Provider Last Rate Last Admin    LORazepam (ATIVAN) injection 1 mg  1 mg Intravenous Q4H PRN Sarah Moreno MD        amLODIPine (NORVASC) tablet 10 mg  10 mg Oral Daily Sarah Moreno MD   10 mg at 01/13/21 0809    isosorbide mononitrate (IMDUR) extended release tablet 30 mg  30 mg Oral Daily Sarah Moreno MD   30 mg at 01/13/21 0809    levETIRAcetam (KEPPRA) tablet 500 mg  500 mg Oral BID Sarah Moreno MD   500 mg at 01/13/21 0809    metoprolol succinate (TOPROL XL) extended release tablet 50 mg  50 mg Oral Daily Sarah Moreno MD   50 mg at 01/13/21 0809    nicotine (NICODERM CQ) 21 MG/24HR 1 patch  1 patch Transdermal Daily Sarah Moreno MD   1 patch at 01/13/21 0810    OLANZapine (ZYPREXA) tablet 5 mg  5 mg Oral Nightly Sarah Moreno MD   5 mg at 01/12/21 1929    sodium chloride flush 0.9 % injection 10 mL  10 mL Intravenous 2 times per day Peri BUCK MD Cleo   10 mL at 01/13/21 9946    sodium chloride flush 0.9 % injection 10 mL  10 mL Intravenous PRN Evette Gowers, MD   10 mL at 01/12/21 2240    polyethylene glycol (GLYCOLAX) packet 17 g  17 g Oral Daily PRN Evette Gowers, MD        acetaminophen (TYLENOL) tablet 650 mg  650 mg Oral Q6H PRN Evette Gowers, MD        Or    acetaminophen (TYLENOL) suppository 650 mg  650 mg Rectal Q6H PRN Evette Gowers, MD        famotidine (PEPCID) injection 20 mg  20 mg Intravenous BID Evette Gowers, MD   20 mg at 01/13/21 0815    promethazine (PHENERGAN) tablet 12.5 mg  12.5 mg Oral Q6H PRN Evette Gowers, MD        Or    ondansetron (ZOFRAN) injection 4 mg  4 mg Intravenous Q6H PRN Evette Gowers, MD        LORazepam (ATIVAN) tablet 1 mg  1 mg Oral Q1H PRN Evette Gowers, MD        Or    LORazepam (ATIVAN) injection 1 mg  1 mg Intravenous Q1H PRN Evette Gowers, MD   1 mg at 01/12/21 2239    Or    LORazepam (ATIVAN) tablet 2 mg  2 mg Oral Q1H PRN Evette Gowers, MD        Or    LORazepam (ATIVAN) injection 2 mg  2 mg Intravenous Q1H PRN Evette Gowers, MD        Or    LORazepam (ATIVAN) tablet 3 mg  3 mg Oral Q1H PRN Evette Gowers, MD        Or    LORazepam (ATIVAN) injection 3 mg  3 mg Intravenous Q1H PRN Evette Gowers, MD        Or    LORazepam (ATIVAN) tablet 4 mg  4 mg Oral Q1H PRN Evette Gowers, MD        Or    LORazepam (ATIVAN) injection 4 mg  4 mg Intravenous Q1H PRN Evette Gowers, MD        sodium chloride 0.9 % 2,461 mL with folic acid 1 mg, adult multi-vitamin with vitamin k 10 mL, thiamine 100 mg   Intravenous Continuous Evette Gowers, MD   New Bag at 01/12/21 0217    enoxaparin (LOVENOX) injection 40 mg  40 mg Subcutaneous Daily Evette Gowers, MD   40 mg at 01/13/21 0809    HYDROcodone-acetaminophen (NORCO)  MG per tablet 1 tablet  1 tablet Oral Q6H PRN Austyn Dumont MD   1 tablet at 66/34/86 3603    folic acid (FOLVITE) tablet 1 mg  1 mg Oral Daily Bita Diaz MD   1 mg at 01/13/21 0809    multivitamin 1 tablet  1 tablet Oral Daily Bita Diaz MD   1 tablet at 01/13/21 0809    0.9 % sodium chloride infusion   Intravenous Continuous Bita Diaz  mL/hr at 01/12/21 1939 New Bag at 01/12/21 1939    thiamine mononitrate tablet 100 mg  100 mg Oral Daily Bita Diaz MD   100 mg at 01/13/21 0809        Labs:     Recent Labs     01/11/21 2117 01/13/21 0349   WBC 6.6 5.3   RBC 4.64* 4.00*   HGB 13.4* 12.0*   HCT 41.4* 35.2*   MCV 89.2 88.0   MCH 28.9 30.0   MCHC 32.4* 34.1    229     Recent Labs     01/11/21 2117 01/13/21 0349    140   K 3.6 3.6   ANIONGAP 13 10    107   CO2 22 23   BUN 8 10   CREATININE 0.7 0.7   GLUCOSE 130* 94   CALCIUM 8.9 8.2*     No results for input(s): MG, PHOS in the last 72 hours. Recent Labs     01/11/21 2117 01/13/21 0349   AST 25 20   ALT 27 21   BILITOT <0.2 0.4   ALKPHOS 119 97     ABGs:No results for input(s): PH, PO2, PCO2, HCO3, BE, O2SAT in the last 72 hours. Troponin T: No results for input(s): TROPONINI in the last 72 hours. INR:   Recent Labs     01/13/21 0349   INR 1.05     Lactic Acid:   Recent Labs     01/13/21 0349   LACTA 1.4       Objective:   Vitals: BP (!) 168/91   Pulse 101   Temp 98.4 °F (36.9 °C) (Temporal)   Resp 16   Ht 6' (1.829 m)   Wt 259 lb 2 oz (117.5 kg)   SpO2 96%   BMI 35.14 kg/m²   24HR INTAKE/OUTPUT:      Intake/Output Summary (Last 24 hours) at 1/13/2021 1029  Last data filed at 1/13/2021 0800  Gross per 24 hour   Intake 810 ml   Output 200 ml   Net 610 ml     General appearance: Alert  HEENT: AT/NC  Lungs: no increased work of breathing, BLAE  Heart: RRR  Abdomen: BS+  Extremities: pulses+  Neurologic: Alert  Skin: warm    Assessment and Plan:    Active Problems:    Coronary artery disease    HLD (hyperlipidemia)    Seizure

## 2021-01-13 NOTE — CONSULTS
[facility]  Psychiatry Consult    Reason for Consult: Concern   Suicidal ideations    The primary source(s) of information include(s):  Patient    The patient is a 48 y.o. male with previous psychiatric history of depression and alcohol use disorder, complicated by history of withdrawal seizures from alcohol, chronic pain syndrome, hypertension, coronary artery disease, who has been admitted to medical services secondary to alcohol intoxication, blood alcohol level 309, and suicidal ideations. Patient is well-known to psychiatry due to previous admissions to our hospital with same clinical presentation, as at present time. Patient has been seen in his room with presence of one-to-one sitter. He reported that after last discharge from the hospital 2 months ago, he went to [facility] for rehabilitation treatment for alcohol use disorder, however, he stayed in rehab only 1 week and left [address] Patient stated that he expected that patients in rehab will be with the same problem like his, substance use disorder, however, said that he met a lot of patients with mental issues and was under the impressions that he is in the wrong place. Patient reported that after he left A rehabilitation facility, he relapsed in drinking alcohol in 2 days and has been drinking daily, up to 1 gallon of vodka a day. Patient stated that last time when he was drinking alcohol is 2 days ago. Today during the interview, patient denied significant withdrawal symptoms from alcohol, with an exception, mild body shakes, muscle weakness, feeling of fatigue and tiredness. He endorses feeling of depression, decreased concentration, decreased motivation, decreased pleasure in previously enjoyed activities. Patient denies current feeling of hopelessness, helplessness or worthlessness. He denies active suicidal or homicidal ideations, denies any plans.   Also, patient denies any auditory and visual hallucinations. Patient reported that he plans to \"go straight to [facility]" for rehab treatment after his discharge from the hospital, said \"my mom will take me from here straight to there\". PSYCHIATRIC HISTORY:  Diagnoses: Alcohol use disorder, depression  Suicide attempts/gestures: Denies   Prior hospitalizations: Denies   Medication trials: Norco, Valium, Zyprexa  Mental health contact: Primary care provider manages patient's psychotropic medications  Head trauma: Denies    Allergies:  Patient has no known allergies. Mental Status  Appearance: Appropriately groomed and in hospital attire. Made good eye contact. Behavior: Slightly anxious, cooperative with interview, friendly, socially appropriate. No psychomotor retardation or agitation appreciated. Gait was not evaluated as patient was lying down on the bed. Speech: Normal in tone, volume, and quality. Mood: \"I feel better today\"   Affect: Mood congruent. Range is restricted. Thought Process: Appears linear and goal oriented. Thought Content: Patient does not have any current active suicidal and homicidal ideations. No overt delusions or paranoia appreciated. Perceptions: Seems patient does not have any auditory or visual hallucinations at present time. Patient did not appear to be responding to internal stimuli. No overt psychosis. Executive Functions: Appear mildly impaired. Concentration: Decreased  Reasoning: Appears impaired based on interaction from interview   Orientation: to person, place, date, and situation. Alert. Language: Intact. Fund of information: Intact. Memory: recent and remote appear intact.    Impulsivity: Limited  Neurovegitative: Fair appetite, fair sleep  Insight: Limited  Judgment: Impaired    Assessment  DSM 5 DIAGNOSIS:  Alcohol use disorder, severe, complicated by history of withdrawal seizures, currently with mild withdrawal  Substance-induced mood disorder  Tobacco use disorder  Chronic pain syndrome    Recommendations  1. Currently patient is not suicidal, homicidal or psychotic. Patient does not meet criteria for psychiatric hospitalization. 2. Patient does have a capacity of making his own medical decisions. 3.  Recommended to discontinue one-to-one sitter, at least primary treatment team has other reasons or concerns, or patient needs help with ambulation. 4.  Patient experiences mild withdrawal symptoms from alcohol and they are properly addressed by prescribed medications. 5.  Patient can be discharged from the hospital when he is medically stable. Due to patient's medical insurance restriction for admission to rehab treatment, patient can be referred back to [facility], which she accepted patient's medical insurance and provide rehab treatment. 6.  Psychiatry will sign off today.       Liborio Mcmahon MD

## 2021-01-14 VITALS
SYSTOLIC BLOOD PRESSURE: 149 MMHG | WEIGHT: 259.13 LBS | OXYGEN SATURATION: 96 % | HEIGHT: 72 IN | RESPIRATION RATE: 16 BRPM | DIASTOLIC BLOOD PRESSURE: 93 MMHG | HEART RATE: 93 BPM | TEMPERATURE: 98.1 F | BODY MASS INDEX: 35.1 KG/M2

## 2021-01-14 PROCEDURE — 6360000002 HC RX W HCPCS: Performed by: INTERNAL MEDICINE

## 2021-01-14 PROCEDURE — 6370000000 HC RX 637 (ALT 250 FOR IP): Performed by: INTERNAL MEDICINE

## 2021-01-14 RX ADMIN — THIAMINE HCL TAB 100 MG 100 MG: 100 TAB at 08:41

## 2021-01-14 RX ADMIN — THERA TABS 1 TABLET: TAB at 08:41

## 2021-01-14 RX ADMIN — AMLODIPINE BESYLATE 10 MG: 10 TABLET ORAL at 08:41

## 2021-01-14 RX ADMIN — METOPROLOL SUCCINATE 50 MG: 50 TABLET, EXTENDED RELEASE ORAL at 08:41

## 2021-01-14 RX ADMIN — ISOSORBIDE MONONITRATE 30 MG: 30 TABLET, EXTENDED RELEASE ORAL at 08:41

## 2021-01-14 RX ADMIN — LEVETIRACETAM 500 MG: 500 TABLET ORAL at 08:41

## 2021-01-14 RX ADMIN — ENOXAPARIN SODIUM 40 MG: 40 INJECTION SUBCUTANEOUS at 08:41

## 2021-01-14 RX ADMIN — HYDROCODONE BITARTRATE AND ACETAMINOPHEN 1 TABLET: 10; 325 TABLET ORAL at 05:23

## 2021-01-14 RX ADMIN — FOLIC ACID 1 MG: 1 TABLET ORAL at 08:41

## 2021-01-14 RX ADMIN — LORAZEPAM 1 MG: 1 TABLET ORAL at 08:47

## 2021-01-14 NOTE — CARE COORDINATION
Informed patient's nurse that patient must contact rehabilitation themself for discharge planning. Patient referred to [facility] due to insurance. If [facility] needs patient information, may contact case management.      Contact information below:    [facility]  [phone] Phone  [address]   [place], [place]    Electronically signed by Twila Hernandez RN on 1/14/2021 at 8:25 AM

## 2021-01-14 NOTE — DISCHARGE SUMMARY
Discharge Summary      Oliver Pace  :  1970  MRN:  [id number]    Admit date:  2021  Discharge date:      Admitting Physician:  Willy Johnson MD    Advance Directive: Full Code    Consults: psychiatry    Primary Care Physician:  TYRONE Huertas - CNP    Discharge Diagnoses: Active Problems:    Coronary artery disease    HLD (hyperlipidemia)    Seizure disorder (HCC)    Depression with suicidal ideation    Alcohol withdrawal (HCC)    ETOH abuse    COPD (chronic obstructive pulmonary disease) (HCC)    Acute alcohol intoxication delirium with mild use disorder ([facility] [address]  Resolved Problems:    * No resolved hospital problems. *      Significant Diagnostic Studies:   Xr Chest Portable    Result Date: 2021  XR CHEST PORTABLE 2021 12:47 PM History: Short of breath. Portable chest x-ray compared with 2020. The heart size is normal. The mediastinum is within normal limits. The lungs are adequately expanded with no pneumonia or pneumothorax. There is mild chronic interstitial disease with scattered calcified granulomas. There is no significant pleural fluid. No congestive failure changes. 1. No acute disease. Signed by Dr Boo Choi on 2021 12:47 PM      Pertinent Labs:   CBC:   Recent Labs     21  0349   WBC 6.6 5.3   HGB 13.4* 12.0*    229     BMP:    Recent Labs     21  0349    140   K 3.6 3.6    107   CO2 22 23   BUN 8 10   CREATININE 0.7 0.7   GLUCOSE 130* 94     INR:   Recent Labs     21  0349   INR 1.05     ABGs:No results for input(s): PH, PO2, PCO2, HCO3, BE, O2SAT in the last 72 hours.   Lactic Acid:  Recent Labs     21  0349   LACTA 1.4       Procedures: None  Hospital Course: 59-year-old obese male with a past medical history of alcohol and tobacco abuse presented to the hospital for alcohol intoxication and suicidal ideation with a plan. Psychiatry consulted on admission. Psychiatry has cleared the patient for discharge as he currently has no suicidal or homicidal ideation. Patient with minimal requirement for Ativan during admission and he is currently hemodynamically stable discharge home today to follow-up with PCP and psychiatry as an outpatient. Patient has been encouraged to stop drinking alcohol and smoking cigarettes.     Physical Exam:  Vitals: BP (!) 150/91   Pulse 85   Temp 97.1 °F (36.2 °C) (Temporal)   Resp 15   Ht 6' (1.829 m)   Wt 259 lb 2 oz (117.5 kg)   SpO2 94%   BMI 35.14 kg/m²   24HR INTAKE/OUTPUT:      Intake/Output Summary (Last 24 hours) at 1/14/2021 0957  Last data filed at 1/13/2021 2351  Gross per 24 hour   Intake 3080 ml   Output 1050 ml   Net 2030 ml     General appearance: Alert  HEENT: AT/NC  Lungs: no increased work of breathing, BLAE  Heart: RRR  Abdomen: BS+  Extremities: pulses+  Neurologic: Alert  Skin: warm    Discharge Medications:       [facility], [address] [place] [address] Medication Instructions OVU:[id number]    Printed on:01/14/21 0957   Medication Information                      amLODIPine (NORVASC) 10 MG tablet  Take 10 mg by mouth daily             atorvastatin (LIPITOR) 10 MG tablet  Take 10 mg by mouth daily             clopidogrel (PLAVIX) 75 MG tablet  Take 75 mg by mouth daily             folic acid (FOLVITE) 1 MG tablet  Take 1 tablet by mouth daily             HYDROcodone-acetaminophen (NORCO)  MG per tablet  Take 1 tablet by mouth every 6 hours as needed for Pain.             isosorbide mononitrate (IMDUR) 30 MG extended release tablet  Take 1 tablet by mouth daily             levETIRAcetam (KEPPRA) 500 MG tablet  Take 1 tablet by mouth 2 times daily             lisinopril (PRINIVIL;ZESTRIL) 40 MG tablet  Take 40 mg by mouth daily             metoprolol succinate (TOPROL XL) 50 MG extended release tablet  Take 1 tablet by mouth daily             Multiple Vitamin (MULTIVITAMIN) TABS tablet  Take 1 tablet by mouth daily             nicotine (NICODERM CQ) 21 MG/24HR  Place 1 patch onto the skin daily             OLANZapine (ZYPREXA) 5 MG tablet  Take 5 mg by mouth nightly             vitamin B-1 100 MG tablet  Take 1 tablet by mouth daily                 Discharge Instructions: Follow up with TYRONE Flanagan CNP in 7 days. Take medications as directed. Resume activity as tolerated. Diet: DIET CARB CONTROL;     Disposition: Patient is medically stable and will be discharged Home. Time spent on discharge 35 minutes.     Signed:  Aubrie Becerra MD 1/14/2021 9:57 AM

## 2021-01-14 NOTE — DISCHARGE INSTR - DIET

## 2021-04-08 ENCOUNTER — HOSPITAL ENCOUNTER (INPATIENT)
Age: 51
LOS: 2 days | Discharge: HOME | DRG: 897 | End: 2021-04-11
Attending: EMERGENCY MEDICINE
Payer: MEDICARE

## 2021-04-08 DIAGNOSIS — R45.851 SUICIDAL THOUGHTS: ICD-10-CM

## 2021-04-08 DIAGNOSIS — F10.920 ACUTE ALCOHOLIC INTOXICATION WITHOUT COMPLICATION (HCC): Primary | ICD-10-CM

## 2021-04-08 LAB
ACETAMINOPHEN LEVEL: <15 UG/ML
ALBUMIN SERPL-MCNC: 4.2 G/DL (ref 3.5–5.2)
ALP BLD-CCNC: 108 U/L (ref 40–130)
ALT SERPL-CCNC: 27 U/L (ref 5–41)
AMPHETAMINE SCREEN, URINE: NEGATIVE
ANION GAP SERPL CALCULATED.3IONS-SCNC: 13 MMOL/L (ref 7–19)
AST SERPL-CCNC: 29 U/L (ref 5–40)
BARBITURATE SCREEN URINE: NEGATIVE
BASOPHILS ABSOLUTE: 0.1 K/UL (ref 0–0.2)
BASOPHILS RELATIVE PERCENT: 0.9 % (ref 0–1)
BENZODIAZEPINE SCREEN, URINE: POSITIVE
BILIRUB SERPL-MCNC: <0.2 MG/DL (ref 0.2–1.2)
BILIRUBIN URINE: NEGATIVE
BLOOD, URINE: NEGATIVE
BUN BLDV-MCNC: 14 MG/DL (ref 6–20)
CALCIUM SERPL-MCNC: 9 MG/DL (ref 8.6–10)
CANNABINOID SCREEN URINE: NEGATIVE
CHLORIDE BLD-SCNC: 90 MMOL/L (ref 98–111)
CLARITY: CLEAR
CO2: 26 MMOL/L (ref 22–29)
COCAINE METABOLITE SCREEN URINE: NEGATIVE
COLOR: YELLOW
CREAT SERPL-MCNC: 1.2 MG/DL (ref 0.5–1.2)
EOSINOPHILS ABSOLUTE: 0.3 K/UL (ref 0–0.6)
EOSINOPHILS RELATIVE PERCENT: 3.8 % (ref 0–5)
ETHANOL: 317 MG/DL (ref 0–0.08)
GFR AFRICAN AMERICAN: >59
GFR NON-AFRICAN AMERICAN: >60
GLUCOSE BLD-MCNC: 122 MG/DL (ref 74–109)
GLUCOSE URINE: NEGATIVE MG/DL
HCT VFR BLD CALC: 32.1 % (ref 42–52)
HEMOGLOBIN: 11.2 G/DL (ref 14–18)
IMMATURE GRANULOCYTES #: 0 K/UL
KETONES, URINE: NEGATIVE MG/DL
LEUKOCYTE ESTERASE, URINE: NEGATIVE
LYMPHOCYTES ABSOLUTE: 2.8 K/UL (ref 1.1–4.5)
LYMPHOCYTES RELATIVE PERCENT: 36.7 % (ref 20–40)
Lab: ABNORMAL
MCH RBC QN AUTO: 31.4 PG (ref 27–31)
MCHC RBC AUTO-ENTMCNC: 34.9 G/DL (ref 33–37)
MCV RBC AUTO: 89.9 FL (ref 80–94)
MONOCYTES ABSOLUTE: 0.7 K/UL (ref 0–0.9)
MONOCYTES RELATIVE PERCENT: 8.7 % (ref 0–10)
NEUTROPHILS ABSOLUTE: 3.8 K/UL (ref 1.5–7.5)
NEUTROPHILS RELATIVE PERCENT: 49.4 % (ref 50–65)
NITRITE, URINE: NEGATIVE
OPIATE SCREEN URINE: POSITIVE
PDW BLD-RTO: 14.5 % (ref 11.5–14.5)
PH UA: 7 (ref 5–8)
PLATELET # BLD: 270 K/UL (ref 130–400)
PMV BLD AUTO: 8.8 FL (ref 9.4–12.4)
POTASSIUM SERPL-SCNC: 3.4 MMOL/L (ref 3.5–5)
PROTEIN UA: NEGATIVE MG/DL
RBC # BLD: 3.57 M/UL (ref 4.7–6.1)
SALICYLATE, SERUM: <3 MG/DL (ref 3–10)
SODIUM BLD-SCNC: 129 MMOL/L (ref 136–145)
SPECIFIC GRAVITY UA: 1.01 (ref 1–1.03)
TOTAL PROTEIN: 6.7 G/DL (ref 6.6–8.7)
UROBILINOGEN, URINE: 0.2 E.U./DL
WBC # BLD: 7.7 K/UL (ref 4.8–10.8)

## 2021-04-08 PROCEDURE — 36415 COLL VENOUS BLD VENIPUNCTURE: CPT

## 2021-04-08 PROCEDURE — 82077 ASSAY SPEC XCP UR&BREATH IA: CPT

## 2021-04-08 PROCEDURE — 93005 ELECTROCARDIOGRAM TRACING: CPT | Performed by: EMERGENCY MEDICINE

## 2021-04-08 PROCEDURE — 81003 URINALYSIS AUTO W/O SCOPE: CPT

## 2021-04-08 PROCEDURE — 80053 COMPREHEN METABOLIC PANEL: CPT

## 2021-04-08 PROCEDURE — 99285 EMERGENCY DEPT VISIT HI MDM: CPT

## 2021-04-08 PROCEDURE — 80143 DRUG ASSAY ACETAMINOPHEN: CPT

## 2021-04-08 PROCEDURE — 80179 DRUG ASSAY SALICYLATE: CPT

## 2021-04-08 PROCEDURE — 85025 COMPLETE CBC W/AUTO DIFF WBC: CPT

## 2021-04-08 PROCEDURE — 80307 DRUG TEST PRSMV CHEM ANLYZR: CPT

## 2021-04-09 PROBLEM — F10.129 ALCOHOL ABUSE WITH INTOXICATION (HCC): Status: ACTIVE | Noted: 2021-04-09

## 2021-04-09 PROBLEM — E86.0 DEHYDRATION: Status: ACTIVE | Noted: 2021-04-09

## 2021-04-09 PROBLEM — E87.6 HYPOKALEMIA DUE TO EXCESSIVE GASTROINTESTINAL LOSS OF POTASSIUM: Status: ACTIVE | Noted: 2021-04-09

## 2021-04-09 PROBLEM — T50.2X5A VOLUME CONTRACTION FROM DIURETICS: Status: ACTIVE | Noted: 2021-04-09

## 2021-04-09 PROBLEM — E86.9 VOLUME CONTRACTION FROM DIURETICS: Status: ACTIVE | Noted: 2021-04-09

## 2021-04-09 PROBLEM — E87.1 ACUTE HYPONATREMIA: Status: ACTIVE | Noted: 2021-04-09

## 2021-04-09 LAB
ALBUMIN SERPL-MCNC: 4.1 G/DL (ref 3.5–5.2)
ALP BLD-CCNC: 106 U/L (ref 40–130)
ALT SERPL-CCNC: 28 U/L (ref 5–41)
ANION GAP SERPL CALCULATED.3IONS-SCNC: 12 MMOL/L (ref 7–19)
AST SERPL-CCNC: 28 U/L (ref 5–40)
BILIRUB SERPL-MCNC: <0.2 MG/DL (ref 0.2–1.2)
BUN BLDV-MCNC: 13 MG/DL (ref 6–20)
CALCIUM SERPL-MCNC: 8.8 MG/DL (ref 8.6–10)
CHLORIDE BLD-SCNC: 96 MMOL/L (ref 98–111)
CO2: 28 MMOL/L (ref 22–29)
CREAT SERPL-MCNC: 0.9 MG/DL (ref 0.5–1.2)
ETHANOL: 158 MG/DL (ref 0–0.08)
ETHANOL: 54 MG/DL (ref 0–0.08)
GFR AFRICAN AMERICAN: >59
GFR NON-AFRICAN AMERICAN: >60
GLUCOSE BLD-MCNC: 101 MG/DL (ref 74–109)
HBA1C MFR BLD: 5.8 % (ref 4–6)
HCT VFR BLD CALC: 33.2 % (ref 42–52)
HEMOGLOBIN: 11.2 G/DL (ref 14–18)
INR BLD: 0.92 (ref 0.88–1.18)
LACTIC ACID: 2.3 MMOL/L (ref 0.5–1.9)
LACTIC ACID: 2.7 MMOL/L (ref 0.5–1.9)
MAGNESIUM: 1.9 MG/DL (ref 1.6–2.6)
MCH RBC QN AUTO: 30.9 PG (ref 27–31)
MCHC RBC AUTO-ENTMCNC: 33.7 G/DL (ref 33–37)
MCV RBC AUTO: 91.5 FL (ref 80–94)
PDW BLD-RTO: 14.6 % (ref 11.5–14.5)
PHOSPHORUS: 2.4 MG/DL (ref 2.5–4.5)
PLATELET # BLD: 265 K/UL (ref 130–400)
PMV BLD AUTO: 8.7 FL (ref 9.4–12.4)
POTASSIUM REFLEX MAGNESIUM: 3.6 MMOL/L (ref 3.5–5)
PROTHROMBIN TIME: 12.2 SEC (ref 12–14.6)
RBC # BLD: 3.63 M/UL (ref 4.7–6.1)
SARS-COV-2, NAAT: NOT DETECTED
SODIUM BLD-SCNC: 136 MMOL/L (ref 136–145)
TOTAL PROTEIN: 6.5 G/DL (ref 6.6–8.7)
WBC # BLD: 4.4 K/UL (ref 4.8–10.8)

## 2021-04-09 PROCEDURE — 83036 HEMOGLOBIN GLYCOSYLATED A1C: CPT

## 2021-04-09 PROCEDURE — 96376 TX/PRO/DX INJ SAME DRUG ADON: CPT

## 2021-04-09 PROCEDURE — 36415 COLL VENOUS BLD VENIPUNCTURE: CPT

## 2021-04-09 PROCEDURE — 84100 ASSAY OF PHOSPHORUS: CPT

## 2021-04-09 PROCEDURE — 85610 PROTHROMBIN TIME: CPT

## 2021-04-09 PROCEDURE — 6370000000 HC RX 637 (ALT 250 FOR IP): Performed by: INTERNAL MEDICINE

## 2021-04-09 PROCEDURE — 96374 THER/PROPH/DIAG INJ IV PUSH: CPT

## 2021-04-09 PROCEDURE — 83735 ASSAY OF MAGNESIUM: CPT

## 2021-04-09 PROCEDURE — 2580000003 HC RX 258: Performed by: INTERNAL MEDICINE

## 2021-04-09 PROCEDURE — 2500000003 HC RX 250 WO HCPCS: Performed by: INTERNAL MEDICINE

## 2021-04-09 PROCEDURE — 1210000000 HC MED SURG R&B

## 2021-04-09 PROCEDURE — 80053 COMPREHEN METABOLIC PANEL: CPT

## 2021-04-09 PROCEDURE — 87635 SARS-COV-2 COVID-19 AMP PRB: CPT

## 2021-04-09 PROCEDURE — 6360000002 HC RX W HCPCS: Performed by: INTERNAL MEDICINE

## 2021-04-09 PROCEDURE — 85027 COMPLETE CBC AUTOMATED: CPT

## 2021-04-09 PROCEDURE — 96372 THER/PROPH/DIAG INJ SC/IM: CPT

## 2021-04-09 PROCEDURE — 83605 ASSAY OF LACTIC ACID: CPT

## 2021-04-09 PROCEDURE — 82077 ASSAY SPEC XCP UR&BREATH IA: CPT

## 2021-04-09 RX ORDER — MULTIVITAMIN WITH IRON
1 TABLET ORAL DAILY
Status: DISCONTINUED | OUTPATIENT
Start: 2021-04-09 | End: 2021-04-11 | Stop reason: HOSPADM

## 2021-04-09 RX ORDER — LORAZEPAM 2 MG/ML
2 INJECTION INTRAMUSCULAR
Status: DISCONTINUED | OUTPATIENT
Start: 2021-04-09 | End: 2021-04-11 | Stop reason: HOSPADM

## 2021-04-09 RX ORDER — CLOPIDOGREL BISULFATE 75 MG/1
75 TABLET ORAL DAILY
Status: DISCONTINUED | OUTPATIENT
Start: 2021-04-09 | End: 2021-04-11 | Stop reason: HOSPADM

## 2021-04-09 RX ORDER — OLANZAPINE 5 MG/1
5 TABLET ORAL NIGHTLY
Status: DISCONTINUED | OUTPATIENT
Start: 2021-04-09 | End: 2021-04-11 | Stop reason: HOSPADM

## 2021-04-09 RX ORDER — LORAZEPAM 2 MG/ML
3 INJECTION INTRAMUSCULAR
Status: DISCONTINUED | OUTPATIENT
Start: 2021-04-09 | End: 2021-04-11 | Stop reason: HOSPADM

## 2021-04-09 RX ORDER — 0.9 % SODIUM CHLORIDE 0.9 %
1000 INTRAVENOUS SOLUTION INTRAVENOUS ONCE
Status: COMPLETED | OUTPATIENT
Start: 2021-04-09 | End: 2021-04-09

## 2021-04-09 RX ORDER — LORAZEPAM 1 MG/1
2 TABLET ORAL
Status: DISCONTINUED | OUTPATIENT
Start: 2021-04-09 | End: 2021-04-11 | Stop reason: HOSPADM

## 2021-04-09 RX ORDER — LORAZEPAM 1 MG/1
3 TABLET ORAL
Status: DISCONTINUED | OUTPATIENT
Start: 2021-04-09 | End: 2021-04-11 | Stop reason: HOSPADM

## 2021-04-09 RX ORDER — POLYETHYLENE GLYCOL 3350 17 G/17G
17 POWDER, FOR SOLUTION ORAL DAILY PRN
Status: DISCONTINUED | OUTPATIENT
Start: 2021-04-09 | End: 2021-04-11 | Stop reason: HOSPADM

## 2021-04-09 RX ORDER — SODIUM CHLORIDE 9 MG/ML
25 INJECTION, SOLUTION INTRAVENOUS PRN
Status: DISCONTINUED | OUTPATIENT
Start: 2021-04-09 | End: 2021-04-11 | Stop reason: HOSPADM

## 2021-04-09 RX ORDER — MAGNESIUM SULFATE IN WATER 40 MG/ML
2000 INJECTION, SOLUTION INTRAVENOUS PRN
Status: DISCONTINUED | OUTPATIENT
Start: 2021-04-09 | End: 2021-04-11 | Stop reason: HOSPADM

## 2021-04-09 RX ORDER — POTASSIUM CHLORIDE 7.45 MG/ML
10 INJECTION INTRAVENOUS PRN
Status: DISCONTINUED | OUTPATIENT
Start: 2021-04-09 | End: 2021-04-11 | Stop reason: HOSPADM

## 2021-04-09 RX ORDER — LORAZEPAM 2 MG/ML
4 INJECTION INTRAMUSCULAR
Status: DISCONTINUED | OUTPATIENT
Start: 2021-04-09 | End: 2021-04-11 | Stop reason: HOSPADM

## 2021-04-09 RX ORDER — HYDROCODONE BITARTRATE AND ACETAMINOPHEN 10; 325 MG/1; MG/1
1 TABLET ORAL EVERY 6 HOURS PRN
Status: DISCONTINUED | OUTPATIENT
Start: 2021-04-09 | End: 2021-04-11 | Stop reason: HOSPADM

## 2021-04-09 RX ORDER — ONDANSETRON 2 MG/ML
4 INJECTION INTRAMUSCULAR; INTRAVENOUS EVERY 6 HOURS PRN
Status: DISCONTINUED | OUTPATIENT
Start: 2021-04-09 | End: 2021-04-11 | Stop reason: HOSPADM

## 2021-04-09 RX ORDER — PROMETHAZINE HYDROCHLORIDE 25 MG/1
12.5 TABLET ORAL EVERY 6 HOURS PRN
Status: DISCONTINUED | OUTPATIENT
Start: 2021-04-09 | End: 2021-04-11 | Stop reason: HOSPADM

## 2021-04-09 RX ORDER — SODIUM CHLORIDE 0.9 % (FLUSH) 0.9 %
5-40 SYRINGE (ML) INJECTION EVERY 12 HOURS SCHEDULED
Status: DISCONTINUED | OUTPATIENT
Start: 2021-04-09 | End: 2021-04-11 | Stop reason: HOSPADM

## 2021-04-09 RX ORDER — LORAZEPAM 2 MG/ML
1 INJECTION INTRAMUSCULAR
Status: DISCONTINUED | OUTPATIENT
Start: 2021-04-09 | End: 2021-04-11 | Stop reason: HOSPADM

## 2021-04-09 RX ORDER — ATORVASTATIN CALCIUM 10 MG/1
10 TABLET, FILM COATED ORAL DAILY
Status: DISCONTINUED | OUTPATIENT
Start: 2021-04-09 | End: 2021-04-11 | Stop reason: HOSPADM

## 2021-04-09 RX ORDER — ACETAMINOPHEN 325 MG/1
650 TABLET ORAL EVERY 6 HOURS PRN
Status: DISCONTINUED | OUTPATIENT
Start: 2021-04-09 | End: 2021-04-11 | Stop reason: HOSPADM

## 2021-04-09 RX ORDER — LORAZEPAM 1 MG/1
1 TABLET ORAL
Status: DISCONTINUED | OUTPATIENT
Start: 2021-04-09 | End: 2021-04-11 | Stop reason: HOSPADM

## 2021-04-09 RX ORDER — LEVETIRACETAM 250 MG/1
500 TABLET ORAL 2 TIMES DAILY
Status: DISCONTINUED | OUTPATIENT
Start: 2021-04-09 | End: 2021-04-11 | Stop reason: HOSPADM

## 2021-04-09 RX ORDER — GAUZE BANDAGE 2" X 2"
100 BANDAGE TOPICAL DAILY
Status: DISCONTINUED | OUTPATIENT
Start: 2021-04-09 | End: 2021-04-11 | Stop reason: HOSPADM

## 2021-04-09 RX ORDER — LISINOPRIL 20 MG/1
40 TABLET ORAL DAILY
Status: DISCONTINUED | OUTPATIENT
Start: 2021-04-09 | End: 2021-04-11 | Stop reason: HOSPADM

## 2021-04-09 RX ORDER — SODIUM CHLORIDE 0.9 % (FLUSH) 0.9 %
5-40 SYRINGE (ML) INJECTION PRN
Status: DISCONTINUED | OUTPATIENT
Start: 2021-04-09 | End: 2021-04-11 | Stop reason: HOSPADM

## 2021-04-09 RX ORDER — ACETAMINOPHEN 650 MG/1
650 SUPPOSITORY RECTAL EVERY 6 HOURS PRN
Status: DISCONTINUED | OUTPATIENT
Start: 2021-04-09 | End: 2021-04-11 | Stop reason: HOSPADM

## 2021-04-09 RX ORDER — FOLIC ACID 1 MG/1
1 TABLET ORAL DAILY
Status: DISCONTINUED | OUTPATIENT
Start: 2021-04-09 | End: 2021-04-11 | Stop reason: HOSPADM

## 2021-04-09 RX ORDER — NICOTINE 21 MG/24HR
1 PATCH, TRANSDERMAL 24 HOURS TRANSDERMAL DAILY
Status: DISCONTINUED | OUTPATIENT
Start: 2021-04-09 | End: 2021-04-11 | Stop reason: HOSPADM

## 2021-04-09 RX ORDER — LORAZEPAM 1 MG/1
4 TABLET ORAL
Status: DISCONTINUED | OUTPATIENT
Start: 2021-04-09 | End: 2021-04-11 | Stop reason: HOSPADM

## 2021-04-09 RX ORDER — POTASSIUM CHLORIDE 20 MEQ/1
40 TABLET, EXTENDED RELEASE ORAL PRN
Status: DISCONTINUED | OUTPATIENT
Start: 2021-04-09 | End: 2021-04-11 | Stop reason: HOSPADM

## 2021-04-09 RX ORDER — SODIUM CHLORIDE 9 MG/ML
INJECTION, SOLUTION INTRAVENOUS CONTINUOUS
Status: ACTIVE | OUTPATIENT
Start: 2021-04-09 | End: 2021-04-10

## 2021-04-09 RX ORDER — METOPROLOL SUCCINATE 50 MG/1
50 TABLET, EXTENDED RELEASE ORAL DAILY
Status: DISCONTINUED | OUTPATIENT
Start: 2021-04-09 | End: 2021-04-11 | Stop reason: HOSPADM

## 2021-04-09 RX ADMIN — LEVETIRACETAM 500 MG: 250 TABLET ORAL at 20:55

## 2021-04-09 RX ADMIN — CLOPIDOGREL BISULFATE 75 MG: 75 TABLET ORAL at 10:44

## 2021-04-09 RX ADMIN — HYDROCODONE BITARTRATE AND ACETAMINOPHEN 1 TABLET: 10; 325 TABLET ORAL at 13:07

## 2021-04-09 RX ADMIN — LORAZEPAM 1 MG: 1 TABLET ORAL at 10:44

## 2021-04-09 RX ADMIN — OLANZAPINE 5 MG: 5 TABLET, FILM COATED ORAL at 20:55

## 2021-04-09 RX ADMIN — ENOXAPARIN SODIUM 40 MG: 40 INJECTION SUBCUTANEOUS at 10:44

## 2021-04-09 RX ADMIN — THERA TABS 1 TABLET: TAB at 10:45

## 2021-04-09 RX ADMIN — FOLIC ACID 1 MG: 1 TABLET ORAL at 10:44

## 2021-04-09 RX ADMIN — ATORVASTATIN CALCIUM 10 MG: 10 TABLET, FILM COATED ORAL at 13:07

## 2021-04-09 RX ADMIN — SODIUM CHLORIDE, PRESERVATIVE FREE 10 ML: 5 INJECTION INTRAVENOUS at 10:45

## 2021-04-09 RX ADMIN — ACETAMINOPHEN 650 MG: 325 TABLET ORAL at 22:42

## 2021-04-09 RX ADMIN — METOPROLOL SUCCINATE 50 MG: 50 TABLET, EXTENDED RELEASE ORAL at 10:44

## 2021-04-09 RX ADMIN — LORAZEPAM 1 MG: 1 TABLET ORAL at 16:08

## 2021-04-09 RX ADMIN — FAMOTIDINE 20 MG: 10 INJECTION, SOLUTION INTRAVENOUS at 20:55

## 2021-04-09 RX ADMIN — LISINOPRIL 40 MG: 20 TABLET ORAL at 13:07

## 2021-04-09 RX ADMIN — HYDROCODONE BITARTRATE AND ACETAMINOPHEN 1 TABLET: 10; 325 TABLET ORAL at 19:08

## 2021-04-09 RX ADMIN — FAMOTIDINE 20 MG: 10 INJECTION, SOLUTION INTRAVENOUS at 10:45

## 2021-04-09 RX ADMIN — LEVETIRACETAM 500 MG: 250 TABLET ORAL at 13:07

## 2021-04-09 RX ADMIN — LORAZEPAM 1 MG: 1 TABLET ORAL at 19:08

## 2021-04-09 RX ADMIN — Medication 100 MG: at 10:44

## 2021-04-09 RX ADMIN — SODIUM CHLORIDE: 9 INJECTION, SOLUTION INTRAVENOUS at 15:33

## 2021-04-09 RX ADMIN — ACETAMINOPHEN 650 MG: 325 TABLET ORAL at 15:33

## 2021-04-09 RX ADMIN — SODIUM CHLORIDE 1000 ML: 9 INJECTION, SOLUTION INTRAVENOUS at 13:11

## 2021-04-09 ASSESSMENT — PAIN SCALES - GENERAL
PAINLEVEL_OUTOF10: 5
PAINLEVEL_OUTOF10: 8
PAINLEVEL_OUTOF10: 10
PAINLEVEL_OUTOF10: 3

## 2021-04-09 NOTE — PROGRESS NOTES
4 Eyes Skin Assessment    Twin Kennedy is being assessed upon: Admission    I agree that Estrella Escudero, along with Tonie Agrawal RN (either 2 RN's or 1 LPN and 1 RN) have performed a thorough Head to Toe Skin Assessment on the patient. ALL assessment sites listed below have been assessed. Areas assessed by both nurses:     [x]   Head, Face, and Ears   [x]   Shoulders, Back, and Chest  [x]   Arms, Elbows, and Hands   [x]   Coccyx, Sacrum, and Ischium  [x]   Legs, Feet, and Heels    Does the Patient have Skin Breakdown?  No    Justus Prevention initiated: NA  Wound Care Orders initiated: NA    [facility] nurse consulted for Pressure Injury (Stage 3,4, Unstageable, DTI, NWPT, and Complex wounds) and New or Established Ostomies: NA        Primary Nurse eSignature: Lashawn Mccray LPN on 9/6/1574 at 7:03 AM      Co-Signer eSignature: Electronically signed by Shonna Regalado RN on 4/9/21 at 4:51 AM CDT

## 2021-04-09 NOTE — ED PROVIDER NOTES
[facility] [address]      Pt Name: Murray Marte  MRN: [id number]  [place] 1970  Date of evaluation: 4/8/2021  Provider: Mauri Pat MD    [address]       Chief Complaint   Patient presents with    Alcohol Intoxication    Suicidal         HISTORY OF PRESENT ILLNESS   (Location/Symptom, Timing/Onset,Context/Setting, Quality, Duration, Modifying Factors, Severity)  Note limiting factors. Murray Marte is a 48 y.o. male who presents to the emergency department for acute alcohol intoxication and suicidal ideations. Pt tells me that he is \"tired of everything\" and wants to die. He denies a specific plan but states that he has guns at a family members house. He denies ever trying to kill himself in the past.  He has thought about hurting himself in the past.  Pt told his mother that he was considering killing himself tonight. He keeps falling asleep on me. It was difficult to get a complete history from him secondary to the acute alcohol intoxication. HPI    NursingNotes were reviewed. REVIEW OF SYSTEMS    (2-9 systems for level 4, 10 or more for level 5)     Review of Systems   Unable to perform ROS: Other (Patient heavily intoxicated)   Psychiatric/Behavioral: Positive for suicidal ideas.             PAST MEDICALHISTORY     Past Medical History:   Diagnosis Date    Alcohol abuse     COPD (chronic obstructive pulmonary disease) ([facility]     Coronary artery disease     GERD (gastroesophageal reflux disease)     History of blood transfusion     Hyperlipidemia     Hypertension     Seizures (HCC)          SURGICAL HISTORY       Past Surgical History:   Procedure Laterality Date    COLONOSCOPY      CORONARY ANGIOPLASTY WITH STENT PLACEMENT      ELBOW SURGERY      ENDOSCOPY, COLON, DIAGNOSTIC      KNEE ARTHROSCOPY           CURRENT MEDICATIONS     Current Discharge Medication List      CONTINUE these medications which have NOT CHANGED    Details nicotine (NICODERM CQ) 21 MG/24HR Place 1 patch onto the skin daily  Qty: 30 patch, Refills: 3      isosorbide mononitrate (IMDUR) 30 MG extended release tablet Take 1 tablet by mouth daily  Qty: 30 tablet, Refills: 1      levETIRAcetam (KEPPRA) 500 MG tablet Take 1 tablet by mouth 2 times daily  Qty: 60 tablet, Refills: 3      metoprolol succinate (TOPROL XL) 50 MG extended release tablet Take 1 tablet by mouth daily  Qty: 30 tablet, Refills: 3      folic acid (FOLVITE) 1 MG tablet Take 1 tablet by mouth daily  Qty: 30 tablet, Refills: 3      Multiple Vitamin (MULTIVITAMIN) TABS tablet Take 1 tablet by mouth daily  Qty: 30 tablet, Refills: 0      vitamin B-1 100 MG tablet Take 1 tablet by mouth daily  Qty: 30 tablet, Refills: 3      OLANZapine (ZYPREXA) 5 MG tablet Take 5 mg by mouth nightly      amLODIPine (NORVASC) 10 MG tablet Take 10 mg by mouth daily      atorvastatin (LIPITOR) 10 MG tablet Take 10 mg by mouth daily      lisinopril (PRINIVIL;ZESTRIL) 40 MG tablet Take 40 mg by mouth daily      HYDROcodone-acetaminophen (NORCO)  MG per tablet Take 1 tablet by mouth every 6 hours as needed for Pain. clopidogrel (PLAVIX) 75 MG tablet Take 75 mg by mouth daily             ALLERGIES     Patient has no known allergies. FAMILY HISTORY     No family history on file.        SOCIAL HISTORY       Social History     Socioeconomic History    Marital status:      Spouse name: Not on file    Number of children: Not on file    Years of education: Not on file    Highest education level: Not on file   Occupational History    Not on file   Social Needs    Financial resource strain: Not on file    Food insecurity     Worry: Not on file     Inability: Not on file    Transportation needs     Medical: Not on file     Non-medical: Not on file   Tobacco Use    Smoking status: Current Every Day Smoker     Packs/day: 1.00     Types: Cigarettes    Smokeless tobacco: Never Used    Tobacco comment: jennifer horne day   Substance and Sexual Activity    Alcohol use: Yes     Comment: alcoholic    Drug use: Not Currently    Sexual activity: Not on file   Lifestyle    Physical activity     Days per week: Not on file     Minutes per session: Not on file    Stress: Not on file   Relationships    Social connections     Talks on phone: Not on file     Gets together: Not on file     Attends [employer] service: Not on file     Active member of club or organization: Not on file     Attends meetings of clubs or organizations: Not on file     Relationship status: Not on file    Intimate partner violence     Fear of current or ex partner: Not on file     Emotionally abused: Not on file     Physically abused: Not on file     Forced sexual activity: Not on file   Other Topics Concern    Not on file   Social History Narrative    Not on file       SCREENINGS    Alondra Coma Scale  Eye Opening: Spontaneous  Best Verbal Response: Oriented  Best Motor Response: Obeys commands  Alondra Coma Scale Score: 15        PHYSICAL EXAM    (up to 7 for level 4, 8 or more for level 5)     ED Triage Vitals [04/08/21 2112]   BP Temp Temp Source Pulse Resp SpO2 Height Weight   (!) 83/49 97.9 °F (36.6 °C) Temporal 88 20 91 % 6' (1.829 m) 259 lb (117.5 kg)       Physical Exam  Vitals signs and nursing note reviewed. Constitutional:       General: He is not in acute distress. Appearance: He is well-developed. He is not ill-appearing. HENT:      Head: Normocephalic and atraumatic. Nose: Nose normal.      Mouth/Throat:      Mouth: Mucous membranes are moist.      Pharynx: Oropharynx is clear. Eyes:      Conjunctiva/sclera: Conjunctivae normal.      Pupils: Pupils are equal, round, and reactive to light. Neck:      Musculoskeletal: Normal range of motion and neck supple. No muscular tenderness. Cardiovascular:      Rate and Rhythm: Normal rate and regular rhythm. Heart sounds: Normal heart sounds. No murmur.    Pulmonary:      Effort: All other components within normal limits   URINE DRUG SCREEN - Abnormal; Notable for the following components:    Benzodiazepine Screen, Urine Positive (*)     Opiate Scrn, Ur Positive (*)     All other components within normal limits   COMPREHENSIVE METABOLIC PANEL W/ REFLEX TO MG FOR LOW K - Abnormal; Notable for the following components:    Chloride 96 (*)     Total Protein 6.5 (*)     All other components within normal limits    Narrative:     TIMED 6AM ETOH LEVEL   LACTIC ACID, PLASMA - Abnormal; Notable for the following components:    Lactic Acid 2.3 (*)     All other components within normal limits    Narrative:     Suhail Oven tel. [phone],  Chemistry results called to and read back by Yaniv TRACY on 4th, 04/09/2021  05:54, by SHANNAN   CBC - Abnormal; Notable for the following components:    WBC 4.4 (*)     RBC 3.63 (*)     Hemoglobin 11.2 (*)     Hematocrit 33.2 (*)     RDW 14.6 (*)     MPV 8.7 (*)     All other components within normal limits   COVID-19, RAPID   ETHANOL   URINE RT REFLEX TO CULTURE   ACETAMINOPHEN LEVEL   SALICYLATE LEVEL   PROTIME-INR   ETHANOL    Narrative:     TIMED 6AM ETOH LEVEL   LACTIC ACID, PLASMA   ETHANOL   HEMOGLOBIN A1C       All other labs were within normal range or not returned as of this dictation. EMERGENCY DEPARTMENT COURSE and DIFFERENTIAL DIAGNOSIS/MDM:   Vitals:    Vitals:    04/09/21 0016 04/09/21 0215 04/09/21 0452 04/09/21 0500   BP: 115/64 124/79  105/66   Pulse: 75 75 81 86   Resp: 14 16  20   Temp:  97.8 °F (36.6 °C)  97.7 °F (36.5 °C)   TempSrc:  Axillary  Temporal   SpO2: 95% 96%  90%   Weight:       Height:           MDM  Number of Diagnoses or Management Options  Acute alcoholic intoxication without complication ([facility] [address]): new and requires workup  Suicidal thoughts: new and requires workup  Diagnosis management comments: Patient presented this evening with acute alcohol intoxication. His alcohol level was 317.  It is difficult to get the history from him since he kept falling asleep during interview. He did admit to me that he wanted to injure himself. Review of his note shows that patient has had to be admitted through the hospitalist service to prevent withdrawal syndrome. I spoke to the hospitalist service and she has agreed to admit the patient for further evaluation and treatment.     Patient Progress  Patient progress: stable      Reassessment      Medications   clopidogrel (PLAVIX) tablet 75 mg (has no administration in time range)   folic acid (FOLVITE) tablet 1 mg (has no administration in time range)   metoprolol succinate (TOPROL XL) extended release tablet 50 mg (has no administration in time range)   multivitamin 1 tablet (has no administration in time range)   nicotine (NICODERM CQ) 21 MG/24HR 1 patch (has no administration in time range)   OLANZapine (ZYPREXA) tablet 5 mg (has no administration in time range)   thiamine mononitrate tablet 100 mg (has no administration in time range)   sodium chloride flush 0.9 % injection 5-40 mL (has no administration in time range)   sodium chloride flush 0.9 % injection 5-40 mL (has no administration in time range)   0.9 % sodium chloride infusion (has no administration in time range)   polyethylene glycol (GLYCOLAX) packet 17 g (has no administration in time range)   acetaminophen (TYLENOL) tablet 650 mg (has no administration in time range)     Or   acetaminophen (TYLENOL) suppository 650 mg (has no administration in time range)   potassium chloride (KLOR-CON M) extended release tablet 40 mEq (has no administration in time range)     Or   potassium bicarb-citric acid (EFFER-K) effervescent tablet 40 mEq (has no administration in time range)     Or   potassium chloride 10 mEq/100 mL IVPB (Peripheral Line) (has no administration in time range)   magnesium sulfate 2000 mg in 50 mL IVPB premix (has no administration in time range)   ondansetron (ZOFRAN) injection 4 mg (has no administration in time range) famotidine (PEPCID) injection 20 mg (has no administration in time range)   enoxaparin (LOVENOX) injection 40 mg (has no administration in time range)   promethazine (PHENERGAN) tablet 12.5 mg (has no administration in time range)     Or   ondansetron (ZOFRAN) injection 4 mg (has no administration in time range)   LORazepam (ATIVAN) tablet 1 mg (has no administration in time range)     Or   LORazepam (ATIVAN) injection 1 mg (has no administration in time range)     Or   LORazepam (ATIVAN) tablet 2 mg (has no administration in time range)     Or   LORazepam (ATIVAN) injection 2 mg (has no administration in time range)     Or   LORazepam (ATIVAN) tablet 3 mg (has no administration in time range)     Or   LORazepam (ATIVAN) injection 3 mg (has no administration in time range)     Or   LORazepam (ATIVAN) tablet 4 mg (has no administration in time range)     Or   LORazepam (ATIVAN) injection 4 mg (has no administration in time range)       CONSULTS:  IP CONSULT TO SOCIAL WORK:  Unless otherwise noted below, none     Procedures    FINAL IMPRESSION      1. Acute alcoholic intoxication without complication ([facility] [address]    2. Suicidal thoughts          DISPOSITION/PLAN   DISPOSITION Admitted 04/09/2021 03:50:59 AM      PATIENT REFERRED TO:  No follow-up provider specified.     DISCHARGE MEDICATIONS:  Current Discharge Medication List             (Please note that portions of this note were completed with a voice recognition program.  Efforts were made to edit thedictations but occasionally words are mis-transcribed.)    Radha Wong MD (electronically signed)Emergency Physician         Cale Caceres MD  04/09/21 1769

## 2021-04-09 NOTE — ED NOTES
Transported to [phone]; updated hospitalist on arrival to room; belongings also brought in by security

## 2021-04-09 NOTE — H&P
HISTORY AND PHYSICAL             Date: 4/9/2021        Patient Name: Elda Villareal     YOB: 1970      Age:  48 y.o. Chief Complaint     Chief Complaint   Patient presents with    Alcohol Intoxication    Suicidal      47 yo male came to ER intoxicated and stating he was also suicidal and wanted to cause himself self harm. History Obtained From   Patient and ER     History of Present Illness   47 yo male being evaluated due to etoh intoxication. He is altered and confused but knows that he \"does not wish to keep living like this. \"  Admitted and labs done and evaluated and noted to have an etoh level of 317 and needed to be admitted to Hospitalist service for detoxfication and for him to sober up and then get help from psychiatric services. Past Medical History     Past Medical History:   Diagnosis Date    Alcohol abuse     COPD (chronic obstructive pulmonary disease) ([facility]     Coronary artery disease     GERD (gastroesophageal reflux disease)     History of blood transfusion     Hyperlipidemia     Hypertension     Seizures (HCC)         Past Surgical History     Past Surgical History:   Procedure Laterality Date    COLONOSCOPY      CORONARY ANGIOPLASTY WITH STENT PLACEMENT      ELBOW SURGERY      ENDOSCOPY, COLON, DIAGNOSTIC      KNEE ARTHROSCOPY          Medications Prior to Admission     Prior to Admission medications    Medication Sig Start Date End Date Taking?  Authorizing Provider   nicotine (NICODERM CQ) 21 MG/24HR Place 1 patch onto the skin daily 10/21/20   Danisha Mas MD   isosorbide mononitrate (IMDUR) 30 MG extended release tablet Take 1 tablet by mouth daily 9/15/20   Debbie Whitmore MD   levETIRAcetam (KEPPRA) 500 MG tablet Take 1 tablet by mouth 2 times daily 9/14/20   Debbie Whitmore MD   metoprolol succinate (TOPROL XL) 50 MG extended release tablet Take 1 tablet by mouth daily 9/15/20   Debbie Whitmore MD   folic acid (FOLVITE) 1 MG tablet Take 1 tablet by mouth daily 9/15/20   Cande Klein MD   Multiple Vitamin (MULTIVITAMIN) TABS tablet Take 1 tablet by mouth daily 9/15/20   Cande Klein MD   vitamin B-1 100 MG tablet Take 1 tablet by mouth daily 9/15/20   Cande Klein MD   OLANZapine (ZYPREXA) 5 MG tablet Take 5 mg by mouth nightly    Historical Provider, MD   amLODIPine (NORVASC) 10 MG tablet Take 10 mg by mouth daily    Historical Provider, MD   atorvastatin (LIPITOR) 10 MG tablet Take 10 mg by mouth daily    Historical Provider, MD   lisinopril (PRINIVIL;ZESTRIL) 40 MG tablet Take 40 mg by mouth daily    Historical Provider, MD   HYDROcodone-acetaminophen (NORCO)  MG per tablet Take 1 tablet by mouth every 6 hours as needed for Pain. Historical Provider, MD   clopidogrel (PLAVIX) 75 MG tablet Take 75 mg by mouth daily    Historical Provider, MD        Allergies   Patient has no known allergies. Social History     Social History     Tobacco History     Smoking Status  Current Every Day Smoker Smoking Frequency  1 pack/day Smoking Tobacco Type  Cigarettes    Smokeless Tobacco Use  Never Used    Tobacco Comment  pint a day          Alcohol History     Alcohol Use Status  Yes Comment  alcoholic          Drug Use     Drug Use Status  Not Currently          Sexual Activity     Sexually Active  Not Asked                Family History   No family history on file. Review of Systems   Review of Systems   Unable to perform ROS: Acuity of condition (due to intoxication cannot provide details )          Physical Exam   /79   Pulse 75   Temp 97.8 °F (36.6 °C) (Axillary)   Resp 16   Ht 6' (1.829 m)   Wt 259 lb (117.5 kg)   SpO2 96%   BMI 35.13 kg/m²     Physical Exam  Constitutional:       Appearance: He is obese. He is toxic-appearing. HENT:      Head: Normocephalic. Nose: Nose normal.      Mouth/Throat:      Mouth: Mucous membranes are dry.    Eyes:      Conjunctiva/sclera: Conjunctivae normal.   Cardiovascular:      Rate and Rhythm: Regular rhythm. Tachycardia present. Pulmonary:      Effort: Pulmonary effort is normal.      Breath sounds: Normal breath sounds. Abdominal:      Comments: Obese, non distended, non tender  Hypoactive bowel sounds noted. Musculoskeletal:      Right lower leg: Edema present. Left lower leg: Edema present. Skin:     General: Skin is warm. Neurological:      Comments: Cannot assess he cannot follow commands.     Psychiatric:      Comments: Suicidal, depressed, tearful and anxious          Labs      Recent Results (from the past 24 hour(s))   CBC Auto Differential    Collection Time: 04/08/21  9:32 PM   Result Value Ref Range    WBC 7.7 4.8 - 10.8 K/uL    RBC 3.57 (L) 4.70 - 6.10 M/uL    Hemoglobin 11.2 (L) 14.0 - 18.0 g/dL    Hematocrit 32.1 (L) 42.0 - 52.0 %    MCV 89.9 80.0 - 94.0 fL    MCH 31.4 (H) 27.0 - 31.0 pg    MCHC 34.9 33.0 - 37.0 g/dL    RDW 14.5 11.5 - 14.5 %    Platelets 174 165 - 485 K/uL    MPV 8.8 (L) 9.4 - 12.4 fL    Neutrophils % 49.4 (L) 50.0 - 65.0 %    Lymphocytes % 36.7 20.0 - 40.0 %    Monocytes % 8.7 0.0 - 10.0 %    Eosinophils % 3.8 0.0 - 5.0 %    Basophils % 0.9 0.0 - 1.0 %    Neutrophils Absolute 3.8 1.5 - 7.5 K/uL    Immature Granulocytes # 0.0 K/uL    Lymphocytes Absolute 2.8 1.1 - 4.5 K/uL    Monocytes Absolute 0.70 0.00 - 0.90 K/uL    Eosinophils Absolute 0.30 0.00 - 0.60 K/uL    Basophils Absolute 0.10 0.00 - 0.20 K/uL   Comprehensive Metabolic Panel    Collection Time: 04/08/21  9:32 PM   Result Value Ref Range    Sodium 129 (L) 136 - 145 mmol/L    Potassium 3.4 (L) 3.5 - 5.0 mmol/L    Chloride 90 (L) 98 - 111 mmol/L    CO2 26 22 - 29 mmol/L    Anion Gap 13 7 - 19 mmol/L    Glucose 122 (H) 74 - 109 mg/dL    BUN 14 6 - 20 mg/dL    CREATININE 1.2 0.5 - 1.2 mg/dL    GFR Non-African American >60 >60    GFR African American >59 >59    Calcium 9.0 8.6 - 10.0 mg/dL    Total Protein 6.7 6.6 - 8.7 g/dL    Albumin 4.2 3.5 - 5.2 g/dL    Total Bilirubin <0.2 0.2 - 1.2 mg/dL    Alkaline Phosphatase 108 40 - 130 U/L    ALT 27 5 - 41 U/L    AST 29 5 - 40 U/L   Ethanol    Collection Time: 04/08/21  9:32 PM   Result Value Ref Range    Ethanol Lvl 317 mg/dL   Acetaminophen Level    Collection Time: 04/08/21  9:32 PM   Result Value Ref Range    Acetaminophen Level <65 ug/mL   Salicylate    Collection Time: 04/08/21  9:32 PM   Result Value Ref Range    Salicylate, Serum <5.5 3.0 - 10.0 mg/dL   Urinalysis Reflex to Culture    Collection Time: 04/08/21 10:40 PM    Specimen: Urine, clean catch   Result Value Ref Range    Color, UA YELLOW Straw/Yellow    Clarity, UA Clear Clear    Glucose, Ur Negative Negative mg/dL    Bilirubin Urine Negative Negative    Ketones, Urine Negative Negative mg/dL    Specific Gravity, UA 1.009 1.005 - 1.030    Blood, Urine Negative Negative    pH, UA 7.0 5.0 - 8.0    Protein, UA Negative Negative mg/dL    Urobilinogen, Urine 0.2 <2.0 E.U./dL    Nitrite, Urine Negative Negative    Leukocyte Esterase, Urine Negative Negative   Urine Drug Screen    Collection Time: 04/08/21 10:40 PM   Result Value Ref Range    Amphetamine Screen, Urine Negative Negative <1000 ng/mL    Barbiturate Screen, Ur Negative Negative < 200 ng/mL    Benzodiazepine Screen, Urine Positive (A) Negative <100 ng/mL    Cannabinoid Scrn, Ur Negative Negative <50 ng/mL    Cocaine Metabolite Screen, Urine Negative Negative <300 ng/mL    Opiate Scrn, Ur Positive (A) Negative < 300 ng/mL    Drug Screen Comment: see below    COVID-19, Rapid    Collection Time: 04/09/21  1:26 AM    Specimen: Nasopharyngeal Swab   Result Value Ref Range    SARS-CoV-2, NAAT Not Detected Not Detected        Imaging/Diagnostics Last 24 Hours   No results found.     Assessment      Hospital Problems           Last Modified POA    * (Principal) Alcohol abuse with intoxication ([facility] [address] 4/9/2021 Yes    Coronary artery disease 4/9/2021 Yes    HLD (hyperlipidemia) 4/9/2021 Yes    HTN (hypertension) 4/9/2021 Yes    Seizure disorder ([facility] [address] 4/9/2021 Yes    Alcohol withdrawal ([facility] 4/9/2021 Yes    Suicidal ideation 4/9/2021 Yes    COPD (chronic obstructive pulmonary disease) ([facility] 4/9/2021 Yes    Acute alcohol intoxication delirium with mild use disorder ([facility] [address] 4/9/2021 Yes    Dehydration 4/9/2021 Yes    Volume contraction from diuretics 4/9/2021 Yes    Acute hyponatremia 4/9/2021 Yes    Hypokalemia due to excessive gastrointestinal loss of potassium 4/9/2021 Yes          Plan   1. Patient is being admitted to hospital due to acute alcohol intoxication with delirium and agitation and culminating in suicidal ideations that he has voiced. Admit and give fluids  Detox from etoh and other substances  Clear mental status   Replace electrolytes   Monitor neurological status     Once \"sober\" and lessened effects of alcohol on cns mentation, then we can start with psychiatric evaluation and treatment plan. 2.  Dehydration     Volume contraction metabolic alkalosis  Replete volume and electrolytes. 3.  Hyponatremia and hypokalemia   Replete along with magnesium     4. Neuro checks. 5.  ciwa protocol     6. Replete vitamins and thiamine, folate, and fluids     7. Hypertension and has underlying cad and is on plavix and will continue this. bp is borderline and will not resume all of his bp meds. 8.  Copd and tobacco abuse and bronchodilators as needed     9.  dvt scd's for now   Hold off on lovenox     10. Gi prophylaxis pepcid for now.          Consultations Ordered:  IP CONSULT TO SOCIAL WORK    Electronically signed by Saida Han MD on 4/9/21 at 4:01 AM CDT

## 2021-04-09 NOTE — PLAN OF CARE
Writer assumes care of patient this AM.  Patient admitted this a.m. No new complaints. Continues to have bilateral upper extremity tremors with some tongue fasciculations. Continue CIWA protocol, with lorazepam as needed. Continue one-to-one sitter at bedside  Continue monitoring ethanol level  Continue IV fluid  Trend lactic acid level  Monitor and replace electrolytes as needed  Psych evaluation once patient clinically stable.

## 2021-04-10 LAB
ALBUMIN SERPL-MCNC: 4.1 G/DL (ref 3.5–5.2)
ALP BLD-CCNC: 118 U/L (ref 40–130)
ALT SERPL-CCNC: 29 U/L (ref 5–41)
ANION GAP SERPL CALCULATED.3IONS-SCNC: 12 MMOL/L (ref 7–19)
AST SERPL-CCNC: 29 U/L (ref 5–40)
BASOPHILS ABSOLUTE: 0.1 K/UL (ref 0–0.2)
BASOPHILS RELATIVE PERCENT: 1 % (ref 0–1)
BILIRUB SERPL-MCNC: 0.3 MG/DL (ref 0.2–1.2)
BUN BLDV-MCNC: 8 MG/DL (ref 6–20)
CALCIUM SERPL-MCNC: 9 MG/DL (ref 8.6–10)
CHLORIDE BLD-SCNC: 102 MMOL/L (ref 98–111)
CO2: 23 MMOL/L (ref 22–29)
CREAT SERPL-MCNC: 0.8 MG/DL (ref 0.5–1.2)
EOSINOPHILS ABSOLUTE: 0.2 K/UL (ref 0–0.6)
EOSINOPHILS RELATIVE PERCENT: 4.1 % (ref 0–5)
ETHANOL: <10 MG/DL (ref 0–0.08)
GFR AFRICAN AMERICAN: >59
GFR NON-AFRICAN AMERICAN: >60
GLUCOSE BLD-MCNC: 95 MG/DL (ref 74–109)
HCT VFR BLD CALC: 33.5 % (ref 42–52)
HEMOGLOBIN: 11.2 G/DL (ref 14–18)
IMMATURE GRANULOCYTES #: 0 K/UL
LACTIC ACID: 1.7 MMOL/L (ref 0.5–1.9)
LACTIC ACID: 2.4 MMOL/L (ref 0.5–1.9)
LYMPHOCYTES ABSOLUTE: 1.1 K/UL (ref 1.1–4.5)
LYMPHOCYTES RELATIVE PERCENT: 22.6 % (ref 20–40)
MCH RBC QN AUTO: 30.6 PG (ref 27–31)
MCHC RBC AUTO-ENTMCNC: 33.4 G/DL (ref 33–37)
MCV RBC AUTO: 91.5 FL (ref 80–94)
MONOCYTES ABSOLUTE: 0.4 K/UL (ref 0–0.9)
MONOCYTES RELATIVE PERCENT: 7.1 % (ref 0–10)
NEUTROPHILS ABSOLUTE: 3.2 K/UL (ref 1.5–7.5)
NEUTROPHILS RELATIVE PERCENT: 64.8 % (ref 50–65)
PDW BLD-RTO: 14.6 % (ref 11.5–14.5)
PHOSPHORUS: 2.4 MG/DL (ref 2.5–4.5)
PLATELET # BLD: 246 K/UL (ref 130–400)
PMV BLD AUTO: 8.8 FL (ref 9.4–12.4)
POTASSIUM SERPL-SCNC: 4.1 MMOL/L (ref 3.5–5)
RBC # BLD: 3.66 M/UL (ref 4.7–6.1)
SODIUM BLD-SCNC: 137 MMOL/L (ref 136–145)
TOTAL PROTEIN: 6.3 G/DL (ref 6.6–8.7)
WBC # BLD: 4.9 K/UL (ref 4.8–10.8)

## 2021-04-10 PROCEDURE — 2580000003 HC RX 258: Performed by: INTERNAL MEDICINE

## 2021-04-10 PROCEDURE — 96372 THER/PROPH/DIAG INJ SC/IM: CPT

## 2021-04-10 PROCEDURE — 6370000000 HC RX 637 (ALT 250 FOR IP): Performed by: INTERNAL MEDICINE

## 2021-04-10 PROCEDURE — 36415 COLL VENOUS BLD VENIPUNCTURE: CPT

## 2021-04-10 PROCEDURE — 1210000000 HC MED SURG R&B

## 2021-04-10 PROCEDURE — 83605 ASSAY OF LACTIC ACID: CPT

## 2021-04-10 PROCEDURE — 96376 TX/PRO/DX INJ SAME DRUG ADON: CPT

## 2021-04-10 PROCEDURE — 2500000003 HC RX 250 WO HCPCS: Performed by: INTERNAL MEDICINE

## 2021-04-10 PROCEDURE — 80053 COMPREHEN METABOLIC PANEL: CPT

## 2021-04-10 PROCEDURE — 84100 ASSAY OF PHOSPHORUS: CPT

## 2021-04-10 PROCEDURE — 6360000002 HC RX W HCPCS: Performed by: INTERNAL MEDICINE

## 2021-04-10 PROCEDURE — 82077 ASSAY SPEC XCP UR&BREATH IA: CPT

## 2021-04-10 PROCEDURE — 99221 1ST HOSP IP/OBS SF/LOW 40: CPT | Performed by: PSYCHIATRY & NEUROLOGY

## 2021-04-10 PROCEDURE — 85025 COMPLETE CBC W/AUTO DIFF WBC: CPT

## 2021-04-10 RX ORDER — AMLODIPINE BESYLATE 10 MG/1
10 TABLET ORAL DAILY
Status: DISCONTINUED | OUTPATIENT
Start: 2021-04-10 | End: 2021-04-11 | Stop reason: HOSPADM

## 2021-04-10 RX ORDER — 0.9 % SODIUM CHLORIDE 0.9 %
1000 INTRAVENOUS SOLUTION INTRAVENOUS ONCE
Status: COMPLETED | OUTPATIENT
Start: 2021-04-10 | End: 2021-04-10

## 2021-04-10 RX ORDER — SODIUM CHLORIDE 9 MG/ML
INJECTION, SOLUTION INTRAVENOUS CONTINUOUS
Status: DISCONTINUED | OUTPATIENT
Start: 2021-04-10 | End: 2021-04-11 | Stop reason: HOSPADM

## 2021-04-10 RX ORDER — ISOSORBIDE MONONITRATE 30 MG/1
30 TABLET, EXTENDED RELEASE ORAL DAILY
Status: DISCONTINUED | OUTPATIENT
Start: 2021-04-10 | End: 2021-04-11 | Stop reason: HOSPADM

## 2021-04-10 RX ADMIN — ACETAMINOPHEN 650 MG: 325 TABLET ORAL at 21:20

## 2021-04-10 RX ADMIN — HYDROCODONE BITARTRATE AND ACETAMINOPHEN 1 TABLET: 10; 325 TABLET ORAL at 08:18

## 2021-04-10 RX ADMIN — SODIUM CHLORIDE: 9 INJECTION, SOLUTION INTRAVENOUS at 06:59

## 2021-04-10 RX ADMIN — ATORVASTATIN CALCIUM 10 MG: 10 TABLET, FILM COATED ORAL at 08:19

## 2021-04-10 RX ADMIN — OLANZAPINE 5 MG: 5 TABLET, FILM COATED ORAL at 19:56

## 2021-04-10 RX ADMIN — ISOSORBIDE MONONITRATE 30 MG: 30 TABLET, EXTENDED RELEASE ORAL at 14:52

## 2021-04-10 RX ADMIN — FAMOTIDINE 20 MG: 10 INJECTION, SOLUTION INTRAVENOUS at 08:20

## 2021-04-10 RX ADMIN — Medication 100 MG: at 08:19

## 2021-04-10 RX ADMIN — LEVETIRACETAM 500 MG: 250 TABLET ORAL at 19:56

## 2021-04-10 RX ADMIN — HYDROCODONE BITARTRATE AND ACETAMINOPHEN 1 TABLET: 10; 325 TABLET ORAL at 13:58

## 2021-04-10 RX ADMIN — HYDROCODONE BITARTRATE AND ACETAMINOPHEN 1 TABLET: 10; 325 TABLET ORAL at 02:07

## 2021-04-10 RX ADMIN — FOLIC ACID 1 MG: 1 TABLET ORAL at 08:19

## 2021-04-10 RX ADMIN — SODIUM CHLORIDE: 9 INJECTION, SOLUTION INTRAVENOUS at 17:13

## 2021-04-10 RX ADMIN — LISINOPRIL 40 MG: 20 TABLET ORAL at 08:19

## 2021-04-10 RX ADMIN — SODIUM CHLORIDE 1000 ML: 9 INJECTION, SOLUTION INTRAVENOUS at 14:52

## 2021-04-10 RX ADMIN — HYDROCODONE BITARTRATE AND ACETAMINOPHEN 1 TABLET: 10; 325 TABLET ORAL at 19:56

## 2021-04-10 RX ADMIN — LEVETIRACETAM 500 MG: 250 TABLET ORAL at 08:19

## 2021-04-10 RX ADMIN — AMLODIPINE BESYLATE 10 MG: 10 TABLET ORAL at 14:52

## 2021-04-10 RX ADMIN — CLOPIDOGREL BISULFATE 75 MG: 75 TABLET ORAL at 08:19

## 2021-04-10 RX ADMIN — FAMOTIDINE 20 MG: 10 INJECTION, SOLUTION INTRAVENOUS at 19:56

## 2021-04-10 RX ADMIN — THERA TABS 1 TABLET: TAB at 08:19

## 2021-04-10 RX ADMIN — ENOXAPARIN SODIUM 40 MG: 40 INJECTION SUBCUTANEOUS at 08:19

## 2021-04-10 RX ADMIN — ACETAMINOPHEN 650 MG: 325 TABLET ORAL at 12:40

## 2021-04-10 RX ADMIN — METOPROLOL SUCCINATE 50 MG: 50 TABLET, EXTENDED RELEASE ORAL at 08:18

## 2021-04-10 ASSESSMENT — PAIN DESCRIPTION - ORIENTATION
ORIENTATION: LOWER;OTHER (COMMENT)

## 2021-04-10 ASSESSMENT — PAIN SCALES - GENERAL
PAINLEVEL_OUTOF10: 6
PAINLEVEL_OUTOF10: 8
PAINLEVEL_OUTOF10: 0
PAINLEVEL_OUTOF10: 10
PAINLEVEL_OUTOF10: 10

## 2021-04-10 ASSESSMENT — PAIN DESCRIPTION - LOCATION: LOCATION: BACK;HEAD

## 2021-04-10 ASSESSMENT — PAIN DESCRIPTION - ONSET
ONSET: ON-GOING
ONSET: ON-GOING

## 2021-04-10 ASSESSMENT — PAIN DESCRIPTION - DESCRIPTORS
DESCRIPTORS: ACHING;THROBBING
DESCRIPTORS: ACHING;HEADACHE

## 2021-04-10 ASSESSMENT — PAIN DESCRIPTION - PAIN TYPE: TYPE: CHRONIC PAIN

## 2021-04-10 ASSESSMENT — PAIN DESCRIPTION - FREQUENCY: FREQUENCY: CONTINUOUS

## 2021-04-10 ASSESSMENT — PAIN DESCRIPTION - PROGRESSION: CLINICAL_PROGRESSION: NOT CHANGED

## 2021-04-10 NOTE — PLAN OF CARE
Problem: Suicide risk  Goal: Provide patient with safe environment  Description: Provide patient with safe environment  Outcome: Ongoing     Problem: Falls - Risk of:  Goal: Will remain free from falls  Description: Will remain free from falls  Outcome: Ongoing  Goal: Absence of physical injury  Description: Absence of physical injury  Outcome: Ongoing     Problem: Discharge Planning:  Goal: Discharged to appropriate level of care  Description: Discharged to appropriate level of care  Outcome: Ongoing     Problem: Fluid Volume - Deficit:  Goal: Absence of fluid volume deficit signs and symptoms  Description: Absence of fluid volume deficit signs and symptoms  Outcome: Ongoing     Problem: Nutrition Deficit:  Goal: Ability to achieve adequate nutritional intake will improve  Description: Ability to achieve adequate nutritional intake will improve  Outcome: Ongoing     Problem: Sleep Pattern Disturbance:  Goal: Appears well-rested  Description: Appears well-rested  Outcome: Ongoing     Problem: Violence - Risk of, Self/Other-Directed:  Goal: Knowledge of developmental care interventions  Description: Absence of violence  Outcome: Ongoing     Problem: Pain:  Goal: Pain level will decrease  Description: Pain level will decrease  Outcome: Ongoing  Goal: Control of acute pain  Description: Control of acute pain  Outcome: Ongoing  Goal: Control of chronic pain  Description: Control of chronic pain  Outcome: Ongoing

## 2021-04-10 NOTE — PROGRESS NOTES
Chemistry calls with repeat lactic acid (2.4). Notified Dr. Thiago Marie of lactic acid and elevated b/p of 164/95.   Electronically signed by Main Pate RN on 4/10/2021 at 2:17 PM

## 2021-04-10 NOTE — PROGRESS NOTES
[facility] Hospitalists Progress Note    Patient:  Liya Aragon  YOB: 1970  Date of Service: 4/10/2021  MRN: [id number]   Acct: [de-identified]   Primary Care Physician: TYRONE Munoz CNP  Advance Directive: Full Code  Admit Date: 4/8/2021       Hospital Day: 1      CHIEF COMPLAINT:     Chief Complaint   Patient presents with    Alcohol Intoxication    Suicidal       4/10/2021 12:25 PM  Subjective / Interval History:   04/10/2021  Patient seen and examined. No new complaints. Endorses some generalized chronic pain. Laying comfortably in bed in no apparent acute distress. No acute changes or acute overnight events reported. 04/09/2021  Writer assumes care of patient this AM.  Patient admitted this a.m. No new complaints. Continues to have bilateral upper extremity tremors with some tongue fasciculations. Continue CIWA protocol, with lorazepam as needed. Continue one-to-one sitter at bedside  Continue monitoring ethanol level  Continue IV fluid  Trend lactic acid level  Monitor and replace electrolytes as needed  Psych evaluation once patient clinically stable. Review of Systems:   Review of Systems  ROS: 14 point review of systems is negative except as specifically addressed above. DIET CLEAR LIQUID;     Intake/Output Summary (Last 24 hours) at 4/10/2021 1225  Last data filed at 4/10/2021 0757  Gross per 24 hour   Intake 3306 ml   Output    Net 3306 ml       Medications:   sodium chloride      sodium chloride 125 mL/hr at 04/10/21 1359     Current Facility-Administered Medications   Medication Dose Route Frequency Provider Last Rate Last Admin    clopidogrel (PLAVIX) tablet 75 mg  75 mg Oral Daily Kristian Oh MD   75 mg at 76/38/93 5274    folic acid (FOLVITE) tablet 1 mg  1 mg Oral Daily Kristian Oh MD   1 mg at 04/10/21 0819    metoprolol succinate (TOPROL XL) extended release tablet 50 mg  50 mg Oral Daily Peri Gallo, MD   50 mg at 04/10/21 0818    multivitamin 1 tablet  1 tablet Oral Daily Leland Tatum MD   1 tablet at 04/10/21 0819    nicotine (NICODERM CQ) 21 MG/24HR 1 patch  1 patch Transdermal Daily Leland Tatum MD   1 patch at 04/10/21 0820    OLANZapine (ZYPREXA) tablet 5 mg  5 mg Oral Nightly Leland Tatum MD   5 mg at 04/09/21 2055    thiamine mononitrate tablet 100 mg  100 mg Oral Daily Leland Tatum MD   100 mg at 04/10/21 0819    sodium chloride flush 0.9 % injection 5-40 mL  5-40 mL Intravenous 2 times per day Leland Tatum MD   10 mL at 04/09/21 1045    sodium chloride flush 0.9 % injection 5-40 mL  5-40 mL Intravenous PRN Leland Tatum MD        0.9 % sodium chloride infusion  25 mL Intravenous PRN Leland Tatum MD        polyethylene glycol (GLYCOLAX) packet 17 g  17 g Oral Daily PRN Leland Tatum MD        acetaminophen (TYLENOL) tablet 650 mg  650 mg Oral Q6H PRN Leland Tatum MD   650 mg at 04/09/21 2242    Or    acetaminophen (TYLENOL) suppository 650 mg  650 mg Rectal Q6H PRN Leland Tatum MD        potassium chloride (KLOR-CON M) extended release tablet 40 mEq  40 mEq Oral PRN Leland Tatum MD        Or    potassium bicarb-citric acid (EFFER-K) effervescent tablet 40 mEq  40 mEq Oral PRN Leland Tatum MD        Or    potassium chloride 10 mEq/100 mL IVPB (Peripheral Line)  10 mEq Intravenous PRN Leland Tatum MD        magnesium sulfate 2000 mg in 50 mL IVPB premix  2,000 mg Intravenous PRN Leland Tatum MD        ondansetron (ZOFRAN) injection 4 mg  4 mg Intravenous Q6H PRN Leland Tatum MD        famotidine (PEPCID) injection 20 mg  20 mg Intravenous BID Leland Tatum MD   20 mg at 04/10/21 0820    enoxaparin (LOVENOX) injection 40 mg  40 mg Subcutaneous Daily Leland Tatum MD   40 mg at 04/10/21 0819    promethazine (PHENERGAN) tablet 12.5 mg  12.5 mg Oral Q6H PRN Darren Lackey MD        Or    ondansetron (ZOFRAN) injection 4 mg  4 mg Intravenous Q6H PRN Darren Lackey MD        LORazepam (ATIVAN) tablet 1 mg  1 mg Oral Q1H PRN Darren Lackey MD   1 mg at 04/09/21 1908    Or    LORazepam (ATIVAN) injection 1 mg  1 mg Intravenous Q1H PRN Darren Lackey MD        Or    LORazepam (ATIVAN) tablet 2 mg  2 mg Oral Q1H PRN Darren Lackey MD        Or    LORazepam (ATIVAN) injection 2 mg  2 mg Intravenous Q1H PRN Darren Lackey MD        Or    LORazepam (ATIVAN) tablet 3 mg  3 mg Oral Q1H PRN Darren Lackey MD        Or    LORazepam (ATIVAN) injection 3 mg  3 mg Intravenous Q1H PRN Darren Lackey MD        Or    LORazepam (ATIVAN) tablet 4 mg  4 mg Oral Q1H PRN Darren Lackey MD        Or    LORazepam (ATIVAN) injection 4 mg  4 mg Intravenous Q1H PRN Darren Lackey MD        levETIRAcetam (KEPPRA) tablet 500 mg  500 mg Oral BID Joya Urbina MD   500 mg at 04/10/21 0819    atorvastatin (LIPITOR) tablet 10 mg  10 mg Oral Daily Joya Urbina MD   10 mg at 04/10/21 0819    lisinopril (PRINIVIL;ZESTRIL) tablet 40 mg  40 mg Oral Daily Joya Urbina MD   40 mg at 04/10/21 0819    0.9 % sodium chloride infusion   Intravenous Continuous Joya Urbina  mL/hr at 04/10/21 0659 New Bag at 04/10/21 0659    HYDROcodone-acetaminophen (NORCO)  MG per tablet 1 tablet  1 tablet Oral Q6H PRN Joya Urbina MD   1 tablet at 04/10/21 0818    sodium phosphate 18.81 mmol in dextrose 5 % 250 mL IVPB  0.16 mmol/kg Intravenous PRN Joya Urbina MD        Or    sodium phosphate 37.59 mmol in dextrose 5 % 250 mL IVPB  0.32 mmol/kg Intravenous PRN Joya Urbina MD             sodium chloride      sodium chloride 125 mL/hr at 04/10/21 0659      clopidogrel  75 mg Oral Daily    folic acid  1 mg Oral Daily  metoprolol succinate  50 mg Oral Daily    multivitamin  1 tablet Oral Daily    nicotine  1 patch Transdermal Daily    OLANZapine  5 mg Oral Nightly    thiamine mononitrate  100 mg Oral Daily    sodium chloride flush  5-40 mL Intravenous 2 times per day    famotidine (PEPCID) injection  20 mg Intravenous BID    enoxaparin  40 mg Subcutaneous Daily    levETIRAcetam  500 mg Oral BID    atorvastatin  10 mg Oral Daily    lisinopril  40 mg Oral Daily     sodium chloride flush, sodium chloride, polyethylene glycol, acetaminophen **OR** acetaminophen, potassium chloride **OR** potassium alternative oral replacement **OR** potassium chloride, magnesium sulfate, ondansetron, promethazine **OR** ondansetron, LORazepam **OR** LORazepam **OR** LORazepam **OR** LORazepam **OR** LORazepam **OR** LORazepam **OR** LORazepam **OR** LORazepam, HYDROcodone-acetaminophen, sodium phosphate IVPB **OR** sodium phosphate IVPB  DIET CLEAR LIQUID;       Labs:   CBC with DIFF:  Recent Labs     04/08/21  2132 04/09/21  0454 04/10/21  0303   WBC 7.7 4.4* 4.9   RBC 3.57* 3.63* 3.66*   HGB 11.2* 11.2* 11.2*   HCT 32.1* 33.2* 33.5*   MCV 89.9 91.5 91.5   MCH 31.4* 30.9 30.6   MCHC 34.9 33.7 33.4   RDW 14.5 14.6* 14.6*    265 246   MPV 8.8* 8.7* 8.8*   NEUTOPHILPCT 49.4*  --  64.8   LYMPHOPCT 36.7  --  22.6   MONOPCT 8.7  --  7.1   EOSRELPCT 3.8  --  4.1   BASOPCT 0.9  --  1.0   NEUTROABS 3.8  --  3.2   LYMPHSABS 2.8  --  1.1   MONOSABS 0.70  --  0.40   EOSABS 0.30  --  0.20   BASOSABS 0.10  --  0.10       CMP/BMP:  Recent Labs     04/08/21  2132 04/09/21  0454 04/10/21  0303   * 136 137   K 3.4* 3.6 4.1   CL 90* 96* 102   CO2 26 28 23   ANIONGAP 13 12 12   GLUCOSE 122* 101 95   BUN 14 13 8   CREATININE 1.2 0.9 0.8   LABGLOM >60 >60 >60   CALCIUM 9.0 8.8 9.0   PROT 6.7 6.5* 6.3*   LABALBU 4.2 4.1 4.1   BILITOT <0.2 <0.2 0.3   ALKPHOS 108 106 118   ALT 27 28 29   AST 29 28 29         CRP:  No results for input(s): CRP in 4/10/2021 0757  Gross per 24 hour   Intake 3306 ml   Output    Net 3306 ml       Physical Exam  Vitals signs and nursing note reviewed. Constitutional:       General: He is not in acute distress. Appearance: Normal appearance. He is not ill-appearing, toxic-appearing or diaphoretic. HENT:      Head: Normocephalic and atraumatic. Right Ear: External ear normal.      Left Ear: External ear normal.      Nose: Nose normal. No congestion or rhinorrhea. Mouth/Throat:      Mouth: Mucous membranes are moist.      Pharynx: Oropharynx is clear. No oropharyngeal exudate or posterior oropharyngeal erythema. Eyes:      General: No scleral icterus. Right eye: No discharge. Left eye: No discharge. Extraocular Movements: Extraocular movements intact. Conjunctiva/sclera: Conjunctivae normal.      Pupils: Pupils are equal, round, and reactive to light. Neck:      Musculoskeletal: Normal range of motion and neck supple. No neck rigidity or muscular tenderness. Vascular: No carotid bruit. Cardiovascular:      Rate and Rhythm: Normal rate and regular rhythm. Pulses: Normal pulses. Heart sounds: Normal heart sounds. No murmur. No friction rub. No gallop. Pulmonary:      Effort: Pulmonary effort is normal. No respiratory distress. Breath sounds: Normal breath sounds. No stridor. No wheezing, rhonchi or rales. Chest:      Chest wall: No tenderness. Abdominal:      General: Bowel sounds are normal. There is no distension. Palpations: Abdomen is soft. Tenderness: There is no abdominal tenderness. There is no guarding or rebound. Musculoskeletal: Normal range of motion. General: No swelling, tenderness, deformity or signs of injury. Right lower leg: No edema. Left lower leg: No edema. Skin:     General: Skin is warm and dry. Capillary Refill: Capillary refill takes less than 2 seconds.       Coloration: Skin is not jaundiced or pale.      Findings: No bruising, erythema, lesion or rash. Neurological:      General: No focal deficit present. Mental Status: He is alert and oriented to person, place, and time. Cranial Nerves: No cranial nerve deficit. Sensory: No sensory deficit. Motor: No weakness. Coordination: Coordination normal.   Psychiatric:         Mood and Affect: Mood normal.         Behavior: Behavior normal.         Thought Content: Thought content normal.         Judgment: Judgment normal.           Assessment/plan:         Hospital Problems           Last Modified POA    * (Principal) Alcohol abuse with intoxication ([address] 4/9/2021 Yes    Alcohol withdrawal ([address] 4/9/2021 Yes    Suicidal ideation 4/9/2021 Yes    Coronary artery disease 4/9/2021 Yes    HLD (hyperlipidemia) 4/9/2021 Yes    HTN (hypertension) 4/9/2021 Yes    Seizure disorder ([address] 4/9/2021 Yes    COPD (chronic obstructive pulmonary disease) ([address] 4/9/2021 Yes    Acute alcohol intoxication delirium with mild use disorder ([address] 4/9/2021 Yes    Dehydration 4/9/2021 Yes    Volume contraction from diuretics 4/9/2021 Yes    Acute hyponatremia 4/9/2021 Yes    Hypokalemia due to excessive gastrointestinal loss of potassium 4/9/2021 Yes          Principal Problem:    Alcohol abuse with intoxication ([address]  Active Problems:    Alcohol withdrawal ([address]    Suicidal ideation    Coronary artery disease    HLD (hyperlipidemia)    HTN (hypertension)    Seizure disorder (HCC)    COPD (chronic obstructive pulmonary disease) (HCC)    Acute alcohol intoxication delirium with mild use disorder (HCC)    Dehydration    Volume contraction from diuretics    Acute hyponatremia    Hypokalemia due to excessive gastrointestinal loss of potassium  Resolved Problems:    * No resolved hospital problems. *        Brief Summary  Mr.  [place], a 51-year-old male, history of COPD, EtOH abuse, CAD, HLD, seizures, presenting to [facility] ED (04/08/2021), on account of alcohol intoxication and suicidal ideations. Patient reportedly told his mother he was considering killing himself on the day of presentation. Initial work-up significant for  Hypokalemia with potassium of 3.4  Hyponatremia with a sodium of 129  Hyperglycemia with a glucose of 122  Ethanol level of 317 mg/dL  Urine tox positive for opiates and benzodiazepine  Initial lactic acid of 2.3 mmol/L  Acetaminophen and salicylate within lab reference range      ETOH abuse, monitor for withdrawal  Dehydration  Suicidal ideation   CIWA Protocol   Larazepam PRN as per CIWA Score   Ethanol level of 317 ---> 158 --> 54 mg/dL (04/09/2021)   Thiamine, Folic Acid, Multivatamins   Seizure Precautions   Suicide precautions   One-to-one sitter at bedside for safety   Psych evaluation once patient is clinically stable    Dehydration  · Initial lactic acid of 2.3 --> 2.7 mmol/L (04/09/2021)  · Continue IV fluid  · Trend lactic acid level    History of HTN  History of CAD  · Metoprolol succinate 50 mg p.o. daily  · Continue optimize medical management    Hyperglycemia  · Initial glucose of 122  · No documented history of diabetes  · Hemoglobin A1c 5.8% (04/09/2021)  · Monitor POC glucose    Hypokalemia  · Replace as per protocol        Continue management of other chronic medical conditions - see above and orders. Advance Directive: Full Code    DIET CLEAR LIQUID;         Consults Made:   IP CONSULT TO SOCIAL WORK  IP CONSULT TO PSYCHIATRY    DVT prophylaxis: Enoxaparin    Discharge planning: tbd    Time Spent is 25 mins in the examination, evaluation, counseling and review of medications, assessment and plan.      Electronically signed by   Shanthi Palacios MD, MPH,   Internal Medicine Hospitalist   4/10/2021 12:25 PM

## 2021-04-10 NOTE — PROGRESS NOTES
Patient asleep, snoring softly. Sitter at bedside. No acute distress noted at this time.   Electronically signed by Lianna Cerna RN on 4/10/2021 at 12:08 PM

## 2021-04-10 NOTE — CONSULTS
[facility]  Psychiatry Consult    Reason for Consult: Concern  Alcohol intoxication, suicidal ideations    The primary source(s) of information include(s):  Patient    The patient is a 48 y.o. male with previous psychiatric history of depression, alcohol use disorder, complicated by history of withdrawal seizures from alcohol, history of chronic pain syndrome, hypertension, coronary artery disease, who has been admitted to medical services secondary to alcohol intoxication, blood alcohol level 317, and suicidal ideations. Patient is well-known to psychiatry due to multiple previous admissions to our hospital with same clinical presentation, as at present time. Patient has been seen in his room with presence of one-to-one sitter. Patient reported that after last discharge from the hospital 3 months ago he went to rehabilitation treatments twice, where he stayed for 1 week and second time for 2 weeks. Last time he left rehab facility 1.5 months ago for 2 weeks earlier. However, patient stated that he was able to stay sober until 8 days ago, when he relapsed in drinking alcohol again. Patient could not identify any life stressors, which could be a cause of relapsing of drinking alcohol. He reported that he was drinking 2 pints of vodka a day, every day for last 8 days. Patient stated that he does not have any withdrawal symptoms from alcohol at this time. During the interview, patient denies any affective symptomatology, he denies feeling of depression, anxiety or psychotic symptoms. Patient denies current active suicidal or homicidal ideations, denies any plans. Also, he denies any auditory and visual hallucinations. Patient reported that he was compliant with prescribed psychotropic medications, which prescribed to him by his primary care provider.     He reported that he plans to return back to [facility], get a sponsor and stay sober after discharge from the hospital. PSYCHIATRIC HISTORY:  Diagnoses: Alcohol use disorder, depression  Suicide attempts/gestures: Denies   Prior hospitalizations: Denies   Medication trials: Zyprexa, Valium, Treynor  Mental health contact: Primary care provider manages patient's psychotropic medications  Head trauma: Denies    Allergies:  Patient has no known allergies. Mental Status  Appearance: Properly groomed, wearing hospital attire. Made good eye contact. Behavior: Calm, cooperative, socially appropriate. No psychomotor retardation or agitation appreciated. Gait was not evaluated, as patient was lying down on the bed. Speech: Normal in tone, volume, and quality. No pressured speech noted. Mood: \"Fine\"   Affect: Mood congruent. Range is full. Thought Process: Appears linear and goal oriented. Thought Content: Patient does not have any current active suicidal and homicidal ideations. No overt delusions or paranoia appreciated. Perceptions: Seems patient does not have any auditory or visual hallucinations at present time. Patient did not appear to be responding to internal stimuli. No overt psychosis. Executive Functions: Appear intact. Concentration: Decreased  Reasoning: Appears impaired based on interaction from interview   Orientation: to person, place, date, and situation. Alert. Language: Intact. Fund of information: Intact. Memory: recent and remote appear intact. Impulsivity: Limited. Neurovegitative: Fair appetite, fair sleep  Insight: Limited  Judgment: Impaired    Assessment  DSM 5 DIAGNOSIS:  Alcohol use disorder, severe, complicated by history of withdrawal seizures  Alcohol-induced mood disorder   Tobacco use disorder  History of chronic pain syndrome    Recommendations  1. Currently patient is not suicidal, homicidal or psychotic. Patient does not meet criteria for psychiatric hospitalization. 2. Patient does have a capacity of making his own medical decisions.   3. Patient does not present imminent danger to self or others. 4.  Recommended to discontinue one-to-one sitter. 5.  Patient denies any withdrawal symptoms from alcohol at this time. 6.  Patient can be discharged from the hospital when he is medically stable. 7.  Psychiatry will sign off today.       Juan Mullins MD

## 2021-04-11 VITALS
BODY MASS INDEX: 35.08 KG/M2 | SYSTOLIC BLOOD PRESSURE: 156 MMHG | HEART RATE: 105 BPM | OXYGEN SATURATION: 98 % | DIASTOLIC BLOOD PRESSURE: 92 MMHG | TEMPERATURE: 99.1 F | WEIGHT: 259 LBS | HEIGHT: 72 IN | RESPIRATION RATE: 18 BRPM

## 2021-04-11 PROBLEM — E87.6 HYPOKALEMIA DUE TO EXCESSIVE GASTROINTESTINAL LOSS OF POTASSIUM: Status: RESOLVED | Noted: 2021-04-09 | Resolved: 2021-04-11

## 2021-04-11 PROBLEM — E87.1 ACUTE HYPONATREMIA: Status: RESOLVED | Noted: 2021-04-09 | Resolved: 2021-04-11

## 2021-04-11 LAB
ALBUMIN SERPL-MCNC: 4 G/DL (ref 3.5–5.2)
ALP BLD-CCNC: 109 U/L (ref 40–130)
ALT SERPL-CCNC: 26 U/L (ref 5–41)
ANION GAP SERPL CALCULATED.3IONS-SCNC: 12 MMOL/L (ref 7–19)
AST SERPL-CCNC: 30 U/L (ref 5–40)
BASOPHILS ABSOLUTE: 0 K/UL (ref 0–0.2)
BASOPHILS RELATIVE PERCENT: 0.6 % (ref 0–1)
BILIRUB SERPL-MCNC: 0.3 MG/DL (ref 0.2–1.2)
BUN BLDV-MCNC: 7 MG/DL (ref 6–20)
CALCIUM SERPL-MCNC: 8.9 MG/DL (ref 8.6–10)
CHLORIDE BLD-SCNC: 108 MMOL/L (ref 98–111)
CO2: 22 MMOL/L (ref 22–29)
CREAT SERPL-MCNC: 0.9 MG/DL (ref 0.5–1.2)
EKG P AXIS: 49 DEGREES
EKG P-R INTERVAL: 166 MS
EKG Q-T INTERVAL: 380 MS
EKG QRS DURATION: 110 MS
EKG QTC CALCULATION (BAZETT): 422 MS
EKG T AXIS: 50 DEGREES
EOSINOPHILS ABSOLUTE: 0.2 K/UL (ref 0–0.6)
EOSINOPHILS RELATIVE PERCENT: 3.8 % (ref 0–5)
GFR AFRICAN AMERICAN: >59
GFR NON-AFRICAN AMERICAN: >60
GLUCOSE BLD-MCNC: 114 MG/DL (ref 74–109)
HCT VFR BLD CALC: 32.9 % (ref 42–52)
HEMOGLOBIN: 10.7 G/DL (ref 14–18)
IMMATURE GRANULOCYTES #: 0 K/UL
LYMPHOCYTES ABSOLUTE: 1.3 K/UL (ref 1.1–4.5)
LYMPHOCYTES RELATIVE PERCENT: 25.8 % (ref 20–40)
MCH RBC QN AUTO: 30.1 PG (ref 27–31)
MCHC RBC AUTO-ENTMCNC: 32.5 G/DL (ref 33–37)
MCV RBC AUTO: 92.7 FL (ref 80–94)
MONOCYTES ABSOLUTE: 0.5 K/UL (ref 0–0.9)
MONOCYTES RELATIVE PERCENT: 9.1 % (ref 0–10)
NEUTROPHILS ABSOLUTE: 3 K/UL (ref 1.5–7.5)
NEUTROPHILS RELATIVE PERCENT: 60.5 % (ref 50–65)
PDW BLD-RTO: 14.4 % (ref 11.5–14.5)
PLATELET # BLD: 225 K/UL (ref 130–400)
PMV BLD AUTO: 9 FL (ref 9.4–12.4)
POTASSIUM SERPL-SCNC: 4.2 MMOL/L (ref 3.5–5)
RBC # BLD: 3.55 M/UL (ref 4.7–6.1)
SODIUM BLD-SCNC: 142 MMOL/L (ref 136–145)
TOTAL PROTEIN: 6 G/DL (ref 6.6–8.7)
WBC # BLD: 5 K/UL (ref 4.8–10.8)

## 2021-04-11 PROCEDURE — 6370000000 HC RX 637 (ALT 250 FOR IP): Performed by: INTERNAL MEDICINE

## 2021-04-11 PROCEDURE — 96372 THER/PROPH/DIAG INJ SC/IM: CPT

## 2021-04-11 PROCEDURE — 80053 COMPREHEN METABOLIC PANEL: CPT

## 2021-04-11 PROCEDURE — 96376 TX/PRO/DX INJ SAME DRUG ADON: CPT

## 2021-04-11 PROCEDURE — 2500000003 HC RX 250 WO HCPCS: Performed by: INTERNAL MEDICINE

## 2021-04-11 PROCEDURE — 36415 COLL VENOUS BLD VENIPUNCTURE: CPT

## 2021-04-11 PROCEDURE — 93010 ELECTROCARDIOGRAM REPORT: CPT | Performed by: INTERNAL MEDICINE

## 2021-04-11 PROCEDURE — 6360000002 HC RX W HCPCS: Performed by: INTERNAL MEDICINE

## 2021-04-11 PROCEDURE — 85025 COMPLETE CBC W/AUTO DIFF WBC: CPT

## 2021-04-11 RX ADMIN — HYDROCODONE BITARTRATE AND ACETAMINOPHEN 1 TABLET: 10; 325 TABLET ORAL at 08:36

## 2021-04-11 RX ADMIN — THERA TABS 1 TABLET: TAB at 08:36

## 2021-04-11 RX ADMIN — Medication 100 MG: at 08:36

## 2021-04-11 RX ADMIN — FAMOTIDINE 20 MG: 10 INJECTION, SOLUTION INTRAVENOUS at 08:36

## 2021-04-11 RX ADMIN — FOLIC ACID 1 MG: 1 TABLET ORAL at 08:36

## 2021-04-11 RX ADMIN — METOPROLOL SUCCINATE 50 MG: 50 TABLET, EXTENDED RELEASE ORAL at 08:36

## 2021-04-11 RX ADMIN — LEVETIRACETAM 500 MG: 250 TABLET ORAL at 08:36

## 2021-04-11 RX ADMIN — CLOPIDOGREL BISULFATE 75 MG: 75 TABLET ORAL at 08:35

## 2021-04-11 RX ADMIN — ISOSORBIDE MONONITRATE 30 MG: 30 TABLET, EXTENDED RELEASE ORAL at 08:35

## 2021-04-11 RX ADMIN — HYDROCODONE BITARTRATE AND ACETAMINOPHEN 1 TABLET: 10; 325 TABLET ORAL at 02:48

## 2021-04-11 RX ADMIN — AMLODIPINE BESYLATE 10 MG: 10 TABLET ORAL at 08:36

## 2021-04-11 RX ADMIN — ACETAMINOPHEN 650 MG: 325 TABLET ORAL at 11:19

## 2021-04-11 RX ADMIN — ENOXAPARIN SODIUM 40 MG: 40 INJECTION SUBCUTANEOUS at 08:36

## 2021-04-11 RX ADMIN — ATORVASTATIN CALCIUM 10 MG: 10 TABLET, FILM COATED ORAL at 08:35

## 2021-04-11 RX ADMIN — LISINOPRIL 40 MG: 20 TABLET ORAL at 08:35

## 2021-04-11 ASSESSMENT — PAIN DESCRIPTION - PAIN TYPE: TYPE: CHRONIC PAIN

## 2021-04-11 NOTE — PROGRESS NOTES
Discharge instructions given and explained to patient. Denies any questions or concerns at this time.   Electronically signed by Forrest Mclean RN on 4/11/2021 at 12:31 PM

## 2021-04-11 NOTE — DISCHARGE SUMMARY
Nazia Arvizu  :  1970  MRN:  [id number]    Admit date:  2021  Discharge date:  2021       Admitting Physician:  No admitting provider for patient encounter.     Advance Directive: Full Code    Consults Made:   IP CONSULT TO SOCIAL WORK  IP CONSULT TO PSYCHIATRY      Primary Care Physician:  Jamal Alberto, APRN - CNP    Discharge Diagnoses:  Principal Problem:    Alcohol abuse with intoxication ([facility] [address]  Active Problems:    Alcohol withdrawal ([facility] [address]    Suicidal ideation    Coronary artery disease    HLD (hyperlipidemia)    HTN (hypertension)    Seizure disorder (HCC)    COPD (chronic obstructive pulmonary disease) (HCC)    Acute alcohol intoxication delirium with mild use disorder (HCC)    Dehydration    Volume contraction from diuretics  Resolved Problems:    Acute hyponatremia    Hypokalemia due to excessive gastrointestinal loss of potassium      Pertinent Labs:  CBC with DIFF:  Recent Labs     21  2132 21  0454 04/10/21  0303 21  0247   WBC 7.7 4.4* 4.9 5.0   RBC 3.57* 3.63* 3.66* 3.55*   HGB 11.2* 11.2* 11.2* 10.7*   HCT 32.1* 33.2* 33.5* 32.9*   MCV 89.9 91.5 91.5 92.7   MCH 31.4* 30.9 30.6 30.1   MCHC 34.9 33.7 33.4 32.5*   RDW 14.5 14.6* 14.6* 14.4    265 246 225   MPV 8.8* 8.7* 8.8* 9.0*   NEUTOPHILPCT 49.4*  --  64.8 60.5   LYMPHOPCT 36.7  --  22.6 25.8   MONOPCT 8.7  --  7.1 9.1   EOSRELPCT 3.8  --  4.1 3.8   BASOPCT 0.9  --  1.0 0.6   NEUTROABS 3.8  --  3.2 3.0   LYMPHSABS 2.8  --  1.1 1.3   MONOSABS 0.70  --  0.40 0.50   EOSABS 0.30  --  0.20 0.20   BASOSABS 0.10  --  0.10 0.00       CMP/BMP:  Recent Labs     21  0454 04/10/21  0303 21  0247    137 142   K 3.6 4.1 4.2   CL 96* 102 108   CO2 28 23 22   ANIONGAP 12 12 12   GLUCOSE 101 95 114*   BUN 13 8 7   CREATININE 0.9 0.8 0.9   LABGLOM >60 >60 >60   CALCIUM 8.8 9.0 8.9   PROT 6.5* 6.3* 6.0*   LABALBU 4.1 4.1 4.0   BILITOT <0.2 0.3 0.3   ALKPHOS 106 118 109   ALT 28 29 26   AST 28 29 30 CRP:  No results for input(s): CRP in the last 72 hours. Sed Rate:  No results for input(s): SEDRATE in the last 72 hours. HgBA1c:  No components found for: HGBA1C  FLP:    Lab Results   Component Value Date    TRIG 470 11/07/2020    HDL 36 11/07/2020    LDLCALC see below 11/07/2020    LDLDIRECT 73 11/07/2020     TSH:  No results found for: TSH  Troponin T: No results for input(s): TROPONINI in the last 72 hours. Pro-BNP: No results for input(s): BNP in the last 72 hours. INR:   Recent Labs     04/09/21  0454   INR 0.92     ABGs: No results found for: PHART, PO2ART, [id number]  UA:  Recent Labs     04/08/21  2240   COLORU YELLOW   PHUR 7.0   CLARITYU Clear   SPECGRAV 1.009   LEUKOCYTESUR Negative   UROBILINOGEN 0.2   BILIRUBINUR Negative   BLOODU Negative   GLUCOSEU Negative         Culture Results:    No results for input(s): CXSURG in the last 720 hours. Blood Culture Recent: No results for input(s): BC in the last 720 hours. Cultures:   No results for input(s): CULTURE in the last 72 hours. No results for input(s): BC, Perlita Schwalbe in the last 72 hours. No results for input(s): CXSURG in the last 72 hours. Recent Labs     04/09/21  0851 04/10/21  1321   MG 1.9  --    PHOS 2.4* 2.4*     Recent Labs     04/09/21  0454 04/10/21  0303 04/11/21  0247   AST 28 29 30   ALT 28 29 26   BILITOT <0.2 0.3 0.3   ALKPHOS 106 118 109           Significant Diagnostic Studies:   No results found. Hospital Course:   Mr. [place], a 40-year-old male, history of COPD, EtOH abuse, CAD, HLD, seizures, presenting to Health system ED (04/08/2021), on account of alcohol intoxication and suicidal ideations.   Patient reportedly told his mother he was considering killing himself on the day of presentation.     Initial work-up significant for  Hypokalemia with potassium of 3.4  Hyponatremia with a sodium of 129  Hyperglycemia with a glucose of 122  Ethanol level of 317 mg/dL  Urine tox positive for opiates and benzodiazepine Initial lactic acid of 2.3 mmol/L  Acetaminophen and salicylate within lab reference range        ETOH abuse, monitor for withdrawal  Dehydration  Suicidal ideation  · CIWA Protocol  · Larazepam PRN as per CIWA Score  · Ethanol level of 317 ---> 158 --> 54 mg/dL (04/09/2021)  · Thiamine, Folic Acid, Multivatamins  · Seizure Precautions  · Suicide precautions  · Seen by Psych - now signed off  · One-to-one sitter at bedside for safety - discontinued by Psych  · Patient currently not suicidal homicidal, and does not meet criteria for inpatient psych admission. · Okay for discharge from psych standpoint.       Dehydration  · Initial lactic acid of 2.3 --> 2.7 --> 2.4 -> 1.7 mmol/L (04/10/2021)  · Continued IV fluid  · Encourage p.o. intake on discharge.        History of HTN  History of CAD  · Continue at home regimen  · Metoprolol succinate 50 mg p.o. daily  · Isosorbide mononitrate 30 mg p.o. daily  · Lisinopril 40 mg p.o. daily  · Continued optimize medical management     Hyperglycemia  · Initial glucose of 122  · No documented history of diabetes  · Hemoglobin A1c 5.8% (04/09/2021)  · Monitored POC glucose  · Follow-up with PCP, for further monitoring of glucose.     Hypokalemia  · Replaced as per protocol           Continued management of other chronic medical conditions       Physical Exam:  Vital Signs: BP (!) 156/92   Pulse 105   Temp 99.1 °F (37.3 °C)   Resp 18   Ht 6' (1.829 m)   Wt 259 lb (117.5 kg)   SpO2 98%   BMI 35.13 kg/m²   Physical Exam  Vitals signs and nursing note reviewed. Constitutional:       General: He is not in acute distress. Appearance: Normal appearance. He is not ill-appearing, toxic-appearing or diaphoretic. HENT:      Head: Normocephalic and atraumatic. Right Ear: External ear normal.      Left Ear: External ear normal.      Nose: Nose normal. No congestion or rhinorrhea. Mouth/Throat:      Mouth: Mucous membranes are moist.      Pharynx: Oropharynx is clear. Home Medication Instructions DARLENE:[id number]    Printed on:04/11/21 1116   Medication Information                      amLODIPine (NORVASC) 10 MG tablet  Take 10 mg by mouth daily             atorvastatin (LIPITOR) 10 MG tablet  Take 10 mg by mouth daily             clopidogrel (PLAVIX) 75 MG tablet  Take 75 mg by mouth daily             folic acid (FOLVITE) 1 MG tablet  Take 1 tablet by mouth daily             HYDROcodone-acetaminophen (NORCO)  MG per tablet  Take 1 tablet by mouth every 6 hours as needed for Pain.             isosorbide mononitrate (IMDUR) 30 MG extended release tablet  Take 1 tablet by mouth daily             levETIRAcetam (KEPPRA) 500 MG tablet  Take 1 tablet by mouth 2 times daily             lisinopril (PRINIVIL;ZESTRIL) 40 MG tablet  Take 40 mg by mouth daily             metoprolol succinate (TOPROL XL) 50 MG extended release tablet  Take 1 tablet by mouth daily             Multiple Vitamin (MULTIVITAMIN) TABS tablet  Take 1 tablet by mouth daily             nicotine (NICODERM CQ) 21 MG/24HR  Place 1 patch onto the skin daily             OLANZapine (ZYPREXA) 5 MG tablet  Take 5 mg by mouth nightly             vitamin B-1 100 MG tablet  Take 1 tablet by mouth daily                 Discharge Instructions: Follow up with TYRONE Alfaro CNP in 7 days. Take medications as directed. Resume activity as tolerated. Diet: DIET GENERAL;        DISCHARGE STATUS:    Condition: Good  Disposition: Patient is medically stable and will be discharged home    Extended Emergency Contact Information  Primary Emergency Contact: [address] [facility] Phone: [phone]  Mobile Phone: [phone]  Relation: Parent   needed? No       Time Spent on discharge is more than 35 mins in the examination, evaluation, counseling and review of medications and discharge plan.      Electronically signed by   Robert Angela MD, MPH,   Internal Medicine Hospitalist   4/11/2021 11:16 AM Thank you Paige Bloch, APRN - CNP for the opportunity to be involved in this patient's care. If you have any questions or concerns please feel free to contact me at [phone]        EMR Dragon/Transcription disclaimer:   Much of this encounter note is an electronic transcription/translation of spoken language to printed text.  The electronic translation of spoken language may permit erroneous, or at times, nonsensical words or phrases to be inadvertently transcribed; although attempts have made to review the note for such errors, some may still exist.

## 2021-05-09 PROBLEM — E86.0 DEHYDRATION: Status: RESOLVED | Noted: 2021-04-09 | Resolved: 2021-05-09

## 2021-12-08 ENCOUNTER — TRANSCRIBE ORDERS (OUTPATIENT)
Dept: ADMINISTRATIVE | Facility: HOSPITAL | Age: 51
End: 2021-12-08

## 2021-12-08 DIAGNOSIS — M54.50 LOW BACK PAIN, UNSPECIFIED BACK PAIN LATERALITY, UNSPECIFIED CHRONICITY, UNSPECIFIED WHETHER SCIATICA PRESENT: Primary | ICD-10-CM

## 2021-12-10 ENCOUNTER — HOSPITAL ENCOUNTER (EMERGENCY)
Age: 51
Discharge: LEFT BEFORE DISCHARGE | End: 2021-12-10

## 2021-12-10 VITALS
TEMPERATURE: 98.3 F | OXYGEN SATURATION: 93 % | HEART RATE: 105 BPM | RESPIRATION RATE: 18 BRPM | DIASTOLIC BLOOD PRESSURE: 78 MMHG | SYSTOLIC BLOOD PRESSURE: 90 MMHG

## 2021-12-10 ASSESSMENT — PAIN SCALES - GENERAL: PAINLEVEL_OUTOF10: 8

## 2021-12-17 ENCOUNTER — APPOINTMENT (OUTPATIENT)
Dept: MRI IMAGING | Facility: HOSPITAL | Age: 51
End: 2021-12-17

## 2021-12-30 ENCOUNTER — HOSPITAL ENCOUNTER (OUTPATIENT)
Dept: GENERAL RADIOLOGY | Facility: HOSPITAL | Age: 51
Discharge: HOME | End: 2021-12-30

## 2021-12-30 ENCOUNTER — TRANSCRIBE ORDERS (OUTPATIENT)
Dept: ADMINISTRATIVE | Facility: HOSPITAL | Age: 51
End: 2021-12-30

## 2021-12-30 ENCOUNTER — HOSPITAL ENCOUNTER (OUTPATIENT)
Dept: MRI IMAGING | Facility: HOSPITAL | Age: 51
Discharge: HOME | End: 2021-12-30

## 2021-12-30 DIAGNOSIS — M54.2 CERVICALGIA: ICD-10-CM

## 2021-12-30 DIAGNOSIS — M25.511 RIGHT SHOULDER PAIN, UNSPECIFIED CHRONICITY: ICD-10-CM

## 2021-12-30 DIAGNOSIS — M54.50 LOW BACK PAIN, UNSPECIFIED BACK PAIN LATERALITY, UNSPECIFIED CHRONICITY, UNSPECIFIED WHETHER SCIATICA PRESENT: ICD-10-CM

## 2021-12-30 DIAGNOSIS — M25.561 RIGHT KNEE PAIN, UNSPECIFIED CHRONICITY: ICD-10-CM

## 2021-12-30 DIAGNOSIS — M25.521 PAIN IN RIGHT ELBOW: ICD-10-CM

## 2021-12-30 DIAGNOSIS — M25.551 PAIN IN RIGHT HIP: ICD-10-CM

## 2021-12-30 DIAGNOSIS — M54.50 LOW BACK PAIN, UNSPECIFIED BACK PAIN LATERALITY, UNSPECIFIED CHRONICITY, UNSPECIFIED WHETHER SCIATICA PRESENT: Primary | ICD-10-CM

## 2021-12-30 PROCEDURE — 72148 MRI LUMBAR SPINE W/O DYE: CPT

## 2021-12-30 PROCEDURE — 73080 X-RAY EXAM OF ELBOW: CPT

## 2021-12-30 PROCEDURE — 73562 X-RAY EXAM OF KNEE 3: CPT

## 2021-12-30 PROCEDURE — 73560 X-RAY EXAM OF KNEE 1 OR 2: CPT

## 2021-12-30 PROCEDURE — 72100 X-RAY EXAM L-S SPINE 2/3 VWS: CPT

## 2021-12-30 PROCEDURE — 73502 X-RAY EXAM HIP UNI 2-3 VIEWS: CPT

## 2021-12-30 PROCEDURE — 73030 X-RAY EXAM OF SHOULDER: CPT

## 2021-12-30 PROCEDURE — 72040 X-RAY EXAM NECK SPINE 2-3 VW: CPT

## 2022-04-25 ENCOUNTER — TELEPHONE (OUTPATIENT)
Dept: CARDIAC SURGERY | Facility: CLINIC | Age: 52
End: 2022-04-25

## 2022-04-25 NOTE — TELEPHONE ENCOUNTER
Attempted to call pt to sched new pt appt with Dr Minor.  No answer and no voice mail option to leave message/[email or web address]

## 2022-04-25 NOTE — TELEPHONE ENCOUNTER
Pt called back and appt sched for 5-2-22 w/ Dr Minor.  Offered appt on 4-28-22 but pt could not make appt that date due to a conflict./lenny

## 2022-05-02 ENCOUNTER — OFFICE VISIT (OUTPATIENT)
Dept: CARDIAC SURGERY | Facility: CLINIC | Age: 52
End: 2022-05-02

## 2022-05-02 ENCOUNTER — TELEPHONE (OUTPATIENT)
Dept: CARDIAC SURGERY | Facility: CLINIC | Age: 52
End: 2022-05-02

## 2022-05-02 ENCOUNTER — PREP FOR SURGERY (OUTPATIENT)
Dept: OTHER | Facility: HOSPITAL | Age: 52
End: 2022-05-02

## 2022-05-02 ENCOUNTER — LAB (OUTPATIENT)
Dept: LAB | Facility: HOSPITAL | Age: 52
End: 2022-05-02

## 2022-05-02 VITALS
SYSTOLIC BLOOD PRESSURE: 120 MMHG | HEART RATE: 80 BPM | BODY MASS INDEX: 34.77 KG/M2 | HEIGHT: 71 IN | DIASTOLIC BLOOD PRESSURE: 81 MMHG | WEIGHT: 248.4 LBS | OXYGEN SATURATION: 95 %

## 2022-05-02 DIAGNOSIS — I25.10 CORONARY ARTERY DISEASE INVOLVING NATIVE CORONARY ARTERY OF NATIVE HEART, UNSPECIFIED WHETHER ANGINA PRESENT: ICD-10-CM

## 2022-05-02 DIAGNOSIS — R06.02 SOB (SHORTNESS OF BREATH): ICD-10-CM

## 2022-05-02 DIAGNOSIS — E16.2 HYPOGLYCEMIA, UNSPECIFIED: ICD-10-CM

## 2022-05-02 DIAGNOSIS — I25.10 CORONARY ARTERY DISEASE INVOLVING NATIVE CORONARY ARTERY OF NATIVE HEART, UNSPECIFIED WHETHER ANGINA PRESENT: Primary | ICD-10-CM

## 2022-05-02 DIAGNOSIS — I79.8 OTHER DISORDERS OF ARTERIES, ARTERIOLES AND CAPILLARIES IN DISEASES CLASSIFIED ELSEWHERE: ICD-10-CM

## 2022-05-02 LAB
ALBUMIN SERPL-MCNC: 4.1 G/DL (ref 3.5–5.2)
ALBUMIN/GLOB SERPL: 1.6 G/DL
ALP SERPL-CCNC: 116 U/L (ref 39–117)
ALT SERPL W P-5'-P-CCNC: 81 U/L (ref 1–41)
ANION GAP SERPL CALCULATED.3IONS-SCNC: 11 MMOL/L (ref 5–15)
AST SERPL-CCNC: 49 U/L (ref 1–40)
BASOPHILS # BLD AUTO: 0.1 10*3/MM3 (ref 0–0.2)
BASOPHILS NFR BLD AUTO: 1.4 % (ref 0–1.5)
BILIRUB SERPL-MCNC: 0.2 MG/DL (ref 0–1.2)
BUN SERPL-MCNC: 9 MG/DL (ref 6–20)
BUN/CREAT SERPL: 10.2 (ref 7–25)
CALCIUM SPEC-SCNC: 9.6 MG/DL (ref 8.6–10.5)
CHLORIDE SERPL-SCNC: 100 MMOL/L (ref 98–107)
CO2 SERPL-SCNC: 27 MMOL/L (ref 22–29)
CREAT SERPL-MCNC: 0.88 MG/DL (ref 0.76–1.27)
DEPRECATED RDW RBC AUTO: 48.8 FL (ref 37–54)
EGFRCR SERPLBLD CKD-EPI 2021: 104.1 ML/MIN/1.73
EOSINOPHIL # BLD AUTO: 0.26 10*3/MM3 (ref 0–0.4)
EOSINOPHIL NFR BLD AUTO: 3.8 % (ref 0.3–6.2)
ERYTHROCYTE [DISTWIDTH] IN BLOOD BY AUTOMATED COUNT: 14.8 % (ref 12.3–15.4)
GLOBULIN UR ELPH-MCNC: 2.6 GM/DL
GLUCOSE SERPL-MCNC: 86 MG/DL (ref 65–99)
HBA1C MFR BLD: 5.8 % (ref 4.8–5.6)
HCT VFR BLD AUTO: 42.1 % (ref 37.5–51)
HGB BLD-MCNC: 13.6 G/DL (ref 13–17.7)
IMM GRANULOCYTES # BLD AUTO: 0.03 10*3/MM3 (ref 0–0.05)
IMM GRANULOCYTES NFR BLD AUTO: 0.4 % (ref 0–0.5)
LYMPHOCYTES # BLD AUTO: 2.07 10*3/MM3 (ref 0.7–3.1)
LYMPHOCYTES NFR BLD AUTO: 30 % (ref 19.6–45.3)
MCH RBC QN AUTO: 29.1 PG (ref 26.6–33)
MCHC RBC AUTO-ENTMCNC: 32.3 G/DL (ref 31.5–35.7)
MCV RBC AUTO: 90.1 FL (ref 79–97)
MONOCYTES # BLD AUTO: 0.81 10*3/MM3 (ref 0.1–0.9)
MONOCYTES NFR BLD AUTO: 11.7 % (ref 5–12)
NEUTROPHILS NFR BLD AUTO: 3.63 10*3/MM3 (ref 1.7–7)
NEUTROPHILS NFR BLD AUTO: 52.7 % (ref 42.7–76)
NRBC BLD AUTO-RTO: 0 /100 WBC (ref 0–0.2)
PLATELET # BLD AUTO: 303 10*3/MM3 (ref 140–450)
PMV BLD AUTO: 9.3 FL (ref 6–12)
POTASSIUM SERPL-SCNC: 4.5 MMOL/L (ref 3.5–5.2)
PROT SERPL-MCNC: 6.7 G/DL (ref 6–8.5)
RBC # BLD AUTO: 4.67 10*6/MM3 (ref 4.14–5.8)
SODIUM SERPL-SCNC: 138 MMOL/L (ref 136–145)
WBC NRBC COR # BLD: 6.9 10*3/MM3 (ref 3.4–10.8)

## 2022-05-02 PROCEDURE — 36415 COLL VENOUS BLD VENIPUNCTURE: CPT

## 2022-05-02 PROCEDURE — 99204 OFFICE O/P NEW MOD 45 MIN: CPT | Performed by: SURGERY

## 2022-05-02 PROCEDURE — 83036 HEMOGLOBIN GLYCOSYLATED A1C: CPT

## 2022-05-02 PROCEDURE — 80053 COMPREHEN METABOLIC PANEL: CPT

## 2022-05-02 PROCEDURE — 85025 COMPLETE CBC W/AUTO DIFF WBC: CPT

## 2022-05-02 RX ORDER — SODIUM CHLORIDE 0.9 % (FLUSH) 0.9 %
30 SYRINGE (ML) INJECTION ONCE AS NEEDED
Status: CANCELLED | OUTPATIENT
Start: 2022-05-02

## 2022-05-02 RX ORDER — BUPRENORPHINE HYDROCHLORIDE AND NALOXONE HYDROCHLORIDE DIHYDRATE 8; 2 MG/1; MG/1
1 TABLET SUBLINGUAL 3 TIMES DAILY
COMMUNITY
Start: 2022-04-22

## 2022-05-02 RX ORDER — AMLODIPINE BESYLATE 10 MG/1
10 TABLET ORAL DAILY
COMMUNITY
Start: 2022-04-13 | End: 2022-05-21 | Stop reason: HOSPADM

## 2022-05-02 RX ORDER — SODIUM CHLORIDE 0.9 % (FLUSH) 0.9 %
10 SYRINGE (ML) INJECTION AS NEEDED
Status: CANCELLED | OUTPATIENT
Start: 2022-05-02

## 2022-05-02 RX ORDER — ISOSORBIDE MONONITRATE 30 MG/1
30 TABLET, EXTENDED RELEASE ORAL DAILY
COMMUNITY
End: 2022-05-21 | Stop reason: HOSPADM

## 2022-05-02 RX ORDER — SODIUM CHLORIDE 0.9 % (FLUSH) 0.9 %
10 SYRINGE (ML) INJECTION EVERY 12 HOURS SCHEDULED
Status: CANCELLED | OUTPATIENT
Start: 2022-05-02

## 2022-05-02 RX ORDER — NICOTINE POLACRILEX 4 MG
15 LOZENGE BUCCAL
Status: CANCELLED | OUTPATIENT
Start: 2022-05-02

## 2022-05-02 RX ORDER — SODIUM CHLORIDE 9 MG/ML
30 INJECTION, SOLUTION INTRAVENOUS CONTINUOUS PRN
Status: CANCELLED | OUTPATIENT
Start: 2022-05-02

## 2022-05-02 RX ORDER — NIFEDIPINE 60 MG/1
TABLET, EXTENDED RELEASE ORAL
COMMUNITY
End: 2022-05-04

## 2022-05-02 RX ORDER — METOPROLOL SUCCINATE 25 MG/1
25 TABLET, EXTENDED RELEASE ORAL DAILY
Status: ON HOLD | COMMUNITY
End: 2022-05-21 | Stop reason: SDUPTHER

## 2022-05-02 RX ORDER — DEXTROSE MONOHYDRATE 25 G/50ML
10-50 INJECTION, SOLUTION INTRAVENOUS
Status: CANCELLED | OUTPATIENT
Start: 2022-05-02

## 2022-05-02 RX ORDER — ASPIRIN 81 MG/1
TABLET ORAL
COMMUNITY
End: 2022-05-02 | Stop reason: DRUGHIGH

## 2022-05-02 RX ORDER — LEVETIRACETAM 500 MG/1
500 TABLET ORAL 2 TIMES DAILY
COMMUNITY

## 2022-05-02 NOTE — PROGRESS NOTES
Cardiac Surgery Consultation    Referring Physician: Dr. Kash Tinajero    Primary Care Physician: Dr. Niles Connors    Chief Complaint   Patient presents with   • Coronary Artery Disease     New pt from [facility]         Subjective     Mr. Molina is a 51-year-old male with history of PCI to his LAD in 2007.  He presents with intermittent chest pains that last a few minutes that have been occurring every day and has been getting more frequent.  They occur with rest and with activity.  He also occasionally will get short of breath and diaphoretic with these episodes.  He does not think he has a history of diabetes.  No history of stroke or TIA.  He is currently smoking 1 pack/day and has done that for over 30 years.  He does not drink.  No history of cancer personally.  No history of heart lung or chest surgery.  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  With the symptoms he eventually was referred to Dr. Tinajero after being seen in the ER with the chest pain.  A Delfina scan showed inferior apical ischemia and he therefore underwent coronary angiography showing severe in-stent stenosis and three-vessel coronary disease.  With this he is referred to me for consideration of coronary bypass grafting.        Review of Systems   Constitutional: Positive for diaphoresis and fatigue. Negative for activity change and unexpected weight change.   Respiratory: Positive for chest tightness and shortness of breath.    Cardiovascular: Positive for chest pain and leg swelling. Negative for palpitations.        A complete review of systems was performed, is negative except stated above.    Past Medical History:   Diagnosis Date   • CAD (coronary artery disease)    • HLD (hyperlipidemia)    • HTN (hypertension)    • SOB  (shortness of breath)      No past surgical history on file.  Family History   Problem Relation Age of Onset   • Coronary artery disease Mother      Social History     Tobacco Use   • Smoking status: Current Every Day Smoker     Packs/day: 1.00     Types: Cigarettes     Start date: 1/1/1990   • Smokeless tobacco: Never Used   Substance Use Topics   • Alcohol use: Not Currently   • Drug use: Never     Current Outpatient Medications   Medication Sig Dispense Refill   • albuterol sulfate  (90 Base) MCG/ACT inhaler Inhale 2 puffs Every 4 (Four) Hours As Needed for wheezing.     • amLODIPine (NORVASC) 10 MG tablet      • aspirin 325 MG tablet Take 325 mg by mouth Daily.     • atorvastatin (LIPITOR) 40 MG tablet Take 40 mg by mouth Daily.     • buprenorphine-naloxone (SUBOXONE) 8-2 MG per SL tablet      • clopidogrel (PLAVIX) 75 MG tablet Take 75 mg by mouth Daily.     • diazePAM (VALIUM) 10 MG tablet Take 10 mg by mouth 3 (Three) Times a Day.     • isosorbide mononitrate (IMDUR) 30 MG 24 hr tablet isosorbide mononitrate ER 30 mg tablet,extended release 24 hr   Take 1 tablet every day by oral route for 30 days.     • levETIRAcetam (KEPPRA) 500 MG tablet levetiracetam 500 mg tablet   Take 1 tablet twice a day by oral route.     • lisinopril (PRINIVIL,ZESTRIL) 40 MG tablet Take 40 mg by mouth Daily.     • metoprolol succinate XL (TOPROL-XL) 25 MG 24 hr tablet metoprolol succinate ER 25 mg tablet,extended release 24 hr   Take 3 tablets every day by oral route for 30 days.     • nebivolol (BYSTOLIC) 20 MG tablet Take 1 tablet by mouth daily. 30 tablet 11   • NIFEdipine XL (PROCARDIA XL) 60 MG 24 hr tablet nifedipine ER 60 mg tablet,extended release 24 hr     • OLANZapine-FLUoxetine (SYMBYAX) 6-25 MG per capsule Take 1 capsule by mouth Every Evening.     • pantoprazole (PROTONIX) 40 MG EC tablet Take 40 mg by mouth Daily.     • simvastatin (ZOCOR) 40 MG tablet Take 1 tablet by mouth Every Night. 90 tablet 3   • aspirin  "81 MG EC tablet aspirin 81 mg tablet,delayed release   Take 1 tablet every day by oral route.     • HYDROcodone-acetaminophen (NORCO)  MG per tablet Take 1 tablet by mouth Every 6 (Six) Hours As Needed for moderate pain (4-6).       No current facility-administered medications for this visit.     Allergies:  Patient has no known allergies.    Objective      Vital Signs  Visit Vitals  /81 (BP Location: Right arm, Patient Position: Sitting, Cuff Size: Adult)   Pulse 80   Ht 180.3 cm (71\")   Wt 113 kg (248 lb 6.4 oz)   SpO2 95%   BMI 34.64 kg/m²         Physical Exam  Constitutional:       General: He is not in acute distress.     Appearance: He is well-developed. He is obese. He is not diaphoretic.      Comments: Somewhat unkempt   HENT:      Head: Normocephalic and atraumatic.      Right Ear: External ear normal.      Left Ear: External ear normal.   Eyes:      General:         Right eye: No discharge.         Left eye: No discharge.      Pupils: Pupils are equal, round, and reactive to light.   Neck:      Vascular: No JVD.      Trachea: No tracheal deviation.   Cardiovascular:      Rate and Rhythm: Normal rate and regular rhythm.      Heart sounds: Normal heart sounds. No murmur heard.  Pulmonary:      Effort: Pulmonary effort is normal. No respiratory distress.      Breath sounds: Normal breath sounds. No stridor. No wheezing.   Abdominal:      General: There is no distension.      Palpations: Abdomen is soft.      Tenderness: There is no abdominal tenderness. There is no guarding.   Musculoskeletal:         General: No tenderness or deformity. Normal range of motion.      Cervical back: Normal range of motion and neck supple.   Skin:     General: Skin is warm and dry.      Capillary Refill: Capillary refill takes less than 2 seconds.      Coloration: Skin is not pale.      Findings: No erythema or rash.   Neurological:      Mental Status: He is alert and oriented to person, place, and time.      Motor: " No abnormal muscle tone.      Coordination: Coordination normal.   Psychiatric:         Behavior: Behavior normal.         Thought Content: Thought content normal.         Judgment: Judgment normal.         Results Review:     WBC   Date Value Ref Range Status   04/11/2021 5.0 4.8 - 10.8 K/uL Final     RBC   Date Value Ref Range Status   04/11/2021 3.55 (L) 4.70 - 6.10 M/uL Final     Hemoglobin   Date Value Ref Range Status   04/11/2021 10.7 (L) 14.0 - 18.0 g/dL Final     Hematocrit   Date Value Ref Range Status   04/11/2021 32.9 (L) 42.0 - 52.0 % Final     MCV   Date Value Ref Range Status   04/11/2021 92.7 80.0 - 94.0 fL Final     MCH   Date Value Ref Range Status   04/11/2021 30.1 27.0 - 31.0 pg Final     MCHC   Date Value Ref Range Status   04/11/2021 32.5 (L) 33.0 - 37.0 g/dL Final     RDW   Date Value Ref Range Status   04/11/2021 14.4 11.5 - 14.5 % Final     RDW-SD   Date Value Ref Range Status   08/24/2016 41.7 40.0 - 54.0 fL Final     MPV   Date Value Ref Range Status   04/11/2021 9.0 (L) 9.4 - 12.4 fL Final     Platelets   Date Value Ref Range Status   04/11/2021 225 130 - 400 K/uL Final     Neutrophil Rel %   Date Value Ref Range Status   04/11/2021 60.5 50.0 - 65.0 % Final     Lymphocyte Rel %   Date Value Ref Range Status   04/11/2021 25.8 20.0 - 40.0 % Final     Monocyte Rel %   Date Value Ref Range Status   04/11/2021 9.1 0.0 - 10.0 % Final     Eosinophil Rel %   Date Value Ref Range Status   04/11/2021 3.8 0.0 - 5.0 % Final     Basophil Rel %   Date Value Ref Range Status   04/11/2021 0.6 0.0 - 1.0 % Final     Neutrophils Absolute   Date Value Ref Range Status   04/11/2021 3.0 1.5 - 7.5 K/uL Final     Lymphocytes Absolute   Date Value Ref Range Status   04/11/2021 1.3 1.1 - 4.5 K/uL Final     Monocytes Absolute   Date Value Ref Range Status   04/11/2021 0.50 0.00 - 0.90 K/uL Final     Eosinophils Absolute   Date Value Ref Range Status   04/11/2021 0.20 0.00 - 0.60 K/uL Final     Basophils Absolute    Date Value Ref Range Status   04/11/2021 0.00 0.00 - 0.20 K/uL Final     Immature Grans, Absolute   Date Value Ref Range Status   04/11/2021 0.0 K/uL Final     Glucose   Date Value Ref Range Status   04/11/2021 114 (H) 74 - 109 mg/dL Final     Sodium   Date Value Ref Range Status   04/11/2021 142 136 - 145 mmol/L Final     Potassium   Date Value Ref Range Status   04/11/2021 4.2 3.5 - 5.0 mmol/L Final     CO2   Date Value Ref Range Status   04/11/2021 22 22 - 29 mmol/L Final     Chloride   Date Value Ref Range Status   04/11/2021 108 98 - 111 mmol/L Final     Anion Gap   Date Value Ref Range Status   04/11/2021 12 7 - 19 mmol/L Final     Creatinine   Date Value Ref Range Status   04/11/2021 0.9 0.5 - 1.2 mg/dL Final     BUN   Date Value Ref Range Status   04/11/2021 7 6 - 20 mg/dL Final     Calcium   Date Value Ref Range Status   04/11/2021 8.9 8.6 - 10.0 mg/dL Final     eGFR Non  Am   Date Value Ref Range Status   04/11/2021 >60 >60 Final     Comment:     This calculation may be inaccurate for patients under the age of 18 years.  For ages 18 and older, a GFR >60 mL/min/1.73m2 (not corrected for weight) is  valid for stable renal function.     Alkaline Phosphatase   Date Value Ref Range Status   04/11/2021 109 40 - 130 U/L Final     Total Protein   Date Value Ref Range Status   04/11/2021 6.0 (L) 6.6 - 8.7 g/dL Final     ALT (SGPT)   Date Value Ref Range Status   04/11/2021 26 5 - 41 U/L Final     AST (SGOT)   Date Value Ref Range Status   04/11/2021 30 5 - 40 U/L Final     Total Bilirubin   Date Value Ref Range Status   04/11/2021 0.3 0.2 - 1.2 mg/dL Final     Albumin   Date Value Ref Range Status   04/11/2021 4 3.5 - 5.2 g/dL Final        I reviewed the patient's clinical results and discussed with patient.      ECHO:      Cath:      I personally reviewed images of following exams, the following is my interpretation:    ECHO: Normal LV function no significant valvular disease  CATH: Severe stenosis in  proximal LAD and a stent that was previously placed this is long segment likely contributing to significant ischemia, first diagonal/ramus branch is large and supplies a large portion of myocardium and has a severe proximal stenosis this is lateral enough for reach KATIA through transverse sinus I believe, system is left dominant and the right dominant RCA does have a severe proximal stenosis        Assessment/Plan     Mr. Molina is a 51-year-old male who presents with unstable angina and severe in-stent stenosis of previously placed LAD stent, severe stenosis of a large ramus branch supplying large portion of myocardium and a severe stenosis of a nondominant RCA.  He has normal LV function no significant valvular disease.  His symptoms are occurring every day.  He has class I-II NYHA heart failure symptoms with this as well.  I discussed with him the natural history of disease such as his and treatment options including repeat PCI versus coronary bypass grafting.  We agreed that in his case at his young age that coronary bypass grafting would be the best treatment option.  He continues to smoke a pack a day.  I discussed with him at length today smoking cessation options for helping to quit and how this would increase his perioperative risk as well as long-term patency of grafts.  He understands this and plans to try to quit.  He is planning on reaching out to his PCP office for discussions regarding Chantix or Wellbutrin as well as nicotine replacement.    We discussed the operative conduct.  We discussed postoperative expectations especially in the setting of his chronic long-term pain.  He understands and agrees to proceed.  We discussed the risk of surgery including but not limited to bleeding, infection, injury to major organs or vessels, chronic heart failure, arrhythmias, need for pacemaker, prolonged ventilation, renal failure, stroke, risk of anesthesia, risk of sternal wound or bone healing problems,  reoperation, and/or death.  I did calculate his patient specific STS risk or and highlighted this below.    STS Risk Scores:      We will complete Mr. Molina work-up with carotid duplex vein mapping CT scan PFTs.  We will plan on his surgery on May 10.  Thank you for trusting me with the care of Mr. Molina.  Please do not hesitate to call with any questions or concerns.    Ming Minor M.D.  Cardiothoracic Surgeon

## 2022-05-02 NOTE — TELEPHONE ENCOUNTER
Surgery orders are in for 5/10/2022.  Please call patient with prework information.  Last Plavix to be taken on 5/4/2022.  Please ask patient what pharmacy he uses so that I can send Bactroban.

## 2022-05-03 DIAGNOSIS — Z01.812 ENCOUNTER FOR PREPROCEDURE SCREENING LABORATORY TESTING FOR COVID-19: ICD-10-CM

## 2022-05-03 DIAGNOSIS — Z20.822 ENCOUNTER FOR PREPROCEDURE SCREENING LABORATORY TESTING FOR COVID-19: ICD-10-CM

## 2022-05-03 DIAGNOSIS — I25.10 CORONARY ARTERY DISEASE INVOLVING NATIVE CORONARY ARTERY OF NATIVE HEART, UNSPECIFIED WHETHER ANGINA PRESENT: Primary | ICD-10-CM

## 2022-05-04 ENCOUNTER — HOSPITAL ENCOUNTER (OUTPATIENT)
Dept: CT IMAGING | Facility: HOSPITAL | Age: 52
Discharge: HOME | End: 2022-05-04

## 2022-05-04 ENCOUNTER — HOSPITAL ENCOUNTER (OUTPATIENT)
Dept: GENERAL RADIOLOGY | Facility: HOSPITAL | Age: 52
Discharge: HOME | End: 2022-05-04

## 2022-05-04 ENCOUNTER — HOSPITAL ENCOUNTER (OUTPATIENT)
Dept: PULMONOLOGY | Facility: HOSPITAL | Age: 52
Discharge: HOME | End: 2022-05-04

## 2022-05-04 ENCOUNTER — LAB (OUTPATIENT)
Dept: LAB | Facility: HOSPITAL | Age: 52
End: 2022-05-04

## 2022-05-04 ENCOUNTER — ANESTHESIA EVENT (OUTPATIENT)
Dept: PERIOP | Facility: HOSPITAL | Age: 52
End: 2022-05-04

## 2022-05-04 ENCOUNTER — PRE-ADMISSION TESTING (OUTPATIENT)
Dept: PREADMISSION TESTING | Facility: HOSPITAL | Age: 52
End: 2022-05-04

## 2022-05-04 VITALS
RESPIRATION RATE: 18 BRPM | SYSTOLIC BLOOD PRESSURE: 113 MMHG | HEART RATE: 80 BPM | OXYGEN SATURATION: 96 % | DIASTOLIC BLOOD PRESSURE: 71 MMHG

## 2022-05-04 DIAGNOSIS — I25.10 CORONARY ARTERY DISEASE INVOLVING NATIVE CORONARY ARTERY OF NATIVE HEART, UNSPECIFIED WHETHER ANGINA PRESENT: ICD-10-CM

## 2022-05-04 DIAGNOSIS — Z20.822 ENCOUNTER FOR PREPROCEDURE SCREENING LABORATORY TESTING FOR COVID-19: ICD-10-CM

## 2022-05-04 DIAGNOSIS — R06.02 SOB (SHORTNESS OF BREATH): ICD-10-CM

## 2022-05-04 DIAGNOSIS — Z01.812 ENCOUNTER FOR PREPROCEDURE SCREENING LABORATORY TESTING FOR COVID-19: ICD-10-CM

## 2022-05-04 LAB
ABO GROUP BLD: NORMAL
ALBUMIN SERPL-MCNC: 4.3 G/DL (ref 3.5–5.2)
ALBUMIN/GLOB SERPL: 1.7 G/DL
ALP SERPL-CCNC: 136 U/L (ref 39–117)
ALT SERPL W P-5'-P-CCNC: 74 U/L (ref 1–41)
ANION GAP SERPL CALCULATED.3IONS-SCNC: 9 MMOL/L (ref 5–15)
APTT PPP: 29.6 SECONDS (ref 24.1–35)
ARTERIAL PATENCY WRIST A: POSITIVE
AST SERPL-CCNC: 45 U/L (ref 1–40)
ATMOSPHERIC PRESS: 754 MMHG
BASE EXCESS BLDA CALC-SCNC: 1.5 MMOL/L (ref 0–2)
BASOPHILS # BLD AUTO: 0.11 10*3/MM3 (ref 0–0.2)
BASOPHILS NFR BLD AUTO: 1.6 % (ref 0–1.5)
BDY SITE: ABNORMAL
BILIRUB SERPL-MCNC: 0.2 MG/DL (ref 0–1.2)
BLD GP AB SCN SERPL QL: NEGATIVE
BODY TEMPERATURE: 37 C
BUN SERPL-MCNC: 9 MG/DL (ref 6–20)
BUN/CREAT SERPL: 10.7 (ref 7–25)
CALCIUM SPEC-SCNC: 9.8 MG/DL (ref 8.6–10.5)
CHLORIDE SERPL-SCNC: 100 MMOL/L (ref 98–107)
CO2 SERPL-SCNC: 30 MMOL/L (ref 22–29)
CREAT SERPL-MCNC: 0.84 MG/DL (ref 0.76–1.27)
DEPRECATED RDW RBC AUTO: 50.4 FL (ref 37–54)
EGFRCR SERPLBLD CKD-EPI 2021: 105.6 ML/MIN/1.73
EOSINOPHIL # BLD AUTO: 0.37 10*3/MM3 (ref 0–0.4)
EOSINOPHIL NFR BLD AUTO: 5.3 % (ref 0.3–6.2)
ERYTHROCYTE [DISTWIDTH] IN BLOOD BY AUTOMATED COUNT: 14.9 % (ref 12.3–15.4)
GLOBULIN UR ELPH-MCNC: 2.5 GM/DL
GLUCOSE SERPL-MCNC: 141 MG/DL (ref 65–99)
HCO3 BLDA-SCNC: 27.5 MMOL/L (ref 20–26)
HCT VFR BLD AUTO: 44.2 % (ref 37.5–51)
HGB BLD-MCNC: 14 G/DL (ref 13–17.7)
IMM GRANULOCYTES # BLD AUTO: 0.02 10*3/MM3 (ref 0–0.05)
IMM GRANULOCYTES NFR BLD AUTO: 0.3 % (ref 0–0.5)
INR PPP: 0.96 (ref 0.91–1.09)
LYMPHOCYTES # BLD AUTO: 1.96 10*3/MM3 (ref 0.7–3.1)
LYMPHOCYTES NFR BLD AUTO: 28 % (ref 19.6–45.3)
Lab: ABNORMAL
MCH RBC QN AUTO: 29 PG (ref 26.6–33)
MCHC RBC AUTO-ENTMCNC: 31.7 G/DL (ref 31.5–35.7)
MCV RBC AUTO: 91.5 FL (ref 79–97)
MODALITY: ABNORMAL
MONOCYTES # BLD AUTO: 0.49 10*3/MM3 (ref 0.1–0.9)
MONOCYTES NFR BLD AUTO: 7 % (ref 5–12)
NEUTROPHILS NFR BLD AUTO: 4.04 10*3/MM3 (ref 1.7–7)
NEUTROPHILS NFR BLD AUTO: 57.8 % (ref 42.7–76)
NRBC BLD AUTO-RTO: 0 /100 WBC (ref 0–0.2)
PCO2 BLDA: 47.9 MM HG (ref 35–45)
PCO2 TEMP ADJ BLD: 47.9 MM HG (ref 35–45)
PH BLDA: 7.37 PH UNITS (ref 7.35–7.45)
PH, TEMP CORRECTED: 7.37 PH UNITS (ref 7.35–7.45)
PLATELET # BLD AUTO: 306 10*3/MM3 (ref 140–450)
PMV BLD AUTO: 9.6 FL (ref 6–12)
PO2 BLDA: 55 MM HG (ref 83–108)
PO2 TEMP ADJ BLD: 55 MM HG (ref 83–108)
POTASSIUM SERPL-SCNC: 4.4 MMOL/L (ref 3.5–5.2)
PROT SERPL-MCNC: 6.8 G/DL (ref 6–8.5)
PROTHROMBIN TIME: 12.4 SECONDS (ref 11.9–14.6)
RBC # BLD AUTO: 4.83 10*6/MM3 (ref 4.14–5.8)
RH BLD: POSITIVE
SAO2 % BLDCOA: 91 % (ref 94–99)
SARS-COV-2 RNA PNL SPEC NAA+PROBE: NOT DETECTED
SODIUM SERPL-SCNC: 139 MMOL/L (ref 136–145)
T&S EXPIRATION DATE: NORMAL
VENTILATOR MODE: ABNORMAL
WBC NRBC COR # BLD: 6.99 10*3/MM3 (ref 3.4–10.8)

## 2022-05-04 PROCEDURE — 0 IOPAMIDOL PER 1 ML: Performed by: SURGERY

## 2022-05-04 PROCEDURE — 93005 ELECTROCARDIOGRAM TRACING: CPT

## 2022-05-04 PROCEDURE — 86901 BLOOD TYPING SEROLOGIC RH(D): CPT

## 2022-05-04 PROCEDURE — 87635 SARS-COV-2 COVID-19 AMP PRB: CPT

## 2022-05-04 PROCEDURE — 85730 THROMBOPLASTIN TIME PARTIAL: CPT

## 2022-05-04 PROCEDURE — 71046 X-RAY EXAM CHEST 2 VIEWS: CPT

## 2022-05-04 PROCEDURE — 86900 BLOOD TYPING SEROLOGIC ABO: CPT

## 2022-05-04 PROCEDURE — 93010 ELECTROCARDIOGRAM REPORT: CPT | Performed by: INTERNAL MEDICINE

## 2022-05-04 PROCEDURE — 36600 WITHDRAWAL OF ARTERIAL BLOOD: CPT

## 2022-05-04 PROCEDURE — 71275 CT ANGIOGRAPHY CHEST: CPT

## 2022-05-04 PROCEDURE — 82803 BLOOD GASES ANY COMBINATION: CPT

## 2022-05-04 PROCEDURE — 85025 COMPLETE CBC W/AUTO DIFF WBC: CPT

## 2022-05-04 PROCEDURE — 80053 COMPREHEN METABOLIC PANEL: CPT

## 2022-05-04 PROCEDURE — 94010 BREATHING CAPACITY TEST: CPT | Performed by: INTERNAL MEDICINE

## 2022-05-04 PROCEDURE — 86850 RBC ANTIBODY SCREEN: CPT

## 2022-05-04 PROCEDURE — C9803 HOPD COVID-19 SPEC COLLECT: HCPCS

## 2022-05-04 PROCEDURE — 85610 PROTHROMBIN TIME: CPT

## 2022-05-04 PROCEDURE — 94010 BREATHING CAPACITY TEST: CPT

## 2022-05-04 PROCEDURE — 36415 COLL VENOUS BLD VENIPUNCTURE: CPT

## 2022-05-04 RX ORDER — ASPIRIN 81 MG/1
81 TABLET ORAL DAILY
COMMUNITY

## 2022-05-04 RX ADMIN — IOPAMIDOL 100 ML: 755 INJECTION, SOLUTION INTRAVENOUS at 11:09

## 2022-05-04 RX ADMIN — IOPAMIDOL 100 ML: 755 INJECTION, SOLUTION INTRAVENOUS at 10:39

## 2022-05-04 NOTE — ANESTHESIA PREPROCEDURE EVALUATION
Anesthesia Evaluation     no history of anesthetic complications:               Airway   Mallampati: II  Dental    (+) edentulous    Pulmonary    (+) a smoker, COPD,   (-) sleep apnea, no home oxygen  Cardiovascular     (+) hypertension, CAD, cardiac stents (2007) Drug eluting stent hyperlipidemia,   (-) pacemaker      Neuro/Psych  (+) seizures well controlled,    GI/Hepatic/Renal/Endo    (+) obesity,       Musculoskeletal     Abdominal    Substance History       Comment: H/o narcotic abuse, on suboxone, took yesterday  Panic attacks on valium  Last seizure 6 months ago, took keppra today.   OB/GYN          Other                      Anesthesia Plan    ASA 4     general, Ileana, CVL and PAC   (No CI to DANGELO)  intravenous induction     Anesthetic plan, all risks, benefits, and alternatives have been provided, discussed and informed consent has been obtained with: patient.  Use of blood products discussed with patient  Consented to blood products.       CODE STATUS:

## 2022-05-04 NOTE — DISCHARGE INSTRUCTIONS
Before you come to the hospital        Arrival time: AS DIRECTED BY OFFICE     YOU MAY TAKE THE FOLLOWING MEDICATION(S) THE MORNING OF SURGERY WITH A SIP OF WATER: NONE PER DR SANTOS    DO NOT TAKE LISINOPRIL FOR 24 HOURS PRIOR TO SURGERY. DO NOT TAKE SUBOXONE THE DAY BEFORE SURGERY.      USE BACTROBAN OINTMENT FROM PHARMACY ON THE DAY BEFORE SURGERY AT 5PM. PLACE A PEA SIZED AMOUNT ON A Q-TIP AND RUB INSIDE BOTH NOSTRILS.           ALL OTHER HOME MEDICATION CHECK WITH YOUR PHYSICIAN (especially if you are taking diabetes medicines or blood thinners)    Do not take any Erectile Dysfunction medications (EX: CIALIS, VIAGRA) 24 hours prior to surgery      If you were given and instructed to use a germ- killing soap, use as directed the night before surgery and the morning of surgery before coming to the hospital.       Eating and drinking restrictions  (It is extremely important that you follow these guidelines to prevent delay or cancelation of your procedure)   Up to 2 hours prior to the time you are told to arrive to the hospital - you may continue to drink clear liquids, such as water, clear fruit juice (no pulp), black coffee, and plain tea.          Eating and drinking restrictions prior to scheduled arrival time  Clear liquids (water, apple juice-no pulp) - 2 hours   Breast milk - 4 hours   Milk or drinks that contain milk, full liquids-  6 hours   Light meals or foods, such as toast or cereal- 6 hours   Heavy foods, such as meat, fried foods or fatty foods- 8 hours       Clear Liquids  Water and flavored water                                                                       Sugar water.  Ice or frozen ice pops.  Clear Fruit juices, such as cranberry juice and apple juice.  Black coffee.  Plain tea  Clear bouillon or broth.  Broth-based soups that have been strained.  Flavored gelatin.  Soda.  Gatorade or Powerade.  Full liquid examples  Juices that have pulp.  Frozen ice pops that contain fruit  pieces.  Coffee with creamer  Milk.  Cream or cream-based soups.  Yogurt.              MANAGING PAIN AFTER SURGERY    We know you are probably wondering what your pain will be like after surgery.  Following surgery it is unrealistic to expect you will not have pain.   Pain is how our bodies let us know that something is wrong or cautions us to be careful.  That said, our goal is to make your pain tolerable.    Methods we may use to treat your pain include (oral or IV medications, PCAs, epidurals, nerve blocks, etc.)   While some procedures require IV pain medications for a short time after surgery, transitioning to pain medications by mouth allows for better management of pain.   Your nurse will encourage you to take oral pain medications whenever possible.  IV medications work almost immediately, but only last a short while.  Taking medications by mouth allows for a more constant level of medication in your blood stream for a longer period of time.      Once your pain is out of control it is harder to get back under control.  It is important you are aware when your next dose of pain medication is due.  If you are admitted, your nurse may write the time of your next dose on the white board in your room to help you remember.      We are interested in your pain and encourage you to inform us about aggravating factors during your visit.   Many times a simple repositioning every few hours can make a big difference.    If your physician says it is okay, do not let your pain prevent you from getting out of bed. Be sure to call your nurse for assistance prior to getting up so you do not fall.      Before surgery, please decide your tolerable pain goal.  These faces help describe the pain ratings we use on a 0-10 scale.   Be prepared to tell us your goal and whether or not you take pain or anxiety medications at home.

## 2022-05-05 DIAGNOSIS — Z20.822 ENCOUNTER FOR PREOPERATIVE SCREENING LABORATORY TESTING FOR COVID-19 VIRUS: Primary | ICD-10-CM

## 2022-05-05 DIAGNOSIS — Z01.812 ENCOUNTER FOR PREOPERATIVE SCREENING LABORATORY TESTING FOR COVID-19 VIRUS: Primary | ICD-10-CM

## 2022-05-05 LAB
QT INTERVAL: 396 MS
QTC INTERVAL: 436 MS

## 2022-05-06 ENCOUNTER — TELEPHONE (OUTPATIENT)
Dept: CARDIAC SURGERY | Facility: CLINIC | Age: 52
End: 2022-05-06

## 2022-05-06 NOTE — TELEPHONE ENCOUNTER
Per verbal instruction from Danielle PAIZ, I called pt to inform him that his surgery currently on for 5-10-22 would have to be postponed for hosp emergency pts. New date tentatively set for 5-16-22.  Informed pt to restart his Plavix at this time and that Danielle would call him Monday with more instructions and information as we see how everything goes.  Pt voiced understanding to all and will await our call next week./lenny

## 2022-05-09 ENCOUNTER — LAB (OUTPATIENT)
Dept: LAB | Facility: HOSPITAL | Age: 52
End: 2022-05-09

## 2022-05-09 ENCOUNTER — HOSPITAL ENCOUNTER (OUTPATIENT)
Dept: ULTRASOUND IMAGING | Facility: HOSPITAL | Age: 52
Discharge: HOME | End: 2022-05-09

## 2022-05-09 DIAGNOSIS — I25.10 CORONARY ARTERY DISEASE INVOLVING NATIVE CORONARY ARTERY OF NATIVE HEART, UNSPECIFIED WHETHER ANGINA PRESENT: ICD-10-CM

## 2022-05-09 DIAGNOSIS — Z20.822 ENCOUNTER FOR PREOPERATIVE SCREENING LABORATORY TESTING FOR COVID-19 VIRUS: ICD-10-CM

## 2022-05-09 DIAGNOSIS — I79.8 OTHER DISORDERS OF ARTERIES, ARTERIOLES AND CAPILLARIES IN DISEASES CLASSIFIED ELSEWHERE: ICD-10-CM

## 2022-05-09 DIAGNOSIS — Z01.812 ENCOUNTER FOR PREOPERATIVE SCREENING LABORATORY TESTING FOR COVID-19 VIRUS: ICD-10-CM

## 2022-05-09 LAB — SARS-COV-2 RNA PNL SPEC NAA+PROBE: NOT DETECTED

## 2022-05-09 PROCEDURE — 93880 EXTRACRANIAL BILAT STUDY: CPT

## 2022-05-09 PROCEDURE — 93880 EXTRACRANIAL BILAT STUDY: CPT | Performed by: SURGERY

## 2022-05-09 PROCEDURE — 93970 EXTREMITY STUDY: CPT

## 2022-05-09 PROCEDURE — C9803 HOPD COVID-19 SPEC COLLECT: HCPCS

## 2022-05-09 PROCEDURE — 93970 EXTREMITY STUDY: CPT | Performed by: SURGERY

## 2022-05-09 PROCEDURE — 87635 SARS-COV-2 COVID-19 AMP PRB: CPT

## 2022-05-09 NOTE — TELEPHONE ENCOUNTER
Patient aware of new OR date 5/16 with arrival time 0500/npo/no meds. Last dose of Plavix on 5/10. Patient voiced understanding to all.

## 2022-05-09 NOTE — TELEPHONE ENCOUNTER
Please call patient and offer Surgery date of 5/16/2022 at 0700.  Last dose of Plavix should be taken on 5/10/2022

## 2022-05-10 ENCOUNTER — ANESTHESIA (OUTPATIENT)
Dept: PERIOP | Facility: HOSPITAL | Age: 52
End: 2022-05-10

## 2022-05-13 ENCOUNTER — LAB (OUTPATIENT)
Dept: LAB | Facility: HOSPITAL | Age: 52
End: 2022-05-13

## 2022-05-13 DIAGNOSIS — I25.10 CORONARY ARTERY DISEASE INVOLVING NATIVE CORONARY ARTERY OF NATIVE HEART, UNSPECIFIED WHETHER ANGINA PRESENT: ICD-10-CM

## 2022-05-13 LAB — SARS-COV-2 ORF1AB RESP QL NAA+PROBE: NOT DETECTED

## 2022-05-13 PROCEDURE — U0004 COV-19 TEST NON-CDC HGH THRU: HCPCS

## 2022-05-13 PROCEDURE — C9803 HOPD COVID-19 SPEC COLLECT: HCPCS

## 2022-05-16 ENCOUNTER — HOSPITAL ENCOUNTER (INPATIENT)
Facility: HOSPITAL | Age: 52
LOS: 5 days | Discharge: HOME | End: 2022-05-21
Attending: SURGERY | Admitting: SURGERY

## 2022-05-16 ENCOUNTER — APPOINTMENT (OUTPATIENT)
Dept: CARDIOLOGY | Facility: HOSPITAL | Age: 52
End: 2022-05-16

## 2022-05-16 ENCOUNTER — HOSPITAL ENCOUNTER (OUTPATIENT)
Dept: CARDIOLOGY | Facility: HOSPITAL | Age: 52
Setting detail: SURGERY ADMIT
Discharge: HOME | End: 2022-05-16

## 2022-05-16 ENCOUNTER — APPOINTMENT (OUTPATIENT)
Dept: GENERAL RADIOLOGY | Facility: HOSPITAL | Age: 52
End: 2022-05-16

## 2022-05-16 DIAGNOSIS — Z74.09 IMPAIRED MOBILITY: ICD-10-CM

## 2022-05-16 DIAGNOSIS — I25.10 CORONARY ARTERY DISEASE INVOLVING NATIVE CORONARY ARTERY OF NATIVE HEART, UNSPECIFIED WHETHER ANGINA PRESENT: ICD-10-CM

## 2022-05-16 LAB
A-A DO2: 168.2 MMHG
A-A DO2: 299.2 MMHG
A-A DO2: 564.4 MMHG
A-A DO2: 571.8 MMHG
ABO GROUP BLD: NORMAL
ALBUMIN SERPL-MCNC: 3.8 G/DL (ref 3.5–5.2)
ALBUMIN SERPL-MCNC: 4.2 G/DL (ref 3.5–5.2)
ANION GAP SERPL CALCULATED.3IONS-SCNC: 13 MMOL/L (ref 5–15)
ANION GAP SERPL CALCULATED.3IONS-SCNC: 8 MMOL/L (ref 5–15)
APTT PPP: 32.9 SECONDS (ref 24.1–35)
ARTERIAL PATENCY WRIST A: ABNORMAL
ATMOSPHERIC PRESS: 748 MMHG
ATMOSPHERIC PRESS: 749 MMHG
ATMOSPHERIC PRESS: 750 MMHG
BASE EXCESS BLDA CALC-SCNC: -1 MMOL/L (ref 0–2)
BASE EXCESS BLDA CALC-SCNC: -1.7 MMOL/L (ref 0–2)
BASE EXCESS BLDA CALC-SCNC: -1.9 MMOL/L (ref 0–2)
BASE EXCESS BLDA CALC-SCNC: -2.4 MMOL/L (ref 0–2)
BASE EXCESS BLDA CALC-SCNC: 0.1 MMOL/L (ref 0–2)
BASE EXCESS BLDA CALC-SCNC: 0.2 MMOL/L (ref 0–2)
BASE EXCESS BLDA CALC-SCNC: 0.9 MMOL/L (ref 0–2)
BASE EXCESS BLDV CALC-SCNC: 2.4 MMOL/L (ref 0–2)
BDY SITE: ABNORMAL
BLD GP AB SCN SERPL QL: NEGATIVE
BODY TEMPERATURE: 37 C
BUN SERPL-MCNC: 7 MG/DL (ref 6–20)
BUN SERPL-MCNC: 8 MG/DL (ref 6–20)
BUN/CREAT SERPL: 9.4 (ref 7–25)
BUN/CREAT SERPL: 9.7 (ref 7–25)
CA-I BLD-MCNC: 4.68 MG/DL (ref 4.6–5.4)
CA-I BLD-MCNC: 4.74 MG/DL (ref 4.6–5.4)
CA-I BLD-MCNC: 4.75 MG/DL (ref 4.6–5.4)
CA-I BLD-MCNC: 4.88 MG/DL (ref 4.6–5.4)
CALCIUM SPEC-SCNC: 8.9 MG/DL (ref 8.6–10.5)
CALCIUM SPEC-SCNC: 9.1 MG/DL (ref 8.6–10.5)
CHLORIDE SERPL-SCNC: 101 MMOL/L (ref 98–107)
CHLORIDE SERPL-SCNC: 102 MMOL/L (ref 98–107)
CO2 SERPL-SCNC: 22 MMOL/L (ref 22–29)
CO2 SERPL-SCNC: 27 MMOL/L (ref 22–29)
COHGB MFR BLD: 4.3 % (ref 0–5)
COHGB MFR BLD: 5.1 % (ref 0–5)
COHGB MFR BLD: 5.8 % (ref 0–5)
COHGB MFR BLD: 5.9 % (ref 0–5)
COHGB MFR BLD: 6.6 % (ref 0–5)
CREAT SERPL-MCNC: 0.72 MG/DL (ref 0.76–1.27)
CREAT SERPL-MCNC: 0.85 MG/DL (ref 0.76–1.27)
D-LACTATE SERPL-SCNC: 1.4 MMOL/L (ref 0.5–2)
DEPRECATED RDW RBC AUTO: 46.5 FL (ref 37–54)
DEPRECATED RDW RBC AUTO: 47.8 FL (ref 37–54)
EGFRCR SERPLBLD CKD-EPI 2021: 105.2 ML/MIN/1.73
EGFRCR SERPLBLD CKD-EPI 2021: 110.6 ML/MIN/1.73
ERYTHROCYTE [DISTWIDTH] IN BLOOD BY AUTOMATED COUNT: 14.5 % (ref 12.3–15.4)
ERYTHROCYTE [DISTWIDTH] IN BLOOD BY AUTOMATED COUNT: 14.6 % (ref 12.3–15.4)
GAS FLOW AIRWAY: 4 LPM
GLUCOSE BLDC GLUCOMTR-MCNC: 146 MG/DL (ref 70–130)
GLUCOSE BLDC GLUCOMTR-MCNC: 162 MG/DL (ref 70–130)
GLUCOSE BLDC GLUCOMTR-MCNC: 174 MG/DL (ref 70–130)
GLUCOSE BLDC GLUCOMTR-MCNC: 205 MG/DL (ref 70–130)
GLUCOSE BLDC GLUCOMTR-MCNC: 210 MG/DL (ref 70–130)
GLUCOSE BLDC GLUCOMTR-MCNC: 215 MG/DL (ref 70–130)
GLUCOSE BLDC GLUCOMTR-MCNC: 227 MG/DL (ref 70–130)
GLUCOSE BLDC GLUCOMTR-MCNC: 238 MG/DL (ref 70–130)
GLUCOSE BLDC GLUCOMTR-MCNC: 253 MG/DL (ref 70–130)
GLUCOSE BLDC GLUCOMTR-MCNC: 263 MG/DL (ref 70–130)
GLUCOSE BLDC GLUCOMTR-MCNC: 282 MG/DL (ref 70–130)
GLUCOSE SERPL-MCNC: 218 MG/DL (ref 65–99)
GLUCOSE SERPL-MCNC: 260 MG/DL (ref 65–99)
HCO3 BLDA-SCNC: 23.1 MMOL/L (ref 20–26)
HCO3 BLDA-SCNC: 23.3 MMOL/L (ref 20–26)
HCO3 BLDA-SCNC: 24.7 MMOL/L (ref 20–26)
HCO3 BLDA-SCNC: 26.6 MMOL/L (ref 20–26)
HCO3 BLDA-SCNC: 26.9 MMOL/L (ref 20–26)
HCO3 BLDA-SCNC: 27.3 MMOL/L (ref 20–26)
HCO3 BLDA-SCNC: 27.8 MMOL/L (ref 20–26)
HCO3 BLDV-SCNC: 28 MMOL/L (ref 22–28)
HCT VFR BLD AUTO: 39.2 % (ref 37.5–51)
HCT VFR BLD AUTO: 41.3 % (ref 37.5–51)
HCT VFR BLD CALC: 31.3 % (ref 38–51)
HCT VFR BLD CALC: 38.2 % (ref 38–51)
HCT VFR BLD CALC: 38.6 % (ref 38–51)
HCT VFR BLD CALC: 38.9 % (ref 38–51)
HGB BLD-MCNC: 12.6 G/DL (ref 13–17.7)
HGB BLD-MCNC: 13.4 G/DL (ref 13–17.7)
HGB BLDA-MCNC: 10.2 G/DL (ref 14–18)
HGB BLDA-MCNC: 12.5 G/DL (ref 14–18)
HGB BLDA-MCNC: 12.6 G/DL (ref 14–18)
HGB BLDA-MCNC: 12.7 G/DL (ref 14–18)
HGB BLDA-MCNC: 9.3 G/DL (ref 14–18)
INHALED O2 CONCENTRATION: 100 %
INHALED O2 CONCENTRATION: 40 %
INHALED O2 CONCENTRATION: 40 %
INHALED O2 CONCENTRATION: 50 %
INHALED O2 CONCENTRATION: 60 %
INR PPP: 1.11 (ref 0.91–1.09)
Lab: ABNORMAL
Lab: NORMAL
MCH RBC QN AUTO: 28.3 PG (ref 26.6–33)
MCH RBC QN AUTO: 28.6 PG (ref 26.6–33)
MCHC RBC AUTO-ENTMCNC: 32.1 G/DL (ref 31.5–35.7)
MCHC RBC AUTO-ENTMCNC: 32.4 G/DL (ref 31.5–35.7)
MCV RBC AUTO: 87.3 FL (ref 79–97)
MCV RBC AUTO: 89.1 FL (ref 79–97)
METHGB BLD QL: 0.4 % (ref 0–3)
METHGB BLD QL: 0.4 % (ref 0–3)
METHGB BLD QL: 0.5 % (ref 0–3)
METHGB BLD QL: 0.7 % (ref 0–3)
METHGB BLD QL: 0.7 % (ref 0–3)
MODALITY: ABNORMAL
NOTE: ABNORMAL
OXYHGB MFR BLDV: 71.3 % (ref 60–85)
OXYHGB MFR BLDV: 88.1 % (ref 94–99)
OXYHGB MFR BLDV: 88.4 % (ref 94–99)
OXYHGB MFR BLDV: 91.8 % (ref 94–99)
OXYHGB MFR BLDV: 93.6 % (ref 94–99)
PCO2 BLDA: 40.7 MM HG (ref 35–45)
PCO2 BLDA: 41.6 MM HG (ref 35–45)
PCO2 BLDA: 47.2 MM HG (ref 35–45)
PCO2 BLDA: 50.3 MM HG (ref 35–45)
PCO2 BLDA: 53.3 MM HG (ref 35–45)
PCO2 BLDA: 54.6 MM HG (ref 35–45)
PCO2 BLDA: 58.5 MM HG (ref 35–45)
PCO2 BLDV: 47.5 MM HG (ref 41–51)
PCO2 TEMP ADJ BLD: 40.7 MM HG (ref 35–45)
PCO2 TEMP ADJ BLD: 41.6 MM HG (ref 35–45)
PCO2 TEMP ADJ BLD: 47.2 MM HG (ref 35–45)
PCO2 TEMP ADJ BLD: 50.3 MM HG (ref 35–45)
PCO2 TEMP ADJ BLD: 53.3 MM HG (ref 35–45)
PCO2 TEMP ADJ BLD: 54.6 MM HG (ref 35–45)
PCO2 TEMP ADJ BLD: 58.5 MM HG (ref 35–45)
PEEP RESPIRATORY: 10 CM[H2O]
PEEP RESPIRATORY: 10 CM[H2O]
PEEP RESPIRATORY: 8 CM[H2O]
PEEP RESPIRATORY: 8 CM[H2O]
PH BLDA: 7.27 PH UNITS (ref 7.35–7.45)
PH BLDA: 7.31 PH UNITS (ref 7.35–7.45)
PH BLDA: 7.33 PH UNITS (ref 7.35–7.45)
PH BLDA: 7.35 PH UNITS (ref 7.35–7.45)
PH BLDA: 7.37 PH UNITS (ref 7.35–7.45)
PH BLDV: 7.38 PH UNITS (ref 7.32–7.42)
PH, TEMP CORRECTED: 7.27 PH UNITS (ref 7.35–7.45)
PH, TEMP CORRECTED: 7.31 PH UNITS (ref 7.35–7.45)
PH, TEMP CORRECTED: 7.33 PH UNITS (ref 7.35–7.45)
PH, TEMP CORRECTED: 7.35 PH UNITS (ref 7.35–7.45)
PH, TEMP CORRECTED: 7.37 PH UNITS (ref 7.35–7.45)
PHOSPHATE SERPL-MCNC: 2.1 MG/DL (ref 2.5–4.5)
PHOSPHATE SERPL-MCNC: 3.6 MG/DL (ref 2.5–4.5)
PLATELET # BLD AUTO: 188 10*3/MM3 (ref 140–450)
PLATELET # BLD AUTO: 201 10*3/MM3 (ref 140–450)
PMV BLD AUTO: 10.1 FL (ref 6–12)
PMV BLD AUTO: 10.4 FL (ref 6–12)
PO2 BLDA: 480 MM HG (ref 83–108)
PO2 BLDA: 63 MM HG (ref 83–108)
PO2 BLDA: 64.1 MM HG (ref 83–108)
PO2 BLDA: 66 MM HG (ref 83–108)
PO2 BLDA: 70 MM HG (ref 83–108)
PO2 BLDA: 72.5 MM HG (ref 83–108)
PO2 BLDA: 88.6 MM HG (ref 83–108)
PO2 BLDV: 38.6 MM HG (ref 27–53)
PO2 TEMP ADJ BLD: 480 MM HG (ref 83–108)
PO2 TEMP ADJ BLD: 63 MM HG (ref 83–108)
PO2 TEMP ADJ BLD: 64.1 MM HG (ref 83–108)
PO2 TEMP ADJ BLD: 66 MM HG (ref 83–108)
PO2 TEMP ADJ BLD: 70 MM HG (ref 83–108)
PO2 TEMP ADJ BLD: 72.5 MM HG (ref 83–108)
PO2 TEMP ADJ BLD: 88.6 MM HG (ref 83–108)
POTASSIUM BLDA-SCNC: 4 MMOL/L (ref 3.5–5.2)
POTASSIUM BLDA-SCNC: 4.3 MMOL/L (ref 3.5–5.2)
POTASSIUM BLDA-SCNC: 4.6 MMOL/L (ref 3.5–5.2)
POTASSIUM BLDA-SCNC: 4.7 MMOL/L (ref 3.5–5.2)
POTASSIUM BLDV-SCNC: 5.2 MMOL/L (ref 3.5–5.2)
POTASSIUM SERPL-SCNC: 3.8 MMOL/L (ref 3.5–5.2)
POTASSIUM SERPL-SCNC: 4 MMOL/L (ref 3.5–5.2)
PROTHROMBIN TIME: 13.8 SECONDS (ref 11.9–14.6)
PSV: 10 CMH2O
RBC # BLD AUTO: 4.4 10*6/MM3 (ref 4.14–5.8)
RBC # BLD AUTO: 4.73 10*6/MM3 (ref 4.14–5.8)
RH BLD: POSITIVE
SAO2 % BLDCOA: 92.6 % (ref 94–99)
SAO2 % BLDCOA: 92.6 % (ref 94–99)
SAO2 % BLDCOA: 92.9 % (ref 94–99)
SAO2 % BLDCOA: 94.1 % (ref 94–99)
SAO2 % BLDCOA: 94.1 % (ref 94–99)
SAO2 % BLDCOA: 97.5 % (ref 94–99)
SAO2 % BLDCOA: >100.1 % (ref 94–99)
SAO2 % BLDCOV: 76.3 % (ref 45–75)
SET MECH RESP RATE: 14
SET MECH RESP RATE: 16
SET MECH RESP RATE: 20
SET MECH RESP RATE: 20
SODIUM BLDA-SCNC: 138 MMOL/L (ref 136–145)
SODIUM BLDA-SCNC: 139 MMOL/L (ref 136–145)
SODIUM BLDV-SCNC: 137 MMOL/L (ref 136–145)
SODIUM SERPL-SCNC: 136 MMOL/L (ref 136–145)
SODIUM SERPL-SCNC: 137 MMOL/L (ref 136–145)
T&S EXPIRATION DATE: NORMAL
VENTILATOR MODE: ABNORMAL
VT ON VENT VENT: 550 ML
WBC NRBC COR # BLD: 12.79 10*3/MM3 (ref 3.4–10.8)
WBC NRBC COR # BLD: 12.85 10*3/MM3 (ref 3.4–10.8)

## 2022-05-16 PROCEDURE — C1751 CATH, INF, PER/CENT/MIDLINE: HCPCS | Performed by: NURSE ANESTHETIST, CERTIFIED REGISTERED

## 2022-05-16 PROCEDURE — 71045 X-RAY EXAM CHEST 1 VIEW: CPT

## 2022-05-16 PROCEDURE — 25010000002 MORPHINE PER 10 MG: Performed by: SURGERY

## 2022-05-16 PROCEDURE — 82820 HEMOGLOBIN-OXYGEN AFFINITY: CPT

## 2022-05-16 PROCEDURE — 25010000002 MIDAZOLAM PER 1 MG: Performed by: NURSE ANESTHETIST, CERTIFIED REGISTERED

## 2022-05-16 PROCEDURE — 0 INSULIN REGULAR HUMAN PER 5 UNITS: Performed by: SURGERY

## 2022-05-16 PROCEDURE — 94002 VENT MGMT INPAT INIT DAY: CPT

## 2022-05-16 PROCEDURE — C1713 ANCHOR/SCREW BN/BN,TIS/BN: HCPCS | Performed by: SURGERY

## 2022-05-16 PROCEDURE — 93010 ELECTROCARDIOGRAM REPORT: CPT | Performed by: EMERGENCY MEDICINE

## 2022-05-16 PROCEDURE — 25010000002 HYDROMORPHONE PER 4 MG: Performed by: NURSE ANESTHETIST, CERTIFIED REGISTERED

## 2022-05-16 PROCEDURE — C9290 INJ, BUPIVACAINE LIPOSOME: HCPCS | Performed by: SURGERY

## 2022-05-16 PROCEDURE — 83050 HGB METHEMOGLOBIN QUAN: CPT

## 2022-05-16 PROCEDURE — 94799 UNLISTED PULMONARY SVC/PX: CPT

## 2022-05-16 PROCEDURE — 85027 COMPLETE CBC AUTOMATED: CPT | Performed by: SURGERY

## 2022-05-16 PROCEDURE — 0 POTASSIUM CHLORIDE PER 2 MEQ: Performed by: SURGERY

## 2022-05-16 PROCEDURE — 25010000002 VANCOMYCIN 10 G RECONSTITUTED SOLUTION: Performed by: SURGERY

## 2022-05-16 PROCEDURE — 94640 AIRWAY INHALATION TREATMENT: CPT

## 2022-05-16 PROCEDURE — 86900 BLOOD TYPING SEROLOGIC ABO: CPT | Performed by: SURGERY

## 2022-05-16 PROCEDURE — 25010000002 MAGNESIUM SULFATE PER 500 MG OF MAGNESIUM: Performed by: NURSE ANESTHETIST, CERTIFIED REGISTERED

## 2022-05-16 PROCEDURE — 25010000002 VANCOMYCIN 1 G RECONSTITUTED SOLUTION: Performed by: NURSE ANESTHETIST, CERTIFIED REGISTERED

## 2022-05-16 PROCEDURE — 25010000002 HEPARIN (PORCINE) PER 1000 UNITS: Performed by: SURGERY

## 2022-05-16 PROCEDURE — 93005 ELECTROCARDIOGRAM TRACING: CPT | Performed by: SURGERY

## 2022-05-16 PROCEDURE — 5A1221Z PERFORMANCE OF CARDIAC OUTPUT, CONTINUOUS: ICD-10-PCS | Performed by: SURGERY

## 2022-05-16 PROCEDURE — 25010000002 HEPARIN (PORCINE) PER 1000 UNITS: Performed by: NURSE ANESTHETIST, CERTIFIED REGISTERED

## 2022-05-16 PROCEDURE — 25810000003 DEXTROSE 5 % WITH KCL 20 MEQ 20-5 MEQ/L-% SOLUTION: Performed by: SURGERY

## 2022-05-16 PROCEDURE — 93318 ECHO TRANSESOPHAGEAL INTRAOP: CPT | Performed by: NURSE ANESTHETIST, CERTIFIED REGISTERED

## 2022-05-16 PROCEDURE — 021009W BYPASS CORONARY ARTERY, ONE ARTERY FROM AORTA WITH AUTOLOGOUS VENOUS TISSUE, OPEN APPROACH: ICD-10-PCS | Performed by: SURGERY

## 2022-05-16 PROCEDURE — 82805 BLOOD GASES W/O2 SATURATION: CPT

## 2022-05-16 PROCEDURE — 25010000002 VANCOMYCIN 1 G RECONSTITUTED SOLUTION 1 EACH VIAL: Performed by: SURGERY

## 2022-05-16 PROCEDURE — 85730 THROMBOPLASTIN TIME PARTIAL: CPT | Performed by: SURGERY

## 2022-05-16 PROCEDURE — 80069 RENAL FUNCTION PANEL: CPT | Performed by: SURGERY

## 2022-05-16 PROCEDURE — 93325 DOPPLER ECHO COLOR FLOW MAPG: CPT | Performed by: INTERNAL MEDICINE

## 2022-05-16 PROCEDURE — 82962 GLUCOSE BLOOD TEST: CPT

## 2022-05-16 PROCEDURE — 83605 ASSAY OF LACTIC ACID: CPT | Performed by: SURGERY

## 2022-05-16 PROCEDURE — 93312 ECHO TRANSESOPHAGEAL: CPT | Performed by: INTERNAL MEDICINE

## 2022-05-16 PROCEDURE — 25010000002 PROTAMINE SULFATE PER 10 MG: Performed by: NURSE ANESTHETIST, CERTIFIED REGISTERED

## 2022-05-16 PROCEDURE — 25010000002 PROPOFOL 10 MG/ML EMULSION: Performed by: NURSE ANESTHETIST, CERTIFIED REGISTERED

## 2022-05-16 PROCEDURE — 82803 BLOOD GASES ANY COMBINATION: CPT

## 2022-05-16 PROCEDURE — 02100Z9 BYPASS CORONARY ARTERY, ONE ARTERY FROM LEFT INTERNAL MAMMARY, OPEN APPROACH: ICD-10-PCS | Performed by: SURGERY

## 2022-05-16 PROCEDURE — 25010000002 CEFAZOLIN PER 500 MG: Performed by: NURSE PRACTITIONER

## 2022-05-16 PROCEDURE — 85610 PROTHROMBIN TIME: CPT | Performed by: SURGERY

## 2022-05-16 PROCEDURE — 0 BUPIVACAINE LIPOSOME 1.3 % SUSPENSION 20 ML VIAL: Performed by: SURGERY

## 2022-05-16 PROCEDURE — 0 HYDROCORTISONE SOD SUCCINATE PF 250 MG RECONSTITUTED SOLUTION: Performed by: NURSE ANESTHETIST, CERTIFIED REGISTERED

## 2022-05-16 PROCEDURE — 82330 ASSAY OF CALCIUM: CPT

## 2022-05-16 PROCEDURE — 33534 CABG ARTERIAL TWO: CPT | Performed by: SURGERY

## 2022-05-16 PROCEDURE — 25010000002 PROPOFOL 10 MG/ML EMULSION: Performed by: SURGERY

## 2022-05-16 PROCEDURE — 25010000002 PAPAVERINE PER 60 MG: Performed by: SURGERY

## 2022-05-16 PROCEDURE — 86850 RBC ANTIBODY SCREEN: CPT | Performed by: SURGERY

## 2022-05-16 PROCEDURE — 86901 BLOOD TYPING SEROLOGIC RH(D): CPT | Performed by: SURGERY

## 2022-05-16 PROCEDURE — 86923 COMPATIBILITY TEST ELECTRIC: CPT

## 2022-05-16 PROCEDURE — 25010000002 DEXAMETHASONE PER 1 MG: Performed by: NURSE ANESTHETIST, CERTIFIED REGISTERED

## 2022-05-16 PROCEDURE — 25010000002 MORPHINE SULFATE (PF) 2 MG/ML SOLUTION: Performed by: SURGERY

## 2022-05-16 PROCEDURE — 82375 ASSAY CARBOXYHB QUANT: CPT

## 2022-05-16 DEVICE — IMPLANTABLE DEVICE
Type: IMPLANTABLE DEVICE | Site: STERNUM | Status: FUNCTIONAL
Brand: STERNALOCK® BLU SYSTEM

## 2022-05-16 DEVICE — PLEDGET INCISIONLINE REINF TFE SFT PTFE 1.5X3X7MM WHT: Type: IMPLANTABLE DEVICE | Site: HEART | Status: FUNCTIONAL

## 2022-05-16 DEVICE — WR SUT NONABS MF SS V40 1/2CIR TC 6/0 18IN M649G: Type: IMPLANTABLE DEVICE | Site: STERNUM | Status: FUNCTIONAL

## 2022-05-16 DEVICE — WAX BONE HEMO NAT 2.5G: Type: IMPLANTABLE DEVICE | Site: STERNUM | Status: FUNCTIONAL

## 2022-05-16 RX ORDER — CLOPIDOGREL BISULFATE 75 MG/1
75 TABLET ORAL DAILY
Status: DISCONTINUED | OUTPATIENT
Start: 2022-05-17 | End: 2022-05-21 | Stop reason: HOSPADM

## 2022-05-16 RX ORDER — METOPROLOL TARTRATE 5 MG/5ML
INJECTION INTRAVENOUS AS NEEDED
Status: DISCONTINUED | OUTPATIENT
Start: 2022-05-16 | End: 2022-05-16 | Stop reason: SURG

## 2022-05-16 RX ORDER — VANCOMYCIN HYDROCHLORIDE 1 G/20ML
INJECTION, POWDER, LYOPHILIZED, FOR SOLUTION INTRAVENOUS AS NEEDED
Status: DISCONTINUED | OUTPATIENT
Start: 2022-05-16 | End: 2022-05-16 | Stop reason: SURG

## 2022-05-16 RX ORDER — SODIUM CHLORIDE 0.9 % (FLUSH) 0.9 %
10 SYRINGE (ML) INJECTION AS NEEDED
Status: DISCONTINUED | OUTPATIENT
Start: 2022-05-16 | End: 2022-05-16 | Stop reason: HOSPADM

## 2022-05-16 RX ORDER — CHLORHEXIDINE GLUCONATE 0.12 MG/ML
15 RINSE ORAL EVERY 12 HOURS
Status: DISCONTINUED | OUTPATIENT
Start: 2022-05-16 | End: 2022-05-18

## 2022-05-16 RX ORDER — IPRATROPIUM BROMIDE AND ALBUTEROL SULFATE 2.5; .5 MG/3ML; MG/3ML
3 SOLUTION RESPIRATORY (INHALATION)
Status: DISCONTINUED | OUTPATIENT
Start: 2022-05-16 | End: 2022-05-21 | Stop reason: HOSPADM

## 2022-05-16 RX ORDER — POTASSIUM CHLORIDE, DEXTROSE MONOHYDRATE 150; 5 MG/100ML; G/100ML
30 INJECTION, SOLUTION INTRAVENOUS CONTINUOUS
Status: DISCONTINUED | OUTPATIENT
Start: 2022-05-16 | End: 2022-05-18

## 2022-05-16 RX ORDER — LIDOCAINE HYDROCHLORIDE 10 MG/ML
0.5 INJECTION, SOLUTION EPIDURAL; INFILTRATION; INTRACAUDAL; PERINEURAL ONCE AS NEEDED
Status: DISCONTINUED | OUTPATIENT
Start: 2022-05-16 | End: 2022-05-16 | Stop reason: HOSPADM

## 2022-05-16 RX ORDER — PROPOFOL 10 MG/ML
VIAL (ML) INTRAVENOUS AS NEEDED
Status: DISCONTINUED | OUTPATIENT
Start: 2022-05-16 | End: 2022-05-16 | Stop reason: SURG

## 2022-05-16 RX ORDER — POTASSIUM CHLORIDE 29.8 MG/ML
20 INJECTION INTRAVENOUS
Status: DISCONTINUED | OUTPATIENT
Start: 2022-05-16 | End: 2022-05-18

## 2022-05-16 RX ORDER — DEXTROSE MONOHYDRATE 25 G/50ML
10-50 INJECTION, SOLUTION INTRAVENOUS
Status: DISCONTINUED | OUTPATIENT
Start: 2022-05-16 | End: 2022-05-18

## 2022-05-16 RX ORDER — OXYCODONE AND ACETAMINOPHEN 10; 325 MG/1; MG/1
1 TABLET ORAL
Status: DISCONTINUED | OUTPATIENT
Start: 2022-05-16 | End: 2022-05-18

## 2022-05-16 RX ORDER — OXYCODONE HYDROCHLORIDE AND ACETAMINOPHEN 5; 325 MG/1; MG/1
1 TABLET ORAL
Status: DISCONTINUED | OUTPATIENT
Start: 2022-05-16 | End: 2022-05-18

## 2022-05-16 RX ORDER — CHLORHEXIDINE GLUCONATE 0.12 MG/ML
15 RINSE ORAL EVERY 12 HOURS SCHEDULED
Status: DISCONTINUED | OUTPATIENT
Start: 2022-05-16 | End: 2022-05-16 | Stop reason: SDUPTHER

## 2022-05-16 RX ORDER — NITROGLYCERIN 20 MG/100ML
5 INJECTION INTRAVENOUS CONTINUOUS
Status: DISCONTINUED | OUTPATIENT
Start: 2022-05-16 | End: 2022-05-18

## 2022-05-16 RX ORDER — LABETALOL HYDROCHLORIDE 5 MG/ML
10 INJECTION, SOLUTION INTRAVENOUS ONCE
Status: COMPLETED | OUTPATIENT
Start: 2022-05-16 | End: 2022-05-16

## 2022-05-16 RX ORDER — LIDOCAINE HYDROCHLORIDE 20 MG/ML
INJECTION, SOLUTION EPIDURAL; INFILTRATION; INTRACAUDAL; PERINEURAL AS NEEDED
Status: DISCONTINUED | OUTPATIENT
Start: 2022-05-16 | End: 2022-05-16 | Stop reason: SURG

## 2022-05-16 RX ORDER — MAGNESIUM SULFATE HEPTAHYDRATE 500 MG/ML
INJECTION, SOLUTION INTRAMUSCULAR; INTRAVENOUS AS NEEDED
Status: DISCONTINUED | OUTPATIENT
Start: 2022-05-16 | End: 2022-05-16 | Stop reason: SURG

## 2022-05-16 RX ORDER — SODIUM CHLORIDE 9 MG/ML
30 INJECTION, SOLUTION INTRAVENOUS CONTINUOUS PRN
Status: DISCONTINUED | OUTPATIENT
Start: 2022-05-16 | End: 2022-05-16 | Stop reason: HOSPADM

## 2022-05-16 RX ORDER — POTASSIUM CHLORIDE 29.8 MG/ML
20 INJECTION INTRAVENOUS
Status: COMPLETED | OUTPATIENT
Start: 2022-05-16 | End: 2022-05-16

## 2022-05-16 RX ORDER — HYDROMORPHONE HCL 110MG/55ML
PATIENT CONTROLLED ANALGESIA SYRINGE INTRAVENOUS AS NEEDED
Status: DISCONTINUED | OUTPATIENT
Start: 2022-05-16 | End: 2022-05-16 | Stop reason: SURG

## 2022-05-16 RX ORDER — NICOTINE POLACRILEX 4 MG
15 LOZENGE BUCCAL
Status: DISCONTINUED | OUTPATIENT
Start: 2022-05-16 | End: 2022-05-16 | Stop reason: HOSPADM

## 2022-05-16 RX ORDER — SODIUM CHLORIDE, SODIUM LACTATE, POTASSIUM CHLORIDE, CALCIUM CHLORIDE 600; 310; 30; 20 MG/100ML; MG/100ML; MG/100ML; MG/100ML
1000 INJECTION, SOLUTION INTRAVENOUS CONTINUOUS
Status: DISCONTINUED | OUTPATIENT
Start: 2022-05-16 | End: 2022-05-16

## 2022-05-16 RX ORDER — SODIUM CHLORIDE 0.9 % (FLUSH) 0.9 %
3 SYRINGE (ML) INJECTION AS NEEDED
Status: DISCONTINUED | OUTPATIENT
Start: 2022-05-16 | End: 2022-05-16 | Stop reason: HOSPADM

## 2022-05-16 RX ORDER — VECURONIUM BROMIDE 1 MG/ML
INJECTION, POWDER, LYOPHILIZED, FOR SOLUTION INTRAVENOUS AS NEEDED
Status: DISCONTINUED | OUTPATIENT
Start: 2022-05-16 | End: 2022-05-16 | Stop reason: SURG

## 2022-05-16 RX ORDER — SODIUM CHLORIDE 0.9 % (FLUSH) 0.9 %
10 SYRINGE (ML) INJECTION EVERY 12 HOURS SCHEDULED
Status: DISCONTINUED | OUTPATIENT
Start: 2022-05-16 | End: 2022-05-16 | Stop reason: HOSPADM

## 2022-05-16 RX ORDER — MAGNESIUM HYDROXIDE 1200 MG/15ML
LIQUID ORAL AS NEEDED
Status: DISCONTINUED | OUTPATIENT
Start: 2022-05-16 | End: 2022-05-16 | Stop reason: HOSPADM

## 2022-05-16 RX ORDER — NICARDIPINE HYDROCHLORIDE 2.5 MG/ML
INJECTION INTRAVENOUS AS NEEDED
Status: DISCONTINUED | OUTPATIENT
Start: 2022-05-16 | End: 2022-05-16 | Stop reason: SURG

## 2022-05-16 RX ORDER — DEXMEDETOMIDINE HYDROCHLORIDE 4 UG/ML
.2-1.5 INJECTION, SOLUTION INTRAVENOUS
Status: DISCONTINUED | OUTPATIENT
Start: 2022-05-16 | End: 2022-05-18

## 2022-05-16 RX ORDER — NOREPINEPHRINE BIT/0.9 % NACL 8 MG/250ML
.02-.3 INFUSION BOTTLE (ML) INTRAVENOUS CONTINUOUS PRN
Status: DISCONTINUED | OUTPATIENT
Start: 2022-05-16 | End: 2022-05-18

## 2022-05-16 RX ORDER — MORPHINE SULFATE 2 MG/ML
2 INJECTION, SOLUTION INTRAMUSCULAR; INTRAVENOUS
Status: DISCONTINUED | OUTPATIENT
Start: 2022-05-16 | End: 2022-05-16

## 2022-05-16 RX ORDER — SODIUM CHLORIDE 0.9 % (FLUSH) 0.9 %
3 SYRINGE (ML) INJECTION EVERY 12 HOURS SCHEDULED
Status: DISCONTINUED | OUTPATIENT
Start: 2022-05-16 | End: 2022-05-16 | Stop reason: HOSPADM

## 2022-05-16 RX ORDER — MIDAZOLAM HYDROCHLORIDE 1 MG/ML
INJECTION INTRAMUSCULAR; INTRAVENOUS AS NEEDED
Status: DISCONTINUED | OUTPATIENT
Start: 2022-05-16 | End: 2022-05-16 | Stop reason: SURG

## 2022-05-16 RX ORDER — PHENYLEPHRINE HCL IN 0.9% NACL 1 MG/10 ML
SYRINGE (ML) INTRAVENOUS AS NEEDED
Status: DISCONTINUED | OUTPATIENT
Start: 2022-05-16 | End: 2022-05-16 | Stop reason: SURG

## 2022-05-16 RX ORDER — ROCURONIUM BROMIDE 10 MG/ML
INJECTION, SOLUTION INTRAVENOUS AS NEEDED
Status: DISCONTINUED | OUTPATIENT
Start: 2022-05-16 | End: 2022-05-16 | Stop reason: SURG

## 2022-05-16 RX ORDER — ASPIRIN 81 MG/1
162 TABLET, CHEWABLE ORAL ONCE
Status: COMPLETED | OUTPATIENT
Start: 2022-05-17 | End: 2022-05-17

## 2022-05-16 RX ORDER — DEXTROSE MONOHYDRATE 25 G/50ML
10-50 INJECTION, SOLUTION INTRAVENOUS
Status: DISCONTINUED | OUTPATIENT
Start: 2022-05-16 | End: 2022-05-16 | Stop reason: HOSPADM

## 2022-05-16 RX ORDER — ASPIRIN 81 MG/1
81 TABLET ORAL DAILY
Status: DISCONTINUED | OUTPATIENT
Start: 2022-05-18 | End: 2022-05-21 | Stop reason: HOSPADM

## 2022-05-16 RX ORDER — LABETALOL HYDROCHLORIDE 5 MG/ML
10 INJECTION, SOLUTION INTRAVENOUS ONCE
Status: COMPLETED | OUTPATIENT
Start: 2022-05-17 | End: 2022-05-16

## 2022-05-16 RX ORDER — DEXAMETHASONE SODIUM PHOSPHATE 4 MG/ML
INJECTION, SOLUTION INTRA-ARTICULAR; INTRALESIONAL; INTRAMUSCULAR; INTRAVENOUS; SOFT TISSUE AS NEEDED
Status: DISCONTINUED | OUTPATIENT
Start: 2022-05-16 | End: 2022-05-16 | Stop reason: SURG

## 2022-05-16 RX ORDER — SODIUM CHLORIDE 0.9 % (FLUSH) 0.9 %
3-10 SYRINGE (ML) INJECTION AS NEEDED
Status: DISCONTINUED | OUTPATIENT
Start: 2022-05-16 | End: 2022-05-16 | Stop reason: HOSPADM

## 2022-05-16 RX ORDER — NEOSTIGMINE METHYLSULFATE 5 MG/5 ML
SYRINGE (ML) INTRAVENOUS AS NEEDED
Status: DISCONTINUED | OUTPATIENT
Start: 2022-05-16 | End: 2022-05-16 | Stop reason: SURG

## 2022-05-16 RX ORDER — ATORVASTATIN CALCIUM 10 MG/1
20 TABLET, FILM COATED ORAL NIGHTLY
Status: DISCONTINUED | OUTPATIENT
Start: 2022-05-17 | End: 2022-05-21 | Stop reason: HOSPADM

## 2022-05-16 RX ORDER — SODIUM CHLORIDE 9 MG/ML
INJECTION, SOLUTION INTRAVENOUS AS NEEDED
Status: DISCONTINUED | OUTPATIENT
Start: 2022-05-16 | End: 2022-05-16 | Stop reason: HOSPADM

## 2022-05-16 RX ORDER — NITROGLYCERIN 20 MG/100ML
INJECTION INTRAVENOUS AS NEEDED
Status: DISCONTINUED | OUTPATIENT
Start: 2022-05-16 | End: 2022-05-16 | Stop reason: SURG

## 2022-05-16 RX ORDER — PROTAMINE SULFATE 10 MG/ML
INJECTION, SOLUTION INTRAVENOUS AS NEEDED
Status: DISCONTINUED | OUTPATIENT
Start: 2022-05-16 | End: 2022-05-16 | Stop reason: SURG

## 2022-05-16 RX ORDER — MIDAZOLAM HYDROCHLORIDE 1 MG/ML
2 INJECTION INTRAMUSCULAR; INTRAVENOUS
Status: DISCONTINUED | OUTPATIENT
Start: 2022-05-16 | End: 2022-05-16 | Stop reason: HOSPADM

## 2022-05-16 RX ORDER — SODIUM CHLORIDE 0.9 % (FLUSH) 0.9 %
30 SYRINGE (ML) INJECTION ONCE AS NEEDED
Status: DISCONTINUED | OUTPATIENT
Start: 2022-05-16 | End: 2022-05-16 | Stop reason: HOSPADM

## 2022-05-16 RX ORDER — SODIUM CHLORIDE, SODIUM LACTATE, POTASSIUM CHLORIDE, CALCIUM CHLORIDE 600; 310; 30; 20 MG/100ML; MG/100ML; MG/100ML; MG/100ML
INJECTION, SOLUTION INTRAVENOUS CONTINUOUS PRN
Status: DISCONTINUED | OUTPATIENT
Start: 2022-05-16 | End: 2022-05-16 | Stop reason: SURG

## 2022-05-16 RX ORDER — NICOTINE POLACRILEX 4 MG
15 LOZENGE BUCCAL
Status: DISCONTINUED | OUTPATIENT
Start: 2022-05-16 | End: 2022-05-18

## 2022-05-16 RX ORDER — DEXMEDETOMIDINE HYDROCHLORIDE 4 UG/ML
INJECTION, SOLUTION INTRAVENOUS CONTINUOUS PRN
Status: DISCONTINUED | OUTPATIENT
Start: 2022-05-16 | End: 2022-05-16 | Stop reason: SURG

## 2022-05-16 RX ORDER — ALBUTEROL SULFATE 90 UG/1
AEROSOL, METERED RESPIRATORY (INHALATION) AS NEEDED
Status: DISCONTINUED | OUTPATIENT
Start: 2022-05-16 | End: 2022-05-16 | Stop reason: SURG

## 2022-05-16 RX ORDER — LEVETIRACETAM 500 MG/1
500 TABLET ORAL 2 TIMES DAILY
Status: DISCONTINUED | OUTPATIENT
Start: 2022-05-16 | End: 2022-05-21 | Stop reason: HOSPADM

## 2022-05-16 RX ORDER — HEPARIN SODIUM 1000 [USP'U]/ML
INJECTION, SOLUTION INTRAVENOUS; SUBCUTANEOUS AS NEEDED
Status: DISCONTINUED | OUTPATIENT
Start: 2022-05-16 | End: 2022-05-16 | Stop reason: SURG

## 2022-05-16 RX ORDER — ALBUTEROL SULFATE 2.5 MG/3ML
2.5 SOLUTION RESPIRATORY (INHALATION) EVERY 4 HOURS PRN
Status: DISCONTINUED | OUTPATIENT
Start: 2022-05-16 | End: 2022-05-17

## 2022-05-16 RX ORDER — SODIUM CHLORIDE 9 MG/ML
INJECTION, SOLUTION INTRAVENOUS CONTINUOUS PRN
Status: DISCONTINUED | OUTPATIENT
Start: 2022-05-16 | End: 2022-05-16 | Stop reason: SURG

## 2022-05-16 RX ORDER — SODIUM CHLORIDE, SODIUM LACTATE, POTASSIUM CHLORIDE, CALCIUM CHLORIDE 600; 310; 30; 20 MG/100ML; MG/100ML; MG/100ML; MG/100ML
100 INJECTION, SOLUTION INTRAVENOUS CONTINUOUS
Status: DISCONTINUED | OUTPATIENT
Start: 2022-05-16 | End: 2022-05-16

## 2022-05-16 RX ORDER — ONDANSETRON 2 MG/ML
4 INJECTION INTRAMUSCULAR; INTRAVENOUS EVERY 6 HOURS PRN
Status: DISCONTINUED | OUTPATIENT
Start: 2022-05-16 | End: 2022-05-21 | Stop reason: HOSPADM

## 2022-05-16 RX ORDER — ENOXAPARIN SODIUM 100 MG/ML
40 INJECTION SUBCUTANEOUS DAILY
Status: DISCONTINUED | OUTPATIENT
Start: 2022-05-17 | End: 2022-05-21 | Stop reason: HOSPADM

## 2022-05-16 RX ORDER — MEPERIDINE HYDROCHLORIDE 50 MG/ML
25 INJECTION INTRAMUSCULAR; INTRAVENOUS; SUBCUTANEOUS
Status: DISCONTINUED | OUTPATIENT
Start: 2022-05-16 | End: 2022-05-17

## 2022-05-16 RX ADMIN — PROPOFOL 15 MCG/KG/MIN: 10 INJECTION, EMULSION INTRAVENOUS at 17:20

## 2022-05-16 RX ADMIN — POTASSIUM CHLORIDE 20 MEQ: 29.8 INJECTION, SOLUTION INTRAVENOUS at 13:43

## 2022-05-16 RX ADMIN — SUFENTANIL CITRATE 100 MCG: 50 INJECTION EPIDURAL; INTRAVENOUS at 07:10

## 2022-05-16 RX ADMIN — SODIUM CHLORIDE 11.8 UNITS/HR: 9 INJECTION, SOLUTION INTRAVENOUS at 20:19

## 2022-05-16 RX ADMIN — SODIUM CHLORIDE 3.9 UNITS/HR: 9 INJECTION, SOLUTION INTRAVENOUS at 13:28

## 2022-05-16 RX ADMIN — LABETALOL HYDROCHLORIDE 10 MG: 5 INJECTION INTRAVENOUS at 23:08

## 2022-05-16 RX ADMIN — SODIUM CHLORIDE, POTASSIUM CHLORIDE, SODIUM LACTATE AND CALCIUM CHLORIDE 1000 ML: 600; 310; 30; 20 INJECTION, SOLUTION INTRAVENOUS at 06:57

## 2022-05-16 RX ADMIN — LIDOCAINE HYDROCHLORIDE 100 MG: 20 INJECTION, SOLUTION EPIDURAL; INFILTRATION; INTRACAUDAL; PERINEURAL at 07:10

## 2022-05-16 RX ADMIN — SODIUM CHLORIDE: 9 INJECTION, SOLUTION INTRAVENOUS at 07:26

## 2022-05-16 RX ADMIN — ROCURONIUM BROMIDE 100 MG: 10 SOLUTION INTRAVENOUS at 07:10

## 2022-05-16 RX ADMIN — PROTAMINE SULFATE 250 MG: 10 INJECTION, SOLUTION INTRAVENOUS at 10:40

## 2022-05-16 RX ADMIN — NITROGLYCERIN 5 MCG/MIN: 20 INJECTION INTRAVENOUS at 15:00

## 2022-05-16 RX ADMIN — METOPROLOL TARTRATE 1 MG: 1 INJECTION, SOLUTION INTRAVENOUS at 07:30

## 2022-05-16 RX ADMIN — Medication 1 APPLICATION: at 17:21

## 2022-05-16 RX ADMIN — SUFENTANIL CITRATE 100 MCG: 50 INJECTION EPIDURAL; INTRAVENOUS at 09:11

## 2022-05-16 RX ADMIN — IPRATROPIUM BROMIDE AND ALBUTEROL SULFATE 3 ML: 2.5; .5 SOLUTION RESPIRATORY (INHALATION) at 12:12

## 2022-05-16 RX ADMIN — VECURONIUM BROMIDE 5 MG: 1 INJECTION, POWDER, LYOPHILIZED, FOR SOLUTION INTRAVENOUS at 08:50

## 2022-05-16 RX ADMIN — MIDAZOLAM 2 MG: 1 INJECTION INTRAMUSCULAR; INTRAVENOUS at 07:09

## 2022-05-16 RX ADMIN — VANCOMYCIN HYDROCHLORIDE 1750 MG: 10 INJECTION, POWDER, LYOPHILIZED, FOR SOLUTION INTRAVENOUS at 19:52

## 2022-05-16 RX ADMIN — DEXAMETHASONE SODIUM PHOSPHATE 8 MG: 4 INJECTION, SOLUTION INTRA-ARTICULAR; INTRALESIONAL; INTRAMUSCULAR; INTRAVENOUS; SOFT TISSUE at 08:45

## 2022-05-16 RX ADMIN — DEXMEDETOMIDINE HYDROCHLORIDE 1.5 MCG/KG/HR: 4 INJECTION, SOLUTION INTRAVENOUS at 13:42

## 2022-05-16 RX ADMIN — POTASSIUM CHLORIDE AND DEXTROSE MONOHYDRATE 30 ML/HR: 150; 5 INJECTION, SOLUTION INTRAVENOUS at 12:23

## 2022-05-16 RX ADMIN — SODIUM CHLORIDE, POTASSIUM CHLORIDE, SODIUM LACTATE AND CALCIUM CHLORIDE 1000 ML: 600; 310; 30; 20 INJECTION, SOLUTION INTRAVENOUS at 06:56

## 2022-05-16 RX ADMIN — MORPHINE SULFATE 4 MG: 4 INJECTION, SOLUTION INTRAMUSCULAR; INTRAVENOUS at 23:30

## 2022-05-16 RX ADMIN — MIDAZOLAM 3 MG: 1 INJECTION INTRAMUSCULAR; INTRAVENOUS at 07:06

## 2022-05-16 RX ADMIN — SUFENTANIL CITRATE 100 MCG: 50 INJECTION EPIDURAL; INTRAVENOUS at 07:14

## 2022-05-16 RX ADMIN — HEPARIN SODIUM 41000 UNITS: 1000 INJECTION, SOLUTION INTRAVENOUS; SUBCUTANEOUS at 09:18

## 2022-05-16 RX ADMIN — HYDROCORTISONE SODIUM SUCCINATE 50 MG: 250 INJECTION, POWDER, FOR SOLUTION INTRAMUSCULAR; INTRAVENOUS at 10:41

## 2022-05-16 RX ADMIN — MAGNESIUM SULFATE HEPTAHYDRATE 500 MG: 500 INJECTION, SOLUTION INTRAMUSCULAR; INTRAVENOUS at 10:43

## 2022-05-16 RX ADMIN — SODIUM CHLORIDE, POTASSIUM CHLORIDE, SODIUM LACTATE AND CALCIUM CHLORIDE: 600; 310; 30; 20 INJECTION, SOLUTION INTRAVENOUS at 07:26

## 2022-05-16 RX ADMIN — PROPOFOL 50 MG: 10 INJECTION, EMULSION INTRAVENOUS at 07:10

## 2022-05-16 RX ADMIN — MORPHINE SULFATE 4 MG: 2 INJECTION, SOLUTION INTRAMUSCULAR; INTRAVENOUS at 12:34

## 2022-05-16 RX ADMIN — NICARDIPINE HYDROCHLORIDE 500 MCG: 2.5 INJECTION INTRAVENOUS at 07:39

## 2022-05-16 RX ADMIN — CHLORHEXIDINE GLUCONATE 15 ML: 1.2 RINSE ORAL at 17:21

## 2022-05-16 RX ADMIN — DEXMEDETOMIDINE HYDROCHLORIDE 1.5 MCG/KG/HR: 4 INJECTION, SOLUTION INTRAVENOUS at 21:22

## 2022-05-16 RX ADMIN — MORPHINE SULFATE 4 MG: 4 INJECTION, SOLUTION INTRAMUSCULAR; INTRAVENOUS at 16:09

## 2022-05-16 RX ADMIN — DEXMEDETOMIDINE HYDROCHLORIDE 1.5 MCG/KG/HR: 4 INJECTION, SOLUTION INTRAVENOUS at 19:10

## 2022-05-16 RX ADMIN — Medication 5 MG: at 12:13

## 2022-05-16 RX ADMIN — Medication 1 APPLICATION: at 06:00

## 2022-05-16 RX ADMIN — POTASSIUM CHLORIDE AND DEXTROSE MONOHYDRATE 30 ML/HR: 150; 5 INJECTION, SOLUTION INTRAVENOUS at 12:22

## 2022-05-16 RX ADMIN — NITROGLYCERIN 20 MCG: 20 INJECTION INTRAVENOUS at 07:36

## 2022-05-16 RX ADMIN — IPRATROPIUM BROMIDE AND ALBUTEROL SULFATE 3 ML: 2.5; .5 SOLUTION RESPIRATORY (INHALATION) at 18:59

## 2022-05-16 RX ADMIN — SUFENTANIL CITRATE 50 MCG: 50 INJECTION EPIDURAL; INTRAVENOUS at 07:25

## 2022-05-16 RX ADMIN — LABETALOL HYDROCHLORIDE 10 MG: 5 INJECTION INTRAVENOUS at 18:13

## 2022-05-16 RX ADMIN — DEXMEDETOMIDINE HYDROCHLORIDE 1.5 MCG/KG/HR: 4 INJECTION, SOLUTION INTRAVENOUS at 23:50

## 2022-05-16 RX ADMIN — IPRATROPIUM BROMIDE AND ALBUTEROL SULFATE 3 ML: 2.5; .5 SOLUTION RESPIRATORY (INHALATION) at 14:13

## 2022-05-16 RX ADMIN — Medication 2 G: at 07:30

## 2022-05-16 RX ADMIN — MIDAZOLAM 5 MG: 1 INJECTION INTRAMUSCULAR; INTRAVENOUS at 10:14

## 2022-05-16 RX ADMIN — VECURONIUM BROMIDE 5 MG: 1 INJECTION, POWDER, LYOPHILIZED, FOR SOLUTION INTRAVENOUS at 09:26

## 2022-05-16 RX ADMIN — MORPHINE SULFATE 4 MG: 4 INJECTION, SOLUTION INTRAMUSCULAR; INTRAVENOUS at 20:53

## 2022-05-16 RX ADMIN — DEXMEDETOMIDINE HYDROCHLORIDE 1.5 MCG/KG/HR: 4 INJECTION, SOLUTION INTRAVENOUS at 16:29

## 2022-05-16 RX ADMIN — SUFENTANIL CITRATE 100 MCG: 50 INJECTION EPIDURAL; INTRAVENOUS at 07:19

## 2022-05-16 RX ADMIN — Medication 50 MCG: at 09:30

## 2022-05-16 RX ADMIN — HEPARIN SODIUM 5000 UNITS: 1000 INJECTION, SOLUTION INTRAVENOUS; SUBCUTANEOUS at 09:00

## 2022-05-16 RX ADMIN — NITROGLYCERIN 60 MCG: 20 INJECTION INTRAVENOUS at 09:19

## 2022-05-16 RX ADMIN — VANCOMYCIN HYDROCHLORIDE 1750 MG: 1 INJECTION, POWDER, LYOPHILIZED, FOR SOLUTION INTRAVENOUS at 07:30

## 2022-05-16 RX ADMIN — NITROGLYCERIN 20 MCG: 20 INJECTION INTRAVENOUS at 07:39

## 2022-05-16 RX ADMIN — GLYCOPYRROLATE 0.8 MG: 0.2 INJECTION INTRAMUSCULAR; INTRAVENOUS at 12:13

## 2022-05-16 RX ADMIN — IPRATROPIUM BROMIDE AND ALBUTEROL SULFATE 3 ML: 2.5; .5 SOLUTION RESPIRATORY (INHALATION) at 23:08

## 2022-05-16 RX ADMIN — SUFENTANIL CITRATE 100 MCG: 50 INJECTION EPIDURAL; INTRAVENOUS at 08:32

## 2022-05-16 RX ADMIN — DEXMEDETOMIDINE HYDROCHLORIDE 0.5 MCG/KG/HR: 4 INJECTION, SOLUTION INTRAVENOUS at 10:55

## 2022-05-16 RX ADMIN — MORPHINE SULFATE 4 MG: 4 INJECTION, SOLUTION INTRAMUSCULAR; INTRAVENOUS at 14:34

## 2022-05-16 RX ADMIN — Medication 100 MCG: at 08:55

## 2022-05-16 RX ADMIN — SUFENTANIL CITRATE 50 MCG: 50 INJECTION EPIDURAL; INTRAVENOUS at 08:34

## 2022-05-16 RX ADMIN — VECURONIUM BROMIDE 5 MG: 1 INJECTION, POWDER, LYOPHILIZED, FOR SOLUTION INTRAVENOUS at 10:14

## 2022-05-16 RX ADMIN — ALBUTEROL SULFATE 4 PUFF: 90 AEROSOL, METERED RESPIRATORY (INHALATION) at 08:48

## 2022-05-16 RX ADMIN — MORPHINE SULFATE 4 MG: 4 INJECTION, SOLUTION INTRAMUSCULAR; INTRAVENOUS at 18:44

## 2022-05-16 RX ADMIN — HYDROMORPHONE HYDROCHLORIDE 2 MG: 2 INJECTION, SOLUTION INTRAMUSCULAR; INTRAVENOUS; SUBCUTANEOUS at 11:00

## 2022-05-16 RX ADMIN — SUFENTANIL CITRATE 150 MCG: 50 INJECTION EPIDURAL; INTRAVENOUS at 09:14

## 2022-05-16 NOTE — ANESTHESIA PROCEDURE NOTES
Airway  Urgency: elective    Date/Time: 5/16/2022 7:12 AM    General Information and Staff    Patient location during procedure: OR  CRNA/CAA: Cuauhtemoc Fenton CRNA    Indications and Patient Condition  Indications for airway management: airway protection    Preoxygenated: yes  MILS maintained throughout  Mask difficulty assessment: 1 - vent by mask    Final Airway Details  Final airway type: endotracheal airway      Successful airway: ETT  Cuffed: yes   Successful intubation technique: direct laryngoscopy  Endotracheal tube insertion site: oral  Blade: García  Blade size: 2  ETT size (mm): 7.5  Cormack-Lehane Classification: grade I - full view of glottis  Placement verified by: chest auscultation and capnometry   Cuff volume (mL): 5  Measured from: lips  ETT/EBT  to lips (cm): 22  Number of attempts at approach: 1  Assessment: lips, teeth, and gum same as pre-op and atraumatic intubation

## 2022-05-16 NOTE — ANESTHESIA PROCEDURE NOTES
Central Line    Pre-sedation assessment completed: 5/16/2022 7:22 AM    Patient reassessed immediately prior to procedure    Patient location during procedure: pre-op  Start time: 5/16/2022 7:23 AM  Stop Time:5/16/2022 7:34 AM  Indications: central pressure monitoring, vascular access and MD/Surgeon request  Staff  Anesthesiologist: Daphne Henley MD  Preanesthetic Checklist  Completed: patient identified, IV checked, site marked, risks and benefits discussed, surgical consent, monitors and equipment checked, pre-op evaluation and timeout performed  Central Line Prep  Sterile Tech:cap, gloves, gown, mask and sterile barriers  Prep: chloraprep  all elements of maximal sterile barrier technique not followed for medical reasons  Patient monitoring: blood pressure monitoring, continuous pulse oximetry and EKG  Central Line Procedure  Location:internal jugular  Catheter Type:MAC and [place]-Gurpreet  Guidance:ultrasound guided  PROCEDURE NOTE/ULTRASOUND INTERPRETATION.  Using ultrasound guidance the potential vascular sites for insertion of the catheter were visualized to determine the patency of the vessel to be used for vascular access.  After selecting the appropriate site for insertion, the needle was visualized under ultrasound being inserted into the internal jugular vein, followed by ultrasound confirmation of wire and catheter placement. There were no abnormalities seen on ultrasound; an image was taken; and the patient tolerated the procedure with no complications. Images: still images not obtained  Assessment  Post procedure:biopatch applied and line sutured  Assessement:blood return through all ports and free fluid flow  Complications:no  Patient Tolerance:patient tolerated the procedure well with no apparent complications

## 2022-05-16 NOTE — ANESTHESIA PROCEDURE NOTES
Emergent/Open-Heart Anesthesia DANGELO    Procedure Performed: Emergent/Open-Heart Anesthesia DANGELO       Start Time:  5/16/2022 7:36 AM       End Time:   5/16/2022 7:46 AM      General Procedure Information  DANGELO Placed for monitoring purposes only -- This is not a diagnostic DANGELO  Location performed:  OR  Intubated  Bite block not placed  Heart visualized  Probe Insertion:  Easy  Probe Type:  Multiplane  Modalities:  Color flow mapping        Anesthesia Information  Performed Personally  Anesthesiologist:  Daphne Henley MD

## 2022-05-16 NOTE — ANESTHESIA POSTPROCEDURE EVALUATION
"Patient: Stephen Molina    Procedure Summary     Date: 05/16/22 Room / Location:  [facility] OR  /  [facility] OR    Anesthesia Start: 0704 Anesthesia Stop: 1219    Procedure: CORONARY ARTERY BYPASS GRAFT X 2 WITH BILATERAL INTERNAL MAMMARY ARTERY, STERNAL PLATING,  AND PERFUSION; TRANSESOPHAGEAL ECHOCARDIOGRAM (N/A Chest) Diagnosis:       Coronary artery disease involving native coronary artery of native heart, unspecified whether angina present      (Coronary artery disease involving native coronary artery of native heart, unspecified whether angina present [I25.10])    Surgeons: Ming Minor MD Provider: Cuauhtemoc Fenton CRNA    Anesthesia Type: general, [place]leana, CVL, PAC ASA Status: 4          Anesthesia Type: general, [place], CVL, PAC    Vitals  Vitals Value Taken Time   /63 05/16/22 1216   Temp     Pulse 81 05/16/22 1219   Resp     SpO2 93 % 05/16/22 1219   Vitals shown include unvalidated device data.        Post Anesthesia Care and Evaluation    Patient location during evaluation: ICU  Pain management: adequate  Airway patency: patent  Anesthetic complications: No anesthetic complications    Cardiovascular status: acceptable  Respiratory status: acceptable  Hydration status: acceptable    Comments: Blood pressure 129/82, pulse 79, temperature 97.5 °F (36.4 °C), temperature source Tympanic, resp. rate 16, height 182 cm (71.65\"), weight 113 kg (248 lb 14.4 oz), SpO2 92 %.    Pt discharged from PACU based on laney score >8      "

## 2022-05-16 NOTE — ANESTHESIA PROCEDURE NOTES
Arterial Line      Patient location during procedure: pre-op   Preanesthetic Checklist  Completed: patient identified, IV checked, site marked, risks and benefits discussed, surgical consent, monitors and equipment checked, pre-op evaluation and timeout performed  Arterial Line Prep   Sterile Tech: cap, gloves and sterile barriers  Prep: ChloraPrep  Patient monitoring: EKG, continuous pulse oximetry and blood pressure monitoring  Arterial Line Procedure   Laterality:left  Location:  radial artery  Catheter size: 20 G   Guidance: ultrasound guided  PROCEDURE NOTE/ULTRASOUND INTERPRETATION.  Using ultrasound guidance the potential vascular sites for insertion of the catheter were visualized to determine the patency of the vessel to be used for vascular access.  After selecting the appropriate site for insertion, the needle was visualized under ultrasound being inserted into the radial artery, followed by ultrasound confirmation of wire and catheter placement. There were no abnormalities seen on ultrasound; an image was taken; and the patient tolerated the procedure with no complications.   Number of attempts: 1  Successful placement: yes  Post Assessment   Dressing Type: wrist guard applied, secured with tape and occlusive dressing applied.   Complications no  Circ/Move/Sens Assessment: normal and unchanged.   Patient Tolerance: patient tolerated the procedure well with no apparent complications

## 2022-05-17 ENCOUNTER — APPOINTMENT (OUTPATIENT)
Dept: GENERAL RADIOLOGY | Facility: HOSPITAL | Age: 52
End: 2022-05-17

## 2022-05-17 PROBLEM — M54.9 CHRONIC BACK PAIN: Status: ACTIVE | Noted: 2022-05-17

## 2022-05-17 PROBLEM — J44.9 COPD (CHRONIC OBSTRUCTIVE PULMONARY DISEASE): Status: ACTIVE | Noted: 2022-05-17

## 2022-05-17 PROBLEM — E66.811 CLASS 1 OBESITY WITH BODY MASS INDEX (BMI) OF 33.0 TO 33.9 IN ADULT: Status: ACTIVE | Noted: 2022-05-17

## 2022-05-17 PROBLEM — F17.200 CURRENT SMOKER: Status: ACTIVE | Noted: 2022-05-17

## 2022-05-17 PROBLEM — E66.9 CLASS 1 OBESITY WITH BODY MASS INDEX (BMI) OF 33.0 TO 33.9 IN ADULT: Status: ACTIVE | Noted: 2022-05-17

## 2022-05-17 PROBLEM — G89.29 CHRONIC BACK PAIN: Status: ACTIVE | Noted: 2022-05-17

## 2022-05-17 LAB
ALBUMIN SERPL-MCNC: 3.8 G/DL (ref 3.5–5.2)
ANION GAP SERPL CALCULATED.3IONS-SCNC: 11 MMOL/L (ref 5–15)
ANION GAP SERPL CALCULATED.3IONS-SCNC: 8 MMOL/L (ref 5–15)
ARTERIAL PATENCY WRIST A: ABNORMAL
ATMOSPHERIC PRESS: 749 MMHG
BASE EXCESS BLDA CALC-SCNC: -0.1 MMOL/L (ref 0–2)
BASOPHILS # BLD AUTO: 0.02 10*3/MM3 (ref 0–0.2)
BASOPHILS NFR BLD AUTO: 0.1 % (ref 0–1.5)
BDY SITE: ABNORMAL
BODY TEMPERATURE: 37 C
BUN SERPL-MCNC: 11 MG/DL (ref 6–20)
BUN SERPL-MCNC: 15 MG/DL (ref 6–20)
BUN/CREAT SERPL: 18.3 (ref 7–25)
BUN/CREAT SERPL: 21.4 (ref 7–25)
CALCIUM SPEC-SCNC: 8.8 MG/DL (ref 8.6–10.5)
CALCIUM SPEC-SCNC: 8.9 MG/DL (ref 8.6–10.5)
CHLORIDE SERPL-SCNC: 105 MMOL/L (ref 98–107)
CHLORIDE SERPL-SCNC: 107 MMOL/L (ref 98–107)
CO2 SERPL-SCNC: 23 MMOL/L (ref 22–29)
CO2 SERPL-SCNC: 24 MMOL/L (ref 22–29)
CREAT SERPL-MCNC: 0.6 MG/DL (ref 0.76–1.27)
CREAT SERPL-MCNC: 0.7 MG/DL (ref 0.76–1.27)
DEPRECATED RDW RBC AUTO: 44.4 FL (ref 37–54)
DEPRECATED RDW RBC AUTO: 49.1 FL (ref 37–54)
EGFRCR SERPLBLD CKD-EPI 2021: 111.6 ML/MIN/1.73
EGFRCR SERPLBLD CKD-EPI 2021: 116.9 ML/MIN/1.73
EOSINOPHIL # BLD AUTO: 0.01 10*3/MM3 (ref 0–0.4)
EOSINOPHIL NFR BLD AUTO: 0.1 % (ref 0.3–6.2)
ERYTHROCYTE [DISTWIDTH] IN BLOOD BY AUTOMATED COUNT: 14.1 % (ref 12.3–15.4)
ERYTHROCYTE [DISTWIDTH] IN BLOOD BY AUTOMATED COUNT: 15 % (ref 12.3–15.4)
GLUCOSE BLDC GLUCOMTR-MCNC: 122 MG/DL (ref 70–130)
GLUCOSE BLDC GLUCOMTR-MCNC: 126 MG/DL (ref 70–130)
GLUCOSE BLDC GLUCOMTR-MCNC: 127 MG/DL (ref 70–130)
GLUCOSE BLDC GLUCOMTR-MCNC: 130 MG/DL (ref 70–130)
GLUCOSE BLDC GLUCOMTR-MCNC: 132 MG/DL (ref 70–130)
GLUCOSE BLDC GLUCOMTR-MCNC: 136 MG/DL (ref 70–130)
GLUCOSE BLDC GLUCOMTR-MCNC: 136 MG/DL (ref 70–130)
GLUCOSE BLDC GLUCOMTR-MCNC: 138 MG/DL (ref 70–130)
GLUCOSE BLDC GLUCOMTR-MCNC: 139 MG/DL (ref 70–130)
GLUCOSE BLDC GLUCOMTR-MCNC: 151 MG/DL (ref 70–130)
GLUCOSE BLDC GLUCOMTR-MCNC: 152 MG/DL (ref 70–130)
GLUCOSE SERPL-MCNC: 126 MG/DL (ref 65–99)
GLUCOSE SERPL-MCNC: 172 MG/DL (ref 65–99)
HCO3 BLDA-SCNC: 24 MMOL/L (ref 20–26)
HCT VFR BLD AUTO: 35.1 % (ref 37.5–51)
HCT VFR BLD AUTO: 40.6 % (ref 37.5–51)
HGB BLD-MCNC: 11.3 G/DL (ref 13–17.7)
HGB BLD-MCNC: 13.4 G/DL (ref 13–17.7)
IMM GRANULOCYTES # BLD AUTO: 0.06 10*3/MM3 (ref 0–0.05)
IMM GRANULOCYTES NFR BLD AUTO: 0.4 % (ref 0–0.5)
INHALED O2 CONCENTRATION: 40 %
INR PPP: 1.07 (ref 0.91–1.09)
LYMPHOCYTES # BLD AUTO: 0.83 10*3/MM3 (ref 0.7–3.1)
LYMPHOCYTES NFR BLD AUTO: 6.1 % (ref 19.6–45.3)
Lab: ABNORMAL
MAGNESIUM SERPL-MCNC: 2 MG/DL (ref 1.6–2.6)
MCH RBC QN AUTO: 28.5 PG (ref 26.6–33)
MCH RBC QN AUTO: 28.9 PG (ref 26.6–33)
MCHC RBC AUTO-ENTMCNC: 32.2 G/DL (ref 31.5–35.7)
MCHC RBC AUTO-ENTMCNC: 33 G/DL (ref 31.5–35.7)
MCV RBC AUTO: 86.4 FL (ref 79–97)
MCV RBC AUTO: 89.8 FL (ref 79–97)
MODALITY: ABNORMAL
MONOCYTES # BLD AUTO: 0.89 10*3/MM3 (ref 0.1–0.9)
MONOCYTES NFR BLD AUTO: 6.5 % (ref 5–12)
NEUTROPHILS NFR BLD AUTO: 11.83 10*3/MM3 (ref 1.7–7)
NEUTROPHILS NFR BLD AUTO: 86.8 % (ref 42.7–76)
NRBC BLD AUTO-RTO: 0 /100 WBC (ref 0–0.2)
PCO2 BLDA: 36.5 MM HG (ref 35–45)
PCO2 TEMP ADJ BLD: 36.5 MM HG (ref 35–45)
PEEP RESPIRATORY: 8 CM[H2O]
PH BLDA: 7.42 PH UNITS (ref 7.35–7.45)
PH, TEMP CORRECTED: 7.42 PH UNITS (ref 7.35–7.45)
PHOSPHATE SERPL-MCNC: 2.4 MG/DL (ref 2.5–4.5)
PLATELET # BLD AUTO: 180 10*3/MM3 (ref 140–450)
PLATELET # BLD AUTO: 203 10*3/MM3 (ref 140–450)
PMV BLD AUTO: 10.3 FL (ref 6–12)
PMV BLD AUTO: 10.4 FL (ref 6–12)
PO2 BLDA: 59.6 MM HG (ref 83–108)
PO2 TEMP ADJ BLD: 59.6 MM HG (ref 83–108)
POTASSIUM SERPL-SCNC: 4.5 MMOL/L (ref 3.5–5.2)
POTASSIUM SERPL-SCNC: 4.5 MMOL/L (ref 3.5–5.2)
PROTHROMBIN TIME: 13.5 SECONDS (ref 11.9–14.6)
PSV: 10 CMH2O
QT INTERVAL: 410 MS
QT INTERVAL: 440 MS
QTC INTERVAL: 461 MS
QTC INTERVAL: 491 MS
RBC # BLD AUTO: 3.91 10*6/MM3 (ref 4.14–5.8)
RBC # BLD AUTO: 4.7 10*6/MM3 (ref 4.14–5.8)
SAO2 % BLDCOA: 92.8 % (ref 94–99)
SODIUM SERPL-SCNC: 139 MMOL/L (ref 136–145)
SODIUM SERPL-SCNC: 139 MMOL/L (ref 136–145)
VENTILATOR MODE: ABNORMAL
WBC NRBC COR # BLD: 11.34 10*3/MM3 (ref 3.4–10.8)
WBC NRBC COR # BLD: 13.64 10*3/MM3 (ref 3.4–10.8)

## 2022-05-17 PROCEDURE — 97162 PT EVAL MOD COMPLEX 30 MIN: CPT

## 2022-05-17 PROCEDURE — 93005 ELECTROCARDIOGRAM TRACING: CPT | Performed by: SURGERY

## 2022-05-17 PROCEDURE — 93010 ELECTROCARDIOGRAM REPORT: CPT | Performed by: EMERGENCY MEDICINE

## 2022-05-17 PROCEDURE — 85610 PROTHROMBIN TIME: CPT | Performed by: SURGERY

## 2022-05-17 PROCEDURE — 94799 UNLISTED PULMONARY SVC/PX: CPT

## 2022-05-17 PROCEDURE — 82803 BLOOD GASES ANY COMBINATION: CPT

## 2022-05-17 PROCEDURE — 25010000002 KETOROLAC TROMETHAMINE PER 15 MG: Performed by: NURSE PRACTITIONER

## 2022-05-17 PROCEDURE — 80048 BASIC METABOLIC PNL TOTAL CA: CPT | Performed by: NURSE PRACTITIONER

## 2022-05-17 PROCEDURE — 25010000002 ENOXAPARIN PER 10 MG: Performed by: SURGERY

## 2022-05-17 PROCEDURE — 25010000002 MORPHINE PER 10 MG: Performed by: SURGERY

## 2022-05-17 PROCEDURE — 99024 POSTOP FOLLOW-UP VISIT: CPT | Performed by: SURGERY

## 2022-05-17 PROCEDURE — 80069 RENAL FUNCTION PANEL: CPT | Performed by: SURGERY

## 2022-05-17 PROCEDURE — 25010000002 VANCOMYCIN 10 G RECONSTITUTED SOLUTION: Performed by: SURGERY

## 2022-05-17 PROCEDURE — 71045 X-RAY EXAM CHEST 1 VIEW: CPT

## 2022-05-17 PROCEDURE — 82962 GLUCOSE BLOOD TEST: CPT

## 2022-05-17 PROCEDURE — 83735 ASSAY OF MAGNESIUM: CPT | Performed by: SURGERY

## 2022-05-17 PROCEDURE — 85025 COMPLETE CBC W/AUTO DIFF WBC: CPT | Performed by: SURGERY

## 2022-05-17 PROCEDURE — 25010000002 ALBUMIN HUMAN-KJDA 5 % SOLUTION: Performed by: NURSE PRACTITIONER

## 2022-05-17 PROCEDURE — P9041 ALBUMIN (HUMAN),5%, 50ML: HCPCS | Performed by: NURSE PRACTITIONER

## 2022-05-17 PROCEDURE — 85027 COMPLETE CBC AUTOMATED: CPT | Performed by: NURSE PRACTITIONER

## 2022-05-17 PROCEDURE — 94761 N-INVAS EAR/PLS OXIMETRY MLT: CPT

## 2022-05-17 PROCEDURE — 97116 GAIT TRAINING THERAPY: CPT

## 2022-05-17 RX ORDER — POLYETHYLENE GLYCOL 3350 17 G/17G
17 POWDER, FOR SOLUTION ORAL DAILY
Status: DISCONTINUED | OUTPATIENT
Start: 2022-05-17 | End: 2022-05-21 | Stop reason: HOSPADM

## 2022-05-17 RX ORDER — DEXTROSE MONOHYDRATE 25 G/50ML
25 INJECTION, SOLUTION INTRAVENOUS
Status: DISCONTINUED | OUTPATIENT
Start: 2022-05-17 | End: 2022-05-18

## 2022-05-17 RX ORDER — NICOTINE POLACRILEX 4 MG
15 LOZENGE BUCCAL
Status: DISCONTINUED | OUTPATIENT
Start: 2022-05-17 | End: 2022-05-18

## 2022-05-17 RX ORDER — BISACODYL 5 MG/1
10 TABLET, DELAYED RELEASE ORAL 2 TIMES DAILY
Status: DISCONTINUED | OUTPATIENT
Start: 2022-05-17 | End: 2022-05-21 | Stop reason: HOSPADM

## 2022-05-17 RX ORDER — ACETAMINOPHEN 325 MG/1
650 TABLET ORAL EVERY 6 HOURS
Status: DISCONTINUED | OUTPATIENT
Start: 2022-05-17 | End: 2022-05-18

## 2022-05-17 RX ORDER — INSULIN LISPRO 100 [IU]/ML
0-14 INJECTION, SOLUTION INTRAVENOUS; SUBCUTANEOUS
Status: DISCONTINUED | OUTPATIENT
Start: 2022-05-17 | End: 2022-05-18

## 2022-05-17 RX ORDER — PANTOPRAZOLE SODIUM 40 MG/1
40 TABLET, DELAYED RELEASE ORAL
Status: DISCONTINUED | OUTPATIENT
Start: 2022-05-17 | End: 2022-05-21 | Stop reason: HOSPADM

## 2022-05-17 RX ORDER — TAMSULOSIN HYDROCHLORIDE 0.4 MG/1
0.4 CAPSULE ORAL NIGHTLY
Status: DISCONTINUED | OUTPATIENT
Start: 2022-05-17 | End: 2022-05-21 | Stop reason: HOSPADM

## 2022-05-17 RX ORDER — LIDOCAINE 50 MG/G
2 PATCH TOPICAL
Status: DISCONTINUED | OUTPATIENT
Start: 2022-05-17 | End: 2022-05-21 | Stop reason: HOSPADM

## 2022-05-17 RX ORDER — METHOCARBAMOL 750 MG/1
750 TABLET, FILM COATED ORAL EVERY 8 HOURS SCHEDULED
Status: DISCONTINUED | OUTPATIENT
Start: 2022-05-17 | End: 2022-05-21 | Stop reason: HOSPADM

## 2022-05-17 RX ORDER — KETOROLAC TROMETHAMINE 15 MG/ML
15 INJECTION, SOLUTION INTRAMUSCULAR; INTRAVENOUS EVERY 6 HOURS
Status: COMPLETED | OUTPATIENT
Start: 2022-05-17 | End: 2022-05-18

## 2022-05-17 RX ADMIN — LEVETIRACETAM 500 MG: 500 TABLET, FILM COATED ORAL at 20:47

## 2022-05-17 RX ADMIN — CLOPIDOGREL 75 MG: 75 TABLET, FILM COATED ORAL at 19:10

## 2022-05-17 RX ADMIN — METHOCARBAMOL 750 MG: 750 TABLET ORAL at 13:35

## 2022-05-17 RX ADMIN — IPRATROPIUM BROMIDE AND ALBUTEROL SULFATE 3 ML: 2.5; .5 SOLUTION RESPIRATORY (INHALATION) at 02:50

## 2022-05-17 RX ADMIN — OXYCODONE HYDROCHLORIDE AND ACETAMINOPHEN 1 TABLET: 5; 325 TABLET ORAL at 22:32

## 2022-05-17 RX ADMIN — ENOXAPARIN SODIUM 40 MG: 40 INJECTION SUBCUTANEOUS at 09:35

## 2022-05-17 RX ADMIN — VANCOMYCIN HYDROCHLORIDE 1750 MG: 10 INJECTION, POWDER, LYOPHILIZED, FOR SOLUTION INTRAVENOUS at 07:39

## 2022-05-17 RX ADMIN — KETOROLAC TROMETHAMINE 15 MG: 15 INJECTION, SOLUTION INTRAMUSCULAR; INTRAVENOUS at 12:35

## 2022-05-17 RX ADMIN — METOPROLOL TARTRATE 12.5 MG: 25 TABLET, FILM COATED ORAL at 20:47

## 2022-05-17 RX ADMIN — MORPHINE SULFATE 4 MG: 4 INJECTION, SOLUTION INTRAMUSCULAR; INTRAVENOUS at 01:43

## 2022-05-17 RX ADMIN — BISACODYL 10 MG: 5 TABLET ORAL at 09:36

## 2022-05-17 RX ADMIN — KETOROLAC TROMETHAMINE 15 MG: 15 INJECTION, SOLUTION INTRAMUSCULAR; INTRAVENOUS at 07:39

## 2022-05-17 RX ADMIN — METOPROLOL TARTRATE 12.5 MG: 25 TABLET, FILM COATED ORAL at 10:55

## 2022-05-17 RX ADMIN — LEVETIRACETAM 500 MG: 500 TABLET, FILM COATED ORAL at 09:36

## 2022-05-17 RX ADMIN — IPRATROPIUM BROMIDE AND ALBUTEROL SULFATE 3 ML: 2.5; .5 SOLUTION RESPIRATORY (INHALATION) at 18:59

## 2022-05-17 RX ADMIN — PANTOPRAZOLE SODIUM 40 MG: 40 TABLET, DELAYED RELEASE ORAL at 07:39

## 2022-05-17 RX ADMIN — POLYETHYLENE GLYCOL 3350 17 G: 17 POWDER, FOR SOLUTION ORAL at 09:36

## 2022-05-17 RX ADMIN — LIDOCAINE 2 PATCH: 50 PATCH CUTANEOUS at 09:52

## 2022-05-17 RX ADMIN — IPRATROPIUM BROMIDE AND ALBUTEROL SULFATE 3 ML: 2.5; .5 SOLUTION RESPIRATORY (INHALATION) at 10:22

## 2022-05-17 RX ADMIN — ACETAMINOPHEN 650 MG: 325 TABLET ORAL at 09:35

## 2022-05-17 RX ADMIN — IPRATROPIUM BROMIDE AND ALBUTEROL SULFATE 3 ML: 2.5; .5 SOLUTION RESPIRATORY (INHALATION) at 14:39

## 2022-05-17 RX ADMIN — OXYCODONE HYDROCHLORIDE AND ACETAMINOPHEN 1 TABLET: 5; 325 TABLET ORAL at 16:28

## 2022-05-17 RX ADMIN — ASPIRIN 162 MG: 81 TABLET, CHEWABLE ORAL at 09:36

## 2022-05-17 RX ADMIN — BISACODYL 10 MG: 5 TABLET ORAL at 20:47

## 2022-05-17 RX ADMIN — ALBUMIN HUMAN 500 ML: 0.05 SOLUTION INTRAVENOUS at 09:35

## 2022-05-17 RX ADMIN — IPRATROPIUM BROMIDE AND ALBUTEROL SULFATE 3 ML: 2.5; .5 SOLUTION RESPIRATORY (INHALATION) at 07:10

## 2022-05-17 RX ADMIN — OXYCODONE HYDROCHLORIDE AND ACETAMINOPHEN 1 TABLET: 5; 325 TABLET ORAL at 19:24

## 2022-05-17 RX ADMIN — OXYCODONE HYDROCHLORIDE AND ACETAMINOPHEN 1 TABLET: 10; 325 TABLET ORAL at 04:40

## 2022-05-17 RX ADMIN — CHLORHEXIDINE GLUCONATE 15 ML: 1.2 RINSE ORAL at 05:27

## 2022-05-17 RX ADMIN — ACETAMINOPHEN 650 MG: 325 TABLET ORAL at 20:47

## 2022-05-17 RX ADMIN — SODIUM CHLORIDE 2.5 MG/HR: 9 INJECTION, SOLUTION INTRAVENOUS at 00:08

## 2022-05-17 RX ADMIN — Medication 1 APPLICATION: at 05:27

## 2022-05-17 RX ADMIN — ALBUMIN HUMAN 500 ML: 0.05 SOLUTION INTRAVENOUS at 12:34

## 2022-05-17 RX ADMIN — VANCOMYCIN HYDROCHLORIDE 1750 MG: 10 INJECTION, POWDER, LYOPHILIZED, FOR SOLUTION INTRAVENOUS at 19:25

## 2022-05-17 RX ADMIN — TAMSULOSIN HYDROCHLORIDE 0.4 MG: 0.4 CAPSULE ORAL at 20:47

## 2022-05-17 RX ADMIN — IPRATROPIUM BROMIDE AND ALBUTEROL SULFATE 3 ML: 2.5; .5 SOLUTION RESPIRATORY (INHALATION) at 23:13

## 2022-05-17 RX ADMIN — OXYCODONE HYDROCHLORIDE AND ACETAMINOPHEN 1 TABLET: 10; 325 TABLET ORAL at 07:39

## 2022-05-17 RX ADMIN — KETOROLAC TROMETHAMINE 15 MG: 15 INJECTION, SOLUTION INTRAMUSCULAR; INTRAVENOUS at 19:10

## 2022-05-17 RX ADMIN — ATORVASTATIN CALCIUM 20 MG: 10 TABLET, FILM COATED ORAL at 20:47

## 2022-05-17 RX ADMIN — METHOCARBAMOL 750 MG: 750 TABLET ORAL at 22:32

## 2022-05-18 ENCOUNTER — APPOINTMENT (OUTPATIENT)
Dept: GENERAL RADIOLOGY | Facility: HOSPITAL | Age: 52
End: 2022-05-18

## 2022-05-18 LAB
ANION GAP SERPL CALCULATED.3IONS-SCNC: 11 MMOL/L (ref 5–15)
BUN SERPL-MCNC: 14 MG/DL (ref 6–20)
BUN/CREAT SERPL: 19.4 (ref 7–25)
CALCIUM SPEC-SCNC: 8.8 MG/DL (ref 8.6–10.5)
CHLORIDE SERPL-SCNC: 104 MMOL/L (ref 98–107)
CO2 SERPL-SCNC: 24 MMOL/L (ref 22–29)
CREAT SERPL-MCNC: 0.72 MG/DL (ref 0.76–1.27)
DEPRECATED RDW RBC AUTO: 51.4 FL (ref 37–54)
EGFRCR SERPLBLD CKD-EPI 2021: 110.6 ML/MIN/1.73
ERYTHROCYTE [DISTWIDTH] IN BLOOD BY AUTOMATED COUNT: 15.4 % (ref 12.3–15.4)
GLUCOSE BLDC GLUCOMTR-MCNC: 117 MG/DL (ref 70–130)
GLUCOSE BLDC GLUCOMTR-MCNC: 121 MG/DL (ref 70–130)
GLUCOSE BLDC GLUCOMTR-MCNC: 126 MG/DL (ref 70–130)
GLUCOSE SERPL-MCNC: 125 MG/DL (ref 65–99)
HCT VFR BLD AUTO: 33.7 % (ref 37.5–51)
HGB BLD-MCNC: 10.7 G/DL (ref 13–17.7)
MCH RBC QN AUTO: 28.6 PG (ref 26.6–33)
MCHC RBC AUTO-ENTMCNC: 31.8 G/DL (ref 31.5–35.7)
MCV RBC AUTO: 90.1 FL (ref 79–97)
PLATELET # BLD AUTO: 169 10*3/MM3 (ref 140–450)
PMV BLD AUTO: 10.3 FL (ref 6–12)
POTASSIUM SERPL-SCNC: 4 MMOL/L (ref 3.5–5.2)
RBC # BLD AUTO: 3.74 10*6/MM3 (ref 4.14–5.8)
SODIUM SERPL-SCNC: 139 MMOL/L (ref 136–145)
WBC NRBC COR # BLD: 8.75 10*3/MM3 (ref 3.4–10.8)

## 2022-05-18 PROCEDURE — 93010 ELECTROCARDIOGRAM REPORT: CPT | Performed by: EMERGENCY MEDICINE

## 2022-05-18 PROCEDURE — 80048 BASIC METABOLIC PNL TOTAL CA: CPT | Performed by: NURSE PRACTITIONER

## 2022-05-18 PROCEDURE — 82962 GLUCOSE BLOOD TEST: CPT

## 2022-05-18 PROCEDURE — 25010000002 ENOXAPARIN PER 10 MG: Performed by: SURGERY

## 2022-05-18 PROCEDURE — 93005 ELECTROCARDIOGRAM TRACING: CPT | Performed by: SURGERY

## 2022-05-18 PROCEDURE — 99024 POSTOP FOLLOW-UP VISIT: CPT | Performed by: THORACIC SURGERY (CARDIOTHORACIC VASCULAR SURGERY)

## 2022-05-18 PROCEDURE — 25010000002 KETOROLAC TROMETHAMINE PER 15 MG: Performed by: NURSE PRACTITIONER

## 2022-05-18 PROCEDURE — 97116 GAIT TRAINING THERAPY: CPT

## 2022-05-18 PROCEDURE — 71045 X-RAY EXAM CHEST 1 VIEW: CPT

## 2022-05-18 PROCEDURE — 85027 COMPLETE CBC AUTOMATED: CPT | Performed by: NURSE PRACTITIONER

## 2022-05-18 PROCEDURE — 25010000002 FUROSEMIDE PER 20 MG: Performed by: NURSE PRACTITIONER

## 2022-05-18 PROCEDURE — 94799 UNLISTED PULMONARY SVC/PX: CPT

## 2022-05-18 RX ORDER — BISACODYL 10 MG
10 SUPPOSITORY, RECTAL RECTAL ONCE
Status: DISCONTINUED | OUTPATIENT
Start: 2022-05-18 | End: 2022-05-21 | Stop reason: HOSPADM

## 2022-05-18 RX ORDER — PREGABALIN 25 MG/1
25 CAPSULE ORAL EVERY 12 HOURS SCHEDULED
Status: DISCONTINUED | OUTPATIENT
Start: 2022-05-18 | End: 2022-05-21 | Stop reason: HOSPADM

## 2022-05-18 RX ORDER — OXYCODONE HYDROCHLORIDE 5 MG/1
10 TABLET ORAL EVERY 4 HOURS PRN
Status: DISCONTINUED | OUTPATIENT
Start: 2022-05-18 | End: 2022-05-21 | Stop reason: HOSPADM

## 2022-05-18 RX ORDER — ACETAMINOPHEN 500 MG
1000 TABLET ORAL EVERY 6 HOURS
Status: DISCONTINUED | OUTPATIENT
Start: 2022-05-18 | End: 2022-05-21 | Stop reason: HOSPADM

## 2022-05-18 RX ORDER — OXYCODONE HYDROCHLORIDE 5 MG/1
15 TABLET ORAL EVERY 4 HOURS PRN
Status: DISCONTINUED | OUTPATIENT
Start: 2022-05-18 | End: 2022-05-21 | Stop reason: HOSPADM

## 2022-05-18 RX ORDER — FUROSEMIDE 10 MG/ML
20 INJECTION INTRAMUSCULAR; INTRAVENOUS
Status: DISCONTINUED | OUTPATIENT
Start: 2022-05-18 | End: 2022-05-21 | Stop reason: HOSPADM

## 2022-05-18 RX ORDER — POTASSIUM CHLORIDE 750 MG/1
20 CAPSULE, EXTENDED RELEASE ORAL 2 TIMES DAILY WITH MEALS
Status: DISCONTINUED | OUTPATIENT
Start: 2022-05-18 | End: 2022-05-21 | Stop reason: HOSPADM

## 2022-05-18 RX ORDER — NAPROXEN 250 MG/1
250 TABLET ORAL 2 TIMES DAILY WITH MEALS
Status: DISCONTINUED | OUTPATIENT
Start: 2022-05-18 | End: 2022-05-21 | Stop reason: HOSPADM

## 2022-05-18 RX ADMIN — NAPROXEN 250 MG: 250 TABLET ORAL at 23:17

## 2022-05-18 RX ADMIN — ATORVASTATIN CALCIUM 20 MG: 10 TABLET, FILM COATED ORAL at 22:22

## 2022-05-18 RX ADMIN — OXYCODONE HYDROCHLORIDE AND ACETAMINOPHEN 1 TABLET: 10; 325 TABLET ORAL at 19:41

## 2022-05-18 RX ADMIN — BISACODYL 10 MG: 5 TABLET ORAL at 08:55

## 2022-05-18 RX ADMIN — ACETAMINOPHEN 650 MG: 325 TABLET ORAL at 08:55

## 2022-05-18 RX ADMIN — IPRATROPIUM BROMIDE AND ALBUTEROL SULFATE 3 ML: 2.5; .5 SOLUTION RESPIRATORY (INHALATION) at 03:08

## 2022-05-18 RX ADMIN — LEVETIRACETAM 500 MG: 500 TABLET, FILM COATED ORAL at 08:55

## 2022-05-18 RX ADMIN — POLYETHYLENE GLYCOL 3350 17 G: 17 POWDER, FOR SOLUTION ORAL at 08:55

## 2022-05-18 RX ADMIN — POTASSIUM CHLORIDE 20 MEQ: 10 CAPSULE, COATED, EXTENDED RELEASE ORAL at 17:20

## 2022-05-18 RX ADMIN — METOPROLOL TARTRATE 12.5 MG: 25 TABLET, FILM COATED ORAL at 08:55

## 2022-05-18 RX ADMIN — ACETAMINOPHEN 1000 MG: 500 TABLET, FILM COATED ORAL at 23:17

## 2022-05-18 RX ADMIN — IPRATROPIUM BROMIDE AND ALBUTEROL SULFATE 3 ML: 2.5; .5 SOLUTION RESPIRATORY (INHALATION) at 10:35

## 2022-05-18 RX ADMIN — OXYCODONE HYDROCHLORIDE 15 MG: 5 TABLET ORAL at 22:30

## 2022-05-18 RX ADMIN — PANTOPRAZOLE SODIUM 40 MG: 40 TABLET, DELAYED RELEASE ORAL at 05:52

## 2022-05-18 RX ADMIN — OXYCODONE HYDROCHLORIDE AND ACETAMINOPHEN 1 TABLET: 5; 325 TABLET ORAL at 05:52

## 2022-05-18 RX ADMIN — ACETAMINOPHEN 650 MG: 325 TABLET ORAL at 02:24

## 2022-05-18 RX ADMIN — OXYCODONE HYDROCHLORIDE AND ACETAMINOPHEN 1 TABLET: 10; 325 TABLET ORAL at 12:34

## 2022-05-18 RX ADMIN — OXYCODONE HYDROCHLORIDE AND ACETAMINOPHEN 1 TABLET: 5; 325 TABLET ORAL at 02:24

## 2022-05-18 RX ADMIN — TAMSULOSIN HYDROCHLORIDE 0.4 MG: 0.4 CAPSULE ORAL at 22:22

## 2022-05-18 RX ADMIN — METOPROLOL TARTRATE 25 MG: 25 TABLET, FILM COATED ORAL at 22:22

## 2022-05-18 RX ADMIN — OXYCODONE HYDROCHLORIDE AND ACETAMINOPHEN 1 TABLET: 5; 325 TABLET ORAL at 09:37

## 2022-05-18 RX ADMIN — IPRATROPIUM BROMIDE AND ALBUTEROL SULFATE 3 ML: 2.5; .5 SOLUTION RESPIRATORY (INHALATION) at 06:50

## 2022-05-18 RX ADMIN — IPRATROPIUM BROMIDE AND ALBUTEROL SULFATE 3 ML: 2.5; .5 SOLUTION RESPIRATORY (INHALATION) at 19:40

## 2022-05-18 RX ADMIN — FUROSEMIDE 20 MG: 10 INJECTION INTRAMUSCULAR; INTRAVENOUS at 09:03

## 2022-05-18 RX ADMIN — LEVETIRACETAM 500 MG: 500 TABLET, FILM COATED ORAL at 22:22

## 2022-05-18 RX ADMIN — PREGABALIN 25 MG: 25 CAPSULE ORAL at 22:22

## 2022-05-18 RX ADMIN — METHOCARBAMOL 750 MG: 750 TABLET ORAL at 05:52

## 2022-05-18 RX ADMIN — FUROSEMIDE 20 MG: 10 INJECTION INTRAMUSCULAR; INTRAVENOUS at 17:20

## 2022-05-18 RX ADMIN — POTASSIUM CHLORIDE 20 MEQ: 10 CAPSULE, COATED, EXTENDED RELEASE ORAL at 09:03

## 2022-05-18 RX ADMIN — OXYCODONE HYDROCHLORIDE AND ACETAMINOPHEN 1 TABLET: 10; 325 TABLET ORAL at 16:17

## 2022-05-18 RX ADMIN — CHLORHEXIDINE GLUCONATE 15 ML: 1.2 RINSE ORAL at 05:51

## 2022-05-18 RX ADMIN — METHOCARBAMOL 750 MG: 750 TABLET ORAL at 14:56

## 2022-05-18 RX ADMIN — PREGABALIN 25 MG: 25 CAPSULE ORAL at 09:37

## 2022-05-18 RX ADMIN — KETOROLAC TROMETHAMINE 15 MG: 15 INJECTION, SOLUTION INTRAMUSCULAR; INTRAVENOUS at 02:24

## 2022-05-18 RX ADMIN — METHOCARBAMOL 750 MG: 750 TABLET ORAL at 22:22

## 2022-05-18 RX ADMIN — ACETAMINOPHEN 650 MG: 325 TABLET ORAL at 14:56

## 2022-05-18 RX ADMIN — CLOPIDOGREL 75 MG: 75 TABLET, FILM COATED ORAL at 17:21

## 2022-05-18 RX ADMIN — ASPIRIN 81 MG: 81 TABLET, COATED ORAL at 08:55

## 2022-05-18 RX ADMIN — LIDOCAINE 2 PATCH: 50 PATCH CUTANEOUS at 08:56

## 2022-05-18 RX ADMIN — IPRATROPIUM BROMIDE AND ALBUTEROL SULFATE 3 ML: 2.5; .5 SOLUTION RESPIRATORY (INHALATION) at 23:32

## 2022-05-18 RX ADMIN — BISACODYL 10 MG: 5 TABLET ORAL at 22:22

## 2022-05-18 RX ADMIN — ENOXAPARIN SODIUM 40 MG: 40 INJECTION SUBCUTANEOUS at 09:03

## 2022-05-19 ENCOUNTER — APPOINTMENT (OUTPATIENT)
Dept: GENERAL RADIOLOGY | Facility: HOSPITAL | Age: 52
End: 2022-05-19

## 2022-05-19 LAB
ANION GAP SERPL CALCULATED.3IONS-SCNC: 12 MMOL/L (ref 5–15)
ANION GAP SERPL CALCULATED.3IONS-SCNC: 18 MMOL/L (ref 5–15)
BUN SERPL-MCNC: 11 MG/DL (ref 6–20)
BUN SERPL-MCNC: 11 MG/DL (ref 6–20)
BUN/CREAT SERPL: 14.7 (ref 7–25)
BUN/CREAT SERPL: 16.2 (ref 7–25)
CALCIUM SPEC-SCNC: 8.9 MG/DL (ref 8.6–10.5)
CALCIUM SPEC-SCNC: 9.3 MG/DL (ref 8.6–10.5)
CHLORIDE SERPL-SCNC: 100 MMOL/L (ref 98–107)
CHLORIDE SERPL-SCNC: 103 MMOL/L (ref 98–107)
CO2 SERPL-SCNC: 17 MMOL/L (ref 22–29)
CO2 SERPL-SCNC: 24 MMOL/L (ref 22–29)
CREAT SERPL-MCNC: 0.68 MG/DL (ref 0.76–1.27)
CREAT SERPL-MCNC: 0.75 MG/DL (ref 0.76–1.27)
D-LACTATE SERPL-SCNC: 1 MMOL/L (ref 0.5–2)
DEPRECATED RDW RBC AUTO: 49.3 FL (ref 37–54)
DEPRECATED RDW RBC AUTO: 49.6 FL (ref 37–54)
EGFRCR SERPLBLD CKD-EPI 2021: 109.3 ML/MIN/1.73
EGFRCR SERPLBLD CKD-EPI 2021: 112.5 ML/MIN/1.73
ERYTHROCYTE [DISTWIDTH] IN BLOOD BY AUTOMATED COUNT: 15.3 % (ref 12.3–15.4)
ERYTHROCYTE [DISTWIDTH] IN BLOOD BY AUTOMATED COUNT: 15.3 % (ref 12.3–15.4)
GLUCOSE SERPL-MCNC: 105 MG/DL (ref 65–99)
GLUCOSE SERPL-MCNC: 134 MG/DL (ref 65–99)
HCT VFR BLD AUTO: 35 % (ref 37.5–51)
HCT VFR BLD AUTO: 35.2 % (ref 37.5–51)
HGB BLD-MCNC: 11.3 G/DL (ref 13–17.7)
HGB BLD-MCNC: 11.6 G/DL (ref 13–17.7)
MAXIMAL PREDICTED HEART RATE: 169 BPM
MCH RBC QN AUTO: 28.3 PG (ref 26.6–33)
MCH RBC QN AUTO: 29 PG (ref 26.6–33)
MCHC RBC AUTO-ENTMCNC: 32.3 G/DL (ref 31.5–35.7)
MCHC RBC AUTO-ENTMCNC: 33 G/DL (ref 31.5–35.7)
MCV RBC AUTO: 87.7 FL (ref 79–97)
MCV RBC AUTO: 88 FL (ref 79–97)
PLATELET # BLD AUTO: 186 10*3/MM3 (ref 140–450)
PLATELET # BLD AUTO: 188 10*3/MM3 (ref 140–450)
PMV BLD AUTO: 10.6 FL (ref 6–12)
PMV BLD AUTO: 9.8 FL (ref 6–12)
POTASSIUM SERPL-SCNC: 4.1 MMOL/L (ref 3.5–5.2)
POTASSIUM SERPL-SCNC: 4.9 MMOL/L (ref 3.5–5.2)
QT INTERVAL: 340 MS
QTC INTERVAL: 443 MS
RBC # BLD AUTO: 3.99 10*6/MM3 (ref 4.14–5.8)
RBC # BLD AUTO: 4 10*6/MM3 (ref 4.14–5.8)
SODIUM SERPL-SCNC: 136 MMOL/L (ref 136–145)
SODIUM SERPL-SCNC: 138 MMOL/L (ref 136–145)
STRESS TARGET HR: 144 BPM
WBC NRBC COR # BLD: 9.18 10*3/MM3 (ref 3.4–10.8)
WBC NRBC COR # BLD: 9.55 10*3/MM3 (ref 3.4–10.8)

## 2022-05-19 PROCEDURE — 94799 UNLISTED PULMONARY SVC/PX: CPT

## 2022-05-19 PROCEDURE — 25010000002 ENOXAPARIN PER 10 MG: Performed by: SURGERY

## 2022-05-19 PROCEDURE — 85027 COMPLETE CBC AUTOMATED: CPT | Performed by: NURSE PRACTITIONER

## 2022-05-19 PROCEDURE — 80048 BASIC METABOLIC PNL TOTAL CA: CPT | Performed by: NURSE PRACTITIONER

## 2022-05-19 PROCEDURE — 97116 GAIT TRAINING THERAPY: CPT

## 2022-05-19 PROCEDURE — 25010000002 FUROSEMIDE PER 20 MG: Performed by: NURSE PRACTITIONER

## 2022-05-19 PROCEDURE — 94664 DEMO&/EVAL PT USE INHALER: CPT

## 2022-05-19 PROCEDURE — 83605 ASSAY OF LACTIC ACID: CPT | Performed by: NURSE PRACTITIONER

## 2022-05-19 PROCEDURE — 94761 N-INVAS EAR/PLS OXIMETRY MLT: CPT

## 2022-05-19 PROCEDURE — 71046 X-RAY EXAM CHEST 2 VIEWS: CPT

## 2022-05-19 PROCEDURE — 99024 POSTOP FOLLOW-UP VISIT: CPT | Performed by: THORACIC SURGERY (CARDIOTHORACIC VASCULAR SURGERY)

## 2022-05-19 RX ORDER — NICOTINE 21 MG/24HR
1 PATCH, TRANSDERMAL 24 HOURS TRANSDERMAL EVERY 24 HOURS
COMMUNITY

## 2022-05-19 RX ORDER — OLANZAPINE AND FLUOXETINE 6; 50 MG/1; MG/1
1 CAPSULE ORAL EVERY EVENING
COMMUNITY

## 2022-05-19 RX ADMIN — LEVETIRACETAM 500 MG: 500 TABLET, FILM COATED ORAL at 20:00

## 2022-05-19 RX ADMIN — PREGABALIN 25 MG: 25 CAPSULE ORAL at 20:00

## 2022-05-19 RX ADMIN — ENOXAPARIN SODIUM 40 MG: 40 INJECTION SUBCUTANEOUS at 08:58

## 2022-05-19 RX ADMIN — ATORVASTATIN CALCIUM 20 MG: 10 TABLET, FILM COATED ORAL at 20:00

## 2022-05-19 RX ADMIN — IPRATROPIUM BROMIDE AND ALBUTEROL SULFATE 3 ML: 2.5; .5 SOLUTION RESPIRATORY (INHALATION) at 06:14

## 2022-05-19 RX ADMIN — ACETAMINOPHEN 1000 MG: 500 TABLET, FILM COATED ORAL at 19:59

## 2022-05-19 RX ADMIN — PREGABALIN 25 MG: 25 CAPSULE ORAL at 08:58

## 2022-05-19 RX ADMIN — POTASSIUM CHLORIDE 20 MEQ: 10 CAPSULE, COATED, EXTENDED RELEASE ORAL at 08:58

## 2022-05-19 RX ADMIN — NAPROXEN 250 MG: 250 TABLET ORAL at 08:58

## 2022-05-19 RX ADMIN — LIDOCAINE 2 PATCH: 50 PATCH CUTANEOUS at 08:58

## 2022-05-19 RX ADMIN — BISACODYL 10 MG: 5 TABLET ORAL at 20:00

## 2022-05-19 RX ADMIN — IPRATROPIUM BROMIDE AND ALBUTEROL SULFATE 3 ML: 2.5; .5 SOLUTION RESPIRATORY (INHALATION) at 14:34

## 2022-05-19 RX ADMIN — TAMSULOSIN HYDROCHLORIDE 0.4 MG: 0.4 CAPSULE ORAL at 20:00

## 2022-05-19 RX ADMIN — OXYCODONE HYDROCHLORIDE 15 MG: 5 TABLET ORAL at 06:49

## 2022-05-19 RX ADMIN — ASPIRIN 81 MG: 81 TABLET, COATED ORAL at 08:58

## 2022-05-19 RX ADMIN — PANTOPRAZOLE SODIUM 40 MG: 40 TABLET, DELAYED RELEASE ORAL at 05:23

## 2022-05-19 RX ADMIN — IPRATROPIUM BROMIDE AND ALBUTEROL SULFATE 3 ML: 2.5; .5 SOLUTION RESPIRATORY (INHALATION) at 10:48

## 2022-05-19 RX ADMIN — OXYCODONE HYDROCHLORIDE 10 MG: 5 TABLET ORAL at 10:50

## 2022-05-19 RX ADMIN — METHOCARBAMOL 750 MG: 750 TABLET ORAL at 13:43

## 2022-05-19 RX ADMIN — BISACODYL 10 MG: 5 TABLET ORAL at 08:59

## 2022-05-19 RX ADMIN — POTASSIUM CHLORIDE 20 MEQ: 10 CAPSULE, COATED, EXTENDED RELEASE ORAL at 17:19

## 2022-05-19 RX ADMIN — ACETAMINOPHEN 1000 MG: 500 TABLET, FILM COATED ORAL at 22:46

## 2022-05-19 RX ADMIN — METHOCARBAMOL 750 MG: 750 TABLET ORAL at 22:46

## 2022-05-19 RX ADMIN — METOPROLOL TARTRATE 37.5 MG: 25 TABLET, FILM COATED ORAL at 20:00

## 2022-05-19 RX ADMIN — FUROSEMIDE 20 MG: 10 INJECTION INTRAMUSCULAR; INTRAVENOUS at 08:58

## 2022-05-19 RX ADMIN — IPRATROPIUM BROMIDE AND ALBUTEROL SULFATE 3 ML: 2.5; .5 SOLUTION RESPIRATORY (INHALATION) at 19:08

## 2022-05-19 RX ADMIN — OXYCODONE HYDROCHLORIDE 15 MG: 5 TABLET ORAL at 21:30

## 2022-05-19 RX ADMIN — METHOCARBAMOL 750 MG: 750 TABLET ORAL at 05:22

## 2022-05-19 RX ADMIN — METOPROLOL TARTRATE 37.5 MG: 25 TABLET, FILM COATED ORAL at 08:57

## 2022-05-19 RX ADMIN — NAPROXEN 250 MG: 250 TABLET ORAL at 17:20

## 2022-05-19 RX ADMIN — ACETAMINOPHEN 1000 MG: 500 TABLET, FILM COATED ORAL at 13:42

## 2022-05-19 RX ADMIN — FUROSEMIDE 20 MG: 10 INJECTION INTRAMUSCULAR; INTRAVENOUS at 17:19

## 2022-05-19 RX ADMIN — OXYCODONE HYDROCHLORIDE 15 MG: 5 TABLET ORAL at 17:19

## 2022-05-19 RX ADMIN — LEVETIRACETAM 500 MG: 500 TABLET, FILM COATED ORAL at 08:59

## 2022-05-19 RX ADMIN — POLYETHYLENE GLYCOL 3350 17 G: 17 POWDER, FOR SOLUTION ORAL at 08:58

## 2022-05-19 RX ADMIN — CLOPIDOGREL 75 MG: 75 TABLET, FILM COATED ORAL at 17:19

## 2022-05-19 RX ADMIN — IPRATROPIUM BROMIDE AND ALBUTEROL SULFATE 3 ML: 2.5; .5 SOLUTION RESPIRATORY (INHALATION) at 03:17

## 2022-05-19 RX ADMIN — OXYCODONE HYDROCHLORIDE 15 MG: 5 TABLET ORAL at 02:57

## 2022-05-20 ENCOUNTER — APPOINTMENT (OUTPATIENT)
Dept: GENERAL RADIOLOGY | Facility: HOSPITAL | Age: 52
End: 2022-05-20

## 2022-05-20 LAB
ANION GAP SERPL CALCULATED.3IONS-SCNC: 10 MMOL/L (ref 5–15)
BH BB BLOOD EXPIRATION DATE: NORMAL
BH BB BLOOD EXPIRATION DATE: NORMAL
BH BB BLOOD TYPE BARCODE: 6200
BH BB BLOOD TYPE BARCODE: 6200
BH BB DISPENSE STATUS: NORMAL
BH BB DISPENSE STATUS: NORMAL
BH BB PRODUCT CODE: NORMAL
BH BB PRODUCT CODE: NORMAL
BH BB UNIT NUMBER: NORMAL
BH BB UNIT NUMBER: NORMAL
BUN SERPL-MCNC: 14 MG/DL (ref 6–20)
BUN/CREAT SERPL: 19.7 (ref 7–25)
CALCIUM SPEC-SCNC: 9.1 MG/DL (ref 8.6–10.5)
CHLORIDE SERPL-SCNC: 103 MMOL/L (ref 98–107)
CO2 SERPL-SCNC: 25 MMOL/L (ref 22–29)
CREAT SERPL-MCNC: 0.71 MG/DL (ref 0.76–1.27)
CROSSMATCH INTERPRETATION: NORMAL
CROSSMATCH INTERPRETATION: NORMAL
DEPRECATED RDW RBC AUTO: 50.1 FL (ref 37–54)
EGFRCR SERPLBLD CKD-EPI 2021: 111.1 ML/MIN/1.73
ERYTHROCYTE [DISTWIDTH] IN BLOOD BY AUTOMATED COUNT: 15.4 % (ref 12.3–15.4)
GLUCOSE SERPL-MCNC: 111 MG/DL (ref 65–99)
HCT VFR BLD AUTO: 33.6 % (ref 37.5–51)
HGB BLD-MCNC: 10.9 G/DL (ref 13–17.7)
MCH RBC QN AUTO: 28.6 PG (ref 26.6–33)
MCHC RBC AUTO-ENTMCNC: 32.4 G/DL (ref 31.5–35.7)
MCV RBC AUTO: 88.2 FL (ref 79–97)
PLATELET # BLD AUTO: 189 10*3/MM3 (ref 140–450)
PMV BLD AUTO: 10.4 FL (ref 6–12)
POTASSIUM SERPL-SCNC: 4.3 MMOL/L (ref 3.5–5.2)
RBC # BLD AUTO: 3.81 10*6/MM3 (ref 4.14–5.8)
SODIUM SERPL-SCNC: 138 MMOL/L (ref 136–145)
UNIT  ABO: NORMAL
UNIT  ABO: NORMAL
UNIT  RH: NORMAL
UNIT  RH: NORMAL
WBC NRBC COR # BLD: 8.64 10*3/MM3 (ref 3.4–10.8)

## 2022-05-20 PROCEDURE — 97116 GAIT TRAINING THERAPY: CPT

## 2022-05-20 PROCEDURE — 99024 POSTOP FOLLOW-UP VISIT: CPT | Performed by: THORACIC SURGERY (CARDIOTHORACIC VASCULAR SURGERY)

## 2022-05-20 PROCEDURE — 94799 UNLISTED PULMONARY SVC/PX: CPT

## 2022-05-20 PROCEDURE — 80048 BASIC METABOLIC PNL TOTAL CA: CPT | Performed by: NURSE PRACTITIONER

## 2022-05-20 PROCEDURE — 71045 X-RAY EXAM CHEST 1 VIEW: CPT

## 2022-05-20 PROCEDURE — 25010000002 FUROSEMIDE PER 20 MG: Performed by: NURSE PRACTITIONER

## 2022-05-20 PROCEDURE — 85027 COMPLETE CBC AUTOMATED: CPT | Performed by: NURSE PRACTITIONER

## 2022-05-20 PROCEDURE — 25010000002 ENOXAPARIN PER 10 MG: Performed by: SURGERY

## 2022-05-20 PROCEDURE — 71046 X-RAY EXAM CHEST 2 VIEWS: CPT

## 2022-05-20 RX ORDER — METOPROLOL TARTRATE 50 MG/1
50 TABLET, FILM COATED ORAL EVERY 12 HOURS SCHEDULED
Status: DISCONTINUED | OUTPATIENT
Start: 2022-05-20 | End: 2022-05-21 | Stop reason: HOSPADM

## 2022-05-20 RX ADMIN — NAPROXEN 250 MG: 250 TABLET ORAL at 09:50

## 2022-05-20 RX ADMIN — BISACODYL 10 MG: 5 TABLET ORAL at 09:50

## 2022-05-20 RX ADMIN — IPRATROPIUM BROMIDE AND ALBUTEROL SULFATE 3 ML: 2.5; .5 SOLUTION RESPIRATORY (INHALATION) at 13:54

## 2022-05-20 RX ADMIN — OXYCODONE HYDROCHLORIDE 15 MG: 5 TABLET ORAL at 22:24

## 2022-05-20 RX ADMIN — ACETAMINOPHEN 1000 MG: 500 TABLET, FILM COATED ORAL at 22:24

## 2022-05-20 RX ADMIN — IPRATROPIUM BROMIDE AND ALBUTEROL SULFATE 3 ML: 2.5; .5 SOLUTION RESPIRATORY (INHALATION) at 10:05

## 2022-05-20 RX ADMIN — ACETAMINOPHEN 1000 MG: 500 TABLET, FILM COATED ORAL at 05:09

## 2022-05-20 RX ADMIN — METOPROLOL TARTRATE 37.5 MG: 25 TABLET, FILM COATED ORAL at 09:51

## 2022-05-20 RX ADMIN — ACETAMINOPHEN 1000 MG: 500 TABLET, FILM COATED ORAL at 17:11

## 2022-05-20 RX ADMIN — OXYCODONE HYDROCHLORIDE 15 MG: 5 TABLET ORAL at 10:34

## 2022-05-20 RX ADMIN — BISACODYL 10 MG: 5 TABLET ORAL at 20:21

## 2022-05-20 RX ADMIN — METHOCARBAMOL 750 MG: 750 TABLET ORAL at 14:34

## 2022-05-20 RX ADMIN — IPRATROPIUM BROMIDE AND ALBUTEROL SULFATE 3 ML: 2.5; .5 SOLUTION RESPIRATORY (INHALATION) at 23:12

## 2022-05-20 RX ADMIN — POTASSIUM CHLORIDE 20 MEQ: 10 CAPSULE, COATED, EXTENDED RELEASE ORAL at 17:11

## 2022-05-20 RX ADMIN — IPRATROPIUM BROMIDE AND ALBUTEROL SULFATE 3 ML: 2.5; .5 SOLUTION RESPIRATORY (INHALATION) at 19:06

## 2022-05-20 RX ADMIN — ATORVASTATIN CALCIUM 20 MG: 10 TABLET, FILM COATED ORAL at 20:21

## 2022-05-20 RX ADMIN — PANTOPRAZOLE SODIUM 40 MG: 40 TABLET, DELAYED RELEASE ORAL at 05:09

## 2022-05-20 RX ADMIN — PREGABALIN 25 MG: 25 CAPSULE ORAL at 20:21

## 2022-05-20 RX ADMIN — LEVETIRACETAM 500 MG: 500 TABLET, FILM COATED ORAL at 09:51

## 2022-05-20 RX ADMIN — FUROSEMIDE 20 MG: 10 INJECTION INTRAMUSCULAR; INTRAVENOUS at 17:11

## 2022-05-20 RX ADMIN — OXYCODONE HYDROCHLORIDE 15 MG: 5 TABLET ORAL at 06:10

## 2022-05-20 RX ADMIN — FUROSEMIDE 20 MG: 10 INJECTION INTRAMUSCULAR; INTRAVENOUS at 09:50

## 2022-05-20 RX ADMIN — LIDOCAINE 2 PATCH: 50 PATCH CUTANEOUS at 09:52

## 2022-05-20 RX ADMIN — POTASSIUM CHLORIDE 20 MEQ: 10 CAPSULE, COATED, EXTENDED RELEASE ORAL at 09:51

## 2022-05-20 RX ADMIN — METOPROLOL TARTRATE 50 MG: 50 TABLET, FILM COATED ORAL at 20:21

## 2022-05-20 RX ADMIN — ENOXAPARIN SODIUM 40 MG: 40 INJECTION SUBCUTANEOUS at 09:50

## 2022-05-20 RX ADMIN — TAMSULOSIN HYDROCHLORIDE 0.4 MG: 0.4 CAPSULE ORAL at 20:21

## 2022-05-20 RX ADMIN — ACETAMINOPHEN 1000 MG: 500 TABLET, FILM COATED ORAL at 10:34

## 2022-05-20 RX ADMIN — OXYCODONE HYDROCHLORIDE 15 MG: 5 TABLET ORAL at 01:48

## 2022-05-20 RX ADMIN — METHOCARBAMOL 750 MG: 750 TABLET ORAL at 22:24

## 2022-05-20 RX ADMIN — CLOPIDOGREL 75 MG: 75 TABLET, FILM COATED ORAL at 17:11

## 2022-05-20 RX ADMIN — OXYCODONE HYDROCHLORIDE 15 MG: 5 TABLET ORAL at 14:34

## 2022-05-20 RX ADMIN — OXYCODONE HYDROCHLORIDE 15 MG: 5 TABLET ORAL at 18:26

## 2022-05-20 RX ADMIN — ASPIRIN 81 MG: 81 TABLET, COATED ORAL at 09:51

## 2022-05-20 RX ADMIN — NAPROXEN 250 MG: 250 TABLET ORAL at 17:11

## 2022-05-20 RX ADMIN — LEVETIRACETAM 500 MG: 500 TABLET, FILM COATED ORAL at 20:21

## 2022-05-20 RX ADMIN — METHOCARBAMOL 750 MG: 750 TABLET ORAL at 05:09

## 2022-05-20 RX ADMIN — IPRATROPIUM BROMIDE AND ALBUTEROL SULFATE 3 ML: 2.5; .5 SOLUTION RESPIRATORY (INHALATION) at 06:05

## 2022-05-20 RX ADMIN — PREGABALIN 25 MG: 25 CAPSULE ORAL at 09:50

## 2022-05-21 ENCOUNTER — APPOINTMENT (OUTPATIENT)
Dept: GENERAL RADIOLOGY | Facility: HOSPITAL | Age: 52
End: 2022-05-21

## 2022-05-21 ENCOUNTER — READMISSION MANAGEMENT (OUTPATIENT)
Dept: CALL CENTER | Facility: HOSPITAL | Age: 52
End: 2022-05-21

## 2022-05-21 VITALS
HEIGHT: 72 IN | DIASTOLIC BLOOD PRESSURE: 65 MMHG | HEART RATE: 90 BPM | TEMPERATURE: 98 F | WEIGHT: 241.2 LBS | OXYGEN SATURATION: 98 % | RESPIRATION RATE: 18 BRPM | BODY MASS INDEX: 32.67 KG/M2 | SYSTOLIC BLOOD PRESSURE: 114 MMHG

## 2022-05-21 LAB
ANION GAP SERPL CALCULATED.3IONS-SCNC: 9 MMOL/L (ref 5–15)
BUN SERPL-MCNC: 15 MG/DL (ref 6–20)
BUN/CREAT SERPL: 22.4 (ref 7–25)
CALCIUM SPEC-SCNC: 9.3 MG/DL (ref 8.6–10.5)
CHLORIDE SERPL-SCNC: 101 MMOL/L (ref 98–107)
CO2 SERPL-SCNC: 25 MMOL/L (ref 22–29)
CREAT SERPL-MCNC: 0.67 MG/DL (ref 0.76–1.27)
DEPRECATED RDW RBC AUTO: 47.7 FL (ref 37–54)
EGFRCR SERPLBLD CKD-EPI 2021: 113 ML/MIN/1.73
ERYTHROCYTE [DISTWIDTH] IN BLOOD BY AUTOMATED COUNT: 14.7 % (ref 12.3–15.4)
GLUCOSE SERPL-MCNC: 116 MG/DL (ref 65–99)
HCT VFR BLD AUTO: 34.7 % (ref 37.5–51)
HGB BLD-MCNC: 11.3 G/DL (ref 13–17.7)
MCH RBC QN AUTO: 28.5 PG (ref 26.6–33)
MCHC RBC AUTO-ENTMCNC: 32.6 G/DL (ref 31.5–35.7)
MCV RBC AUTO: 87.4 FL (ref 79–97)
PLATELET # BLD AUTO: 210 10*3/MM3 (ref 140–450)
PMV BLD AUTO: 10.1 FL (ref 6–12)
POTASSIUM SERPL-SCNC: 4.2 MMOL/L (ref 3.5–5.2)
RBC # BLD AUTO: 3.97 10*6/MM3 (ref 4.14–5.8)
SODIUM SERPL-SCNC: 135 MMOL/L (ref 136–145)
WBC NRBC COR # BLD: 9.1 10*3/MM3 (ref 3.4–10.8)

## 2022-05-21 PROCEDURE — 85027 COMPLETE CBC AUTOMATED: CPT | Performed by: NURSE PRACTITIONER

## 2022-05-21 PROCEDURE — 71045 X-RAY EXAM CHEST 1 VIEW: CPT

## 2022-05-21 PROCEDURE — 97116 GAIT TRAINING THERAPY: CPT

## 2022-05-21 PROCEDURE — 94799 UNLISTED PULMONARY SVC/PX: CPT

## 2022-05-21 PROCEDURE — 25010000002 ENOXAPARIN PER 10 MG: Performed by: SURGERY

## 2022-05-21 PROCEDURE — 99024 POSTOP FOLLOW-UP VISIT: CPT | Performed by: THORACIC SURGERY (CARDIOTHORACIC VASCULAR SURGERY)

## 2022-05-21 PROCEDURE — 80048 BASIC METABOLIC PNL TOTAL CA: CPT | Performed by: NURSE PRACTITIONER

## 2022-05-21 PROCEDURE — 25010000002 FUROSEMIDE PER 20 MG: Performed by: NURSE PRACTITIONER

## 2022-05-21 RX ORDER — LISINOPRIL 5 MG/1
5 TABLET ORAL DAILY
Qty: 30 TABLET | Refills: 2 | Status: SHIPPED | OUTPATIENT
Start: 2022-05-21

## 2022-05-21 RX ORDER — METOPROLOL SUCCINATE 50 MG/1
50 TABLET, EXTENDED RELEASE ORAL DAILY
Qty: 30 TABLET | Refills: 2 | Status: SHIPPED | OUTPATIENT
Start: 2022-05-21

## 2022-05-21 RX ADMIN — IPRATROPIUM BROMIDE AND ALBUTEROL SULFATE 3 ML: 2.5; .5 SOLUTION RESPIRATORY (INHALATION) at 15:27

## 2022-05-21 RX ADMIN — ASPIRIN 81 MG: 81 TABLET, COATED ORAL at 09:49

## 2022-05-21 RX ADMIN — ACETAMINOPHEN 1000 MG: 500 TABLET, FILM COATED ORAL at 12:03

## 2022-05-21 RX ADMIN — OXYCODONE HYDROCHLORIDE 15 MG: 5 TABLET ORAL at 10:04

## 2022-05-21 RX ADMIN — OXYCODONE HYDROCHLORIDE 15 MG: 5 TABLET ORAL at 02:39

## 2022-05-21 RX ADMIN — FUROSEMIDE 20 MG: 10 INJECTION INTRAMUSCULAR; INTRAVENOUS at 09:48

## 2022-05-21 RX ADMIN — METHOCARBAMOL 750 MG: 750 TABLET ORAL at 13:56

## 2022-05-21 RX ADMIN — METHOCARBAMOL 750 MG: 750 TABLET ORAL at 05:31

## 2022-05-21 RX ADMIN — LIDOCAINE 2 PATCH: 50 PATCH CUTANEOUS at 09:56

## 2022-05-21 RX ADMIN — OXYCODONE HYDROCHLORIDE 15 MG: 5 TABLET ORAL at 06:11

## 2022-05-21 RX ADMIN — POTASSIUM CHLORIDE 20 MEQ: 10 CAPSULE, COATED, EXTENDED RELEASE ORAL at 09:49

## 2022-05-21 RX ADMIN — IPRATROPIUM BROMIDE AND ALBUTEROL SULFATE 3 ML: 2.5; .5 SOLUTION RESPIRATORY (INHALATION) at 10:55

## 2022-05-21 RX ADMIN — ACETAMINOPHEN 1000 MG: 500 TABLET, FILM COATED ORAL at 05:31

## 2022-05-21 RX ADMIN — PANTOPRAZOLE SODIUM 40 MG: 40 TABLET, DELAYED RELEASE ORAL at 05:31

## 2022-05-21 RX ADMIN — ENOXAPARIN SODIUM 40 MG: 40 INJECTION SUBCUTANEOUS at 09:49

## 2022-05-21 RX ADMIN — OXYCODONE HYDROCHLORIDE 15 MG: 5 TABLET ORAL at 13:55

## 2022-05-21 RX ADMIN — NAPROXEN 250 MG: 250 TABLET ORAL at 09:49

## 2022-05-21 RX ADMIN — METOPROLOL TARTRATE 50 MG: 50 TABLET, FILM COATED ORAL at 09:49

## 2022-05-21 RX ADMIN — PREGABALIN 25 MG: 25 CAPSULE ORAL at 09:49

## 2022-05-21 RX ADMIN — LEVETIRACETAM 500 MG: 500 TABLET, FILM COATED ORAL at 09:49

## 2022-05-21 RX ADMIN — IPRATROPIUM BROMIDE AND ALBUTEROL SULFATE 3 ML: 2.5; .5 SOLUTION RESPIRATORY (INHALATION) at 07:12

## 2022-05-21 RX ADMIN — IPRATROPIUM BROMIDE AND ALBUTEROL SULFATE 3 ML: 2.5; .5 SOLUTION RESPIRATORY (INHALATION) at 03:04

## 2022-05-22 NOTE — OUTREACH NOTE
Prep Survey    Flowsheet Row Responses   [facility] facility patient discharged from? [place]   Is LACE score < 7 ? No   Emergency Room discharge w/ pulse ox? No   Eligibility Readm Mgmt   Discharge diagnosis Coronary Artery Diseasecoronary bypass grafting   Does the patient have one of the following disease processes/diagnoses(primary or secondary)? Cardiothoracic surgery   Does the patient have Home health ordered? No   Is there a DME ordered? No   Prep survey completed? Yes          GROVER FRANCIS - Registered Nurse

## 2022-05-24 ENCOUNTER — READMISSION MANAGEMENT (OUTPATIENT)
Dept: CALL CENTER | Facility: HOSPITAL | Age: 52
End: 2022-05-24

## 2022-05-24 NOTE — OUTREACH NOTE
CT Surgery Week 1 Survey    Flowsheet Row Responses   [facility] patient discharged from? [place]   Does the patient have one of the following disease processes/diagnoses(primary or secondary)? Cardiothoracic surgery   Week 1 attempt successful? Yes   Call start time 1850   Call end time 1852   Discharge diagnosis Coronary Artery Diseasecoronary bypass grafting   Meds reviewed with patient/caregiver? Yes   Is the patient having any side effects they believe may be caused by any medication additions or changes? No   Does the patient have all medications related to this admission filled (includes all antibiotics, pain medications, cardiac medications, etc.) Yes   Is the patient taking all medications as directed (includes completed medication regime)? Yes   Does the patient have a primary care provider?  Yes   Does the patient have an appointment scheduled with their C/T surgeon? Yes   Has the patient kept scheduled appointments due by today? Yes   Comments Followed    Psychosocial issues? No   Did the patient receive a copy of their discharge instructions? Yes   Nursing interventions Reviewed instructions with patient   What is the patient's perception of their health status since discharge? Improving   Is the patient /caregiver able to teach back basic post-op care? Drive as instructed by MD in discharge instructions, Lifting as instructed by MD in discharge instructions, Practice 'cough and deep breath'   Is the patient/caregiver able to teach back signs and symptoms of incisional infection? Increased redness, swelling or pain at the incisonal site, Increased drainage or bleeding, Incisional warmth, Pus or odor from incision, Fever   Is the patient/caregiver able to teach back steps to recovery at home? Set small, achievable goals for return to baseline health, Rest and rebuild strength, gradually increase activity, Eat a well-balance diet   Is the patient/caregiver able to teach back the hierarchy of who to  "call/visit for symptoms/problems? PCP, Specialist, Home health nurse, Urgent Care, ED, 911 Yes   Additional teach back comments States he is doing \"good\" and has follow up with his PCP.   Week 1 call completed? Yes   Wrap up additional comments Denies questions or needs at this time            ALIYAH COTO - Licensed Nurse   "

## 2022-05-27 ENCOUNTER — TELEPHONE (OUTPATIENT)
Dept: CARDIAC SURGERY | Facility: CLINIC | Age: 52
End: 2022-05-27

## 2022-05-27 NOTE — TELEPHONE ENCOUNTER
This patient was discharged home last weekend and it doesn't look like a 1 week follow up was scheduled with me.  Do you mind to call the patient and schedule this please.  Next week is fine.

## 2022-06-02 ENCOUNTER — READMISSION MANAGEMENT (OUTPATIENT)
Dept: CALL CENTER | Facility: HOSPITAL | Age: 52
End: 2022-06-02

## 2022-06-02 ENCOUNTER — OFFICE VISIT (OUTPATIENT)
Dept: CARDIAC SURGERY | Facility: CLINIC | Age: 52
End: 2022-06-02

## 2022-06-02 VITALS
OXYGEN SATURATION: 95 % | DIASTOLIC BLOOD PRESSURE: 82 MMHG | HEART RATE: 89 BPM | HEIGHT: 72 IN | BODY MASS INDEX: 33.7 KG/M2 | SYSTOLIC BLOOD PRESSURE: 130 MMHG | WEIGHT: 248.8 LBS

## 2022-06-02 DIAGNOSIS — I10 PRIMARY HYPERTENSION: ICD-10-CM

## 2022-06-02 DIAGNOSIS — F17.200 CURRENT SMOKER: ICD-10-CM

## 2022-06-02 DIAGNOSIS — I25.110 CORONARY ARTERY DISEASE INVOLVING NATIVE CORONARY ARTERY OF NATIVE HEART WITH UNSTABLE ANGINA PECTORIS: Primary | ICD-10-CM

## 2022-06-02 DIAGNOSIS — E66.9 CLASS 1 OBESITY WITH BODY MASS INDEX (BMI) OF 33.0 TO 33.9 IN ADULT, UNSPECIFIED OBESITY TYPE, UNSPECIFIED WHETHER SERIOUS COMORBIDITY PRESENT: ICD-10-CM

## 2022-06-02 DIAGNOSIS — E78.2 MIXED HYPERLIPIDEMIA: ICD-10-CM

## 2022-06-02 PROCEDURE — 99024 POSTOP FOLLOW-UP VISIT: CPT | Performed by: NURSE PRACTITIONER

## 2022-06-02 NOTE — OUTREACH NOTE
CT Surgery Week 2 Survey    Flowsheet Row Responses   [facility] patient discharged from? [place]   Does the patient have one of the following disease processes/diagnoses(primary or secondary)? Cardiothoracic surgery   Week 2 attempt successful? Yes   Call start time 1011   Call end time 1012   Discharge diagnosis Coronary Artery Diseasecoronary bypass grafting   Meds reviewed with patient/caregiver? Yes   Is the patient taking all medications as directed (includes completed medication regime)? Yes   Has the patient kept scheduled appointments due by today? Yes  [An apt with cardiology today 6-2-22]   Psychosocial issues? No   What is the patient's perception of their health status since discharge? Improving   Nursing interventions Nurse provided patient education   Nursing interventions Reassured on normal signs of recovery   Is the patient /caregiver able to teach back basic post-op care? Practice 'cough and deep breath'   Is the patient/caregiver able to teach back signs and symptoms of incisional infection? Increased redness, swelling or pain at the incisonal site, Increased drainage or bleeding, Fever   If the patient is a current smoker, are they able to teach back resources for cessation? [id number]   Is the patient/caregiver able to teach back the hierarchy of who to call/visit for symptoms/problems? PCP, Specialist, Home health nurse, Urgent Care, ED, 911 Yes   Week 2 call completed? Yes          MEDINA CLEMENTS - Registered Nurse

## 2022-06-09 ENCOUNTER — READMISSION MANAGEMENT (OUTPATIENT)
Dept: CALL CENTER | Facility: HOSPITAL | Age: 52
End: 2022-06-09

## 2022-06-09 NOTE — OUTREACH NOTE
CT Surgery Week 3 Survey    Flowsheet Row Responses   [facility] facility patient discharged from? [place]   Does the patient have one of the following disease processes/diagnoses(primary or secondary)? Cardiothoracic surgery   Week 3 attempt successful? No   Unsuccessful attempts Attempt 1          LAKEISHA Young Registered Nurse

## 2022-06-14 ENCOUNTER — TELEPHONE (OUTPATIENT)
Dept: CARDIAC SURGERY | Facility: CLINIC | Age: 52
End: 2022-06-14

## 2022-06-14 ENCOUNTER — READMISSION MANAGEMENT (OUTPATIENT)
Dept: CALL CENTER | Facility: HOSPITAL | Age: 52
End: 2022-06-14

## 2022-06-14 NOTE — OUTREACH NOTE
CT Surgery Week 3 Survey    Flowsheet Row Responses   [facility] facility patient discharged from? [place]   Does the patient have one of the following disease processes/diagnoses(primary or secondary)? Cardiothoracic surgery   Week 3 attempt successful? No   Unsuccessful attempts Attempt 2          RILEY SOTOMAYOR - Registered Nurse

## 2022-06-14 NOTE — TELEPHONE ENCOUNTER
Nadira from Dr. Tinajero's office called to request OP note on mutual patient as Dr. Tinajero will be having a follow-up with patient. She requested this be faxed to [phone]. This has been faxed successfully.

## 2022-06-30 ENCOUNTER — OFFICE VISIT (OUTPATIENT)
Dept: CARDIAC SURGERY | Facility: CLINIC | Age: 52
End: 2022-06-30

## 2022-06-30 VITALS
BODY MASS INDEX: 34.19 KG/M2 | OXYGEN SATURATION: 93 % | WEIGHT: 252.4 LBS | SYSTOLIC BLOOD PRESSURE: 109 MMHG | HEART RATE: 90 BPM | HEIGHT: 72 IN | DIASTOLIC BLOOD PRESSURE: 70 MMHG

## 2022-06-30 DIAGNOSIS — Z95.1 S/P CABG X 2: Primary | ICD-10-CM

## 2022-06-30 DIAGNOSIS — I25.110 CORONARY ARTERY DISEASE INVOLVING NATIVE CORONARY ARTERY OF NATIVE HEART WITH UNSTABLE ANGINA PECTORIS: Primary | ICD-10-CM

## 2022-06-30 PROCEDURE — 99024 POSTOP FOLLOW-UP VISIT: CPT | Performed by: SURGERY

## 2022-06-30 NOTE — PROGRESS NOTES
"[facility] Cardiothoracic Surgery  PROGRESS NOTE          Procedure Performed: CABG x2 (LIMA to LAD, KATIA insitu to Ramus through transverse sinus)  Date of Procedure: 5/16/2022    Subjective:  Mr. Molina is a 51-year-old male who returns today in follow-up 6 weeks after coronary bypass grafting x2.  He is recovered well with no significant complications after surgery.  He has healed well without problems with his wounds.  His sternum has been stable.  He has had no further chest pain or shortness of breath like before surgery.  He is walking 30 minutes twice a day.  I am very happy with his progress.     Objective:      06/30/22  1115   Weight: 114 kg (252 lb 6.4 oz)              PE:  Visit Vitals  /70 (BP Location: Right arm, Patient Position: Sitting, Cuff Size: Adult)   Pulse 90   Ht 182 cm (71.65\")   Wt 114 kg (252 lb 6.4 oz)   SpO2 93%   BMI 34.57 kg/m²        GENERAL: NAD, resting comfortably, normal color  CARDIOVASCULAR: regular, regular rate, sinus, well-healed sternotomy with stable sternum  PULMONARY: Normal bilateral breath sounds, no labored breathing  ABDOMEN: soft, nt/nd  EXTREMITIES: mild peripheral edema, normal pulses, normal ROM               Lab Results (last 72 hours)      ** No results found for the last 72 hours. **             Assessment/Plan      Mr. Molina is a 51-year-old male who is postop 6 weeks from CABG x2.  He is recovered well and I am very happy with his progress.  He has had no recurrent anginal type symptoms and his heart failure type symptoms are much improved from prior to surgery.  Today we discussed returning to normal activity on a progressive basis, he understands and agrees to this plan.  We discussed returning to driving which is okay with me.  He has had follow-up with Dr. Tinajero and has further follow-up scheduled.     We will make referral to cardiac rehab, patient prefers [facility].  I would prefer patient to remain on DAPT " until 3 months postop then okay to transition to ASA when okay with primary cardiologist.    Mr. Molina can follow-up with me on an as-needed basis.  Thank you for trusting me with the care of Mr. Molina.  Please do not hesitate to call with questions or concerns.     Ming Minor MD  Cardiothoracic Surgeon

## 2022-07-08 ENCOUNTER — TELEPHONE (OUTPATIENT)
Dept: CARDIAC SURGERY | Facility: CLINIC | Age: 52
End: 2022-07-08

## 2022-07-08 DIAGNOSIS — R91.1 LUNG NODULE: Primary | ICD-10-CM

## 2022-07-08 NOTE — TELEPHONE ENCOUNTER
"Long discussion with patient.  He states that Dr. Parr has told him that he would no longer prescribe Suboxone because he is \"count\" was wrong and that he has no doctors are willing to take care of him in regards to his pain.  I have explained to the patient that he has a lung nodule and this needs to be followed up in 6 months with a repeat CT scanning and that is why our office is calling.  We do not provide pain management.  He verbalized understanding to this and is agreeable to accept a 6-month follow-up with repeat CT chest.  Please schedule appointment."

## 2022-07-08 NOTE — TELEPHONE ENCOUNTER
----- Message from Ming Minor MD sent at 7/8/2022  9:43 AM CDT -----  Regarding: RE: Lung Nodule  We can see him back with scan at 6 months with Danielle.     Thanks!    ----- Message -----  From: Daphne Moon  Sent: 7/8/2022   8:48 AM CDT  To: Ming Minor MD  Subject: Lung Nodule                                      This patient has a lung nodule that popped up on my incidental findings. I read through some OV notes but didn't see any mention for follow up. Do you want the PCP to follow up on this? Thanks

## 2022-07-08 NOTE — TELEPHONE ENCOUNTER
Spoke with pt but I don't think he understood the appt that I was scheduling or remembered the conversation with Danielle PAIZ.  Pt continued to ask about Suboxone.  Informed pt we do not prescribe that medication and that we were just scheduling a follow up re: lung nodule.  Informed pt I would mail the appointment information to him as every time I tried to ask him about when he would like his appt he asked about Suboxone and whether he would get that medication or what he should do about it.  Appt sched for December 15, 2022 with Danielle PAIZ and appt notice mailed to pt/lenny

## 2022-07-08 NOTE — TELEPHONE ENCOUNTER
Called pt to inform him of this incidental finding and of need for follow up CT and office visit in 6 months.  Pt refused.  Pt states he is not coming back to our office because we can't prescribe his Norco.  Explained to pt that this is a different issue and would be a follow up in December/January, not something current or related to his recent surgery but pt continued to refuse to return to our office.  Informed pt I would let make Dr Minor aware of his decision./lenny

## 2022-08-17 RX ORDER — LISINOPRIL 5 MG/1
5 TABLET ORAL DAILY
Qty: 30 TABLET | Refills: 2 | OUTPATIENT
Start: 2022-08-17

## 2022-08-17 RX ORDER — METOPROLOL SUCCINATE 50 MG/1
50 TABLET, EXTENDED RELEASE ORAL DAILY
Qty: 30 TABLET | Refills: 2 | OUTPATIENT
Start: 2022-08-17

## 2023-01-06 ENCOUNTER — DOCUMENTATION (OUTPATIENT)
Dept: CT IMAGING | Facility: HOSPITAL | Age: 53
End: 2023-01-06
Payer: MEDICARE

## 2023-01-06 NOTE — PROGRESS NOTES
Patient would like to reschedule CT surgery appt and CT scans. I have put a message out to Enma at CT surgery office so we can coordinate for same day.

## 2023-01-16 ENCOUNTER — HOSPITAL ENCOUNTER (OUTPATIENT)
Dept: CT IMAGING | Facility: HOSPITAL | Age: 53
Discharge: HOME | End: 2023-01-16
Admitting: NURSE PRACTITIONER
Payer: MEDICARE

## 2023-01-16 ENCOUNTER — OFFICE VISIT (OUTPATIENT)
Dept: CARDIAC SURGERY | Facility: CLINIC | Age: 53
End: 2023-01-16
Payer: MEDICARE

## 2023-01-16 VITALS
WEIGHT: 252.8 LBS | BODY MASS INDEX: 34.24 KG/M2 | HEART RATE: 111 BPM | DIASTOLIC BLOOD PRESSURE: 84 MMHG | SYSTOLIC BLOOD PRESSURE: 126 MMHG | OXYGEN SATURATION: 93 % | HEIGHT: 72 IN

## 2023-01-16 DIAGNOSIS — R91.1 LUNG NODULE: ICD-10-CM

## 2023-01-16 DIAGNOSIS — F17.200 CURRENT SMOKER: ICD-10-CM

## 2023-01-16 DIAGNOSIS — R91.1 LUNG NODULE: Primary | ICD-10-CM

## 2023-01-16 PROCEDURE — 99213 OFFICE O/P EST LOW 20 MIN: CPT | Performed by: NURSE PRACTITIONER

## 2023-01-16 PROCEDURE — 71250 CT THORAX DX C-: CPT

## 2023-01-16 NOTE — PROGRESS NOTES
"Subjective   Chief Complaint   Patient presents with   • Lung Nodule     Pt is here for follow-up w/ CT for Lung Nodule  Pt had CABGx2 on 05/16/2022       Patient ID: Stephen Molina is a 52 y.o. male who is here for follow-up on known lung nodule.    History of Present Illness  Mr. Molina is here for follow up on known lung nodule.  This was first identified on preoperative CTA chest prior to CABG. H reports his postoperative recovery since last office visit has been unremarkable.  He states he is doing well.  Unfortunately he does continue to smoke.  He denies recent pneumonia, cough, hemoptysis, or unintended weight loss.    The following portions of the patient's history were reviewed and updated as appropriate: allergies, current medications, past family history, past medical history, past social history, past surgical history and problem list.    Review of Systems   Constitutional: Negative for chills, diaphoresis, fatigue and fever.   HENT: Negative for trouble swallowing and voice change.    Eyes: Negative for visual disturbance.   Respiratory: Negative for chest tightness and shortness of breath.    Cardiovascular: Negative for chest pain, palpitations and leg swelling.   Gastrointestinal: Negative for abdominal pain, diarrhea, nausea and vomiting.   Musculoskeletal: Negative for arthralgias and myalgias.   Skin: Negative for color change, pallor, rash and wound.   Neurological: Negative for dizziness, syncope and light-headedness.   Psychiatric/Behavioral: Negative for agitation, confusion and sleep disturbance.       Objective   Visit Vitals  /84 (BP Location: Right arm, Patient Position: Sitting, Cuff Size: Adult)   Pulse 111   Ht 182 cm (71.65\")   Wt 115 kg (252 lb 12.8 oz)   SpO2 93%   BMI 34.62 kg/m²       Physical Exam  Vitals reviewed.   Constitutional:       General: He is not in acute distress.     Appearance: Normal appearance.   HENT:      Head: Normocephalic.   Eyes:      Pupils: " Pupils are equal, round, and reactive to light.   Cardiovascular:      Rate and Rhythm: Normal rate and regular rhythm.      Heart sounds: Normal heart sounds. No murmur heard.  Pulmonary:      Breath sounds: No wheezing or rales.      Comments: Diminished lung sounds bilaterally  Abdominal:      General: There is no distension.      Palpations: Abdomen is soft.      Tenderness: There is no abdominal tenderness.   Musculoskeletal:         General: No swelling or tenderness.   Skin:     General: Skin is warm and dry.      Comments: Sternum stable no clicks.  Sternal incision is well healed.   Neurological:      General: No focal deficit present.      Mental Status: He is alert and oriented to person, place, and time.   Psychiatric:         Mood and Affect: Mood normal.         Behavior: Behavior normal.         Thought Content: Thought content normal.         Judgment: Judgment normal.             Assessment & Plan       CT chest: Bilateral groundglass nodules are stable on today's exam.  No pneumothorax.  Right upper lobe atelectasis.      Diagnoses and all orders for this visit:    1. Lung nodule (Primary)  -     CT Chest Without Contrast; Future    2. Current smoker           CT chest reviewed today showing stability of bilateral lung nodules.  We discussed the importance of stopping tobacco use in the setting of recent CABG as well as known lung nodules.  He verbalizes understanding and states he is trying.  I have recommended that he follow-up with his PCP to discuss smoking cessation aids.  From a lung nodule standpoint, we will plan on repeat CT chest without contrast in 6 months.  He is agreeable to this plan.    BMI is >= 30 and <35. (Class 1 Obesity). The following options were offered after discussion;: referral to primary care      Stephen Gigi Jesse  reports that he has been smoking cigarettes. He started smoking about 33 years ago. He has a 41.25 pack-year smoking history. He has never used smokeless  tobacco.. I have educated him on the risk of diseases from using tobacco products such as cancer, COPD and heart disease.     I spent 3  minutes counseling the patient.    Advance Care Planning   ACP discussion was held with the patient during this visit. Patient does not have an advance directive, declines further assistance.       Stop smoking  Follow-up in 6 months with repeat CT chest without contrast.  Call the office sooner should any issues arise prior to neck scheduled follow-up.  Patient verbalized understanding and is agreeable to this plan.

## 2023-05-28 ENCOUNTER — HOSPITAL ENCOUNTER (INPATIENT)
Age: 53
LOS: 2 days | Discharge: HOME HEALTH | DRG: 897 | End: 2023-05-30
Attending: EMERGENCY MEDICINE | Admitting: STUDENT IN AN ORGANIZED HEALTH CARE EDUCATION/TRAINING PROGRAM
Payer: MEDICARE

## 2023-05-28 DIAGNOSIS — F32.A DEPRESSION WITH SUICIDAL IDEATION: Primary | ICD-10-CM

## 2023-05-28 DIAGNOSIS — R45.851 DEPRESSION WITH SUICIDAL IDEATION: Primary | ICD-10-CM

## 2023-05-28 DIAGNOSIS — Z91.89 AT RISK FOR WITHDRAWAL: ICD-10-CM

## 2023-05-28 DIAGNOSIS — F10.921 ACUTE ALCOHOLIC INTOXICATION WITH DELIRIUM (HCC): ICD-10-CM

## 2023-05-28 PROBLEM — F10.920 ALCOHOLIC INTOXICATION WITHOUT COMPLICATION (HCC): Status: ACTIVE | Noted: 2023-05-28

## 2023-05-28 LAB
ALBUMIN SERPL-MCNC: 4.2 G/DL (ref 3.5–5.2)
ALP SERPL-CCNC: 108 U/L (ref 40–130)
ALT SERPL-CCNC: 23 U/L (ref 5–41)
AMPHET UR QL SCN: NEGATIVE
ANION GAP SERPL CALCULATED.3IONS-SCNC: 17 MMOL/L (ref 7–19)
AST SERPL-CCNC: 31 U/L (ref 5–40)
BARBITURATES UR QL SCN: NEGATIVE
BASOPHILS # BLD: 0.1 K/UL (ref 0–0.2)
BASOPHILS NFR BLD: 0.8 % (ref 0–1)
BENZODIAZ UR QL SCN: NEGATIVE
BILIRUB SERPL-MCNC: 0.3 MG/DL (ref 0.2–1.2)
BILIRUB UR QL STRIP: NEGATIVE
BUN SERPL-MCNC: 9 MG/DL (ref 6–20)
BUPRENORPHINE URINE: NEGATIVE
CALCIUM SERPL-MCNC: 8.1 MG/DL (ref 8.6–10)
CANNABINOIDS UR QL SCN: NEGATIVE
CHLORIDE SERPL-SCNC: 95 MMOL/L (ref 98–111)
CLARITY UR: CLEAR
CO2 SERPL-SCNC: 18 MMOL/L (ref 22–29)
COCAINE UR QL SCN: NEGATIVE
COLOR UR: YELLOW
CREAT SERPL-MCNC: 0.6 MG/DL (ref 0.5–1.2)
DRUG SCREEN COMMENT UR-IMP: NORMAL
EOSINOPHIL # BLD: 0 K/UL (ref 0–0.6)
EOSINOPHIL NFR BLD: 0.5 % (ref 0–5)
ERYTHROCYTE [DISTWIDTH] IN BLOOD BY AUTOMATED COUNT: 15 % (ref 11.5–14.5)
ETHANOLAMINE SERPL-MCNC: 307 MG/DL (ref 0–0.08)
GLUCOSE SERPL-MCNC: 110 MG/DL (ref 74–109)
GLUCOSE UR STRIP.AUTO-MCNC: NEGATIVE MG/DL
HCT VFR BLD AUTO: 40.7 % (ref 42–52)
HGB BLD-MCNC: 14 G/DL (ref 14–18)
HGB UR STRIP.AUTO-MCNC: NEGATIVE MG/L
IMM GRANULOCYTES # BLD: 0 K/UL
KETONES UR STRIP.AUTO-MCNC: NEGATIVE MG/DL
LEUKOCYTE ESTERASE UR QL STRIP.AUTO: NEGATIVE
LYMPHOCYTES # BLD: 2.6 K/UL (ref 1.1–4.5)
LYMPHOCYTES NFR BLD: 35.9 % (ref 20–40)
MCH RBC QN AUTO: 29.4 PG (ref 27–31)
MCHC RBC AUTO-ENTMCNC: 34.4 G/DL (ref 33–37)
MCV RBC AUTO: 85.3 FL (ref 80–94)
METHADONE UR QL SCN: NEGATIVE
METHAMPHETAMINE, URINE: NEGATIVE
MONOCYTES # BLD: 0.4 K/UL (ref 0–0.9)
MONOCYTES NFR BLD: 5.8 % (ref 0–10)
NEUTROPHILS # BLD: 4.2 K/UL (ref 1.5–7.5)
NEUTS SEG NFR BLD: 57 % (ref 50–65)
NITRITE UR QL STRIP.AUTO: NEGATIVE
OPIATES UR QL SCN: NEGATIVE
OXYCODONE UR QL SCN: NEGATIVE
PCP UR QL SCN: NEGATIVE
PH UR STRIP.AUTO: 5.5 [PH] (ref 5–8)
PLATELET # BLD AUTO: 262 K/UL (ref 130–400)
PMV BLD AUTO: 8.7 FL (ref 9.4–12.4)
POTASSIUM SERPL-SCNC: 5.2 MMOL/L (ref 3.5–5)
PROPOXYPH UR QL SCN: NEGATIVE
PROT SERPL-MCNC: 6.6 G/DL (ref 6.6–8.7)
PROT UR STRIP.AUTO-MCNC: NEGATIVE MG/DL
RBC # BLD AUTO: 4.77 M/UL (ref 4.7–6.1)
SODIUM SERPL-SCNC: 130 MMOL/L (ref 136–145)
SP GR UR STRIP.AUTO: 1.01 (ref 1–1.03)
TRICYCLIC, URINE: NEGATIVE
TROPONIN T SERPL-MCNC: <0.01 NG/ML (ref 0–0.03)
UROBILINOGEN UR STRIP.AUTO-MCNC: 0.2 E.U./DL
WBC # BLD AUTO: 7.3 K/UL (ref 4.8–10.8)

## 2023-05-28 PROCEDURE — 84484 ASSAY OF TROPONIN QUANT: CPT

## 2023-05-28 PROCEDURE — 82077 ASSAY SPEC XCP UR&BREATH IA: CPT

## 2023-05-28 PROCEDURE — 2580000003 HC RX 258: Performed by: EMERGENCY MEDICINE

## 2023-05-28 PROCEDURE — 99285 EMERGENCY DEPT VISIT HI MDM: CPT

## 2023-05-28 PROCEDURE — 6370000000 HC RX 637 (ALT 250 FOR IP): Performed by: STUDENT IN AN ORGANIZED HEALTH CARE EDUCATION/TRAINING PROGRAM

## 2023-05-28 PROCEDURE — 6360000002 HC RX W HCPCS: Performed by: NURSE PRACTITIONER

## 2023-05-28 PROCEDURE — 80053 COMPREHEN METABOLIC PANEL: CPT

## 2023-05-28 PROCEDURE — 85025 COMPLETE CBC W/AUTO DIFF WBC: CPT

## 2023-05-28 PROCEDURE — 80306 DRUG TEST PRSMV INSTRMNT: CPT

## 2023-05-28 PROCEDURE — 6370000000 HC RX 637 (ALT 250 FOR IP): Performed by: NURSE PRACTITIONER

## 2023-05-28 PROCEDURE — 81003 URINALYSIS AUTO W/O SCOPE: CPT

## 2023-05-28 PROCEDURE — 2580000003 HC RX 258: Performed by: NURSE PRACTITIONER

## 2023-05-28 PROCEDURE — 36415 COLL VENOUS BLD VENIPUNCTURE: CPT

## 2023-05-28 PROCEDURE — 1210000000 HC MED SURG R&B

## 2023-05-28 PROCEDURE — 6360000002 HC RX W HCPCS: Performed by: STUDENT IN AN ORGANIZED HEALTH CARE EDUCATION/TRAINING PROGRAM

## 2023-05-28 RX ORDER — SODIUM CHLORIDE 9 MG/ML
INJECTION, SOLUTION INTRAVENOUS PRN
Status: DISCONTINUED | OUTPATIENT
Start: 2023-05-28 | End: 2023-05-30 | Stop reason: HOSPADM

## 2023-05-28 RX ORDER — NICOTINE 21 MG/24HR
1 PATCH, TRANSDERMAL 24 HOURS TRANSDERMAL DAILY
Status: DISCONTINUED | OUTPATIENT
Start: 2023-05-28 | End: 2023-05-30 | Stop reason: HOSPADM

## 2023-05-28 RX ORDER — ONDANSETRON 4 MG/1
4 TABLET, ORALLY DISINTEGRATING ORAL EVERY 8 HOURS PRN
Status: DISCONTINUED | OUTPATIENT
Start: 2023-05-28 | End: 2023-05-30 | Stop reason: HOSPADM

## 2023-05-28 RX ORDER — PHENOBARBITAL SODIUM 65 MG/ML
130 INJECTION INTRAMUSCULAR ONCE
Status: COMPLETED | OUTPATIENT
Start: 2023-05-28 | End: 2023-05-28

## 2023-05-28 RX ORDER — ACETAMINOPHEN 325 MG/1
650 TABLET ORAL EVERY 6 HOURS PRN
Status: DISCONTINUED | OUTPATIENT
Start: 2023-05-28 | End: 2023-05-30 | Stop reason: HOSPADM

## 2023-05-28 RX ORDER — OLANZAPINE AND FLUOXETINE 6; 50 MG/1; MG/1
1 CAPSULE ORAL NIGHTLY
Status: DISCONTINUED | OUTPATIENT
Start: 2023-05-28 | End: 2023-05-28 | Stop reason: CLARIF

## 2023-05-28 RX ORDER — GAUZE BANDAGE 2" X 2"
100 BANDAGE TOPICAL DAILY
Status: DISCONTINUED | OUTPATIENT
Start: 2023-05-28 | End: 2023-05-30 | Stop reason: HOSPADM

## 2023-05-28 RX ORDER — LEVETIRACETAM 500 MG/1
500 TABLET ORAL 2 TIMES DAILY
Status: DISCONTINUED | OUTPATIENT
Start: 2023-05-28 | End: 2023-05-30 | Stop reason: HOSPADM

## 2023-05-28 RX ORDER — POLYETHYLENE GLYCOL 3350 17 G/17G
17 POWDER, FOR SOLUTION ORAL DAILY PRN
Status: DISCONTINUED | OUTPATIENT
Start: 2023-05-28 | End: 2023-05-30 | Stop reason: HOSPADM

## 2023-05-28 RX ORDER — LORAZEPAM 2 MG/1
2 TABLET ORAL
Status: DISCONTINUED | OUTPATIENT
Start: 2023-05-28 | End: 2023-05-30

## 2023-05-28 RX ORDER — ENOXAPARIN SODIUM 100 MG/ML
30 INJECTION SUBCUTANEOUS 2 TIMES DAILY
Status: DISCONTINUED | OUTPATIENT
Start: 2023-05-28 | End: 2023-05-30 | Stop reason: HOSPADM

## 2023-05-28 RX ORDER — SODIUM CHLORIDE, SODIUM LACTATE, POTASSIUM CHLORIDE, CALCIUM CHLORIDE 600; 310; 30; 20 MG/100ML; MG/100ML; MG/100ML; MG/100ML
INJECTION, SOLUTION INTRAVENOUS CONTINUOUS
Status: DISCONTINUED | OUTPATIENT
Start: 2023-05-28 | End: 2023-05-30 | Stop reason: HOSPADM

## 2023-05-28 RX ORDER — OLANZAPINE 5 MG/1
5 TABLET ORAL NIGHTLY
Status: DISCONTINUED | OUTPATIENT
Start: 2023-05-28 | End: 2023-05-30 | Stop reason: HOSPADM

## 2023-05-28 RX ORDER — SODIUM CHLORIDE 0.9 % (FLUSH) 0.9 %
5-40 SYRINGE (ML) INJECTION EVERY 12 HOURS SCHEDULED
Status: DISCONTINUED | OUTPATIENT
Start: 2023-05-28 | End: 2023-05-30 | Stop reason: HOSPADM

## 2023-05-28 RX ORDER — POTASSIUM CHLORIDE 7.45 MG/ML
10 INJECTION INTRAVENOUS PRN
Status: DISCONTINUED | OUTPATIENT
Start: 2023-05-28 | End: 2023-05-30 | Stop reason: HOSPADM

## 2023-05-28 RX ORDER — MAGNESIUM SULFATE IN WATER 40 MG/ML
2000 INJECTION, SOLUTION INTRAVENOUS PRN
Status: DISCONTINUED | OUTPATIENT
Start: 2023-05-28 | End: 2023-05-30 | Stop reason: HOSPADM

## 2023-05-28 RX ORDER — POTASSIUM CHLORIDE 20 MEQ/1
40 TABLET, EXTENDED RELEASE ORAL PRN
Status: DISCONTINUED | OUTPATIENT
Start: 2023-05-28 | End: 2023-05-30 | Stop reason: HOSPADM

## 2023-05-28 RX ORDER — SODIUM CHLORIDE 0.9 % (FLUSH) 0.9 %
5-40 SYRINGE (ML) INJECTION PRN
Status: DISCONTINUED | OUTPATIENT
Start: 2023-05-28 | End: 2023-05-30 | Stop reason: HOSPADM

## 2023-05-28 RX ORDER — OLANZAPINE AND FLUOXETINE 6; 50 MG/1; MG/1
1 CAPSULE ORAL NIGHTLY
COMMUNITY

## 2023-05-28 RX ORDER — ACETAMINOPHEN 650 MG/1
650 SUPPOSITORY RECTAL EVERY 6 HOURS PRN
Status: DISCONTINUED | OUTPATIENT
Start: 2023-05-28 | End: 2023-05-30 | Stop reason: HOSPADM

## 2023-05-28 RX ORDER — LORAZEPAM 1 MG/1
1 TABLET ORAL
Status: DISCONTINUED | OUTPATIENT
Start: 2023-05-28 | End: 2023-05-30

## 2023-05-28 RX ORDER — LISINOPRIL 20 MG/1
40 TABLET ORAL DAILY
Status: DISCONTINUED | OUTPATIENT
Start: 2023-05-29 | End: 2023-05-30 | Stop reason: HOSPADM

## 2023-05-28 RX ORDER — ONDANSETRON 2 MG/ML
4 INJECTION INTRAMUSCULAR; INTRAVENOUS EVERY 6 HOURS PRN
Status: DISCONTINUED | OUTPATIENT
Start: 2023-05-28 | End: 2023-05-30 | Stop reason: HOSPADM

## 2023-05-28 RX ORDER — MULTIVITAMIN WITH IRON
1 TABLET ORAL DAILY
Status: DISCONTINUED | OUTPATIENT
Start: 2023-05-28 | End: 2023-05-30 | Stop reason: HOSPADM

## 2023-05-28 RX ORDER — SODIUM CHLORIDE, SODIUM LACTATE, POTASSIUM CHLORIDE, AND CALCIUM CHLORIDE .6; .31; .03; .02 G/100ML; G/100ML; G/100ML; G/100ML
1000 INJECTION, SOLUTION INTRAVENOUS ONCE
Status: COMPLETED | OUTPATIENT
Start: 2023-05-28 | End: 2023-05-28

## 2023-05-28 RX ADMIN — SODIUM CHLORIDE, POTASSIUM CHLORIDE, SODIUM LACTATE AND CALCIUM CHLORIDE: 600; 310; 30; 20 INJECTION, SOLUTION INTRAVENOUS at 23:34

## 2023-05-28 RX ADMIN — OLANZAPINE 5 MG: 5 TABLET, FILM COATED ORAL at 21:35

## 2023-05-28 RX ADMIN — ENOXAPARIN SODIUM 30 MG: 100 INJECTION SUBCUTANEOUS at 21:35

## 2023-05-28 RX ADMIN — Medication 100 MG: at 16:29

## 2023-05-28 RX ADMIN — SODIUM CHLORIDE, PRESERVATIVE FREE 10 ML: 5 INJECTION INTRAVENOUS at 21:35

## 2023-05-28 RX ADMIN — FLUOXETINE 50 MG: 20 CAPSULE ORAL at 21:34

## 2023-05-28 RX ADMIN — ACETAMINOPHEN 650 MG: 325 TABLET ORAL at 16:27

## 2023-05-28 RX ADMIN — THERA TABS 1 TABLET: TAB at 16:29

## 2023-05-28 RX ADMIN — ONDANSETRON HYDROCHLORIDE 4 MG: 2 INJECTION, SOLUTION INTRAMUSCULAR; INTRAVENOUS at 19:53

## 2023-05-28 RX ADMIN — LORAZEPAM 3 MG: 2 TABLET ORAL at 20:08

## 2023-05-28 RX ADMIN — PHENOBARBITAL SODIUM 130 MG: 65 INJECTION INTRAMUSCULAR at 17:38

## 2023-05-28 RX ADMIN — SODIUM CHLORIDE, POTASSIUM CHLORIDE, SODIUM LACTATE AND CALCIUM CHLORIDE: 600; 310; 30; 20 INJECTION, SOLUTION INTRAVENOUS at 15:05

## 2023-05-28 RX ADMIN — LEVETIRACETAM 500 MG: 500 TABLET, FILM COATED ORAL at 21:35

## 2023-05-28 RX ADMIN — SODIUM CHLORIDE, POTASSIUM CHLORIDE, SODIUM LACTATE AND CALCIUM CHLORIDE 1000 ML: 600; 310; 30; 20 INJECTION, SOLUTION INTRAVENOUS at 11:12

## 2023-05-28 ASSESSMENT — PAIN DESCRIPTION - LOCATION: LOCATION: BACK;NECK

## 2023-05-28 ASSESSMENT — PAIN DESCRIPTION - DESCRIPTORS: DESCRIPTORS: ACHING;SHOOTING;STABBING

## 2023-05-28 ASSESSMENT — ENCOUNTER SYMPTOMS
GASTROINTESTINAL NEGATIVE: 1
SHORTNESS OF BREATH: 0
RESPIRATORY NEGATIVE: 1
DIARRHEA: 0
NAUSEA: 0
ABDOMINAL PAIN: 0
BACK PAIN: 0
VOMITING: 0
CHEST TIGHTNESS: 0
EYES NEGATIVE: 1

## 2023-05-28 ASSESSMENT — PAIN - FUNCTIONAL ASSESSMENT: PAIN_FUNCTIONAL_ASSESSMENT: NONE - DENIES PAIN

## 2023-05-28 ASSESSMENT — PAIN SCALES - GENERAL: PAINLEVEL_OUTOF10: 10

## 2023-05-29 LAB
ANION GAP SERPL CALCULATED.3IONS-SCNC: 14 MMOL/L (ref 7–19)
BASOPHILS # BLD: 0.1 K/UL (ref 0–0.2)
BASOPHILS NFR BLD: 0.9 % (ref 0–1)
BUN SERPL-MCNC: 7 MG/DL (ref 6–20)
CALCIUM SERPL-MCNC: 9.2 MG/DL (ref 8.6–10)
CHLORIDE SERPL-SCNC: 97 MMOL/L (ref 98–111)
CO2 SERPL-SCNC: 23 MMOL/L (ref 22–29)
CREAT SERPL-MCNC: 0.7 MG/DL (ref 0.5–1.2)
EOSINOPHIL # BLD: 0.1 K/UL (ref 0–0.6)
EOSINOPHIL NFR BLD: 1.3 % (ref 0–5)
ERYTHROCYTE [DISTWIDTH] IN BLOOD BY AUTOMATED COUNT: 14.9 % (ref 11.5–14.5)
ETHANOLAMINE SERPL-MCNC: <10 MG/DL (ref 0–0.08)
GLUCOSE SERPL-MCNC: 94 MG/DL (ref 74–109)
HBA1C MFR BLD: 5.5 % (ref 4–6)
HCT VFR BLD AUTO: 39.3 % (ref 42–52)
HGB BLD-MCNC: 13.7 G/DL (ref 14–18)
IMM GRANULOCYTES # BLD: 0 K/UL
LIPASE SERPL-CCNC: 21 U/L (ref 13–60)
LYMPHOCYTES # BLD: 1.9 K/UL (ref 1.1–4.5)
LYMPHOCYTES NFR BLD: 29.6 % (ref 20–40)
MCH RBC QN AUTO: 29.4 PG (ref 27–31)
MCHC RBC AUTO-ENTMCNC: 34.9 G/DL (ref 33–37)
MCV RBC AUTO: 84.3 FL (ref 80–94)
MONOCYTES # BLD: 0.4 K/UL (ref 0–0.9)
MONOCYTES NFR BLD: 6.6 % (ref 0–10)
NEUTROPHILS # BLD: 3.9 K/UL (ref 1.5–7.5)
NEUTS SEG NFR BLD: 61.4 % (ref 50–65)
PHOSPHATE SERPL-MCNC: 3.2 MG/DL (ref 2.5–4.5)
PLATELET # BLD AUTO: 251 K/UL (ref 130–400)
PMV BLD AUTO: 8.7 FL (ref 9.4–12.4)
POTASSIUM SERPL-SCNC: 4.1 MMOL/L (ref 3.5–5)
RBC # BLD AUTO: 4.66 M/UL (ref 4.7–6.1)
SODIUM SERPL-SCNC: 134 MMOL/L (ref 136–145)
TSH SERPL DL<=0.005 MIU/L-ACNC: 0.81 UIU/ML (ref 0.27–4.2)
VIT B12 SERPL-MCNC: 509 PG/ML (ref 211–946)
WBC # BLD AUTO: 6.3 K/UL (ref 4.8–10.8)

## 2023-05-29 PROCEDURE — 84100 ASSAY OF PHOSPHORUS: CPT

## 2023-05-29 PROCEDURE — 36415 COLL VENOUS BLD VENIPUNCTURE: CPT

## 2023-05-29 PROCEDURE — 84443 ASSAY THYROID STIM HORMONE: CPT

## 2023-05-29 PROCEDURE — 2580000003 HC RX 258: Performed by: NURSE PRACTITIONER

## 2023-05-29 PROCEDURE — 1210000000 HC MED SURG R&B

## 2023-05-29 PROCEDURE — 83690 ASSAY OF LIPASE: CPT

## 2023-05-29 PROCEDURE — 82607 VITAMIN B-12: CPT

## 2023-05-29 PROCEDURE — 85025 COMPLETE CBC W/AUTO DIFF WBC: CPT

## 2023-05-29 PROCEDURE — 6370000000 HC RX 637 (ALT 250 FOR IP): Performed by: NURSE PRACTITIONER

## 2023-05-29 PROCEDURE — 94760 N-INVAS EAR/PLS OXIMETRY 1: CPT

## 2023-05-29 PROCEDURE — 82077 ASSAY SPEC XCP UR&BREATH IA: CPT

## 2023-05-29 PROCEDURE — 83036 HEMOGLOBIN GLYCOSYLATED A1C: CPT

## 2023-05-29 PROCEDURE — 6370000000 HC RX 637 (ALT 250 FOR IP): Performed by: STUDENT IN AN ORGANIZED HEALTH CARE EDUCATION/TRAINING PROGRAM

## 2023-05-29 PROCEDURE — 80048 BASIC METABOLIC PNL TOTAL CA: CPT

## 2023-05-29 PROCEDURE — 6360000002 HC RX W HCPCS: Performed by: NURSE PRACTITIONER

## 2023-05-29 RX ORDER — AMLODIPINE BESYLATE 5 MG/1
5 TABLET ORAL DAILY
Status: DISCONTINUED | OUTPATIENT
Start: 2023-05-29 | End: 2023-05-30 | Stop reason: HOSPADM

## 2023-05-29 RX ORDER — LIDOCAINE 4 G/G
1 PATCH TOPICAL DAILY
Status: DISCONTINUED | OUTPATIENT
Start: 2023-05-29 | End: 2023-05-30 | Stop reason: HOSPADM

## 2023-05-29 RX ADMIN — LORAZEPAM 1 MG: 1 TABLET ORAL at 01:15

## 2023-05-29 RX ADMIN — ACETAMINOPHEN 650 MG: 325 TABLET ORAL at 16:16

## 2023-05-29 RX ADMIN — LORAZEPAM 1 MG: 1 TABLET ORAL at 06:34

## 2023-05-29 RX ADMIN — SODIUM CHLORIDE, PRESERVATIVE FREE 10 ML: 5 INJECTION INTRAVENOUS at 22:41

## 2023-05-29 RX ADMIN — LISINOPRIL 40 MG: 20 TABLET ORAL at 08:11

## 2023-05-29 RX ADMIN — LEVETIRACETAM 500 MG: 500 TABLET, FILM COATED ORAL at 08:11

## 2023-05-29 RX ADMIN — LEVETIRACETAM 500 MG: 500 TABLET, FILM COATED ORAL at 22:35

## 2023-05-29 RX ADMIN — ENOXAPARIN SODIUM 30 MG: 100 INJECTION SUBCUTANEOUS at 08:12

## 2023-05-29 RX ADMIN — ACETAMINOPHEN 650 MG: 325 TABLET ORAL at 22:39

## 2023-05-29 RX ADMIN — Medication 100 MG: at 08:11

## 2023-05-29 RX ADMIN — ACETAMINOPHEN 650 MG: 325 TABLET ORAL at 06:34

## 2023-05-29 RX ADMIN — AMLODIPINE BESYLATE 5 MG: 5 TABLET ORAL at 16:16

## 2023-05-29 RX ADMIN — OLANZAPINE 5 MG: 5 TABLET, FILM COATED ORAL at 22:36

## 2023-05-29 RX ADMIN — FLUOXETINE 50 MG: 20 CAPSULE ORAL at 22:36

## 2023-05-29 RX ADMIN — THERA TABS 1 TABLET: TAB at 08:11

## 2023-05-29 RX ADMIN — ENOXAPARIN SODIUM 30 MG: 100 INJECTION SUBCUTANEOUS at 22:36

## 2023-05-29 RX ADMIN — SODIUM CHLORIDE, POTASSIUM CHLORIDE, SODIUM LACTATE AND CALCIUM CHLORIDE: 600; 310; 30; 20 INJECTION, SOLUTION INTRAVENOUS at 06:32

## 2023-05-29 ASSESSMENT — PAIN SCALES - GENERAL
PAINLEVEL_OUTOF10: 0
PAINLEVEL_OUTOF10: 8
PAINLEVEL_OUTOF10: 10

## 2023-05-29 ASSESSMENT — PAIN DESCRIPTION - PAIN TYPE: TYPE: CHRONIC PAIN

## 2023-05-29 ASSESSMENT — PAIN DESCRIPTION - DIRECTION: RADIATING_TOWARDS: NO

## 2023-05-29 ASSESSMENT — PAIN DESCRIPTION - LOCATION
LOCATION: HEAD
LOCATION: BACK;HEAD

## 2023-05-29 ASSESSMENT — PAIN DESCRIPTION - FREQUENCY: FREQUENCY: CONTINUOUS

## 2023-05-29 ASSESSMENT — PAIN DESCRIPTION - ORIENTATION: ORIENTATION: LOWER;OTHER (COMMENT)

## 2023-05-29 ASSESSMENT — PAIN - FUNCTIONAL ASSESSMENT: PAIN_FUNCTIONAL_ASSESSMENT: ACTIVITIES ARE NOT PREVENTED

## 2023-05-29 ASSESSMENT — PAIN DESCRIPTION - ONSET: ONSET: ON-GOING

## 2023-05-29 ASSESSMENT — PAIN DESCRIPTION - DESCRIPTORS: DESCRIPTORS: ACHING;SHOOTING

## 2023-05-29 NOTE — CONSULTS
[facility]  Psychiatry Consult    Reason for Consult: Concern   Suicidal ideations    The primary source(s) of information include(s):  patient    The patient is a 46 y.o. male with previous psychiatric history of depression, alcohol use disorder, complicated by history of withdrawal seizures from alcohol, history of chronic pain syndrome, coronary artery disease and hypertension, who has been admitted to medical services secondary to alcohol intoxication, blood alcohol level 307, and suicidal ideations. Patient is well-known to psychiatry due to previous multiple admissions to our hospital with same clinical presentation, as at present time. Patient has been seen in his room with presence of one-to-one sitter. He reported that he stayed sober almost a year with the help of AA meetings and his sponsor, however, a week ago on Sunday, he relapsed in drinking alcohol, stated that he felt stressed of taking care of his mother, who is suffering from dementia. He reported that he was drinking for 8 days, 2 pints of vodka and 15-30 beers a day, every day. Patient could not recall circumstances of his admission to the hospital.  However, review of the patient's chart indicated that patient called to his brother after holding gun to his chin. When I discussed this information with patient, he replied \"I guess it is alcohol was talking\". Today during the interview, patient denies significant withdrawal symptoms from alcohol, with an exception of having feeling of anxiety.   Also, patient reported chronic lower back pain, stated that at home he has been taken Norco as needed for pain management, however, stated that his pain was not managed during this hospital stay and he reported lower back pain, which he rated today as 8-9 out of 10, with 10 being the worst.    In regards of affective symptomatology, patient denies any depression, endorses fair appetite and fair quality of sleep at

## 2023-05-29 NOTE — PLAN OF CARE
Problem: Safety - Adult  Goal: Free from fall injury  Outcome: Progressing  Flowsheets (Taken 5/29/2023 0156)  Free From Fall Injury: Instruct family/caregiver on patient safety     Problem: ABCDS Injury Assessment  Goal: Absence of physical injury  Outcome: Progressing  Flowsheets (Taken 5/29/2023 0156)  Absence of Physical Injury: Implement safety measures based on patient assessment     Problem: Risk for Elopement  Goal: Patient will not exit the unit/facility without proper excort  Outcome: Progressing  Flowsheets  Taken 5/29/2023 0155 by Sugey Arevalo LPN  Nursing Interventions for Elopement Risk:   Assist with personal care needs such as toileting, eating, dressing, as needed to reduce the risk of wandering   Make sure patient has all necessary personal care items   Place patient in room far away from exits and stairways   Reduce environmental triggers  Taken 5/28/2023 1400 by Marivel Berrios LPN  Nursing Interventions for Elopement Risk:   Reduce environmental triggers   Assist with personal care needs such as toileting, eating, dressing, as needed to reduce the risk of wandering     Problem: Confusion  Goal: Confusion, delirium, dementia, or psychosis is improved or at baseline  Description: INTERVENTIONS:  1. Assess for possible contributors to thought disturbance, including medications, impaired vision or hearing, underlying metabolic abnormalities, dehydration, psychiatric diagnoses, and notify attending LIP  2. [place] high risk fall precautions, as indicated  3. Provide frequent short contacts to provide reality reorientation, refocusing and direction  4. Decrease environmental stimuli, including noise as appropriate  5. Monitor and intervene to maintain adequate nutrition, hydration, elimination, sleep and activity  6. If unable to ensure safety without constant attention obtain sitter and review sitter guidelines with assigned personnel  7.  Initiate Psychosocial CNS and Spiritual

## 2023-05-29 NOTE — PROGRESS NOTES
Daily Progress Note    Date:2023  Patient: Katelyn Dumont  : 1970  [facility]:[id number]  CODE:Full Code No additional code details  PCP:Danya Sapp NP    Admit Date: 2023 10:32 AM   LOS: 1 day     Chief Complaint   Patient presents with    Alcohol Intoxication    Mental Health Problem     Pt had a gun to his head last night         Subjective: Odette Sees. Pt sleeping but able to wake up and answer questions. Seen by psychiatry today. Sitter discontinued. Feels better overall. Hospital Summary: 45 yo M with EtOH use disorder who presented to [address] [place] [address] ED while intoxicated reporting suicidal ideation. EtOH level >300. Admitted to hospitalist service for further management. Psychiatry consulted for SI, determined not to be acutely suicidal and sitter discontinued. Given Phenobarbital and started on CIWA protocol for EtOH withdrawal.     Pt reported atypical chest pain associated with anxiety. Improved with Ativan. EKG unremarkable. Troponin negative x2. Chest pain is noncardiac. Can follow up with cardiology as outpatient. Review of Systems   All other systems reviewed and are negative.     Objective:      Vital signs in last 24 hours:  Patient Vitals for the past 24 hrs:   BP Temp Temp src Pulse Resp SpO2 Weight   23 0752 (!) 164/95 97.3 °F (36.3 °C) Temporal 97 16 96 % --   23 0737 -- -- -- -- -- 97 % --   23 0712 (!) 155/98 97.9 °F (36.6 °C) -- 100 16 97 % --   23 0634 -- -- -- -- 18 -- --   23 0628 (!) 153/96 98.1 °F (36.7 °C) -- (!) 110 18 97 % --   23 0545 (!) 169/100 98.1 °F (36.7 °C) -- (!) 101 18 96 % --   23 0445 -- -- -- -- -- -- 239 lb (108.4 kg)   23 0211 (!) 158/80 98 °F (36.7 °C) -- (!) 106 18 95 % --   23 0111 (!) 163/111 97.9 °F (36.6 °C) -- (!) 114 18 97 % --   23 0030 (!) 173/88 97.4 °F (36.3 °C) -- (!) 114 16 95 % --   23 (!) 159/88 -- -- (!) 111 -- -- --   23 (!) 148/102 98 °F (36.7 °C)

## 2023-05-30 VITALS
WEIGHT: 239 LBS | OXYGEN SATURATION: 97 % | DIASTOLIC BLOOD PRESSURE: 82 MMHG | BODY MASS INDEX: 32.37 KG/M2 | RESPIRATION RATE: 20 BRPM | TEMPERATURE: 96.9 F | HEART RATE: 99 BPM | HEIGHT: 72 IN | SYSTOLIC BLOOD PRESSURE: 154 MMHG

## 2023-05-30 LAB
ANION GAP SERPL CALCULATED.3IONS-SCNC: 12 MMOL/L (ref 7–19)
BASOPHILS # BLD: 0 K/UL (ref 0–0.2)
BASOPHILS NFR BLD: 0.7 % (ref 0–1)
BUN SERPL-MCNC: 7 MG/DL (ref 6–20)
CALCIUM SERPL-MCNC: 9.1 MG/DL (ref 8.6–10)
CHLORIDE SERPL-SCNC: 99 MMOL/L (ref 98–111)
CO2 SERPL-SCNC: 24 MMOL/L (ref 22–29)
CREAT SERPL-MCNC: 0.7 MG/DL (ref 0.5–1.2)
EOSINOPHIL # BLD: 0.1 K/UL (ref 0–0.6)
EOSINOPHIL NFR BLD: 2.2 % (ref 0–5)
ERYTHROCYTE [DISTWIDTH] IN BLOOD BY AUTOMATED COUNT: 14.8 % (ref 11.5–14.5)
GLUCOSE SERPL-MCNC: 102 MG/DL (ref 74–109)
HCT VFR BLD AUTO: 38.8 % (ref 42–52)
HGB BLD-MCNC: 13.5 G/DL (ref 14–18)
IMM GRANULOCYTES # BLD: 0 K/UL
LYMPHOCYTES # BLD: 2 K/UL (ref 1.1–4.5)
LYMPHOCYTES NFR BLD: 34 % (ref 20–40)
MCH RBC QN AUTO: 29.5 PG (ref 27–31)
MCHC RBC AUTO-ENTMCNC: 34.8 G/DL (ref 33–37)
MCV RBC AUTO: 84.7 FL (ref 80–94)
MONOCYTES # BLD: 0.3 K/UL (ref 0–0.9)
MONOCYTES NFR BLD: 5.5 % (ref 0–10)
NEUTROPHILS # BLD: 3.3 K/UL (ref 1.5–7.5)
NEUTS SEG NFR BLD: 57.1 % (ref 50–65)
PLATELET # BLD AUTO: 197 K/UL (ref 130–400)
PMV BLD AUTO: 8.7 FL (ref 9.4–12.4)
POTASSIUM SERPL-SCNC: 3.9 MMOL/L (ref 3.5–5)
RBC # BLD AUTO: 4.58 M/UL (ref 4.7–6.1)
SODIUM SERPL-SCNC: 135 MMOL/L (ref 136–145)
WBC # BLD AUTO: 5.8 K/UL (ref 4.8–10.8)

## 2023-05-30 PROCEDURE — 85025 COMPLETE CBC W/AUTO DIFF WBC: CPT

## 2023-05-30 PROCEDURE — 6370000000 HC RX 637 (ALT 250 FOR IP): Performed by: STUDENT IN AN ORGANIZED HEALTH CARE EDUCATION/TRAINING PROGRAM

## 2023-05-30 PROCEDURE — 36415 COLL VENOUS BLD VENIPUNCTURE: CPT

## 2023-05-30 PROCEDURE — 6370000000 HC RX 637 (ALT 250 FOR IP): Performed by: NURSE PRACTITIONER

## 2023-05-30 PROCEDURE — 2580000003 HC RX 258: Performed by: NURSE PRACTITIONER

## 2023-05-30 PROCEDURE — 6360000002 HC RX W HCPCS: Performed by: NURSE PRACTITIONER

## 2023-05-30 PROCEDURE — 80048 BASIC METABOLIC PNL TOTAL CA: CPT

## 2023-05-30 PROCEDURE — 94760 N-INVAS EAR/PLS OXIMETRY 1: CPT

## 2023-05-30 RX ORDER — AMLODIPINE BESYLATE 10 MG/1
10 TABLET ORAL DAILY
Qty: 30 TABLET | Refills: 0 | Status: SHIPPED | OUTPATIENT
Start: 2023-05-30

## 2023-05-30 RX ORDER — THIAMINE MONONITRATE (VIT B1) 100 MG
100 TABLET ORAL DAILY
Qty: 30 TABLET | Refills: 0 | Status: SHIPPED | OUTPATIENT
Start: 2023-05-31

## 2023-05-30 RX ADMIN — ENOXAPARIN SODIUM 30 MG: 100 INJECTION SUBCUTANEOUS at 08:18

## 2023-05-30 RX ADMIN — Medication 100 MG: at 08:18

## 2023-05-30 RX ADMIN — LEVETIRACETAM 500 MG: 500 TABLET, FILM COATED ORAL at 08:18

## 2023-05-30 RX ADMIN — SODIUM CHLORIDE, PRESERVATIVE FREE 10 ML: 5 INJECTION INTRAVENOUS at 08:18

## 2023-05-30 RX ADMIN — LISINOPRIL 40 MG: 20 TABLET ORAL at 08:18

## 2023-05-30 RX ADMIN — AMLODIPINE BESYLATE 5 MG: 5 TABLET ORAL at 08:18

## 2023-05-30 RX ADMIN — ACETAMINOPHEN 650 MG: 325 TABLET ORAL at 08:22

## 2023-05-30 RX ADMIN — THERA TABS 1 TABLET: TAB at 08:18

## 2023-05-30 ASSESSMENT — PAIN SCALES - GENERAL: PAINLEVEL_OUTOF10: 4

## 2023-05-30 NOTE — CARE COORDINATION
Attempted to meet with patient to discuss discharge plan and provide Rehab information. Patient had already been discharged.   Electronically signed by Kathi Goodpasture, RN on 5/30/2023 at 12:31 PM

## 2023-05-30 NOTE — PLAN OF CARE
Problem: Safety - Adult  Goal: Free from fall injury  Outcome: Progressing     Problem: ABCDS Injury Assessment  Goal: Absence of physical injury  Outcome: Progressing     Problem: Risk for Elopement  Goal: Patient will not exit the unit/facility without proper excort  Outcome: Progressing     Problem: Confusion  Goal: Confusion, delirium, dementia, or psychosis is improved or at baseline  Description: INTERVENTIONS:  1. Assess for possible contributors to thought disturbance, including medications, impaired vision or hearing, underlying metabolic abnormalities, dehydration, psychiatric diagnoses, and notify attending LIP  2. [place] high risk fall precautions, as indicated  3. Provide frequent short contacts to provide reality reorientation, refocusing and direction  4. Decrease environmental stimuli, including noise as appropriate  5. Monitor and intervene to maintain adequate nutrition, hydration, elimination, sleep and activity  6. If unable to ensure safety without constant attention obtain sitter and review sitter guidelines with assigned personnel  7.  Initiate Psychosocial CNS and Spiritual Care consult, as indicated  Outcome: Progressing     Problem: Pain  Goal: Verbalizes/displays adequate comfort level or baseline comfort level  Outcome: Progressing

## 2023-05-30 NOTE — DISCHARGE SUMMARY
[place], [place], [address]    DEPARTMENT OF HOSPITALIST MEDICINE      DISCHARGE SUMMARY:      PATIENT NAME:  Ashanti Ceja  :  1970  MRN:  [id number]    Admission Date:   2023 10:32 AM Attending: India Ruvalcaba MD   Discharge Date:   2023              PCP: TYRONE Veliz NP  Length of Stay: 2 days     Chief Complaint on Admission:   Chief Complaint   Patient presents with    Alcohol Intoxication    Mental Health Problem     Pt had a gun to his head last night       Consultants:     [address] [place] [address]  AND  TREATMENT:  47 yo M with EtOH use disorder who presented to [facility] ED while intoxicated reporting suicidal ideation. EtOH level >300. Admitted to hospitalist service for further management. Psychiatry consulted for SI, determined not to be acutely suicidal and sitter discontinued. Given Phenobarbital and started on CIWA protocol for EtOH withdrawal.      Pt reported atypical chest pain associated with anxiety. Improved with Ativan. EKG unremarkable. Troponin negative x2. Chest pain is noncardiac. Can follow up with cardiology as outpatient. EtOH symptoms resolved. Seen by psychiatry for SI and cleared for discharge. Discharge Problem List:   Principal Problem:    Alcoholic intoxication without complication ([facility] [address]  Active Problems:    Coronary artery disease    HLD (hyperlipidemia)    HTN (hypertension)    Seizure disorder ([facility] [address]    Suicidal ideation    ETOH abuse  Resolved Problems:    * No resolved hospital problems. *         Last dated Assessment and Plan . ..  23     EtOH use disorder and withdrawal  -- CIWA protocol  -- Given Phenobarbital 130 mg IV x1   -- PO Thiamine daily  --  consult for resources     Suicidal ideation  -- Psychiatry consult  -- Sitter dced  -- Can discharge when medically stable     Noncardiac chest pain  -- Anxiety related  -- CIWA as above     History of

## 2023-05-30 NOTE — PLAN OF CARE
Problem: Safety - Adult  Goal: Free from fall injury  5/30/2023 1111 by Tyrone Elder RN  Outcome: Adequate for Discharge  5/30/2023 1017 by Tyrone Elder RN  Outcome: Progressing     Problem: ABCDS Injury Assessment  Goal: Absence of physical injury  5/30/2023 1111 by Tyrone Elder RN  Outcome: Adequate for Discharge  5/30/2023 1017 by Tyrone Elder RN  Outcome: Progressing     Problem: Risk for Elopement  Goal: Patient will not exit the unit/facility without proper excort  5/30/2023 1111 by Tyrone Elder RN  Outcome: Adequate for Discharge  5/30/2023 1017 by Tyrone Elder RN  Outcome: Progressing     Problem: Confusion  Goal: Confusion, delirium, dementia, or psychosis is improved or at baseline  Description: INTERVENTIONS:  1. Assess for possible contributors to thought disturbance, including medications, impaired vision or hearing, underlying metabolic abnormalities, dehydration, psychiatric diagnoses, and notify attending LIP  2. [place] high risk fall precautions, as indicated  3. Provide frequent short contacts to provide reality reorientation, refocusing and direction  4. Decrease environmental stimuli, including noise as appropriate  5. Monitor and intervene to maintain adequate nutrition, hydration, elimination, sleep and activity  6. If unable to ensure safety without constant attention obtain sitter and review sitter guidelines with assigned personnel  7.  Initiate Psychosocial CNS and Spiritual Care consult, as indicated  5/30/2023 1111 by Tyrone Elder RN  Outcome: Adequate for Discharge  5/30/2023 1017 by Tyrone Elder RN  Outcome: Progressing     Problem: Pain  Goal: Verbalizes/displays adequate comfort level or baseline comfort level  5/30/2023 1111 by Tyrone Elder RN  Outcome: Adequate for Discharge  5/30/2023 1017 by Tyrone Elder RN  Outcome: Progressing

## 2024-03-11 ENCOUNTER — TELEPHONE (OUTPATIENT)
Dept: CT IMAGING | Facility: HOSPITAL | Age: 54
End: 2024-03-11
Payer: MEDICARE

## 2024-03-11 NOTE — TELEPHONE ENCOUNTER
Spoke with patient about overdue CT chest imaging. Transferred him to CT surgery to reschedule appt with them.

## 2024-03-27 DIAGNOSIS — R91.1 LUNG NODULE: Primary | ICD-10-CM

## 2024-04-01 ENCOUNTER — HOSPITAL ENCOUNTER (OUTPATIENT)
Dept: CT IMAGING | Facility: HOSPITAL | Age: 54
Discharge: HOME | End: 2024-04-01
Admitting: NURSE PRACTITIONER
Payer: MEDICARE

## 2024-04-01 DIAGNOSIS — R91.1 LUNG NODULE: ICD-10-CM

## 2024-04-01 PROCEDURE — 71250 CT THORAX DX C-: CPT

## 2024-04-04 ENCOUNTER — OFFICE VISIT (OUTPATIENT)
Dept: CARDIAC SURGERY | Facility: CLINIC | Age: 54
End: 2024-04-04
Payer: MEDICARE

## 2024-04-04 VITALS
OXYGEN SATURATION: 96 % | SYSTOLIC BLOOD PRESSURE: 162 MMHG | WEIGHT: 239.6 LBS | HEIGHT: 72 IN | HEART RATE: 94 BPM | DIASTOLIC BLOOD PRESSURE: 107 MMHG | BODY MASS INDEX: 32.45 KG/M2

## 2024-04-04 DIAGNOSIS — R91.1 LUNG NODULE: Primary | ICD-10-CM

## 2024-04-04 PROCEDURE — 99214 OFFICE O/P EST MOD 30 MIN: CPT | Performed by: SURGERY

## 2024-04-04 PROCEDURE — 3077F SYST BP >= 140 MM HG: CPT | Performed by: SURGERY

## 2024-04-04 PROCEDURE — 3080F DIAST BP >= 90 MM HG: CPT | Performed by: SURGERY

## 2024-04-04 RX ORDER — NITROGLYCERIN 0.4 MG/1
TABLET SUBLINGUAL
COMMUNITY

## 2024-04-04 RX ORDER — AMLODIPINE BESYLATE 10 MG/1
1 TABLET ORAL DAILY
COMMUNITY

## 2024-04-04 NOTE — H&P (VIEW-ONLY)
"    [facility] Cardiothoracic Surgery  PROGRESS NOTE   CC: lung nodule after CABG.    Subjective: The patient is a 53-year-old male who presents for evaluation of lung nodule after CABG.    The patient continues to smoke, albeit slightly over 1 pack per day, a habit he has been grappling with for approximately 40 years. Despite attempts to cease smoking, he has been unsuccessful due to heightened anxiety. He has not tried Wellbutrin in the past. His primary care physician is IZABEL Villalta.    ROS:   No fevers, chills or recent illness.    Objective:      BP (!) 162/107 (BP Location: Right arm, Patient Position: Sitting, Cuff Size: Adult)   Pulse 94   Ht 182 cm (71.65\")   Wt 109 kg (239 lb 9.6 oz)   SpO2 96%   BMI 32.81 kg/m²     [unfilled]    PE:  Vitals:    04/04/24 0802   BP: (!) 162/107   Pulse: 94   SpO2: 96%     GENERAL: NAD, resting comfortably, normal color  CARDIOVASCULAR: regular, regular rate, sinus  PULMONARY: Normal bilateral breath sounds, no labored breathing. Mild wheezing noted in the lungs.  ABDOMEN: soft, nontender/nondistended  EXTREMITIES: mild peripheral edema, normal pulses, normal ROM        Lab Results (last 72 hours)       ** No results found for the last 72 hours. **                CT chest without contrast performed on 4/1/2024.   FINDINGS:     Airways/Lungs/Pleura: Minimally increased size of a right upper lobe  subpleural groundglass nodule measuring up to 1.8 cm, previously 1.6 cm  (image 46). New inferior right upper lobe 2 mm nodule (image 86).  Remaining nodules are stable, representative example includes a stable 5  mm left upper lobe groundglass nodule (image 66).      Minimal scarring in the anterior right upper lobe, lingula, and left  lower lobe. Ill-defined upper lobe predominant centrilobular groundglass  nodules. No consolidation, pleural effusion, or pneumothorax.     Lower Neck: Unremarkable.     Mediastinum/Hilum: No enlarged lymphadenopathy.   "   Heart/Vasculature: Heart size normal. Status post CABG. No pericardial  effusion. Advanced coronary artery calcifications and/or stents. Mild  aortic atherosclerosis.     Chest wall/Soft Tissues: Unremarkable.     Upper Abdomen: No acute or suspicious findings.     Osseous Structures: No acute or suspicious osseous finding.     IMPRESSION:     Minimally increased size of a 1.8 cm right upper lobe subpleural  groundglass nodule. New 2 mm right upper lobe nodule. Remaining nodules  are stable. Continued follow-up is recommended.     Ill-defined centrilobular groundglass nodules, most likely related to  smoking-related respiratory bronchiolitis.     I personally reviewed the CT chest, and the following is my interpretation:  There is a lateral ground glass opacity in the right upper lobe that I measured to be 1.8 cm in maximum dimension, this is slightly increased from last time where it was about 1.6 to 1.7 cm. There is a small linear density in the anterior portion of the  right upper lobe that I suspect is more likely scarring and less likely consider concern for malignancy.    Assessment & Plan     Mr. Molina is a 53-year-old male who I performed CABG on in 05/2023 for unstable angina. At that time, we incidentally noted a ground-glass opacity in his right upper lobe, and we have been following this.    The nodule has grown since the last visit, approximately 1 year ago, and currently measures 1.8 cm in maximum dimension, with a very small solid component in the anterior aspect. He does have a 40-pack-year smoking history and has a very high risk for lung malignancy.     I discussed with him the differential diagnosis and potential treatment options. I think we should proceed with a PET scan and if metabolically active, go down the route of biopsy or ink marking given its peripheral nature with right wedge resection. This plan was discussed in detail, and he expressed understanding and agreement. If the nodule is  not metabolically active, and he prefers to continue with surveillance, this is acceptable and followed up in 6 months given the growth over the past year.     Thank you for trusting me with the care of Mr. Molina. Please do not hesitate to call with questions or concerns.        Ming Minor MD   Cardiothoracic Surgeon     Transcribed from ambient dictation for Ming Minor MD by Ryan Richard.  04/04/24   09:04 CDT    Patient or patient representative verbalized consent to the visit recording.  I have personally performed the services described in this document as transcribed by the above individual, and it is both accurate and complete.

## 2024-04-04 NOTE — LETTER
April 9, 2024       No Recipients    Patient: Stephen Molina   YOB: 1970   Date of Visit: 4/4/2024       Dear IZABEL Villalta,    Stephen Molina was in my office today. Below are the relevant portions of my assessment and plan of care.    Mr. Molina is a 53-year-old male who I performed CABG on in 05/2023 for unstable angina. At that time, we incidentally noted a ground-glass opacity in his right upper lobe, and we have been following this.    The nodule has grown since the last visit, approximately 1 year ago, and currently measures 1.8 cm in maximum dimension, with a very small solid component in the anterior aspect. He does have a 40-pack-year smoking history and has a very high risk for lung malignancy.     I discussed with him the differential diagnosis and potential treatment options. I think we should proceed with a PET scan and if metabolically active, go down the route of biopsy or ink marking given its peripheral nature with right wedge resection. This plan was discussed in detail, and he expressed understanding and agreement. If the nodule is not metabolically active, and he prefers to continue with surveillance, this is acceptable and followed up in 6 months given the growth over the past year.     Thank you for trusting me with the care of Mr. Molina. Please do not hesitate to call with questions or concerns.         Sincerely,        Ming Minor MD        CC:   No Recipients

## 2024-04-04 NOTE — PROGRESS NOTES
"    [facility] Cardiothoracic Surgery  PROGRESS NOTE   CC: lung nodule after CABG.    Subjective: The patient is a 53-year-old male who presents for evaluation of lung nodule after CABG.    The patient continues to smoke, albeit slightly over 1 pack per day, a habit he has been grappling with for approximately 40 years. Despite attempts to cease smoking, he has been unsuccessful due to heightened anxiety. He has not tried Wellbutrin in the past. His primary care physician is IZABEL Villalta.    ROS:   No fevers, chills or recent illness.    Objective:      BP (!) 162/107 (BP Location: Right arm, Patient Position: Sitting, Cuff Size: Adult)   Pulse 94   Ht 182 cm (71.65\")   Wt 109 kg (239 lb 9.6 oz)   SpO2 96%   BMI 32.81 kg/m²     [unfilled]    PE:  Vitals:    04/04/24 0802   BP: (!) 162/107   Pulse: 94   SpO2: 96%     GENERAL: NAD, resting comfortably, normal color  CARDIOVASCULAR: regular, regular rate, sinus  PULMONARY: Normal bilateral breath sounds, no labored breathing. Mild wheezing noted in the lungs.  ABDOMEN: soft, nontender/nondistended  EXTREMITIES: mild peripheral edema, normal pulses, normal ROM        Lab Results (last 72 hours)       ** No results found for the last 72 hours. **                CT chest without contrast performed on 4/1/2024.   FINDINGS:     Airways/Lungs/Pleura: Minimally increased size of a right upper lobe  subpleural groundglass nodule measuring up to 1.8 cm, previously 1.6 cm  (image 46). New inferior right upper lobe 2 mm nodule (image 86).  Remaining nodules are stable, representative example includes a stable 5  mm left upper lobe groundglass nodule (image 66).      Minimal scarring in the anterior right upper lobe, lingula, and left  lower lobe. Ill-defined upper lobe predominant centrilobular groundglass  nodules. No consolidation, pleural effusion, or pneumothorax.     Lower Neck: Unremarkable.     Mediastinum/Hilum: No enlarged lymphadenopathy.   "   Heart/Vasculature: Heart size normal. Status post CABG. No pericardial  effusion. Advanced coronary artery calcifications and/or stents. Mild  aortic atherosclerosis.     Chest wall/Soft Tissues: Unremarkable.     Upper Abdomen: No acute or suspicious findings.     Osseous Structures: No acute or suspicious osseous finding.     IMPRESSION:     Minimally increased size of a 1.8 cm right upper lobe subpleural  groundglass nodule. New 2 mm right upper lobe nodule. Remaining nodules  are stable. Continued follow-up is recommended.     Ill-defined centrilobular groundglass nodules, most likely related to  smoking-related respiratory bronchiolitis.     I personally reviewed the CT chest, and the following is my interpretation:  There is a lateral ground glass opacity in the right upper lobe that I measured to be 1.8 cm in maximum dimension, this is slightly increased from last time where it was about 1.6 to 1.7 cm. There is a small linear density in the anterior portion of the  right upper lobe that I suspect is more likely scarring and less likely consider concern for malignancy.    Assessment & Plan     Mr. Molina is a 53-year-old male who I performed CABG on in 05/2023 for unstable angina. At that time, we incidentally noted a ground-glass opacity in his right upper lobe, and we have been following this.    The nodule has grown since the last visit, approximately 1 year ago, and currently measures 1.8 cm in maximum dimension, with a very small solid component in the anterior aspect. He does have a 40-pack-year smoking history and has a very high risk for lung malignancy.     I discussed with him the differential diagnosis and potential treatment options. I think we should proceed with a PET scan and if metabolically active, go down the route of biopsy or ink marking given its peripheral nature with right wedge resection. This plan was discussed in detail, and he expressed understanding and agreement. If the nodule is  not metabolically active, and he prefers to continue with surveillance, this is acceptable and followed up in 6 months given the growth over the past year.     Thank you for trusting me with the care of Mr. Molina. Please do not hesitate to call with questions or concerns.        Ming Minor MD   Cardiothoracic Surgeon     Transcribed from ambient dictation for Ming Minor MD by Ryan Richard.  04/04/24   09:04 CDT    Patient or patient representative verbalized consent to the visit recording.  I have personally performed the services described in this document as transcribed by the above individual, and it is both accurate and complete.

## 2024-04-10 ENCOUNTER — HOSPITAL ENCOUNTER (OUTPATIENT)
Dept: CT IMAGING | Facility: HOSPITAL | Age: 54
Discharge: HOME | End: 2024-04-10
Payer: MEDICARE

## 2024-04-10 DIAGNOSIS — R91.1 LUNG NODULE: ICD-10-CM

## 2024-04-11 ENCOUNTER — TELEPHONE (OUTPATIENT)
Dept: CARDIAC SURGERY | Facility: CLINIC | Age: 54
End: 2024-04-11

## 2024-04-11 DIAGNOSIS — R91.1 LUNG NODULE: Primary | ICD-10-CM

## 2024-04-12 ENCOUNTER — PREP FOR SURGERY (OUTPATIENT)
Dept: OTHER | Facility: HOSPITAL | Age: 54
End: 2024-04-12
Payer: MEDICARE

## 2024-04-12 DIAGNOSIS — R91.1 LUNG NODULE: Primary | ICD-10-CM

## 2024-04-12 DIAGNOSIS — R06.02 SOB (SHORTNESS OF BREATH): ICD-10-CM

## 2024-04-12 RX ORDER — SODIUM CHLORIDE 0.9 % (FLUSH) 0.9 %
10 SYRINGE (ML) INJECTION EVERY 12 HOURS SCHEDULED
OUTPATIENT
Start: 2024-04-12

## 2024-04-12 RX ORDER — SODIUM CHLORIDE 9 MG/ML
40 INJECTION, SOLUTION INTRAVENOUS AS NEEDED
OUTPATIENT
Start: 2024-04-12

## 2024-04-12 RX ORDER — HEPARIN SODIUM 5000 [USP'U]/ML
5000 INJECTION, SOLUTION INTRAVENOUS; SUBCUTANEOUS ONCE
OUTPATIENT
Start: 2024-04-26

## 2024-04-12 RX ORDER — SODIUM CHLORIDE 0.9 % (FLUSH) 0.9 %
10 SYRINGE (ML) INJECTION AS NEEDED
OUTPATIENT
Start: 2024-04-12

## 2024-04-12 NOTE — PROGRESS NOTES
Chief Complaint  Lung Nodule    Subjective    History of Present Illness {  Problem List  Visit Diagnosis   Encounters  Notes  Medications  Labs  Result Review Imaging  Media     Stephen Molina presents to [facility] PULMONARY & [facility]ICAL CARE MEDICINE for:    History of Present Illness  Mr. Molina is here as a new patient to establish care. He underwent CABG with Dr. Minor in May 2023.  At that time he had a preoperative a CT of his chest with an incidental finding of right upper lobe groundglass nodule.  He recently had a repeat CT chest to follow-up on this nodule with slight interval growth. There are plans for resection of this nodule by Dr. Minor. Dr. Santos and Dr. Minor have discussed case and plans for Dr. Santos to kristi nodule with INK procedure prior to resection. Severe obstruction noted on PFT in 2022. Patient reports wheezing and dyspnea for several years. Mild wheeze on auscultation. He is only on albuterol. Follow-up PFT pending. He is a current every day smoker and smokes over a pack per day for 40 years.          Prior to Admission medications    Medication Sig Start Date End Date Taking? Authorizing Provider   albuterol sulfate  (90 Base) MCG/ACT inhaler Inhale 2 puffs Every 4 (Four) Hours As Needed for Wheezing.    ProviderMimi MD   amLODIPine (NORVASC) 10 MG tablet Take 1 tablet by mouth Daily.    ProviderMimi MD   aspirin 81 MG EC tablet Take 1 tablet by mouth Daily.    Mimi De La Cruz MD   levETIRAcetam (KEPPRA) 500 MG tablet Take 1 tablet by mouth 2 (Two) Times a Day.    Mimi De La Cruz MD   lisinopril (PRINIVIL,ZESTRIL) 5 MG tablet Take 1 tablet by mouth Daily.  Patient taking differently: Take 4 tablets by mouth Daily. 5/21/22   Andre Alberto MD   nitroglycerin (NITROSTAT) 0.4 MG SL tablet     Mimi De La Cruz MD   OLANZapine-FLUoxetine (SYMBYAX) 6-50 MG per capsule Take 1 capsule by mouth Every Evening.    Provider  "MD Mimi       Social History     Socioeconomic History    Marital status: Single   Tobacco Use    Smoking status: Every Day     Current packs/day: 1.25     Average packs/day: 1.3 packs/day for 34.3 years (42.9 ttl pk-yrs)     Types: Cigarettes     Start date: 1/1/1990    Smokeless tobacco: Never   Vaping Use    Vaping status: Never Used   Substance and Sexual Activity    Alcohol use: Not Currently    Drug use: Not Currently    Sexual activity: Defer       Objective   Vital Signs:   /80   Pulse 85   Ht 182 cm (71.65\")   Wt 108 kg (238 lb)   SpO2 99% Comment: RA  BMI 32.59 kg/m²     Physical Exam  Constitutional:       General: He is not in acute distress.  HENT:      Head: Normocephalic.      Nose: Nose normal.      Mouth/Throat:      Mouth: Mucous membranes are moist.   Eyes:      General: No scleral icterus.  Cardiovascular:      Rate and Rhythm: Normal rate.   Pulmonary:      Effort: No respiratory distress.      Breath sounds: Wheezing (mild) present.   Abdominal:      General: There is no distension.   Neurological:      Mental Status: He is alert and oriented to person, place, and time.   Psychiatric:         Mood and Affect: Mood normal.         Behavior: Behavior is cooperative.        Result Review :{ Labs  Result Review  Imaging  Med Tab  Media :          Results for orders placed during the hospital encounter of 05/04/22    Pulmonary Function Test    [facility] - Pulmonary Function Test    [address] [place] [address]  [id number]  [phone]    Patient : Stephen Molina  MRN : [id number]  CSN : [id number]  Pulmonologist : Jimmy Matthew MD  Date : 5/5/2022    ______________________________________________________________________    Interpretation :  1.  Spirometry is consistent with a severe obstructive ventilatory defect.  2.  Arterial blood gases on room air reveal a mild respiratory acidosis and moderate hypoxemia.      Jimmy Matthew MD          "      CT Chest Without Contrast Diagnostic (04/01/2024 11:49)   My interpretation of imaging:  mildly increased size of rul gg nodule           Assessment and Plan {CC Problem List  Visit Diagnosis  ROS  Review (Popup)  Health Maintenance  Quality  BestPractice  Medications  SmartSets  SnapShot Encounters  Media      Diagnoses and all orders for this visit:    1. Lung nodule (Primary)  Comments:  Right upper lobe groundglass nodule    2. Current smoker    3. Stage 3 severe COPD by GOLD classification  Comments:  As of PFT from 2022          We reviewed ct showing slight increase in size of rul gg nodule. Plans for resection by CT surgery on 4-. Dr. Santos to perform INK procedure with ION robotic bronchoscopy prior to resection. Procedure education provided. Trelegy 100 sample provided given mild wheeze on auscultation today in the setting of severe copd. Oral care instructions provided. Continue albuterol as needed. Smoking cessation education provided. Office follow up in 6 weeks or sooner if needed.     Bernice Tran, IZABEL  4/17/2024  09:06 CDT    Follow Up {Instructions Charge Capture  Follow-up Communications   Return in about 6 weeks (around 5/29/2024).    Patient was given instructions and counseling regarding his condition or for health maintenance advice. Please see specific information pulled into the AVS if appropriate.

## 2024-04-16 ENCOUNTER — HOSPITAL ENCOUNTER (OUTPATIENT)
Dept: GENERAL RADIOLOGY | Facility: HOSPITAL | Age: 54
Discharge: HOME | End: 2024-04-16
Payer: MEDICARE

## 2024-04-16 ENCOUNTER — HOSPITAL ENCOUNTER (OUTPATIENT)
Dept: CT IMAGING | Facility: HOSPITAL | Age: 54
Discharge: HOME | End: 2024-04-16
Payer: MEDICARE

## 2024-04-16 DIAGNOSIS — R91.1 LUNG NODULE: ICD-10-CM

## 2024-04-16 PROCEDURE — 78815 PET IMAGE W/CT SKULL-THIGH: CPT

## 2024-04-16 PROCEDURE — A9552 F18 FDG: HCPCS | Performed by: NURSE PRACTITIONER

## 2024-04-16 PROCEDURE — 0 FLUDEOXYGLUCOSE F18 SOLUTION: Performed by: NURSE PRACTITIONER

## 2024-04-16 PROCEDURE — 71046 X-RAY EXAM CHEST 2 VIEWS: CPT

## 2024-04-16 RX ADMIN — FLUDEOXYGLUCOSE F 18 1 DOSE: 200 INJECTION, SOLUTION INTRAVENOUS at 08:10

## 2024-04-17 ENCOUNTER — OFFICE VISIT (OUTPATIENT)
Dept: PULMONOLOGY | Facility: CLINIC | Age: 54
End: 2024-04-17
Payer: MEDICARE

## 2024-04-17 VITALS
HEIGHT: 72 IN | OXYGEN SATURATION: 99 % | WEIGHT: 238 LBS | SYSTOLIC BLOOD PRESSURE: 142 MMHG | DIASTOLIC BLOOD PRESSURE: 80 MMHG | BODY MASS INDEX: 32.23 KG/M2 | HEART RATE: 85 BPM

## 2024-04-17 DIAGNOSIS — F17.200 CURRENT SMOKER: Chronic | ICD-10-CM

## 2024-04-17 DIAGNOSIS — J44.9 STAGE 3 SEVERE COPD BY GOLD CLASSIFICATION: Chronic | ICD-10-CM

## 2024-04-17 DIAGNOSIS — R91.1 LUNG NODULE: Primary | Chronic | ICD-10-CM

## 2024-04-17 PROCEDURE — 3079F DIAST BP 80-89 MM HG: CPT | Performed by: NURSE PRACTITIONER

## 2024-04-17 PROCEDURE — 3077F SYST BP >= 140 MM HG: CPT | Performed by: NURSE PRACTITIONER

## 2024-04-17 PROCEDURE — 1159F MED LIST DOCD IN RCRD: CPT | Performed by: NURSE PRACTITIONER

## 2024-04-17 PROCEDURE — 99214 OFFICE O/P EST MOD 30 MIN: CPT | Performed by: NURSE PRACTITIONER

## 2024-04-17 PROCEDURE — 1160F RVW MEDS BY RX/DR IN RCRD: CPT | Performed by: NURSE PRACTITIONER

## 2024-04-22 ENCOUNTER — HOSPITAL ENCOUNTER (OUTPATIENT)
Dept: PULMONOLOGY | Facility: HOSPITAL | Age: 54
Discharge: HOME | End: 2024-04-22
Payer: MEDICARE

## 2024-04-22 DIAGNOSIS — R91.1 LUNG NODULE: ICD-10-CM

## 2024-04-22 LAB
A-A DO2: 16.1 MMHG
ARTERIAL PATENCY WRIST A: POSITIVE
ATMOSPHERIC PRESS: 756 MMHG
BASE EXCESS BLDA CALC-SCNC: -0.8 MMOL/L (ref 0–2)
BDY SITE: ABNORMAL
BODY TEMPERATURE: 37
COHGB MFR BLD: 8.3 % (ref 0–5)
HCO3 BLDA-SCNC: 24.9 MMOL/L (ref 20–26)
HCT VFR BLD CALC: 48.7 % (ref 38–51)
HGB BLDA-MCNC: 15.9 G/DL (ref 14–18)
Lab: ABNORMAL
METHGB BLD QL: 0.9 % (ref 0–3)
MODALITY: ABNORMAL
OXYHGB MFR BLDV: 87.4 % (ref 94–99)
PCO2 BLDA: 43.7 MM HG (ref 35–45)
PCO2 TEMP ADJ BLD: 43.7 MM HG (ref 35–45)
PH BLDA: 7.36 PH UNITS (ref 7.35–7.45)
PH, TEMP CORRECTED: 7.36 PH UNITS (ref 7.35–7.45)
PO2 BLDA: 83.4 MM HG (ref 83–108)
PO2 TEMP ADJ BLD: 83.4 MM HG (ref 83–108)
POTASSIUM BLDA-SCNC: 4.1 MMOL/L (ref 3.5–5.2)
SAO2 % BLDCOA: 96.3 % (ref 94–99)
SODIUM BLDA-SCNC: 136 MMOL/L (ref 136–145)
VENTILATOR MODE: ABNORMAL

## 2024-04-22 PROCEDURE — 94729 DIFFUSING CAPACITY: CPT

## 2024-04-22 PROCEDURE — 83050 HGB METHEMOGLOBIN QUAN: CPT

## 2024-04-22 PROCEDURE — 36600 WITHDRAWAL OF ARTERIAL BLOOD: CPT

## 2024-04-22 PROCEDURE — 82805 BLOOD GASES W/O2 SATURATION: CPT

## 2024-04-22 PROCEDURE — 94729 DIFFUSING CAPACITY: CPT | Performed by: INTERNAL MEDICINE

## 2024-04-22 PROCEDURE — 94060 EVALUATION OF WHEEZING: CPT | Performed by: INTERNAL MEDICINE

## 2024-04-22 PROCEDURE — 94726 PLETHYSMOGRAPHY LUNG VOLUMES: CPT | Performed by: INTERNAL MEDICINE

## 2024-04-22 PROCEDURE — 94726 PLETHYSMOGRAPHY LUNG VOLUMES: CPT

## 2024-04-22 PROCEDURE — 82375 ASSAY CARBOXYHB QUANT: CPT

## 2024-04-22 PROCEDURE — 94060 EVALUATION OF WHEEZING: CPT

## 2024-04-22 RX ORDER — ALBUTEROL SULFATE 2.5 MG/3ML
2.5 SOLUTION RESPIRATORY (INHALATION) ONCE
Status: COMPLETED | OUTPATIENT
Start: 2024-04-22 | End: 2024-04-22

## 2024-04-22 RX ADMIN — ALBUTEROL SULFATE 2.5 MG: 2.5 SOLUTION RESPIRATORY (INHALATION) at 07:43

## 2024-04-24 ENCOUNTER — PRE-ADMISSION TESTING (OUTPATIENT)
Dept: PREADMISSION TESTING | Facility: HOSPITAL | Age: 54
DRG: 164 | End: 2024-04-24
Payer: MEDICARE

## 2024-04-24 VITALS
RESPIRATION RATE: 18 BRPM | OXYGEN SATURATION: 97 % | BODY MASS INDEX: 32.01 KG/M2 | HEIGHT: 72 IN | DIASTOLIC BLOOD PRESSURE: 84 MMHG | HEART RATE: 84 BPM | SYSTOLIC BLOOD PRESSURE: 143 MMHG | WEIGHT: 236.33 LBS

## 2024-04-24 DIAGNOSIS — R91.1 LUNG NODULE: ICD-10-CM

## 2024-04-24 DIAGNOSIS — R06.02 SOB (SHORTNESS OF BREATH): ICD-10-CM

## 2024-04-24 LAB
ABO GROUP BLD: NORMAL
ALBUMIN SERPL-MCNC: 4.4 G/DL (ref 3.5–5.2)
ALBUMIN/GLOB SERPL: 1.8 G/DL
ALP SERPL-CCNC: 88 U/L (ref 39–117)
ALT SERPL W P-5'-P-CCNC: 25 U/L (ref 1–41)
ANION GAP SERPL CALCULATED.3IONS-SCNC: 13 MMOL/L (ref 5–15)
APTT PPP: 26.4 SECONDS (ref 24.5–36)
AST SERPL-CCNC: 20 U/L (ref 1–40)
BASOPHILS # BLD AUTO: 0.09 10*3/MM3 (ref 0–0.2)
BASOPHILS NFR BLD AUTO: 1.3 % (ref 0–1.5)
BILIRUB SERPL-MCNC: 0.4 MG/DL (ref 0–1.2)
BLD GP AB SCN SERPL QL: NEGATIVE
BUN SERPL-MCNC: 6 MG/DL (ref 6–20)
BUN/CREAT SERPL: 11.5 (ref 7–25)
CALCIUM SPEC-SCNC: 9.3 MG/DL (ref 8.6–10.5)
CHLORIDE SERPL-SCNC: 100 MMOL/L (ref 98–107)
CO2 SERPL-SCNC: 23 MMOL/L (ref 22–29)
CREAT SERPL-MCNC: 0.52 MG/DL (ref 0.76–1.27)
DEPRECATED RDW RBC AUTO: 43.4 FL (ref 37–54)
EGFRCR SERPLBLD CKD-EPI 2021: 120.5 ML/MIN/1.73
EOSINOPHIL # BLD AUTO: 0.12 10*3/MM3 (ref 0–0.4)
EOSINOPHIL NFR BLD AUTO: 1.8 % (ref 0.3–6.2)
ERYTHROCYTE [DISTWIDTH] IN BLOOD BY AUTOMATED COUNT: 13.6 % (ref 12.3–15.4)
GLOBULIN UR ELPH-MCNC: 2.5 GM/DL
GLUCOSE SERPL-MCNC: 107 MG/DL (ref 65–99)
HCT VFR BLD AUTO: 50.1 % (ref 37.5–51)
HGB BLD-MCNC: 16.3 G/DL (ref 13–17.7)
IMM GRANULOCYTES # BLD AUTO: 0.04 10*3/MM3 (ref 0–0.05)
IMM GRANULOCYTES NFR BLD AUTO: 0.6 % (ref 0–0.5)
INR PPP: 0.98 (ref 0.91–1.09)
LYMPHOCYTES # BLD AUTO: 1.58 10*3/MM3 (ref 0.7–3.1)
LYMPHOCYTES NFR BLD AUTO: 23.2 % (ref 19.6–45.3)
MCH RBC QN AUTO: 28.4 PG (ref 26.6–33)
MCHC RBC AUTO-ENTMCNC: 32.5 G/DL (ref 31.5–35.7)
MCV RBC AUTO: 87.4 FL (ref 79–97)
MONOCYTES # BLD AUTO: 0.52 10*3/MM3 (ref 0.1–0.9)
MONOCYTES NFR BLD AUTO: 7.6 % (ref 5–12)
NEUTROPHILS NFR BLD AUTO: 4.45 10*3/MM3 (ref 1.7–7)
NEUTROPHILS NFR BLD AUTO: 65.5 % (ref 42.7–76)
NRBC BLD AUTO-RTO: 0 /100 WBC (ref 0–0.2)
PLATELET # BLD AUTO: 234 10*3/MM3 (ref 140–450)
PMV BLD AUTO: 9.1 FL (ref 6–12)
POTASSIUM SERPL-SCNC: 4.4 MMOL/L (ref 3.5–5.2)
PROT SERPL-MCNC: 6.9 G/DL (ref 6–8.5)
PROTHROMBIN TIME: 13.3 SECONDS (ref 11.8–14.8)
RBC # BLD AUTO: 5.73 10*6/MM3 (ref 4.14–5.8)
RH BLD: POSITIVE
SODIUM SERPL-SCNC: 136 MMOL/L (ref 136–145)
T&S EXPIRATION DATE: NORMAL
WBC NRBC COR # BLD AUTO: 6.8 10*3/MM3 (ref 3.4–10.8)

## 2024-04-24 PROCEDURE — 93005 ELECTROCARDIOGRAM TRACING: CPT

## 2024-04-24 PROCEDURE — 80053 COMPREHEN METABOLIC PANEL: CPT

## 2024-04-24 PROCEDURE — 93010 ELECTROCARDIOGRAM REPORT: CPT | Performed by: INTERNAL MEDICINE

## 2024-04-24 PROCEDURE — 86850 RBC ANTIBODY SCREEN: CPT

## 2024-04-24 PROCEDURE — 85025 COMPLETE CBC W/AUTO DIFF WBC: CPT

## 2024-04-24 PROCEDURE — 86900 BLOOD TYPING SEROLOGIC ABO: CPT

## 2024-04-24 PROCEDURE — 36415 COLL VENOUS BLD VENIPUNCTURE: CPT

## 2024-04-24 PROCEDURE — 85610 PROTHROMBIN TIME: CPT

## 2024-04-24 PROCEDURE — 86901 BLOOD TYPING SEROLOGIC RH(D): CPT

## 2024-04-24 PROCEDURE — 85730 THROMBOPLASTIN TIME PARTIAL: CPT

## 2024-04-24 NOTE — DISCHARGE INSTRUCTIONS
Preparing for Surgery  Follow these instructions before the procedure:  Several days or weeks before your procedure    Ask your health care provider about:  Changing or stopping your regular medicines. This is especially important if you are taking diabetes medicines or blood thinners.  Taking medicines such as aspirin and ibuprofen. These medicines can thin your blood. Do not take these medicines unless your health care provider tells you to take them.  Taking over-the-counter medicines, vitamins, herbs, and supplements.    Contact your surgeon if you:  Develop a fever of more than 100.4°F (38°C) or other feelings of illness during the 48 hours before your surgery.  Have symptoms that get worse.  Have questions or concerns about your surgery.  If you are going home the same day of your surgery you will need to arrange for a responsible adult, age 18 years old or older, to drive you home from the hospital and stay with you for 24 hours. Verification of the  will be made prior to any procedure requiring sedation. You may not go home in a taxi or any form of public transportation by yourself.     Day before your procedure  Medication(s) you need to stop the day before your surgery: LISINOPRIL    24 hours before your procedure DO NOT drink alcoholic beverages or smoke.  24 hours before your procedure STOP taking Erectile Dysfunction medication (i.e.,Cialis, Viagra)   You may be asked to shower with a germ-killing soap.  Day of your procedure   You may take the following medication(s) the morning of surgery with a sip of water: KEPPRA      8 hours before your procedure STOP all food, any dairy products, and full liquids. This includes hard candy, chewing gum or mints. This is extremely important to prevent serious complications.     Up to 2 hours before your scheduled arrival time, you may have clear liquids no cream, powder, or pulp of any kind. Safe options are water, black coffee, plain tea, soda,  Gatorade/Powerade, clear broth, apple juice.    2 hours before your scheduled arrival time, STOP drinking clear liquids.    You may need to take another shower with a germ-killing soap before you leave home in the morning. Do not use perfumes, colognes, or body lotions.  Wear comfortable loose-fitting clothing.  Remove all jewelry including body piercing and rings, dark colored nail polish, and make up prior to arrival at the hospital. Leave all valuables at home.   Bring your hearing aids if you rely on them.  Do not wear contact lenses. If you wear eyeglasses remember to bring a case to store them in while you are in surgery.  Do not use denture adhesives since you will be asked to remove them during your surgery.    You do not need to bring your home medications into the hospital.   Bring your sleep apnea device with you on the day of your surgery (if this applies to you).  If you wear portable oxygen, bring it with you.   If you are staying overnight, you may bring a bag of items you may need such as slippers, robe and a change of clothes for your discharge. You may want to leave these items in the car until you are ready for them since your family will take your belongings when you leave the pre-operative area.  Arrive at the hospital as scheduled by the office. You will be asked to arrive 2 hours prior to your surgery time in order to prepare for your procedure.  When you arrive at the hospital  Go to the registration desk located at the main entrance of the hospital.  After registration is completed, you will be given a beeper and a sticker sheet. Take the stickers to Outpatient Surgery and place in the tray at the end of the desk to notify the staff that you have arrived and registered.   Return to the lobby to wait. You are not always called back according to the time of arrival but rather the time your doctor will be ready.  When your beeper lights up and vibrates proceed through the double doors, under  the stairs, and a member of the Outpatient Surgery staff will escort you to your preoperative room.         How to Use Chlorhexidine Before Surgery  Chlorhexidine gluconate (CHG) is a germ-killing (antiseptic) solution that is used to clean the skin. It can get rid of the bacteria that normally live on the skin and can keep them away for about 24 hours. To clean your skin with CHG, you may be given:  A CHG solution to use in the shower or as part of a sponge bath.  A prepackaged cloth that contains CHG.  Cleaning your skin with CHG may help lower the risk for infection:  While you are staying in the intensive care unit of the hospital.  If you have a vascular access, such as a central line, to provide short-term or long-term access to your veins.  If you have a catheter to drain urine from your bladder.  If you are on a ventilator. A ventilator is a machine that helps you breathe by moving air in and out of your lungs.  After surgery.  What are the risks?  Risks of using CHG include:  A skin reaction.  Hearing loss, if CHG gets in your ears and you have a perforated eardrum.  Eye injury, if CHG gets in your eyes and is not rinsed out.  The CHG product catching fire.  Make sure that you avoid smoking and flames after applying CHG to your skin.  Do not use CHG:  If you have a chlorhexidine allergy or have previously reacted to chlorhexidine.  On babies younger than 2 months of age.  How to use CHG solution  Use CHG only as told by your health care provider, and follow the instructions on the label.  Use the full amount of CHG as directed. Usually, this is one bottle.  During a shower    Follow these steps when using CHG solution during a shower (unless your health care provider gives you different instructions):  Start the shower.  Use your normal soap and shampoo to wash your face and hair.  Turn off the shower or move out of the shower stream.  Pour the CHG onto a clean washcloth. Do not use any type of brush or  rough-edged sponge.  Starting at your neck, lather your body down to your toes. Make sure you follow these instructions:  If you will be having surgery, pay special attention to the part of your body where you will be having surgery. Scrub this area for at least 1 minute.  Do not use CHG on your head or face. If the solution gets into your ears or eyes, rinse them well with water.  Avoid your genital area.  Avoid any areas of skin that have broken skin, cuts, or scrapes.  Scrub your back and under your arms. Make sure to wash skin folds.  Let the lather sit on your skin for 1-2 minutes or as long as told by your health care provider.  Thoroughly rinse your entire body in the shower. Make sure that all body creases and crevices are rinsed well.  Dry off with a clean towel. Do not put any substances on your body afterward--such as powder, lotion, or perfume--unless you are told to do so by your health care provider. Only use lotions that are recommended by the .  Put on clean clothes or pajamas.  If it is the night before your surgery, sleep in clean sheets.     During a sponge bath  Follow these steps when using CHG solution during a sponge bath (unless your health care provider gives you different instructions):  Use your normal soap and shampoo to wash your face and hair.  Pour the CHG onto a clean washcloth.  Starting at your neck, lather your body down to your toes. Make sure you follow these instructions:  If you will be having surgery, pay special attention to the part of your body where you will be having surgery. Scrub this area for at least 1 minute.  Do not use CHG on your head or face. If the solution gets into your ears or eyes, rinse them well with water.  Avoid your genital area.  Avoid any areas of skin that have broken skin, cuts, or scrapes.  Scrub your back and under your arms. Make sure to wash skin folds.  Let the lather sit on your skin for 1-2 minutes or as long as told by your  health care provider.  Using a different clean, wet washcloth, thoroughly rinse your entire body. Make sure that all body creases and crevices are rinsed well.  Dry off with a clean towel. Do not put any substances on your body afterward--such as powder, lotion, or perfume--unless you are told to do so by your health care provider. Only use lotions that are recommended by the .  Put on clean clothes or pajamas.  If it is the night before your surgery, sleep in clean sheets.  How to use CHG prepackaged cloths  Only use CHG cloths as told by your health care provider, and follow the instructions on the label.  Use the CHG cloth on clean, dry skin.  Do not use the CHG cloth on your head or face unless your health care provider tells you to.  When washing with the CHG cloth:  Avoid your genital area.  Avoid any areas of skin that have broken skin, cuts, or scrapes.  Before surgery    Follow these steps when using a CHG cloth to clean before surgery (unless your health care provider gives you different instructions):  Using the CHG cloth, vigorously scrub the part of your body where you will be having surgery. Scrub using a back-and-forth motion for 3 minutes. The area on your body should be completely wet with CHG when you are done scrubbing.  Do not rinse. Discard the cloth and let the area air-dry. Do not put any substances on the area afterward, such as powder, lotion, or perfume.  Put on clean clothes or pajamas.  If it is the night before your surgery, sleep in clean sheets.     For general bathing  Follow these steps when using CHG cloths for general bathing (unless your health care provider gives you different instructions).  Use a separate CHG cloth for each area of your body. Make sure you wash between any folds of skin and between your fingers and toes. Wash your body in the following order, switching to a new cloth after each step:  The front of your neck, shoulders, and chest.  Both of your arms,  under your arms, and your hands.  Your stomach and groin area, avoiding the genitals.  Your right leg and foot.  Your left leg and foot.  The back of your neck, your back, and your buttocks.  Do not rinse. Discard the cloth and let the area air-dry. Do not put any substances on your body afterward--such as powder, lotion, or perfume--unless you are told to do so by your health care provider. Only use lotions that are recommended by the .  Put on clean clothes or pajamas.  Contact a health care provider if:  Your skin gets irritated after scrubbing.  You have questions about using your solution or cloth.  You swallow any chlorhexidine. Call your local poison control center (1-944.858.8125 in the [place]  Get help right away if:  Your eyes itch badly, or they become very red or swollen.  Your skin itches badly and is red or swollen.  Your hearing changes.  You have trouble seeing.  You have swelling or tingling in your mouth or throat.  You have trouble breathing.  These symptoms may represent a serious problem that is an emergency. Do not wait to see if the symptoms will go away. Get medical help right away. Call your local emergency services (938 in the [place] Do not drive yourself to the hospital.  Summary  Chlorhexidine gluconate (CHG) is a germ-killing (antiseptic) solution that is used to clean the skin. Cleaning your skin with CHG may help to lower your risk for infection.  You may be given CHG to use for bathing. It may be in a bottle or in a prepackaged cloth to use on your skin. Carefully follow your health care provider's instructions and the instructions on the product label.  Do not use CHG if you have a chlorhexidine allergy.  Contact your health care provider if your skin gets irritated after scrubbing.  This information is not intended to replace advice given to you by your health care provider. Make sure you discuss any questions you have with your health care provider.  Document Revised:  04/17/2023 Document Reviewed: 02/28/2022  Elsevier Patient Education © 2023 [employer]

## 2024-04-26 ENCOUNTER — APPOINTMENT (OUTPATIENT)
Dept: GENERAL RADIOLOGY | Facility: HOSPITAL | Age: 54
DRG: 164 | End: 2024-04-26
Payer: MEDICARE

## 2024-04-26 ENCOUNTER — HOSPITAL ENCOUNTER (INPATIENT)
Facility: HOSPITAL | Age: 54
LOS: 3 days | Discharge: HOME | DRG: 164 | End: 2024-04-29
Attending: SURGERY | Admitting: SURGERY
Payer: MEDICARE

## 2024-04-26 ENCOUNTER — APPOINTMENT (OUTPATIENT)
Dept: CT IMAGING | Facility: HOSPITAL | Age: 54
DRG: 164 | End: 2024-04-26
Payer: MEDICARE

## 2024-04-26 ENCOUNTER — ANESTHESIA (OUTPATIENT)
Dept: PERIOP | Facility: HOSPITAL | Age: 54
DRG: 164 | End: 2024-04-26
Payer: MEDICARE

## 2024-04-26 ENCOUNTER — ANESTHESIA EVENT (OUTPATIENT)
Dept: PERIOP | Facility: HOSPITAL | Age: 54
DRG: 164 | End: 2024-04-26
Payer: MEDICARE

## 2024-04-26 DIAGNOSIS — R91.1 LUNG NODULE: ICD-10-CM

## 2024-04-26 PROBLEM — C34.90 LUNG CANCER: Status: ACTIVE | Noted: 2024-04-26

## 2024-04-26 LAB
ANION GAP SERPL CALCULATED.3IONS-SCNC: 9 MMOL/L (ref 5–15)
BUN SERPL-MCNC: 9 MG/DL (ref 6–20)
BUN/CREAT SERPL: 13.6 (ref 7–25)
CALCIUM SPEC-SCNC: 8.8 MG/DL (ref 8.6–10.5)
CHLORIDE SERPL-SCNC: 103 MMOL/L (ref 98–107)
CO2 SERPL-SCNC: 25 MMOL/L (ref 22–29)
CREAT SERPL-MCNC: 0.66 MG/DL (ref 0.76–1.27)
DEPRECATED RDW RBC AUTO: 44.3 FL (ref 37–54)
EGFRCR SERPLBLD CKD-EPI 2021: 112.2 ML/MIN/1.73
ERYTHROCYTE [DISTWIDTH] IN BLOOD BY AUTOMATED COUNT: 13.9 % (ref 12.3–15.4)
GLUCOSE SERPL-MCNC: 156 MG/DL (ref 65–99)
HCT VFR BLD AUTO: 47.9 % (ref 37.5–51)
HGB BLD-MCNC: 15.7 G/DL (ref 13–17.7)
MCH RBC QN AUTO: 28.8 PG (ref 26.6–33)
MCHC RBC AUTO-ENTMCNC: 32.8 G/DL (ref 31.5–35.7)
MCV RBC AUTO: 87.9 FL (ref 79–97)
PLATELET # BLD AUTO: 198 10*3/MM3 (ref 140–450)
PMV BLD AUTO: 9 FL (ref 6–12)
POTASSIUM SERPL-SCNC: 5.1 MMOL/L (ref 3.5–5.2)
RBC # BLD AUTO: 5.45 10*6/MM3 (ref 4.14–5.8)
SODIUM SERPL-SCNC: 137 MMOL/L (ref 136–145)
WBC NRBC COR # BLD AUTO: 11.44 10*3/MM3 (ref 3.4–10.8)

## 2024-04-26 PROCEDURE — 31627 NAVIGATIONAL BRONCHOSCOPY: CPT | Performed by: INTERNAL MEDICINE

## 2024-04-26 PROCEDURE — 25010000002 FENTANYL CITRATE (PF) 50 MCG/ML SOLUTION: Performed by: NURSE ANESTHETIST, CERTIFIED REGISTERED

## 2024-04-26 PROCEDURE — 88331 PATH CONSLTJ SURG 1 BLK 1SPC: CPT | Performed by: SURGERY

## 2024-04-26 PROCEDURE — 8E0W4CZ ROBOTIC ASSISTED PROCEDURE OF TRUNK REGION, PERCUTANEOUS ENDOSCOPIC APPROACH: ICD-10-PCS | Performed by: SURGERY

## 2024-04-26 PROCEDURE — 25010000002 HYDROMORPHONE PER 4 MG: Performed by: ANESTHESIOLOGY

## 2024-04-26 PROCEDURE — 88307 TISSUE EXAM BY PATHOLOGIST: CPT | Performed by: SURGERY

## 2024-04-26 PROCEDURE — 32608 THORACOSCOPY W/BX NODULE: CPT | Performed by: SURGERY

## 2024-04-26 PROCEDURE — 25010000002 MIDAZOLAM PER 1 MG: Performed by: ANESTHESIOLOGY

## 2024-04-26 PROCEDURE — 25810000003 LACTATED RINGERS PER 1000 ML: Performed by: SURGERY

## 2024-04-26 PROCEDURE — 94799 UNLISTED PULMONARY SVC/PX: CPT

## 2024-04-26 PROCEDURE — 88305 TISSUE EXAM BY PATHOLOGIST: CPT | Performed by: SURGERY

## 2024-04-26 PROCEDURE — 31626 BRONCHOSCOPY W/MARKERS: CPT | Performed by: INTERNAL MEDICINE

## 2024-04-26 PROCEDURE — 80048 BASIC METABOLIC PNL TOTAL CA: CPT | Performed by: SURGERY

## 2024-04-26 PROCEDURE — 25010000002 FENTANYL CITRATE (PF) 50 MCG/ML SOLUTION: Performed by: ANESTHESIOLOGY

## 2024-04-26 PROCEDURE — 07B74ZX EXCISION OF THORAX LYMPHATIC, PERCUTANEOUS ENDOSCOPIC APPROACH, DIAGNOSTIC: ICD-10-PCS | Performed by: SURGERY

## 2024-04-26 PROCEDURE — 25010000002 ESMOLOL 100 MG/10ML SOLUTION: Performed by: NURSE ANESTHETIST, CERTIFIED REGISTERED

## 2024-04-26 PROCEDURE — 0BJ08ZZ INSPECTION OF TRACHEOBRONCHIAL TREE, VIA NATURAL OR ARTIFICIAL OPENING ENDOSCOPIC: ICD-10-PCS | Performed by: INTERNAL MEDICINE

## 2024-04-26 PROCEDURE — 76000 FLUOROSCOPY <1 HR PHYS/QHP: CPT

## 2024-04-26 PROCEDURE — 25010000002 CEFAZOLIN PER 500 MG: Performed by: SURGERY

## 2024-04-26 PROCEDURE — 71045 X-RAY EXAM CHEST 1 VIEW: CPT

## 2024-04-26 PROCEDURE — 25010000002 DEXAMETHASONE PER 1 MG: Performed by: NURSE ANESTHETIST, CERTIFIED REGISTERED

## 2024-04-26 PROCEDURE — 25010000002 PROPOFOL 10 MG/ML EMULSION: Performed by: NURSE ANESTHETIST, CERTIFIED REGISTERED

## 2024-04-26 PROCEDURE — 25010000002 MORPHINE PER 10 MG: Performed by: SURGERY

## 2024-04-26 PROCEDURE — 25010000002 DEXAMETHASONE PER 1 MG: Performed by: ANESTHESIOLOGY

## 2024-04-26 PROCEDURE — 0 BUPIVACAINE LIPOSOME 1.3 % SUSPENSION 20 ML VIAL: Performed by: SURGERY

## 2024-04-26 PROCEDURE — 85027 COMPLETE CBC AUTOMATED: CPT | Performed by: SURGERY

## 2024-04-26 PROCEDURE — 25010000002 VASOPRESSIN 20 UNIT/ML SOLUTION: Performed by: NURSE ANESTHETIST, CERTIFIED REGISTERED

## 2024-04-26 PROCEDURE — 25010000002 HYDROMORPHONE 1 MG/ML SOLUTION: Performed by: NURSE ANESTHETIST, CERTIFIED REGISTERED

## 2024-04-26 PROCEDURE — 94640 AIRWAY INHALATION TREATMENT: CPT

## 2024-04-26 PROCEDURE — 25010000002 INDOCYANINE GREEN 25 MG RECONSTITUTED SOLUTION: Performed by: INTERNAL MEDICINE

## 2024-04-26 PROCEDURE — C9290 INJ, BUPIVACAINE LIPOSOME: HCPCS | Performed by: SURGERY

## 2024-04-26 PROCEDURE — 25010000002 LABETALOL 5 MG/ML SOLUTION: Performed by: ANESTHESIOLOGY

## 2024-04-26 PROCEDURE — 25010000002 CEFAZOLIN PER 500 MG: Performed by: NURSE PRACTITIONER

## 2024-04-26 PROCEDURE — 71250 CT THORAX DX C-: CPT

## 2024-04-26 PROCEDURE — 25010000002 SUGAMMADEX 200 MG/2ML SOLUTION: Performed by: NURSE ANESTHETIST, CERTIFIED REGISTERED

## 2024-04-26 PROCEDURE — 25010000002 HEPARIN (PORCINE) PER 1000 UNITS: Performed by: NURSE PRACTITIONER

## 2024-04-26 PROCEDURE — 32674 THORACOSCOPY LYMPH NODE EXC: CPT | Performed by: SURGERY

## 2024-04-26 PROCEDURE — 0BBC4ZZ EXCISION OF RIGHT UPPER LUNG LOBE, PERCUTANEOUS ENDOSCOPIC APPROACH: ICD-10-PCS | Performed by: SURGERY

## 2024-04-26 PROCEDURE — 25010000002 ONDANSETRON PER 1 MG: Performed by: NURSE ANESTHETIST, CERTIFIED REGISTERED

## 2024-04-26 PROCEDURE — C1729 CATH, DRAINAGE: HCPCS | Performed by: SURGERY

## 2024-04-26 DEVICE — LARGE LIGATION CLIPS 6 CLIPS/CART
Type: IMPLANTABLE DEVICE | Site: LUNG | Status: FUNCTIONAL
Brand: VAS-Q-CLIP

## 2024-04-26 DEVICE — SUREFORM 45 RELOAD BLACK
Type: IMPLANTABLE DEVICE | Site: LUNG | Status: FUNCTIONAL
Brand: SUREFORM

## 2024-04-26 DEVICE — ABSORBABLE HEMOSTAT (OXIDIZED REGENERATED CELLULOSE)
Type: IMPLANTABLE DEVICE | Site: LUNG | Status: FUNCTIONAL
Brand: SURGICEL

## 2024-04-26 DEVICE — SUREFORM 45 RELOAD BLUE
Type: IMPLANTABLE DEVICE | Site: LUNG | Status: FUNCTIONAL
Brand: SUREFORM

## 2024-04-26 RX ORDER — DROPERIDOL 2.5 MG/ML
0.62 INJECTION, SOLUTION INTRAMUSCULAR; INTRAVENOUS ONCE AS NEEDED
Status: DISCONTINUED | OUTPATIENT
Start: 2024-04-26 | End: 2024-04-26 | Stop reason: HOSPADM

## 2024-04-26 RX ORDER — SODIUM CHLORIDE, SODIUM LACTATE, POTASSIUM CHLORIDE, CALCIUM CHLORIDE 600; 310; 30; 20 MG/100ML; MG/100ML; MG/100ML; MG/100ML
100 INJECTION, SOLUTION INTRAVENOUS CONTINUOUS
Status: DISCONTINUED | OUTPATIENT
Start: 2024-04-26 | End: 2024-04-26

## 2024-04-26 RX ORDER — DOCUSATE SODIUM 100 MG/1
100 CAPSULE, LIQUID FILLED ORAL DAILY
Status: DISCONTINUED | OUTPATIENT
Start: 2024-04-26 | End: 2024-04-29 | Stop reason: HOSPADM

## 2024-04-26 RX ORDER — ASPIRIN 81 MG/1
81 TABLET, CHEWABLE ORAL ONCE
Status: COMPLETED | OUTPATIENT
Start: 2024-04-26 | End: 2024-04-26

## 2024-04-26 RX ORDER — SODIUM CHLORIDE 9 MG/ML
40 INJECTION, SOLUTION INTRAVENOUS AS NEEDED
Status: DISCONTINUED | OUTPATIENT
Start: 2024-04-26 | End: 2024-04-26 | Stop reason: HOSPADM

## 2024-04-26 RX ORDER — SODIUM CHLORIDE 0.9 % (FLUSH) 0.9 %
10 SYRINGE (ML) INJECTION EVERY 12 HOURS SCHEDULED
Status: DISCONTINUED | OUTPATIENT
Start: 2024-04-26 | End: 2024-04-26 | Stop reason: HOSPADM

## 2024-04-26 RX ORDER — PROPOFOL 10 MG/ML
VIAL (ML) INTRAVENOUS AS NEEDED
Status: DISCONTINUED | OUTPATIENT
Start: 2024-04-26 | End: 2024-04-26 | Stop reason: SURG

## 2024-04-26 RX ORDER — NALOXONE HCL 0.4 MG/ML
0.04 VIAL (ML) INJECTION AS NEEDED
Status: DISCONTINUED | OUTPATIENT
Start: 2024-04-26 | End: 2024-04-26 | Stop reason: HOSPADM

## 2024-04-26 RX ORDER — DEXAMETHASONE SODIUM PHOSPHATE 4 MG/ML
4 INJECTION, SOLUTION INTRA-ARTICULAR; INTRALESIONAL; INTRAMUSCULAR; INTRAVENOUS; SOFT TISSUE ONCE AS NEEDED
Status: COMPLETED | OUTPATIENT
Start: 2024-04-26 | End: 2024-04-26

## 2024-04-26 RX ORDER — SUCCINYLCHOLINE/SOD CL,ISO/PF 200MG/10ML
SYRINGE (ML) INTRAVENOUS AS NEEDED
Status: DISCONTINUED | OUTPATIENT
Start: 2024-04-26 | End: 2024-04-26 | Stop reason: SURG

## 2024-04-26 RX ORDER — SODIUM CHLORIDE, SODIUM LACTATE, POTASSIUM CHLORIDE, CALCIUM CHLORIDE 600; 310; 30; 20 MG/100ML; MG/100ML; MG/100ML; MG/100ML
1000 INJECTION, SOLUTION INTRAVENOUS CONTINUOUS
Status: DISCONTINUED | OUTPATIENT
Start: 2024-04-26 | End: 2024-04-26

## 2024-04-26 RX ORDER — ROCURONIUM BROMIDE 10 MG/ML
INJECTION, SOLUTION INTRAVENOUS AS NEEDED
Status: DISCONTINUED | OUTPATIENT
Start: 2024-04-26 | End: 2024-04-26 | Stop reason: SURG

## 2024-04-26 RX ORDER — AMLODIPINE BESYLATE 10 MG/1
10 TABLET ORAL DAILY
Status: DISCONTINUED | OUTPATIENT
Start: 2024-04-26 | End: 2024-04-29 | Stop reason: HOSPADM

## 2024-04-26 RX ORDER — OXYCODONE AND ACETAMINOPHEN 10; 325 MG/1; MG/1
1 TABLET ORAL EVERY 4 HOURS PRN
Status: DISCONTINUED | OUTPATIENT
Start: 2024-04-26 | End: 2024-04-26 | Stop reason: HOSPADM

## 2024-04-26 RX ORDER — IPRATROPIUM BROMIDE AND ALBUTEROL SULFATE 2.5; .5 MG/3ML; MG/3ML
3 SOLUTION RESPIRATORY (INHALATION)
Status: DISPENSED | OUTPATIENT
Start: 2024-04-26 | End: 2024-04-28

## 2024-04-26 RX ORDER — LEVETIRACETAM 500 MG/1
500 TABLET ORAL 2 TIMES DAILY
Status: DISCONTINUED | OUTPATIENT
Start: 2024-04-26 | End: 2024-04-29 | Stop reason: HOSPADM

## 2024-04-26 RX ORDER — BISACODYL 10 MG
10 SUPPOSITORY, RECTAL RECTAL DAILY PRN
Status: DISCONTINUED | OUTPATIENT
Start: 2024-04-26 | End: 2024-04-29 | Stop reason: HOSPADM

## 2024-04-26 RX ORDER — ESMOLOL HYDROCHLORIDE 10 MG/ML
INJECTION INTRAVENOUS AS NEEDED
Status: DISCONTINUED | OUTPATIENT
Start: 2024-04-26 | End: 2024-04-26 | Stop reason: SURG

## 2024-04-26 RX ORDER — ACETYLCYSTEINE 200 MG/ML
3 SOLUTION ORAL; RESPIRATORY (INHALATION)
Status: DISPENSED | OUTPATIENT
Start: 2024-04-26 | End: 2024-04-28

## 2024-04-26 RX ORDER — ONDANSETRON 2 MG/ML
4 INJECTION INTRAMUSCULAR; INTRAVENOUS
Status: DISCONTINUED | OUTPATIENT
Start: 2024-04-26 | End: 2024-04-26 | Stop reason: HOSPADM

## 2024-04-26 RX ORDER — LIDOCAINE HYDROCHLORIDE 10 MG/ML
0.5 INJECTION, SOLUTION EPIDURAL; INFILTRATION; INTRACAUDAL; PERINEURAL ONCE AS NEEDED
Status: DISCONTINUED | OUTPATIENT
Start: 2024-04-26 | End: 2024-04-26 | Stop reason: HOSPADM

## 2024-04-26 RX ORDER — IBUPROFEN 600 MG/1
600 TABLET ORAL EVERY 6 HOURS PRN
Status: DISCONTINUED | OUTPATIENT
Start: 2024-04-26 | End: 2024-04-26 | Stop reason: HOSPADM

## 2024-04-26 RX ORDER — FENTANYL CITRATE 50 UG/ML
50 INJECTION, SOLUTION INTRAMUSCULAR; INTRAVENOUS
Status: DISCONTINUED | OUTPATIENT
Start: 2024-04-26 | End: 2024-04-26 | Stop reason: HOSPADM

## 2024-04-26 RX ORDER — BUPIVACAINE HCL/0.9 % NACL/PF 0.125 %
PLASTIC BAG, INJECTION (ML) EPIDURAL AS NEEDED
Status: DISCONTINUED | OUTPATIENT
Start: 2024-04-26 | End: 2024-04-26 | Stop reason: SURG

## 2024-04-26 RX ORDER — ASPIRIN 81 MG/1
81 TABLET ORAL DAILY
Status: DISCONTINUED | OUTPATIENT
Start: 2024-04-26 | End: 2024-04-29 | Stop reason: HOSPADM

## 2024-04-26 RX ORDER — ACETAMINOPHEN 500 MG
1000 TABLET ORAL ONCE
Status: COMPLETED | OUTPATIENT
Start: 2024-04-26 | End: 2024-04-26

## 2024-04-26 RX ORDER — HEPARIN SODIUM 5000 [USP'U]/ML
5000 INJECTION, SOLUTION INTRAVENOUS; SUBCUTANEOUS ONCE
Status: COMPLETED | OUTPATIENT
Start: 2024-04-26 | End: 2024-04-26

## 2024-04-26 RX ORDER — IBUPROFEN 600 MG/1
600 TABLET ORAL EVERY 6 HOURS SCHEDULED
Status: DISCONTINUED | OUTPATIENT
Start: 2024-04-26 | End: 2024-04-29 | Stop reason: HOSPADM

## 2024-04-26 RX ORDER — HYDROMORPHONE HYDROCHLORIDE 1 MG/ML
0.5 INJECTION, SOLUTION INTRAMUSCULAR; INTRAVENOUS; SUBCUTANEOUS
Status: DISCONTINUED | OUTPATIENT
Start: 2024-04-26 | End: 2024-04-26 | Stop reason: HOSPADM

## 2024-04-26 RX ORDER — MIDAZOLAM HYDROCHLORIDE 1 MG/ML
2 INJECTION INTRAMUSCULAR; INTRAVENOUS
Status: DISCONTINUED | OUTPATIENT
Start: 2024-04-26 | End: 2024-04-26 | Stop reason: HOSPADM

## 2024-04-26 RX ORDER — LIDOCAINE HYDROCHLORIDE 20 MG/ML
INJECTION, SOLUTION EPIDURAL; INFILTRATION; INTRACAUDAL; PERINEURAL AS NEEDED
Status: DISCONTINUED | OUTPATIENT
Start: 2024-04-26 | End: 2024-04-26 | Stop reason: SURG

## 2024-04-26 RX ORDER — SODIUM CHLORIDE, SODIUM LACTATE, POTASSIUM CHLORIDE, CALCIUM CHLORIDE 600; 310; 30; 20 MG/100ML; MG/100ML; MG/100ML; MG/100ML
40 INJECTION, SOLUTION INTRAVENOUS CONTINUOUS
Status: DISCONTINUED | OUTPATIENT
Start: 2024-04-26 | End: 2024-04-29 | Stop reason: HOSPADM

## 2024-04-26 RX ORDER — POLYETHYLENE GLYCOL 3350 17 G/17G
17 POWDER, FOR SOLUTION ORAL DAILY
Status: DISCONTINUED | OUTPATIENT
Start: 2024-04-26 | End: 2024-04-29 | Stop reason: HOSPADM

## 2024-04-26 RX ORDER — LABETALOL HYDROCHLORIDE 5 MG/ML
5 INJECTION, SOLUTION INTRAVENOUS
Status: DISCONTINUED | OUTPATIENT
Start: 2024-04-26 | End: 2024-04-26 | Stop reason: HOSPADM

## 2024-04-26 RX ORDER — SODIUM CHLORIDE 0.9 % (FLUSH) 0.9 %
10 SYRINGE (ML) INJECTION AS NEEDED
Status: DISCONTINUED | OUTPATIENT
Start: 2024-04-26 | End: 2024-04-26 | Stop reason: HOSPADM

## 2024-04-26 RX ORDER — ONDANSETRON 2 MG/ML
INJECTION INTRAMUSCULAR; INTRAVENOUS AS NEEDED
Status: DISCONTINUED | OUTPATIENT
Start: 2024-04-26 | End: 2024-04-26 | Stop reason: SURG

## 2024-04-26 RX ORDER — ONDANSETRON 2 MG/ML
4 INJECTION INTRAMUSCULAR; INTRAVENOUS EVERY 6 HOURS PRN
Status: DISCONTINUED | OUTPATIENT
Start: 2024-04-26 | End: 2024-04-29 | Stop reason: HOSPADM

## 2024-04-26 RX ORDER — ACETAMINOPHEN 325 MG/1
650 TABLET ORAL EVERY 6 HOURS
Status: DISCONTINUED | OUTPATIENT
Start: 2024-04-26 | End: 2024-04-29 | Stop reason: HOSPADM

## 2024-04-26 RX ORDER — DEXAMETHASONE SODIUM PHOSPHATE 4 MG/ML
INJECTION, SOLUTION INTRA-ARTICULAR; INTRALESIONAL; INTRAMUSCULAR; INTRAVENOUS; SOFT TISSUE AS NEEDED
Status: DISCONTINUED | OUTPATIENT
Start: 2024-04-26 | End: 2024-04-26 | Stop reason: SURG

## 2024-04-26 RX ORDER — FLUMAZENIL 0.1 MG/ML
0.2 INJECTION INTRAVENOUS AS NEEDED
Status: DISCONTINUED | OUTPATIENT
Start: 2024-04-26 | End: 2024-04-26 | Stop reason: HOSPADM

## 2024-04-26 RX ORDER — MAGNESIUM HYDROXIDE 1200 MG/15ML
LIQUID ORAL AS NEEDED
Status: DISCONTINUED | OUTPATIENT
Start: 2024-04-26 | End: 2024-04-26 | Stop reason: HOSPADM

## 2024-04-26 RX ORDER — MORPHINE SULFATE 2 MG/ML
2 INJECTION, SOLUTION INTRAMUSCULAR; INTRAVENOUS EVERY 4 HOURS PRN
Status: DISCONTINUED | OUTPATIENT
Start: 2024-04-26 | End: 2024-04-29 | Stop reason: HOSPADM

## 2024-04-26 RX ORDER — FENTANYL CITRATE 50 UG/ML
INJECTION, SOLUTION INTRAMUSCULAR; INTRAVENOUS AS NEEDED
Status: DISCONTINUED | OUTPATIENT
Start: 2024-04-26 | End: 2024-04-26 | Stop reason: SURG

## 2024-04-26 RX ORDER — ENOXAPARIN SODIUM 100 MG/ML
40 INJECTION SUBCUTANEOUS DAILY
Status: DISCONTINUED | OUTPATIENT
Start: 2024-04-27 | End: 2024-04-29 | Stop reason: HOSPADM

## 2024-04-26 RX ORDER — INDOCYANINE GREEN AND WATER 25 MG
KIT INJECTION AS NEEDED
Status: DISCONTINUED | OUTPATIENT
Start: 2024-04-26 | End: 2024-04-26 | Stop reason: HOSPADM

## 2024-04-26 RX ORDER — LISINOPRIL 40 MG/1
40 TABLET ORAL DAILY
COMMUNITY

## 2024-04-26 RX ORDER — LISINOPRIL 20 MG/1
20 TABLET ORAL DAILY
Status: DISCONTINUED | OUTPATIENT
Start: 2024-04-26 | End: 2024-04-29 | Stop reason: HOSPADM

## 2024-04-26 RX ORDER — SODIUM CHLORIDE 0.9 % (FLUSH) 0.9 %
3-10 SYRINGE (ML) INJECTION AS NEEDED
Status: DISCONTINUED | OUTPATIENT
Start: 2024-04-26 | End: 2024-04-26 | Stop reason: HOSPADM

## 2024-04-26 RX ORDER — SODIUM CHLORIDE 0.9 % (FLUSH) 0.9 %
3 SYRINGE (ML) INJECTION AS NEEDED
Status: DISCONTINUED | OUTPATIENT
Start: 2024-04-26 | End: 2024-04-26 | Stop reason: HOSPADM

## 2024-04-26 RX ORDER — ONDANSETRON 4 MG/1
4 TABLET, ORALLY DISINTEGRATING ORAL EVERY 6 HOURS PRN
Status: DISCONTINUED | OUTPATIENT
Start: 2024-04-26 | End: 2024-04-29 | Stop reason: HOSPADM

## 2024-04-26 RX ORDER — NALOXONE HCL 0.4 MG/ML
0.4 VIAL (ML) INJECTION
Status: DISCONTINUED | OUTPATIENT
Start: 2024-04-26 | End: 2024-04-29 | Stop reason: HOSPADM

## 2024-04-26 RX ORDER — SODIUM CHLORIDE 0.9 % (FLUSH) 0.9 %
3 SYRINGE (ML) INJECTION EVERY 12 HOURS SCHEDULED
Status: DISCONTINUED | OUTPATIENT
Start: 2024-04-26 | End: 2024-04-26 | Stop reason: HOSPADM

## 2024-04-26 RX ORDER — OXYCODONE HYDROCHLORIDE AND ACETAMINOPHEN 5; 325 MG/1; MG/1
1 TABLET ORAL
Status: DISCONTINUED | OUTPATIENT
Start: 2024-04-26 | End: 2024-04-29 | Stop reason: HOSPADM

## 2024-04-26 RX ADMIN — SODIUM CHLORIDE, POTASSIUM CHLORIDE, SODIUM LACTATE AND CALCIUM CHLORIDE 1000 ML: 600; 310; 30; 20 INJECTION, SOLUTION INTRAVENOUS at 08:56

## 2024-04-26 RX ADMIN — HYDROMORPHONE HYDROCHLORIDE 0.5 MG: 1 INJECTION, SOLUTION INTRAMUSCULAR; INTRAVENOUS; SUBCUTANEOUS at 15:18

## 2024-04-26 RX ADMIN — CEFAZOLIN 2000 MG: 2 INJECTION, POWDER, FOR SOLUTION INTRAMUSCULAR; INTRAVENOUS at 11:38

## 2024-04-26 RX ADMIN — FENTANYL CITRATE 150 MCG: 50 INJECTION, SOLUTION INTRAMUSCULAR; INTRAVENOUS at 11:37

## 2024-04-26 RX ADMIN — ROCURONIUM BROMIDE 40 MG: 10 INJECTION, SOLUTION INTRAVENOUS at 11:39

## 2024-04-26 RX ADMIN — PROPOFOL 100 MG: 10 INJECTION, EMULSION INTRAVENOUS at 11:40

## 2024-04-26 RX ADMIN — SUGAMMADEX 200 MG: 100 INJECTION, SOLUTION INTRAVENOUS at 14:06

## 2024-04-26 RX ADMIN — OXYCODONE AND ACETAMINOPHEN 1 TABLET: 5; 325 TABLET ORAL at 22:51

## 2024-04-26 RX ADMIN — LABETALOL HYDROCHLORIDE 5 MG: 5 INJECTION, SOLUTION INTRAVENOUS at 14:37

## 2024-04-26 RX ADMIN — HYDROMORPHONE HYDROCHLORIDE 0.5 MG: 1 INJECTION, SOLUTION INTRAMUSCULAR; INTRAVENOUS; SUBCUTANEOUS at 14:56

## 2024-04-26 RX ADMIN — Medication 140 MG: at 11:34

## 2024-04-26 RX ADMIN — ROCURONIUM BROMIDE 50 MG: 10 INJECTION, SOLUTION INTRAVENOUS at 12:29

## 2024-04-26 RX ADMIN — ESMOLOL HYDROCHLORIDE 15 MG: 10 INJECTION, SOLUTION INTRAVENOUS at 11:43

## 2024-04-26 RX ADMIN — MORPHINE SULFATE 2 MG: 2 INJECTION, SOLUTION INTRAMUSCULAR; INTRAVENOUS at 16:11

## 2024-04-26 RX ADMIN — ESMOLOL HYDROCHLORIDE 15 MG: 10 INJECTION, SOLUTION INTRAVENOUS at 11:49

## 2024-04-26 RX ADMIN — ACETYLCYSTEINE 3 ML: 200 SOLUTION ORAL; RESPIRATORY (INHALATION) at 22:43

## 2024-04-26 RX ADMIN — OXYCODONE HYDROCHLORIDE AND ACETAMINOPHEN 1 TABLET: 10; 325 TABLET ORAL at 15:14

## 2024-04-26 RX ADMIN — MORPHINE SULFATE 2 MG: 2 INJECTION, SOLUTION INTRAMUSCULAR; INTRAVENOUS at 20:25

## 2024-04-26 RX ADMIN — FENTANYL CITRATE 50 MCG: 50 INJECTION, SOLUTION INTRAMUSCULAR; INTRAVENOUS at 14:40

## 2024-04-26 RX ADMIN — ROCURONIUM BROMIDE 10 MG: 10 INJECTION, SOLUTION INTRAVENOUS at 11:34

## 2024-04-26 RX ADMIN — LABETALOL HYDROCHLORIDE 5 MG: 5 INJECTION, SOLUTION INTRAVENOUS at 14:42

## 2024-04-26 RX ADMIN — LIDOCAINE HYDROCHLORIDE 100 MG: 20 INJECTION, SOLUTION EPIDURAL; INFILTRATION; INTRACAUDAL; PERINEURAL at 11:34

## 2024-04-26 RX ADMIN — ACETAMINOPHEN 1000 MG: 500 TABLET, FILM COATED ORAL at 10:44

## 2024-04-26 RX ADMIN — DEXAMETHASONE SODIUM PHOSPHATE 4 MG: 4 INJECTION, SOLUTION INTRA-ARTICULAR; INTRALESIONAL; INTRAMUSCULAR; INTRAVENOUS; SOFT TISSUE at 10:44

## 2024-04-26 RX ADMIN — DEXAMETHASONE SODIUM PHOSPHATE 8 MG: 4 INJECTION, SOLUTION INTRAMUSCULAR; INTRAVENOUS at 11:45

## 2024-04-26 RX ADMIN — LEVETIRACETAM 500 MG: 500 TABLET, FILM COATED ORAL at 20:22

## 2024-04-26 RX ADMIN — PROPOFOL 200 MG: 10 INJECTION, EMULSION INTRAVENOUS at 11:34

## 2024-04-26 RX ADMIN — SODIUM CHLORIDE, POTASSIUM CHLORIDE, SODIUM LACTATE AND CALCIUM CHLORIDE: 600; 310; 30; 20 INJECTION, SOLUTION INTRAVENOUS at 12:57

## 2024-04-26 RX ADMIN — CEFAZOLIN 2 G: 2 INJECTION, POWDER, FOR SOLUTION INTRAMUSCULAR; INTRAVENOUS at 19:15

## 2024-04-26 RX ADMIN — ASPIRIN 81 MG: 81 TABLET, CHEWABLE ORAL at 10:48

## 2024-04-26 RX ADMIN — HYDROMORPHONE HYDROCHLORIDE 0.5 MG: 1 INJECTION, SOLUTION INTRAMUSCULAR; INTRAVENOUS; SUBCUTANEOUS at 14:08

## 2024-04-26 RX ADMIN — MIDAZOLAM HYDROCHLORIDE 2 MG: 1 INJECTION, SOLUTION INTRAMUSCULAR; INTRAVENOUS at 10:44

## 2024-04-26 RX ADMIN — FENTANYL CITRATE 100 MCG: 50 INJECTION, SOLUTION INTRAMUSCULAR; INTRAVENOUS at 11:34

## 2024-04-26 RX ADMIN — HEPARIN SODIUM 5000 UNITS: 5000 INJECTION INTRAVENOUS; SUBCUTANEOUS at 10:40

## 2024-04-26 RX ADMIN — IPRATROPIUM BROMIDE AND ALBUTEROL SULFATE 3 ML: 2.5; .5 SOLUTION RESPIRATORY (INHALATION) at 22:43

## 2024-04-26 RX ADMIN — Medication 100 MCG: at 12:50

## 2024-04-26 RX ADMIN — HYDROMORPHONE HYDROCHLORIDE 0.5 MG: 1 INJECTION, SOLUTION INTRAMUSCULAR; INTRAVENOUS; SUBCUTANEOUS at 14:39

## 2024-04-26 RX ADMIN — OXYCODONE AND ACETAMINOPHEN 1 TABLET: 5; 325 TABLET ORAL at 18:03

## 2024-04-26 RX ADMIN — HYDROMORPHONE HYDROCHLORIDE 0.5 MG: 1 INJECTION, SOLUTION INTRAMUSCULAR; INTRAVENOUS; SUBCUTANEOUS at 12:57

## 2024-04-26 RX ADMIN — ONDANSETRON 4 MG: 2 INJECTION INTRAMUSCULAR; INTRAVENOUS at 14:19

## 2024-04-26 NOTE — ANESTHESIA PROCEDURE NOTES
Airway  Urgency: elective    Date/Time: 4/26/2024 11:35 AM  Airway not difficult    General Information and Staff    Patient location during procedure: OR  CRNA/CAA: Maximilian Pace CRNA    Indications and Patient Condition  Indications for airway management: airway protection    Preoxygenated: yes  Mask difficulty assessment: 1 - vent by mask    Final Airway Details  Final airway type: endotracheal airway      Successful airway: ETT  Cuffed: yes   Successful intubation technique: direct laryngoscopy  Facilitating devices/methods: intubating stylet  Endotracheal tube insertion site: oral  Blade: García  Blade size: 2  ETT size (mm): 8.0  Cormack-Lehane Classification: grade IIa - partial view of glottis  Placement verified by: capnometry   Cuff volume (mL): 7  Measured from: lips  ETT/EBT  to lips (cm): 22  Number of attempts at approach: 1  Assessment: lips, teeth, and gum same as pre-op and atraumatic intubation

## 2024-04-26 NOTE — OP NOTE
Procedure Note (AdvancedBronchoscopy)    Date of Operation: 04/26/24  Pre-op Diagnosis: Lung nodule  Post-op Diagnosis: Same  Surgeon: Hamzah Santos MD  Anesthesia: General    Operation: Flexible fiberoptic bronchoscopy, Bronchoscopy, Diagnostic, Ion robotic shape sensing navigational bronchoscopy, and transbronchial injection of icg dye into lesion  Findings: normal airways  Specimen: None  Estimated Blood Loss: Minimal  Complications: none    Indications and History:  The patient is a 53 y.o.  male with Lung nodule.  The risks, benefits, complications, treatment options and expected outcomes were discussed with the patient.  The possibilities of reaction to medication, pulmonary aspiration, perforation of a viscus, bleeding, failure to diagnose a condition and creating a complication requiring transfusion or operation were discussed with the patient who freely signed the consent.  Time out was taken prior to the procedure.    Description of Procedure:    After the induction of general anesthesia, the patient was positioned supine and the Olympus BF type [id number] therapeutic bronchoscope was introduced through the endotracheal tube.  The scope was then passed into the trachea.     The trachea, mainstem bronchi, RUL, RML, Bronchus Intermedius, RLL, LAURA, LAURA upper division and lingula, and LLL, and all primary segmental airways were examined and were normal except as described below:    Endobronchial findings: mild endobronchial mucus in trachea and RUL bronchus and segmental bronchi, endobronchial mucus in RLL, suctionied clear  Trachea: Normal mucosa  Suzy: Sharp  Right main bronchus: Normal mucosa  Right upper lobe bronchus: Normal mucosa  Right middle lobe bronchus: Normal mucosa  Right lower lobe bronchus: Normal mucosa  Left main bronchus: Normal mucosa  Left upper lobe bronchus: Normal mucosa  Left lower lobe bronchus: Normal mucosa    The conventional bronchoscope was removed .    Using the planning  laptop and Planpoint software, the lesion of interest was identified, and marked, a pathway was generated, and anatomic borders were identified.The ION system was magnetically docked to the ETT. The shape sensing catheter with vision probe was introduced via the  into the ETT.    Registration was started by advancing the bronchoscope into the right and left mainstem bronchi. The main cecilia was confirmed by rotating the live-view image to match the navigation-view image of the main cecilia. Registration was completed by advancing the catheter into the identified the lobar bronchi. There was moderate visual divergence that was corrected by using ion preview pathology function. Navigation was complicated by : visual divergence, endobronchial mucous impairing visualization, and airway added during planning not present. Using the , the shape sensing bronchoscope was advanced to the target lesion. The mobile C-Arm was brought in, and the vision probe was removed. A 23 gauge ion needle was mounted and sheath set under fluoroscopic guidance.  The needle was advanced using fluoroscopic guidance to the target 20 mm and 0.5 ml icg dye was injected, followed by 0.5 ml air.      The shape sensing catheter was extracted and catheter guide was disconnected.    The patient was undocked from the ion robot arm.  The Patient was extubated and taken to the Recovery Room in satisfactory condition.      Hamzah Santos MD at 12:10 CDT, 4/26/2024

## 2024-04-26 NOTE — INTERVAL H&P NOTE
Reviewed PFTs, has adequate lung reserve to tolerate lobectomy if needed.  Discussed with him at length the pro and cons of lobectomy vs wedge, will decide after review of frozen path and margin.  Discussed overall risks and benefits with him, he understands and agrees to proceed.    Ming Minor M.D.  Cardiothoracic Surgeon

## 2024-04-26 NOTE — OP NOTE
Cardiothoracic Surgery Operative Note     Date of Procedure: 4/19/2024     Pre-op diagnosis: Lung Nodule, Suspicious for Cancer, Right Upper Lobe  Current Smoker  COPD  Hypertension  Hyperlipidemia  CAD, s/p PCI and CABG  Obese, BMI 31.5  GERD  Hx of Seizures  Anxiey     Post-op diagnosis: Same     Procedure:  Diagnostic Bronchoscopy , CPT 81506  Right Robotic Assisted video-assisted thoracoscopic (VATS) Wedge Resection RUL  Right Robot Assisted video-assisted thoracoscopic (VATS) Mediastinal Lymph Node Dissection     Surgeon: Ming Minor M.D.  Assistant: Meredith Hartley [facility]  Anesthesia: GETA- double lumen  EBL: 20 mL  Drains: 24 [place] Right Pleural Chest Tube     This resection was for curative intent.     Specimens:  RUL Wedge Resection  (Frozen Section Positive for NSCLC, likely lepedic adeno with greater than 1cm margin)  Level 4R LN  Level 2R LN  Level 7 LN  Level 10 LN, Sump Node     Operative indications:   Mr. Molina is a 53-year-old male who I performed CABG on in 2022.  On imaging surrounding this he was incidentally noted to have a right upper lobe small lung nodule.  This was followed an enlarged over time.  It was up to 1.7 cm.  Given this I discussed with him proceeding with wedge resection of this possible lobectomy.  It was less than 2 cm and we discussed the path was favorable likely large wedge resection on given that there is likely extensive adhesions to the mediastinum and KATIA would be at risk of injury with lobectomy.  He understood the risk and benefits of procedure and agreed to proceed.      Operative findings     Bronchoscopy:  Normal endobronchial anatomy, no endobronchial tumor noted see Dr. Santos note regarding bronchoscopic localization      Intrathoracic Findings:   Moderate intrapleural adhesions of the right upper lobe to the mediastinum overlying the KATIA    ICG easily identified the RUL part solid nodule      Wedge resection per above     Operative description in detail:  The  patient was taken to the operative suite where he was placed in a supine position. General anesthesia was induced without complication and a single lumen ET tube was placed by anesthesia. A timeout was performed. With that bronchoscopy with ION robot was performed by Dr. Santos with injection of ICG into areas of concern.  Single lumen tube was exchanged for double lumen tube.     He was positioned in lateral decubitus position with right side up. All pressure points are protected. Single lung ventilation was initiated and double lumen endotracheal tube position confirmed.  He was then prepped and draped in the usual and sterile fashion.       An incision was made sharply in line with the middle axillary line at approximately the 8th intercostal space.  The pleural cavity was accessed and surveyed.  Additional robotic ports were placed in the 8th and 9th interspace.    Assistant port was placed laterally along the diaphragmatic margin under direct vision.    Using ICG and firefly, the right upper lobe nodule was identified and resected with serial blue load staples.         I then retracted the lung inferiorly and open the level 4R window pleura.  Level 4R lymph node was taken.  Area overlying level 2R was opened and lymph nodes taken here.  I then retracted the lung anteriorly and opened the subcarinal space and sampled level 7 node.  I then retracted the lung superiorly and divided the inferior pulmonary ligament, there were no level 8 or 9 lymph nodes identified.  I then developed the secondary cecilia between the upper lobe and ongoing bronchus intermedius and sampled level 10 sump node     Hemostasis was ensured in the operative site.  A 24 Citizen of [place] straight chest tube was placed through the third port site after tunneling.  I performed intercostal blocks using a one-to-one Marcaine/Exparel mixture.  All ports were removed under direct vision ensuring adequate hemostasis.  The lung was allowed to expand under  direct vision and filled the thorax.  All port sites and assistant site were closed in anatomical layers using absorbable suture.  Skin Affix was used as dressing.  The chest tube was connected to 20 cm of suction.     The patient tolerated the procedure well was extubated in the operating room and transferred the PACU in stable condition.  All needle sponge and instrument counts were correct at the end of the case.     Complications: None  Disposition: Transfer to PACU extubated and in stable condition.      Ming Minor MD  Cardiothoracic Surgeon

## 2024-04-26 NOTE — ANESTHESIA PROCEDURE NOTES
Arterial Line      Patient reassessed immediately prior to procedure    Patient location during procedure: pre-op  Start time: 4/26/2024 10:50 AM   Line placed for hemodynamic monitoring, ABGs/Labs/ISTAT and MD/Surgeon request.  Performed By   Anesthesiologist: Lotus Ryan MD   Preanesthetic Checklist  Completed: patient identified, IV checked, site marked, risks and benefits discussed, surgical consent, monitors and equipment checked, pre-op evaluation and timeout performed  Arterial Line Prep    Sterile Tech: gloves, sterile barriers and cap  Prep: ChloraPrep  Patient monitoring: blood pressure monitoring, continuous pulse oximetry and EKG  Arterial Line Procedure   Laterality:left  Location:  radial artery  Catheter size: 20 G   Guidance: ultrasound guided  Number of attempts: 1  Successful placement: yes   Post Assessment   Dressing Type: occlusive dressing applied, secured with tape and wrist guard applied.   Complications no  Circ/Move/Sens Assessment: normal and unchanged.   Patient Tolerance: patient tolerated the procedure well with no apparent complications

## 2024-04-26 NOTE — PLAN OF CARE
Goal Outcome Evaluation:         Patient admitted from surgical intervention. Chest tube in place with no drainage on dressing. Chest tube to suction and marked for output. Patient currently having unrelieved pain, see MAR for provided relief. Will continue to monitor.

## 2024-04-26 NOTE — ANESTHESIA PREPROCEDURE EVALUATION
Anesthesia Evaluation     Patient summary reviewed   no history of anesthetic complications:   NPO Solid Status: > 8 hours  NPO Liquid Status: > 4 hours           Airway   Mallampati: I  TM distance: >3 FB  No difficulty expected  Dental    (+) edentulous    Pulmonary    (+) a smoker, COPD,  (-) sleep apnea, no home oxygen    ROS comment: Right upper lobe nodule  Cardiovascular   Exercise tolerance: good (4-7 METS)    (+) hypertension, CAD, CABG (2022), cardiac stents (2007) Drug eluting stent , hyperlipidemia  (-) pacemaker    ROS comment: CT Chest:      IMPRESSION:     1. Stable 1.7 x 0.8 cm peripheral subpleural right upper lobe  groundglass nodule with additional stable smaller groundglass foci. Mild  linear mucous secretions suspected within the trachea and bronchi.  Chronic peribronchial thickening. Prior mediastinal surgery.      Neuro/Psych  (+) seizures well controlled, psychiatric history Anxiety  GI/Hepatic/Renal/Endo    (+) obesity, GERD, liver disease  (-) no renal disease, diabetes    Musculoskeletal     Abdominal    Substance History   (+) drug use (h/o narcotic abuse several years ago, none now)     OB/GYN          Other                      Anesthesia Plan    ASA 3     general and Ileana     intravenous induction     Anesthetic plan, risks, benefits, and alternatives have been provided, discussed and informed consent has been obtained with: patient.    Use of blood products discussed with patient  Consented to blood products.        CODE STATUS:

## 2024-04-27 ENCOUNTER — APPOINTMENT (OUTPATIENT)
Dept: GENERAL RADIOLOGY | Facility: HOSPITAL | Age: 54
DRG: 164 | End: 2024-04-27
Payer: MEDICARE

## 2024-04-27 LAB
ANION GAP SERPL CALCULATED.3IONS-SCNC: 10 MMOL/L (ref 5–15)
BUN SERPL-MCNC: 11 MG/DL (ref 6–20)
BUN/CREAT SERPL: 19 (ref 7–25)
CALCIUM SPEC-SCNC: 8.5 MG/DL (ref 8.6–10.5)
CHLORIDE SERPL-SCNC: 103 MMOL/L (ref 98–107)
CO2 SERPL-SCNC: 25 MMOL/L (ref 22–29)
CREAT SERPL-MCNC: 0.58 MG/DL (ref 0.76–1.27)
DEPRECATED RDW RBC AUTO: 43.6 FL (ref 37–54)
EGFRCR SERPLBLD CKD-EPI 2021: 116.6 ML/MIN/1.73
ERYTHROCYTE [DISTWIDTH] IN BLOOD BY AUTOMATED COUNT: 13.4 % (ref 12.3–15.4)
GLUCOSE SERPL-MCNC: 115 MG/DL (ref 65–99)
HCT VFR BLD AUTO: 45.9 % (ref 37.5–51)
HGB BLD-MCNC: 14.8 G/DL (ref 13–17.7)
MCH RBC QN AUTO: 28.4 PG (ref 26.6–33)
MCHC RBC AUTO-ENTMCNC: 32.2 G/DL (ref 31.5–35.7)
MCV RBC AUTO: 87.9 FL (ref 79–97)
PLATELET # BLD AUTO: 208 10*3/MM3 (ref 140–450)
PMV BLD AUTO: 9.9 FL (ref 6–12)
POTASSIUM SERPL-SCNC: 4.3 MMOL/L (ref 3.5–5.2)
RBC # BLD AUTO: 5.22 10*6/MM3 (ref 4.14–5.8)
SODIUM SERPL-SCNC: 138 MMOL/L (ref 136–145)
WBC NRBC COR # BLD AUTO: 9.76 10*3/MM3 (ref 3.4–10.8)

## 2024-04-27 PROCEDURE — 71045 X-RAY EXAM CHEST 1 VIEW: CPT

## 2024-04-27 PROCEDURE — 80048 BASIC METABOLIC PNL TOTAL CA: CPT | Performed by: SURGERY

## 2024-04-27 PROCEDURE — 85027 COMPLETE CBC AUTOMATED: CPT | Performed by: SURGERY

## 2024-04-27 PROCEDURE — 25810000003 LACTATED RINGERS PER 1000 ML: Performed by: SURGERY

## 2024-04-27 PROCEDURE — 94799 UNLISTED PULMONARY SVC/PX: CPT

## 2024-04-27 PROCEDURE — 25010000002 CEFAZOLIN PER 500 MG: Performed by: SURGERY

## 2024-04-27 PROCEDURE — 25010000002 ENOXAPARIN PER 10 MG: Performed by: SURGERY

## 2024-04-27 PROCEDURE — 94761 N-INVAS EAR/PLS OXIMETRY MLT: CPT

## 2024-04-27 PROCEDURE — 25010000002 MORPHINE PER 10 MG: Performed by: SURGERY

## 2024-04-27 RX ORDER — PREGABALIN 25 MG/1
25 CAPSULE ORAL EVERY 12 HOURS SCHEDULED
Status: DISCONTINUED | OUTPATIENT
Start: 2024-04-27 | End: 2024-04-29 | Stop reason: HOSPADM

## 2024-04-27 RX ORDER — OXYCODONE AND ACETAMINOPHEN 7.5; 325 MG/1; MG/1
1 TABLET ORAL EVERY 4 HOURS PRN
Status: DISCONTINUED | OUTPATIENT
Start: 2024-04-27 | End: 2024-04-28

## 2024-04-27 RX ADMIN — LEVETIRACETAM 500 MG: 500 TABLET, FILM COATED ORAL at 08:21

## 2024-04-27 RX ADMIN — IBUPROFEN 600 MG: 600 TABLET, FILM COATED ORAL at 17:12

## 2024-04-27 RX ADMIN — ACETYLCYSTEINE 3 ML: 200 SOLUTION ORAL; RESPIRATORY (INHALATION) at 07:06

## 2024-04-27 RX ADMIN — LISINOPRIL 20 MG: 20 TABLET ORAL at 08:21

## 2024-04-27 RX ADMIN — IBUPROFEN 600 MG: 600 TABLET, FILM COATED ORAL at 00:24

## 2024-04-27 RX ADMIN — IPRATROPIUM BROMIDE AND ALBUTEROL SULFATE 3 ML: 2.5; .5 SOLUTION RESPIRATORY (INHALATION) at 23:09

## 2024-04-27 RX ADMIN — ACETAMINOPHEN 650 MG: 325 TABLET, FILM COATED ORAL at 11:00

## 2024-04-27 RX ADMIN — MORPHINE SULFATE 2 MG: 2 INJECTION, SOLUTION INTRAMUSCULAR; INTRAVENOUS at 00:28

## 2024-04-27 RX ADMIN — IPRATROPIUM BROMIDE AND ALBUTEROL SULFATE 3 ML: 2.5; .5 SOLUTION RESPIRATORY (INHALATION) at 14:47

## 2024-04-27 RX ADMIN — CEFAZOLIN 2 G: 2 INJECTION, POWDER, FOR SOLUTION INTRAMUSCULAR; INTRAVENOUS at 03:19

## 2024-04-27 RX ADMIN — OXYCODONE AND ACETAMINOPHEN 1 TABLET: 5; 325 TABLET ORAL at 08:21

## 2024-04-27 RX ADMIN — AMLODIPINE BESYLATE 10 MG: 10 TABLET ORAL at 08:21

## 2024-04-27 RX ADMIN — OXYCODONE HYDROCHLORIDE AND ACETAMINOPHEN 1 TABLET: 7.5; 325 TABLET ORAL at 13:48

## 2024-04-27 RX ADMIN — MORPHINE SULFATE 2 MG: 2 INJECTION, SOLUTION INTRAMUSCULAR; INTRAVENOUS at 17:12

## 2024-04-27 RX ADMIN — LEVETIRACETAM 500 MG: 500 TABLET, FILM COATED ORAL at 20:13

## 2024-04-27 RX ADMIN — OXYCODONE HYDROCHLORIDE AND ACETAMINOPHEN 1 TABLET: 7.5; 325 TABLET ORAL at 20:13

## 2024-04-27 RX ADMIN — PREGABALIN 25 MG: 25 CAPSULE ORAL at 13:28

## 2024-04-27 RX ADMIN — DOCUSATE SODIUM 100 MG: 100 CAPSULE, LIQUID FILLED ORAL at 08:22

## 2024-04-27 RX ADMIN — ACETYLCYSTEINE 3 ML: 200 SOLUTION ORAL; RESPIRATORY (INHALATION) at 14:47

## 2024-04-27 RX ADMIN — ACETAMINOPHEN 650 MG: 325 TABLET, FILM COATED ORAL at 17:12

## 2024-04-27 RX ADMIN — ASPIRIN 81 MG: 81 TABLET, COATED ORAL at 08:21

## 2024-04-27 RX ADMIN — PREGABALIN 25 MG: 25 CAPSULE ORAL at 20:13

## 2024-04-27 RX ADMIN — ENOXAPARIN SODIUM 40 MG: 40 INJECTION SUBCUTANEOUS at 08:21

## 2024-04-27 RX ADMIN — OXYCODONE AND ACETAMINOPHEN 1 TABLET: 5; 325 TABLET ORAL at 03:18

## 2024-04-27 RX ADMIN — SODIUM CHLORIDE, POTASSIUM CHLORIDE, SODIUM LACTATE AND CALCIUM CHLORIDE 40 ML/HR: 600; 310; 30; 20 INJECTION, SOLUTION INTRAVENOUS at 06:40

## 2024-04-27 RX ADMIN — MORPHINE SULFATE 2 MG: 2 INJECTION, SOLUTION INTRAMUSCULAR; INTRAVENOUS at 11:01

## 2024-04-27 RX ADMIN — MORPHINE SULFATE 2 MG: 2 INJECTION, SOLUTION INTRAMUSCULAR; INTRAVENOUS at 06:41

## 2024-04-27 RX ADMIN — IBUPROFEN 600 MG: 600 TABLET, FILM COATED ORAL at 06:40

## 2024-04-27 RX ADMIN — IBUPROFEN 600 MG: 600 TABLET, FILM COATED ORAL at 11:00

## 2024-04-27 RX ADMIN — IPRATROPIUM BROMIDE AND ALBUTEROL SULFATE 3 ML: 2.5; .5 SOLUTION RESPIRATORY (INHALATION) at 07:06

## 2024-04-27 RX ADMIN — ACETYLCYSTEINE 1.5 ML: 200 SOLUTION ORAL; RESPIRATORY (INHALATION) at 23:09

## 2024-04-27 NOTE — PLAN OF CARE
Goal Outcome Evaluation:      Patient A/O x 4 this shift. Patient treated for pain per MAR. Patient had no reports of nausea or shortness of breath. CT maintained this shift. Up to walk and ct dressing change this a.m. IV fluid/abx overnight. Will update oncoming dayshift nurse at bedside shift report in the a.m. as appropriate.

## 2024-04-27 NOTE — PROGRESS NOTES
"Patient name: Stephen Molina  Patient : 1970  VISIT # [id number]  MR #[id number]    Procedure:Procedure(s):  RIGHT THORACOSCOPY WITH DAVINCI ROBOT WITH WEDGE RESECTION, MEDIASTINAL LYMPH NODE DISSECTION WITH DAVINCI ROBOT  BRONCHOSCOPY WITH ION ROBOT  Procedure Date:2024  POD:1 Day Post-Op    Subjective   Postoperative pain      Resting in bed, no acute distress.  Currently on room air with SpO2 of 94%.  Right chest tube intact with airleak, 1 of 5.  Rates pain 8 out of 10, also has chronic back pain.  Creatinine 0.58.  Hemoglobin 14.8 and platelet count of 208,000.  Reports has been ambulating.       Objective     Visit Vitals  /59 (BP Location: Right arm, Patient Position: Lying)   Pulse 76   Temp 98 °F (36.7 °C) (Oral)   Resp 16   Ht 182.5 cm (71.85\")   Wt 104 kg (229 lb 4.5 oz)   SpO2 94%   BMI 31.23 kg/m²       Intake/Output Summary (Last 24 hours) at 2024 1143  Last data filed at 2024 1000  Gross per 24 hour   Intake 2548.32 ml   Output 1470 ml   Net 1078.32 ml     Right chest tube 85 mL/24 hours, serosanguineous, ~20 cm suction, airleak 1 of 5    Lab:     CBC:  Results from last 7 days   Lab Units 24  0322 24  1448 24  1028   WBC 10*3/mm3 9.76 11.44* 6.80   HEMATOCRIT % 45.9 47.9 50.1   PLATELETS 10*3/mm3 208 198 234          BMP:  Results from last 7 days   Lab Units 24  0322 24  1448 24  1028   SODIUM mmol/L 138 137 136   POTASSIUM mmol/L 4.3 5.1 4.4   CHLORIDE mmol/L 103 103 100   CO2 mmol/L 25.0 25.0 23.0   GLUCOSE mg/dL 115* 156* 107*   BUN mg/dL 11 9 6   CREATININE mg/dL 0.58* 0.66* 0.52*          COAG:  Results from last 7 days   Lab Units 24  1028   INR  0.98   APTT seconds 26.4       IMAGES:       Imaging Results (Last 24 Hours)       Procedure Component Value Units Date/Time    XR Chest 1 View [[id number]] Collected: 2443     Updated: 24    Narrative:      EXAMINATION:  XR CHEST 1 VW-  2024 2:28 " AM     HISTORY: Postop lung surgery.     COMPARISON: 4/26/2024.     TECHNIQUE: Single view AP image.     FINDINGS: A right chest tube remains in place. No measurable  pneumothorax is seen. There is a linear density in the right upper lung  zone likely related to wedge resection. Heart size is within normal  limits. Prior median sternotomy. There is minimal subcutaneous emphysema  in the right chest wall.          Impression:      1. Postoperative changes on the right.  2. No measurable pneumothorax. Right chest tube in place.           This report was signed and finalized on 4/27/2024 7:44 AM by Dr. Kaveh Sterling MD.       XR Chest 1 View [[id number]] Collected: 04/26/24 1502     Updated: 04/26/24 1507    Narrative:      EXAM: XR CHEST 1 VW- 4/26/2024 1:40 PM     HISTORY: post op lung surgery; R91.1-Solitary pulmonary nodule       COMPARISON: CT chest 4/26/2021.     TECHNIQUE: Single frontal radiograph of the chest was obtained.     FINDINGS:     Support Devices: Right apical chest tube appears in good position.     Cardiac and Mediastinal Silhouettes: Normal.     Lungs/Pleura: Increased opacity in the right apex is likely related to  known recent wedge resection. No sizable pleural effusion. No visible  pneumothorax.     Osseous structures: No acute osseous finding.     Other: Mild soft tissue emphysema.       Impression:         Status post right apical wedge resection.     Good positioning of right apical chest tube without a visible  pneumothorax.              This report was signed and finalized on 4/26/2024 3:04 PM by Ming Denise.       XR Chest 1 View [[id number]] Collected: 04/26/24 1310     Updated: 04/26/24 1315    Narrative:      EXAMINATION:  XR CHEST 1 VW-  4/26/2024 10:18 AM     HISTORY: Navigational bronchoscopy. R91.1-Solitary pulmonary nodule.     COMPARISON: No comparison study.     NUMBER OF IMAGES: 2     FLUOROSCOPY TIME: 47 seconds.     FLUOROSCOPIC DOSE: 8.2 mGy.     FINDINGS: 2  fluoroscopic images demonstrate the navigational  bronchoscopy peripherally in the right upper lung zone. Please see the  operative report separately.          Impression:      Operative images, as discussed.        This report was signed and finalized on 4/26/2024 1:12 PM by Dr. Kaveh Sterling MD.       FL C Arm During Surgery [[id number]] Resulted: 04/26/24 1209     Updated: 04/26/24 1209    Narrative:      This procedure was auto-finalized with no dictation required.          Chest x-ray: No pneumothorax no pleural effusion.  Chest tube in place.    Physical Exam:  General: Alert, oriented. No apparent distress.   Cardiovascular: Regular rate and rhythm without murmur, rubs, or gallops.    Pulmonary: Clear to auscultation bilaterally without wheezing, rubs, or rales.  Chest: Thoracic incisions clean, dry, and intact.  Right chest tube to 20 cm H2O suction. No air leak. Fluid is serosanguineous.   Abdomen: Soft, non distended, and non tender.  Extremities: Warm, moves all extremities. No edema.   Neurologic:  Grossly intact with no focal deficits.            Impression:  Stage III severe COPD by GOLD classification  Long-term tobacco abuse  Right upper lobe pulmonary nodule  Coronary artery disease  Coronary artery bypass grafting x 2 on 5/16/2022  Chronic back pain  Hypertension  Hyperlipidemia  GERD  History of seizures    Plan:  Continue multimodality pain control strategies  Encourage pulmonary toileting and ambulation  Added Lyrica 25 mg p.o. twice daily  Added Percocet 7.5 mg p.o. every 4 hours as needed pain  Chest tube placed to water seal  Daily chest x-ray  Follow-up on lung pathology     Possible discharge home tomorrow  Discussed plan of care with patient        Lauren Stone, APRN  04/27/24  11:43 CDT

## 2024-04-27 NOTE — PLAN OF CARE
Goal Outcome Evaluation:           Progress: improving  Outcome Evaluation: Pt remains A&Ox4, VSS, c/o pain (see MAR for interventions), catheter removed and pt has voided spontaneously as well as had BM, chest tube removed from suction per CT surgeon. Pt ambulated in hallway as well as multiple times in room. All voiced needs met.

## 2024-04-28 ENCOUNTER — APPOINTMENT (OUTPATIENT)
Dept: GENERAL RADIOLOGY | Facility: HOSPITAL | Age: 54
DRG: 164 | End: 2024-04-28
Payer: MEDICARE

## 2024-04-28 LAB
QT INTERVAL: 356 MS
QTC INTERVAL: 420 MS

## 2024-04-28 PROCEDURE — 71045 X-RAY EXAM CHEST 1 VIEW: CPT

## 2024-04-28 PROCEDURE — 71046 X-RAY EXAM CHEST 2 VIEWS: CPT

## 2024-04-28 PROCEDURE — 94799 UNLISTED PULMONARY SVC/PX: CPT

## 2024-04-28 PROCEDURE — 94664 DEMO&/EVAL PT USE INHALER: CPT

## 2024-04-28 PROCEDURE — 25010000002 ENOXAPARIN PER 10 MG: Performed by: SURGERY

## 2024-04-28 PROCEDURE — 94761 N-INVAS EAR/PLS OXIMETRY MLT: CPT

## 2024-04-28 RX ORDER — OXYCODONE AND ACETAMINOPHEN 7.5; 325 MG/1; MG/1
1 TABLET ORAL EVERY 4 HOURS PRN
Status: DISCONTINUED | OUTPATIENT
Start: 2024-04-28 | End: 2024-04-29 | Stop reason: HOSPADM

## 2024-04-28 RX ADMIN — PREGABALIN 25 MG: 25 CAPSULE ORAL at 20:10

## 2024-04-28 RX ADMIN — IBUPROFEN 600 MG: 600 TABLET, FILM COATED ORAL at 12:06

## 2024-04-28 RX ADMIN — LEVETIRACETAM 500 MG: 500 TABLET, FILM COATED ORAL at 09:06

## 2024-04-28 RX ADMIN — AMLODIPINE BESYLATE 10 MG: 10 TABLET ORAL at 09:06

## 2024-04-28 RX ADMIN — OXYCODONE HYDROCHLORIDE AND ACETAMINOPHEN 1 TABLET: 7.5; 325 TABLET ORAL at 21:21

## 2024-04-28 RX ADMIN — ACETYLCYSTEINE 3 ML: 200 SOLUTION ORAL; RESPIRATORY (INHALATION) at 07:43

## 2024-04-28 RX ADMIN — OXYCODONE HYDROCHLORIDE AND ACETAMINOPHEN 1 TABLET: 7.5; 325 TABLET ORAL at 10:26

## 2024-04-28 RX ADMIN — LISINOPRIL 20 MG: 20 TABLET ORAL at 09:06

## 2024-04-28 RX ADMIN — PREGABALIN 25 MG: 25 CAPSULE ORAL at 09:06

## 2024-04-28 RX ADMIN — IBUPROFEN 600 MG: 600 TABLET, FILM COATED ORAL at 17:41

## 2024-04-28 RX ADMIN — IPRATROPIUM BROMIDE AND ALBUTEROL SULFATE 3 ML: 2.5; .5 SOLUTION RESPIRATORY (INHALATION) at 07:43

## 2024-04-28 RX ADMIN — OXYCODONE HYDROCHLORIDE AND ACETAMINOPHEN 1 TABLET: 7.5; 325 TABLET ORAL at 05:26

## 2024-04-28 RX ADMIN — ENOXAPARIN SODIUM 40 MG: 40 INJECTION SUBCUTANEOUS at 09:06

## 2024-04-28 RX ADMIN — ASPIRIN 81 MG: 81 TABLET, COATED ORAL at 09:06

## 2024-04-28 RX ADMIN — LEVETIRACETAM 500 MG: 500 TABLET, FILM COATED ORAL at 20:10

## 2024-04-28 RX ADMIN — OXYCODONE HYDROCHLORIDE AND ACETAMINOPHEN 1 TABLET: 7.5; 325 TABLET ORAL at 14:55

## 2024-04-28 NOTE — PLAN OF CARE
Goal Outcome Evaluation:      Patient VSS. Chest tubed removed; patient tolerated procedure well. Tolerating current pain interventions; no complaint of pain at this time. Patient up ad raine; ambulated the moya x3. Voiding spontaneously. Safety maintained.

## 2024-04-28 NOTE — PROGRESS NOTES
"Patient name: Stephen Molina  Patient : 1970  VISIT # [id number]  MR #[id number]    Procedure:Procedure(s):  RIGHT THORACOSCOPY WITH DAVINCI ROBOT WITH WEDGE RESECTION, MEDIASTINAL LYMPH NODE DISSECTION WITH DAVINCI ROBOT  BRONCHOSCOPY WITH ION ROBOT  Procedure Date:2024  POD:2 Days Post-Op    Subjective   Postoperative pain    Up in chair, reports has been ambulating in moya.  Very eager to be discharged.  Remains on room air with SpO2 of 97%.    No air leak was noted and Dr. Alberto removed chest tube.  Reports pain is overall controlled.         Objective     Visit Vitals  /81 (BP Location: Left arm, Patient Position: Lying)   Pulse 72   Temp 98.1 °F (36.7 °C) (Oral)   Resp 16   Ht 182.5 cm (71.85\")   Wt 104 kg (229 lb 4.5 oz)   SpO2 97%   BMI 31.23 kg/m²       Intake/Output Summary (Last 24 hours) at 2024 0836  Last data filed at 2024 0332  Gross per 24 hour   Intake 684 ml   Output 1630 ml   Net -946 ml     Right chest tube 30 mL/24 hours, serosanguineous, ~20 cm suction, airleak 1 of 5    Lab:     CBC:  Results from last 7 days   Lab Units 24  0322 24  1448 24  1028   WBC 10*3/mm3 9.76 11.44* 6.80   HEMATOCRIT % 45.9 47.9 50.1   PLATELETS 10*3/mm3 208 198 234          BMP:  Results from last 7 days   Lab Units 24  0322 24  1448 24  1028   SODIUM mmol/L 138 137 136   POTASSIUM mmol/L 4.3 5.1 4.4   CHLORIDE mmol/L 103 103 100   CO2 mmol/L 25.0 25.0 23.0   GLUCOSE mg/dL 115* 156* 107*   BUN mg/dL 11 9 6   CREATININE mg/dL 0.58* 0.66* 0.52*          COAG:  Results from last 7 days   Lab Units 24  1028   INR  0.98   APTT seconds 26.4       IMAGES:       Imaging Results (Last 24 Hours)       Procedure Component Value Units Date/Time    XR Chest 1 View [[id number]] Collected: 24 0753     Updated: 24 0757    Narrative:      EXAMINATION:  XR CHEST 1 VW-  2024 2:25 AM     HISTORY: Postop lung surgery.     COMPARISON: " 4/27/2024.     TECHNIQUE: Single view AP image.     FINDINGS: A right chest tube remains in place. There is no measurable  pneumothorax. There is mild increased density around the suture line in  the right upper lung zone. There is mild right lung volume loss. There  is subcutaneous emphysema in the right chest wall. The left lung is  clear. The heart is normal in size. Prior median sternotomy.          Impression:      1. Postoperative changes on the right. No measurable pneumothorax. Chest  tube remains in place.  2. Subcutaneous emphysema right chest wall.           This report was signed and finalized on 4/28/2024 7:54 AM by Dr. Kaveh Sterling MD.             Chest x-ray: Chest tube in place.  No pneumothorax.    No change in physical exam today  Physical Exam:  General: Alert, oriented. No apparent distress.   Cardiovascular: Regular rate and rhythm without murmur, rubs, or gallops.    Pulmonary: Clear to auscultation bilaterally without wheezing, rubs, or rales.  Chest: Thoracic incisions clean, dry, and intact.  Right chest tube to 20 cm H2O suction. No air leak. Fluid is serosanguineous.   Abdomen: Soft, non distended, and non tender.  Extremities: Warm, moves all extremities. No edema.   Neurologic:  Grossly intact with no focal deficits.            Impression:  Stage III severe COPD by GOLD classification  Long-term tobacco abuse  Right upper lobe pulmonary nodule  Coronary artery disease  Coronary artery bypass grafting x 2 on 5/16/2022  Chronic back pain  Hypertension  Hyperlipidemia  GERD  History of seizures    Plan:  Continue multimodality pain control strategies  Encourage pulmonary toileting and ambulation  Chest x-ray 1 hour post chest tube removal:1645 if no pneumothorax can be discharged  Follow-up on lung pathology     Possible home after x-ray reviewed  Discussed plan of care with patient    Will follow-up with Dr. Minor in 6 weeks        Lauren Stone, IZABEL  04/28/24  08:36 CDT

## 2024-04-28 NOTE — PLAN OF CARE
Goal Outcome Evaluation:              Outcome Evaluation: Pt c/o pain PRN meds given per orders, CT in place to water seal with serosang output, ofelia diet

## 2024-04-29 ENCOUNTER — READMISSION MANAGEMENT (OUTPATIENT)
Dept: CALL CENTER | Facility: HOSPITAL | Age: 54
End: 2024-04-29
Payer: MEDICARE

## 2024-04-29 ENCOUNTER — APPOINTMENT (OUTPATIENT)
Dept: GENERAL RADIOLOGY | Facility: HOSPITAL | Age: 54
DRG: 164 | End: 2024-04-29
Payer: MEDICARE

## 2024-04-29 VITALS
RESPIRATION RATE: 18 BRPM | DIASTOLIC BLOOD PRESSURE: 93 MMHG | OXYGEN SATURATION: 97 % | HEART RATE: 83 BPM | SYSTOLIC BLOOD PRESSURE: 145 MMHG | TEMPERATURE: 98.5 F | WEIGHT: 229.28 LBS | BODY MASS INDEX: 31.05 KG/M2 | HEIGHT: 72 IN

## 2024-04-29 PROCEDURE — 25010000002 ENOXAPARIN PER 10 MG: Performed by: SURGERY

## 2024-04-29 PROCEDURE — 71046 X-RAY EXAM CHEST 2 VIEWS: CPT

## 2024-04-29 PROCEDURE — 71045 X-RAY EXAM CHEST 1 VIEW: CPT

## 2024-04-29 RX ORDER — HYDROCODONE BITARTRATE AND ACETAMINOPHEN 5; 325 MG/1; MG/1
1 TABLET ORAL EVERY 6 HOURS PRN
Qty: 25 TABLET | Refills: 0 | Status: SHIPPED | OUTPATIENT
Start: 2024-04-29

## 2024-04-29 RX ADMIN — LEVETIRACETAM 500 MG: 500 TABLET, FILM COATED ORAL at 08:31

## 2024-04-29 RX ADMIN — IBUPROFEN 600 MG: 600 TABLET, FILM COATED ORAL at 05:21

## 2024-04-29 RX ADMIN — PREGABALIN 25 MG: 25 CAPSULE ORAL at 08:30

## 2024-04-29 RX ADMIN — LISINOPRIL 20 MG: 20 TABLET ORAL at 08:31

## 2024-04-29 RX ADMIN — ACETAMINOPHEN 650 MG: 325 TABLET, FILM COATED ORAL at 05:21

## 2024-04-29 RX ADMIN — AMLODIPINE BESYLATE 10 MG: 10 TABLET ORAL at 08:30

## 2024-04-29 RX ADMIN — ASPIRIN 81 MG: 81 TABLET, COATED ORAL at 08:31

## 2024-04-29 RX ADMIN — ENOXAPARIN SODIUM 40 MG: 40 INJECTION SUBCUTANEOUS at 08:32

## 2024-04-29 RX ADMIN — OXYCODONE HYDROCHLORIDE AND ACETAMINOPHEN 1 TABLET: 7.5; 325 TABLET ORAL at 05:42

## 2024-04-29 NOTE — PLAN OF CARE
Goal Outcome Evaluation:              Outcome Evaluation: Pt c/o pain PRN meds given per orders, CT d/c'd on 4/28 denies SOA O2 sat WNL on RA, ambulating in moya, ofelia diet

## 2024-04-29 NOTE — PAYOR COMM NOTE
"Stephen Solis (53 y.o. Male)    [id number]    Admit   4/26    d/c 4/29     [facility] phone     fax            Date of Birth   1970    Social Security Number       Address   [address] [place] [address]    Home Phone   [phone]    MRN   [id number]       Protestant   [facility]    Marital Status   Single                            Admission Date   4/26/24    Admission Type   Elective    Admitting Provider   Minor, Ming WEST MD    Attending Provider       Department, Room/Bed   [facility] 3C, 396/1       Discharge Date   4/29/2024    Discharge Disposition   Home or Self Care    Discharge Destination                                 Attending Provider: (none)   Allergies: No Known Allergies    Isolation: None   Infection: None   Code Status: CPR    Ht: 182.5 cm (71.85\")   Wt: 104 kg (229 lb 4.5 oz)    Admission Cmt: None   Principal Problem: Lung nodule [R91.1]                   Active Insurance as of 4/26/2024       Primary Coverage       Payor Plan Insurance Group Employer/Plan Group    ANTHEM MEDICARE REPLACEMENT ANTHEM MEDICARE ADVANTAGE [id number]       Payor Plan Address Payor Plan Phone Number Payor Plan Fax Number Effective Dates    [address] [phone]  1/1/2022 - None Entered    [facility] [id number]         Subscriber Name Subscriber Birth Date Member ID       STEPHEN SOLIS 1970 [id number]                     Emergency Contacts        (Rel.) Home Phone Work Phone Mobile Phone    Lois Echevarria (Mother) [phone] -- --                 History & Physical        Minor, Ming WEST MD at 04/26/24 1226          Reviewed PFTs, has adequate lung reserve to tolerate lobectomy if needed.  Discussed with him at length the pro and cons of lobectomy vs wedge, will decide after review of frozen path and margin.  Discussed overall risks and benefits with him, he understands and agrees to " "proceed.    Ming Minor M.D.  Cardiothoracic Surgeon        Electronically signed by Ming Minor MD at 04/26/24 1227   Source Note: H&P (View-Only)              [facility] Cardiothoracic Surgery  PROGRESS NOTE   CC: lung nodule after CABG.    Subjective: The patient is a 53-year-old male who presents for evaluation of lung nodule after CABG.    The patient continues to smoke, albeit slightly over 1 pack per day, a habit he has been grappling with for approximately 40 years. Despite attempts to cease smoking, he has been unsuccessful due to heightened anxiety. He has not tried Wellbutrin in the past. His primary care physician is IZABEL Villalta.    ROS:   No fevers, chills or recent illness.    Objective:      BP (!) 162/107 (BP Location: Right arm, Patient Position: Sitting, Cuff Size: Adult)   Pulse 94   Ht 182 cm (71.65\")   Wt 109 kg (239 lb 9.6 oz)   SpO2 96%   BMI 32.81 kg/m²     [unfilled]    PE:  Vitals:    04/04/24 0802   BP: (!) 162/107   Pulse: 94   SpO2: 96%     GENERAL: NAD, resting comfortably, normal color  CARDIOVASCULAR: regular, regular rate, sinus  PULMONARY: Normal bilateral breath sounds, no labored breathing. Mild wheezing noted in the lungs.  ABDOMEN: soft, nontender/nondistended  EXTREMITIES: mild peripheral edema, normal pulses, normal ROM        Lab Results (last 72 hours)       ** No results found for the last 72 hours. **                CT chest without contrast performed on 4/1/2024.   FINDINGS:     Airways/Lungs/Pleura: Minimally increased size of a right upper lobe  subpleural groundglass nodule measuring up to 1.8 cm, previously 1.6 cm  (image 46). New inferior right upper lobe 2 mm nodule (image 86).  Remaining nodules are stable, representative example includes a stable 5  mm left upper lobe groundglass nodule (image 66).      Minimal scarring in the anterior right upper lobe, lingula, and left  lower lobe. Ill-defined upper lobe predominant " centrilobular groundglass  nodules. No consolidation, pleural effusion, or pneumothorax.     Lower Neck: Unremarkable.     Mediastinum/Hilum: No enlarged lymphadenopathy.     Heart/Vasculature: Heart size normal. Status post CABG. No pericardial  effusion. Advanced coronary artery calcifications and/or stents. Mild  aortic atherosclerosis.     Chest wall/Soft Tissues: Unremarkable.     Upper Abdomen: No acute or suspicious findings.     Osseous Structures: No acute or suspicious osseous finding.     IMPRESSION:     Minimally increased size of a 1.8 cm right upper lobe subpleural  groundglass nodule. New 2 mm right upper lobe nodule. Remaining nodules  are stable. Continued follow-up is recommended.     Ill-defined centrilobular groundglass nodules, most likely related to  smoking-related respiratory bronchiolitis.     I personally reviewed the CT chest, and the following is my interpretation:  There is a lateral ground glass opacity in the right upper lobe that I measured to be 1.8 cm in maximum dimension, this is slightly increased from last time where it was about 1.6 to 1.7 cm. There is a small linear density in the anterior portion of the  right upper lobe that I suspect is more likely scarring and less likely consider concern for malignancy.    Assessment & Plan    Mr. Molina is a 53-year-old male who I performed CABG on in 05/2023 for unstable angina. At that time, we incidentally noted a ground-glass opacity in his right upper lobe, and we have been following this.    The nodule has grown since the last visit, approximately 1 year ago, and currently measures 1.8 cm in maximum dimension, with a very small solid component in the anterior aspect. He does have a 40-pack-year smoking history and has a very high risk for lung malignancy.     I discussed with him the differential diagnosis and potential treatment options. I think we should proceed with a PET scan and if metabolically active, go down the route of  "biopsy or ink marking given its peripheral nature with right wedge resection. This plan was discussed in detail, and he expressed understanding and agreement. If the nodule is not metabolically active, and he prefers to continue with surveillance, this is acceptable and followed up in 6 months given the growth over the past year.     Thank you for trusting me with the care of Mr. Molina. Please do not hesitate to call with questions or concerns.        Ming Minor MD   Cardiothoracic Surgeon     Transcribed from ambient dictation for Ming Minor MD by Rayn Richard.  04/04/24   09:04 CDT    Patient or patient representative verbalized consent to the visit recording.  I have personally performed the services described in this document as transcribed by the above individual, and it is both accurate and complete.      Electronically signed by Ming Minor MD at 04/09/24 1122                 Ming Minor MD at 04/04/24 0800              [facility] Cardiothoracic Surgery  PROGRESS NOTE   CC: lung nodule after CABG.    Subjective: The patient is a 53-year-old male who presents for evaluation of lung nodule after CABG.    The patient continues to smoke, albeit slightly over 1 pack per day, a habit he has been grappling with for approximately 40 years. Despite attempts to cease smoking, he has been unsuccessful due to heightened anxiety. He has not tried Wellbutrin in the past. His primary care physician is IZABEL Villalta.    ROS:   No fevers, chills or recent illness.    Objective:      BP (!) 162/107 (BP Location: Right arm, Patient Position: Sitting, Cuff Size: Adult)   Pulse 94   Ht 182 cm (71.65\")   Wt 109 kg (239 lb 9.6 oz)   SpO2 96%   BMI 32.81 kg/m²     [unfilled]    PE:  Vitals:    04/04/24 0802   BP: (!) 162/107   Pulse: 94   SpO2: 96%     GENERAL: NAD, resting comfortably, normal color  CARDIOVASCULAR: regular, regular rate, sinus  PULMONARY: Normal bilateral breath " sounds, no labored breathing. Mild wheezing noted in the lungs.  ABDOMEN: soft, nontender/nondistended  EXTREMITIES: mild peripheral edema, normal pulses, normal ROM        Lab Results (last 72 hours)       ** No results found for the last 72 hours. **                CT chest without contrast performed on 4/1/2024.   FINDINGS:     Airways/Lungs/Pleura: Minimally increased size of a right upper lobe  subpleural groundglass nodule measuring up to 1.8 cm, previously 1.6 cm  (image 46). New inferior right upper lobe 2 mm nodule (image 86).  Remaining nodules are stable, representative example includes a stable 5  mm left upper lobe groundglass nodule (image 66).      Minimal scarring in the anterior right upper lobe, lingula, and left  lower lobe. Ill-defined upper lobe predominant centrilobular groundglass  nodules. No consolidation, pleural effusion, or pneumothorax.     Lower Neck: Unremarkable.     Mediastinum/Hilum: No enlarged lymphadenopathy.     Heart/Vasculature: Heart size normal. Status post CABG. No pericardial  effusion. Advanced coronary artery calcifications and/or stents. Mild  aortic atherosclerosis.     Chest wall/Soft Tissues: Unremarkable.     Upper Abdomen: No acute or suspicious findings.     Osseous Structures: No acute or suspicious osseous finding.     IMPRESSION:     Minimally increased size of a 1.8 cm right upper lobe subpleural  groundglass nodule. New 2 mm right upper lobe nodule. Remaining nodules  are stable. Continued follow-up is recommended.     Ill-defined centrilobular groundglass nodules, most likely related to  smoking-related respiratory bronchiolitis.     I personally reviewed the CT chest, and the following is my interpretation:  There is a lateral ground glass opacity in the right upper lobe that I measured to be 1.8 cm in maximum dimension, this is slightly increased from last time where it was about 1.6 to 1.7 cm. There is a small linear density in the anterior portion of the   right upper lobe that I suspect is more likely scarring and less likely consider concern for malignancy.    Assessment & Plan    Mr. Molina is a 53-year-old male who I performed CABG on in 05/2023 for unstable angina. At that time, we incidentally noted a ground-glass opacity in his right upper lobe, and we have been following this.    The nodule has grown since the last visit, approximately 1 year ago, and currently measures 1.8 cm in maximum dimension, with a very small solid component in the anterior aspect. He does have a 40-pack-year smoking history and has a very high risk for lung malignancy.     I discussed with him the differential diagnosis and potential treatment options. I think we should proceed with a PET scan and if metabolically active, go down the route of biopsy or ink marking given its peripheral nature with right wedge resection. This plan was discussed in detail, and he expressed understanding and agreement. If the nodule is not metabolically active, and he prefers to continue with surveillance, this is acceptable and followed up in 6 months given the growth over the past year.     Thank you for trusting me with the care of Mr. Molina. Please do not hesitate to call with questions or concerns.        Ming Minor MD   Cardiothoracic Surgeon     Transcribed from ambient dictation for Ming Minor MD by Ryan Richard.  04/04/24   09:04 CDT    Patient or patient representative verbalized consent to the visit recording.  I have personally performed the services described in this document as transcribed by the above individual, and it is both accurate and complete.      Electronically signed by Ming Minor MD at 04/09/24 1122       Vital Signs (last day) before discharge       Date/Time Temp Temp src Pulse Resp BP Patient Position SpO2    04/29/24 0723 98.5 (36.9) Oral 83 18 145/93 Sitting 97    04/29/24 0357 98.2 (36.8) Oral 79 16 152/95 Lying 97    04/28/24 2032 98 (36.7) Oral 77 16  145/85 Lying 96    04/28/24 1517 98.3 (36.8) Oral 75 16 142/80 Sitting 97    04/28/24 1154 98.1 (36.7) Oral 74 16 139/83 Sitting 95    04/28/24 0818 98.1 (36.7) Oral 72 16 154/81 Lying 97    04/28/24 0751 -- -- 72 -- -- -- --    04/28/24 0743 -- -- 70 14 -- -- 95    04/28/24 0332 98.2 (36.8) Oral 71 16 158/86 Lying 94          Current Facility-Administered Medications   Medication Dose Route Frequency Provider Last Rate Last Admin    acetaminophen (TYLENOL) tablet 650 mg  650 mg Oral Q6H Minor, Ming WEST MD   650 mg at 04/29/24 0521    amLODIPine (NORVASC) tablet 10 mg  10 mg Oral Daily Minor, Ming WEST MD   10 mg at 04/29/24 0830    aspirin EC tablet 81 mg  81 mg Oral Daily Minor, Ming WEST MD   81 mg at 04/29/24 0831    bisacodyl (DULCOLAX) suppository 10 mg  10 mg Rectal Daily PRN Minor, Ming WEST MD        docusate sodium (COLACE) capsule 100 mg  100 mg Oral Daily Minor, iMng WEST MD   100 mg at 04/27/24 0822    Enoxaparin Sodium (LOVENOX) syringe 40 mg  40 mg Subcutaneous Daily Minor, Ming WEST MD   40 mg at 04/29/24 0832    ibuprofen (ADVIL,MOTRIN) tablet 600 mg  600 mg Oral Q6H Minor, Ming WEST MD   600 mg at 04/29/24 0521    lactated ringers infusion  40 mL/hr Intravenous Continuous Minor, Ming WEST MD 40 mL/hr at 04/27/24 0640 40 mL/hr at 04/27/24 0640    levETIRAcetam (KEPPRA) tablet 500 mg  500 mg Oral BID Minor, Ming WEST MD   500 mg at 04/29/24 0831    lisinopril (PRINIVIL,ZESTRIL) tablet 20 mg  20 mg Oral Daily Minor, Ming WEST MD   20 mg at 04/29/24 0831    Morphine sulfate (PF) injection 2 mg  2 mg Intravenous Q4H PRN Minor, Ming WEST MD   2 mg at 04/27/24 1712    And    naloxone (NARCAN) injection 0.4 mg  0.4 mg Intravenous Q5 Min PRN Minor, Ming WEST MD        ondansetron ODT (ZOFRAN-ODT) disintegrating tablet 4 mg  4 mg Oral Q6H PRN Minor, Ming WEST MD        Or    ondansetron (ZOFRAN) injection 4 mg  4 mg Intravenous Q6H PRN Minor, Ming WEST MD        oxyCODONE-acetaminophen (PERCOCET) 5-325 MG per tablet 1  tablet  1 tablet Oral Q3H PRN Ming Minor MD   1 tablet at 04/27/24 0821    oxyCODONE-acetaminophen (PERCOCET) 7.5-325 MG per tablet 1 tablet  1 tablet Oral Q4H PRN Ming Minor MD   1 tablet at 04/29/24 0542    polyethylene glycol (MIRALAX) packet 17 g  17 g Oral Daily Ming Minor MD        pregabalin (LYRICA) capsule 25 mg  25 mg Oral Q12H Lauren Stone APRN   25 mg at 04/29/24 0830     Current Outpatient Medications   Medication Sig Dispense Refill    amLODIPine (NORVASC) 5 MG tablet Take 1 tablet by mouth Daily.      aspirin 81 MG EC tablet Take 1 tablet by mouth Daily.      Fluticasone-Umeclidin-Vilant (TRELEGY) 100-62.5-25 MCG/ACT inhaler Inhale 1 puff Daily.      levETIRAcetam (KEPPRA) 500 MG tablet Take 1 tablet by mouth 2 (Two) Times a Day.      lisinopril (PRINIVIL,ZESTRIL) 40 MG tablet Take 1 tablet by mouth Daily.      OLANZapine-FLUoxetine (SYMBYAX) 6-50 MG per capsule Take 1 capsule by mouth Every Evening.      HYDROcodone-acetaminophen (Norco) 5-325 MG per tablet Take 1 tablet by mouth Every 6 (Six) Hours As Needed for Moderate Pain. 25 tablet 0    nitroglycerin (NITROSTAT) 0.4 MG SL tablet Place 1 tablet under the tongue Every 5 (Five) Minutes As Needed for Chest Pain.          Operative/Procedure Notes (last 4 days)        Hamzah Santos MD at 04/26/24 1135           Procedure Note (AdvancedBronchoscopy)    Date of Operation: 04/26/24  Pre-op Diagnosis: Lung nodule  Post-op Diagnosis: Same  Surgeon: Hamzah Santos MD  Anesthesia: General    Operation: Flexible fiberoptic bronchoscopy, Bronchoscopy, Diagnostic, Ion robotic shape sensing navigational bronchoscopy, and transbronchial injection of icg dye into lesion  Findings: normal airways  Specimen: None  Estimated Blood Loss: Minimal  Complications: none    Indications and History:  The patient is a 53 y.o.  male with Lung nodule.  The risks, benefits, complications, treatment options and expected outcomes were discussed with  the patient.  The possibilities of reaction to medication, pulmonary aspiration, perforation of a viscus, bleeding, failure to diagnose a condition and creating a complication requiring transfusion or operation were discussed with the patient who freely signed the consent.  Time out was taken prior to the procedure.    Description of Procedure:    After the induction of general anesthesia, the patient was positioned supine and the Olympus BF type 1TX363 therapeutic bronchoscope was introduced through the endotracheal tube.  The scope was then passed into the trachea.     The trachea, mainstem bronchi, RUL, RML, Bronchus Intermedius, RLL, LAURA, LAURA upper division and lingula, and LLL, and all primary segmental airways were examined and were normal except as described below:    Endobronchial findings: mild endobronchial mucus in trachea and RUL bronchus and segmental bronchi, endobronchial mucus in RLL, suctionied clear  Trachea: Normal mucosa  Cecilia: Sharp  Right main bronchus: Normal mucosa  Right upper lobe bronchus: Normal mucosa  Right middle lobe bronchus: Normal mucosa  Right lower lobe bronchus: Normal mucosa  Left main bronchus: Normal mucosa  Left upper lobe bronchus: Normal mucosa  Left lower lobe bronchus: Normal mucosa    The conventional bronchoscope was removed .    Using the planning laptop and Planpoint software, the lesion of interest was identified, and marked, a pathway was generated, and anatomic borders were identified.The ION system was magnetically docked to the ETT. The shape sensing catheter with vision probe was introduced via the  into the ETT.    Registration was started by advancing the bronchoscope into the right and left mainstem bronchi. The main cecilia was confirmed by rotating the live-view image to match the navigation-view image of the main cecilia. Registration was completed by advancing the catheter into the identified the lobar bronchi. There was moderate visual divergence  that was corrected by using ion preview pathology function. Navigation was complicated by : visual divergence, endobronchial mucous impairing visualization, and airway added during planning not present. Using the , the shape sensing bronchoscope was advanced to the target lesion. The mobile C-Arm was brought in, and the vision probe was removed. A 23 gauge ion needle was mounted and sheath set under fluoroscopic guidance.  The needle was advanced using fluoroscopic guidance to the target 20 mm and 0.5 ml icg dye was injected, followed by 0.5 ml air.      The shape sensing catheter was extracted and catheter guide was disconnected.    The patient was undocked from the ion robot arm.  The Patient was extubated and taken to the Recovery Room in satisfactory condition.      Hamzah Santos MD at 12:10 CDT, 4/26/2024     Electronically signed by Hamzah Santos MD at 04/26/24 1217       Ming Minor MD at 04/26/24 1135          Cardiothoracic Surgery Operative Note     Date of Procedure: 4/19/2024     Pre-op diagnosis: Lung Nodule, Suspicious for Cancer, Right Upper Lobe  Current Smoker  COPD  Hypertension  Hyperlipidemia  CAD, s/p PCI and CABG  Obese, BMI 31.5  GERD  Hx of Seizures  Anxiey     Post-op diagnosis: Same     Procedure:  Diagnostic Bronchoscopy , CPT 49897  Right Robotic Assisted video-assisted thoracoscopic (VATS) Wedge Resection RUL  Right Robot Assisted video-assisted thoracoscopic (VATS) Mediastinal Lymph Node Dissection     Surgeon: Ming Minor M.D.  Assistant: Meredith Hartley CFA  Anesthesia: GETA- double lumen  EBL: 20 mL  Drains: 24 Guamanian Right Pleural Chest Tube     This resection was for curative intent.     Specimens:  RUL Wedge Resection  (Frozen Section Positive for NSCLC, likely lepedic adeno with greater than 1cm margin)  Level 4R LN  Level 2R LN  Level 7 LN  Level 10 LN, Sump Node     Operative indications:   Mr. Molina is a 53-year-old male who I performed CABG  on in 2022.  On imaging surrounding this he was incidentally noted to have a right upper lobe small lung nodule.  This was followed an enlarged over time.  It was up to 1.7 cm.  Given this I discussed with him proceeding with wedge resection of this possible lobectomy.  It was less than 2 cm and we discussed the path was favorable likely large wedge resection on given that there is likely extensive adhesions to the mediastinum and KATIA would be at risk of injury with lobectomy.  He understood the risk and benefits of procedure and agreed to proceed.      Operative findings     Bronchoscopy:  Normal endobronchial anatomy, no endobronchial tumor noted see Dr. Santos note regarding bronchoscopic localization      Intrathoracic Findings:   Moderate intrapleural adhesions of the right upper lobe to the mediastinum overlying the KATIA    ICG easily identified the RUL part solid nodule      Wedge resection per above     Operative description in detail:  The patient was taken to the operative suite where he was placed in a supine position. General anesthesia was induced without complication and a single lumen ET tube was placed by anesthesia. A timeout was performed. With that bronchoscopy with ION robot was performed by Dr. Santos with injection of ICG into areas of concern.  Single lumen tube was exchanged for double lumen tube.     He was positioned in lateral decubitus position with right side up. All pressure points are protected. Single lung ventilation was initiated and double lumen endotracheal tube position confirmed.  He was then prepped and draped in the usual and sterile fashion.       An incision was made sharply in line with the middle axillary line at approximately the 8th intercostal space.  The pleural cavity was accessed and surveyed.  Additional robotic ports were placed in the 8th and 9th interspace.    Assistant port was placed laterally along the diaphragmatic margin under direct vision.    Using ICG and  firefly, the right upper lobe nodule was identified and resected with serial blue load staples.         I then retracted the lung inferiorly and open the level 4R window pleura.  Level 4R lymph node was taken.  Area overlying level 2R was opened and lymph nodes taken here.  I then retracted the lung anteriorly and opened the subcarinal space and sampled level 7 node.  I then retracted the lung superiorly and divided the inferior pulmonary ligament, there were no level 8 or 9 lymph nodes identified.  I then developed the secondary cecilia between the upper lobe and ongoing bronchus intermedius and sampled level 10 sump node     Hemostasis was ensured in the operative site.  A 24 Ivorian straight chest tube was placed through the third port site after tunneling.  I performed intercostal blocks using a one-to-one Marcaine/Exparel mixture.  All ports were removed under direct vision ensuring adequate hemostasis.  The lung was allowed to expand under direct vision and filled the thorax.  All port sites and assistant site were closed in anatomical layers using absorbable suture.  Skin Affix was used as dressing.  The chest tube was connected to 20 cm of suction.     The patient tolerated the procedure well was extubated in the operating room and transferred the PACU in stable condition.  All needle sponge and instrument counts were correct at the end of the case.     Complications: None  Disposition: Transfer to PACU extubated and in stable condition.      Ming Minor MD  Cardiothoracic Surgeon    Electronically signed by Ming Minor MD at 24 1423          Physician Progress Notes (last 72 hours)        Danielle Domingo APRN at 24 0830          Patient name: Stephen Molina  Patient : 1970  VISIT # [id number]  MR #[id number]    Procedure:Procedure(s):  RIGHT THORACOSCOPY WITH DAVINCI ROBOT WITH WEDGE RESECTION, MEDIASTINAL LYMPH NODE DISSECTION WITH DAVINCI ROBOT  BRONCHOSCOPY WITH ION  "ROBOT  Procedure Date:4/26/2024  POD:3 Days Post-Op    Subjective     Patient is tolerating room air.  Right chest tube removed without remark yesterday and post pull x-ray shows small right lateral pneumothorax.  He is hopeful for discharge home today.  Pain is well-controlled.      Objective     Visit Vitals  /93 (BP Location: Right arm, Patient Position: Sitting)   Pulse 83   Temp 98.5 °F (36.9 °C) (Oral)   Resp 18   Ht 182.5 cm (71.85\")   Wt 104 kg (229 lb 4.5 oz)   SpO2 97%   BMI 31.23 kg/m²       Intake/Output Summary (Last 24 hours) at 4/29/2024 0822  Last data filed at 4/29/2024 0542  Gross per 24 hour   Intake 784 ml   Output 1625 ml   Net -841 ml       Lab:     CBC:  Results from last 7 days   Lab Units 04/27/24  0322 04/26/24  1448 04/24/24  1028   WBC 10*3/mm3 9.76 11.44* 6.80   HEMATOCRIT % 45.9 47.9 50.1   PLATELETS 10*3/mm3 208 198 234          BMP:  Results from last 7 days   Lab Units 04/27/24  0322 04/26/24  1448 04/24/24  1028   SODIUM mmol/L 138 137 136   POTASSIUM mmol/L 4.3 5.1 4.4   CHLORIDE mmol/L 103 103 100   CO2 mmol/L 25.0 25.0 23.0   GLUCOSE mg/dL 115* 156* 107*   BUN mg/dL 11 9 6   CREATININE mg/dL 0.58* 0.66* 0.52*          COAG:  Results from last 7 days   Lab Units 04/24/24  1028   INR  0.98   APTT seconds 26.4       IMAGES:       Imaging Results (Last 24 Hours)       Procedure Component Value Units Date/Time    XR Chest PA & Lateral [[id number]] Collected: 04/29/24 0754     Updated: 04/29/24 0802    Narrative:      EXAM: XR CHEST PA AND LATERAL-      DATE: 4/29/2024 6:52 AM     HISTORY: pneumothorax; R91.1-Solitary pulmonary nodule       COMPARISON: 4/29/2024 at 3:09 a.m.     TECHNIQUE:  Frontal and lateral views of the chest submitted.     FINDINGS:    Small right lateral pneumothorax is again noted may be slightly  decreased in size given differences in technique. There is right lateral  chest wall emphysema. Patient is status post median sternotomy and CABG.  Heart size is " within normal limits. No significant effusion is seen.          Impression:      1. Postoperative chest and small right lateral pneumothorax may be  slightly decreased in size.     This report was signed and finalized on 4/29/2024 7:59 AM by Cain Nobles.       XR Chest 1 View [[id number]] Collected: 04/29/24 0728     Updated: 04/29/24 0733    Narrative:      XR CHEST 1 VW-     HISTORY: post op lung surgery     COMPARISON: 4/28/2024     FINDINGS: Frontal view of the chest obtained.     Overall similar small right-sided pneumothorax with mild subcutaneous  edema of the right lower chest wall. Right lung opacities may relate to  atelectasis, postoperative change, and/or developing consolidation.  Probable left basal atelectasis. Prior mediastinal surgery. Heart normal  in size.       Impression:      1. Similar small right-sided pneumothorax with no contralateral shifting  of the cardiomediastinal structures. Right lung opacities may represent  sequelae of recent surgery, atelectasis, or developing consolidation.     This report was signed and finalized on 4/29/2024 7:30 AM by Dr. Maribeth Bowman MD.       XR Chest PA & Lateral [[id number]] Collected: 04/28/24 1721     Updated: 04/28/24 1726    Narrative:      EXAMINATION: XR CHEST PA AND LATERAL-     4/28/2024 3:26 PM     HISTORY: Status post wedge resection: Eval for; R91.1-Solitary pulmonary  nodule     2 view chest x-ray.     COMPARISON:  Earlier today at 3:16 a.m.     Interval RIGHT chest tube removal.  A small RIGHT apical pneumothorax is now present.  Normal heart size.  The LEFT lung is clear.       Impression:      1. Small RIGHT apical pneumothorax after RIGHT chest tube removal.  The pleural reflection of the lateral aspect of the apex lies 15 mm from  the bony margin of the RIGHT hemithorax.           This report was signed and finalized on 4/28/2024 5:23 PM by Dr. Stuart Gonzalez MD.             CXR: Stable right chest wall subcutaneous emphysema.   "Stable to slightly improved right lateral space.    Physical Exam:  General: Alert, oriented. No apparent distress.  Obese  Cardiovascular: Regular rate and rhythm without murmur, rubs, or gallops.    Pulmonary: Clear to auscultation bilaterally without wheezing, rubs, or rales.  Chest: Thoracic incisions clean, dry, and intact.   Abdomen: Soft, nondistended, and nontender.  Extremities: Warm, moves all extremities. No edema.   Neurologic:  Grossly intact with no focal deficits.           Impression:  Lung nodule, right upper lobe  Current smoker  COPD  Obesity, BMI 31        Plan:  Chest x-ray this morning shows stable to slightly improved right lateral space.  Okay for discharge home today.  Routine follow-up with me in 1 week with chest x-ray prior to appointment.  Final pathology is pending at the time of discharge and will be discussed at his postop follow-up visit.  Encourage pulmonary toilet and ambulation  Discussed with patient      IZABEL Gtz  24  08:22 CDT      Electronically signed by aDnielle Domingo APRN at 24 0826       Lauren Stone APRN at 24 0836          Patient name: Stephen Molina  Patient : 1970  VISIT # [id number]  MR #[id number]    Procedure:Procedure(s):  RIGHT THORACOSCOPY WITH DAVINCI ROBOT WITH WEDGE RESECTION, MEDIASTINAL LYMPH NODE DISSECTION WITH DAVINCI ROBOT  BRONCHOSCOPY WITH ION ROBOT  Procedure Date:2024  POD:2 Days Post-Op    Subjective   Postoperative pain    Up in chair, reports has been ambulating in moya.  Very eager to be discharged.  Remains on room air with SpO2 of 97%.    No air leak was noted and Dr. Alberto removed chest tube.  Reports pain is overall controlled.        Objective     Visit Vitals  /81 (BP Location: Left arm, Patient Position: Lying)   Pulse 72   Temp 98.1 °F (36.7 °C) (Oral)   Resp 16   Ht 182.5 cm (71.85\")   Wt 104 kg (229 lb 4.5 oz)   SpO2 97%   BMI 31.23 kg/m²       Intake/Output Summary (Last " 24 hours) at 4/28/2024 0836  Last data filed at 4/28/2024 0332  Gross per 24 hour   Intake 684 ml   Output 1630 ml   Net -946 ml     Right chest tube 30 mL/24 hours, serosanguineous, ~20 cm suction, airleak 1 of 5    Lab:     CBC:  Results from last 7 days   Lab Units 04/27/24  0322 04/26/24  1448 04/24/24  1028   WBC 10*3/mm3 9.76 11.44* 6.80   HEMATOCRIT % 45.9 47.9 50.1   PLATELETS 10*3/mm3 208 198 234          BMP:  Results from last 7 days   Lab Units 04/27/24  0322 04/26/24  1448 04/24/24  1028   SODIUM mmol/L 138 137 136   POTASSIUM mmol/L 4.3 5.1 4.4   CHLORIDE mmol/L 103 103 100   CO2 mmol/L 25.0 25.0 23.0   GLUCOSE mg/dL 115* 156* 107*   BUN mg/dL 11 9 6   CREATININE mg/dL 0.58* 0.66* 0.52*          COAG:  Results from last 7 days   Lab Units 04/24/24  1028   INR  0.98   APTT seconds 26.4       IMAGES:       Imaging Results (Last 24 Hours)       Procedure Component Value Units Date/Time    XR Chest 1 View [[id number]] Collected: 04/28/24 0753     Updated: 04/28/24 0757    Narrative:      EXAMINATION:  XR CHEST 1 VW-  4/28/2024 2:25 AM     HISTORY: Postop lung surgery.     COMPARISON: 4/27/2024.     TECHNIQUE: Single view AP image.     FINDINGS: A right chest tube remains in place. There is no measurable  pneumothorax. There is mild increased density around the suture line in  the right upper lung zone. There is mild right lung volume loss. There  is subcutaneous emphysema in the right chest wall. The left lung is  clear. The heart is normal in size. Prior median sternotomy.          Impression:      1. Postoperative changes on the right. No measurable pneumothorax. Chest  tube remains in place.  2. Subcutaneous emphysema right chest wall.           This report was signed and finalized on 4/28/2024 7:54 AM by Dr. Kaveh Sterling MD.             Chest x-ray: Chest tube in place.  No pneumothorax.    No change in physical exam today  Physical Exam:  General: Alert, oriented. No apparent distress.    Cardiovascular: Regular rate and rhythm without murmur, rubs, or gallops.    Pulmonary: Clear to auscultation bilaterally without wheezing, rubs, or rales.  Chest: Thoracic incisions clean, dry, and intact.  Right chest tube to 20 cm H2O suction. No air leak. Fluid is serosanguineous.   Abdomen: Soft, non distended, and non tender.  Extremities: Warm, moves all extremities. No edema.   Neurologic:  Grossly intact with no focal deficits.           Impression:  Stage III severe COPD by GOLD classification  Long-term tobacco abuse  Right upper lobe pulmonary nodule  Coronary artery disease  Coronary artery bypass grafting x 2 on 2022  Chronic back pain  Hypertension  Hyperlipidemia  GERD  History of seizures    Plan:  Continue multimodality pain control strategies  Encourage pulmonary toileting and ambulation  Chest x-ray 1 hour post chest tube removal:1645 if no pneumothorax can be discharged  Follow-up on lung pathology     Possible home after x-ray reviewed  Discussed plan of care with patient    Will follow-up with Dr. Minor in 6 weeks        IZABEL Shell  24  08:36 CDT      Electronically signed by Lauren Stone APRN at 24 1559       Lauren Stone APRN at 24 [id number]          Patient name: Stephen Molina  Patient : 1970  VISIT # [id number]  MR #[id number]    Procedure:Procedure(s):  RIGHT THORACOSCOPY WITH DAVINCI ROBOT WITH WEDGE RESECTION, MEDIASTINAL LYMPH NODE DISSECTION WITH DAVINCI ROBOT  BRONCHOSCOPY WITH ION ROBOT  Procedure Date:2024  POD:1 Day Post-Op    Subjective   Postoperative pain      Resting in bed, no acute distress.  Currently on room air with SpO2 of 94%.  Right chest tube intact with airleak, 1 of 5.  Rates pain 8 out of 10, also has chronic back pain.  Creatinine 0.58.  Hemoglobin 14.8 and platelet count of 208,000.  Reports has been ambulating.      Objective     Visit Vitals  /59 (BP Location: Right arm, Patient Position: Lying)   Pulse  "76   Temp 98 °F (36.7 °C) (Oral)   Resp 16   Ht 182.5 cm (71.85\")   Wt 104 kg (229 lb 4.5 oz)   SpO2 94%   BMI 31.23 kg/m²       Intake/Output Summary (Last 24 hours) at 4/27/2024 1143  Last data filed at 4/27/2024 1000  Gross per 24 hour   Intake 2548.32 ml   Output 1470 ml   Net 1078.32 ml     Right chest tube 85 mL/24 hours, serosanguineous, ~20 cm suction, airleak 1 of 5    Lab:     CBC:  Results from last 7 days   Lab Units 04/27/24  0322 04/26/24  1448 04/24/24  1028   WBC 10*3/mm3 9.76 11.44* 6.80   HEMATOCRIT % 45.9 47.9 50.1   PLATELETS 10*3/mm3 208 198 234          BMP:  Results from last 7 days   Lab Units 04/27/24  0322 04/26/24  1448 04/24/24  1028   SODIUM mmol/L 138 137 136   POTASSIUM mmol/L 4.3 5.1 4.4   CHLORIDE mmol/L 103 103 100   CO2 mmol/L 25.0 25.0 23.0   GLUCOSE mg/dL 115* 156* 107*   BUN mg/dL 11 9 6   CREATININE mg/dL 0.58* 0.66* 0.52*          COAG:  Results from last 7 days   Lab Units 04/24/24  1028   INR  0.98   APTT seconds 26.4       IMAGES:       Imaging Results (Last 24 Hours)       Procedure Component Value Units Date/Time    XR Chest 1 View [[id number]] Collected: 04/27/24 0743     Updated: 04/27/24 0747    Narrative:      EXAMINATION:  XR CHEST 1 VW-  4/27/2024 2:28 AM     HISTORY: Postop lung surgery.     COMPARISON: 4/26/2024.     TECHNIQUE: Single view AP image.     FINDINGS: A right chest tube remains in place. No measurable  pneumothorax is seen. There is a linear density in the right upper lung  zone likely related to wedge resection. Heart size is within normal  limits. Prior median sternotomy. There is minimal subcutaneous emphysema  in the right chest wall.          Impression:      1. Postoperative changes on the right.  2. No measurable pneumothorax. Right chest tube in place.           This report was signed and finalized on 4/27/2024 7:44 AM by Dr. Kaveh Sterling MD.       XR Chest 1 View [[id number]] Collected: 04/26/24 1502     Updated: 04/26/24 1507    Narrative:   "    EXAM: XR CHEST 1 VW- 4/26/2024 1:40 PM     HISTORY: post op lung surgery; R91.1-Solitary pulmonary nodule       COMPARISON: CT chest 4/26/2021.     TECHNIQUE: Single frontal radiograph of the chest was obtained.     FINDINGS:     Support Devices: Right apical chest tube appears in good position.     Cardiac and Mediastinal Silhouettes: Normal.     Lungs/Pleura: Increased opacity in the right apex is likely related to  known recent wedge resection. No sizable pleural effusion. No visible  pneumothorax.     Osseous structures: No acute osseous finding.     Other: Mild soft tissue emphysema.       Impression:         Status post right apical wedge resection.     Good positioning of right apical chest tube without a visible  pneumothorax.              This report was signed and finalized on 4/26/2024 3:04 PM by Ming Denise.       XR Chest 1 View [[id number]] Collected: 04/26/24 1310     Updated: 04/26/24 1315    Narrative:      EXAMINATION:  XR CHEST 1 VW-  4/26/2024 10:18 AM     HISTORY: Navigational bronchoscopy. R91.1-Solitary pulmonary nodule.     COMPARISON: No comparison study.     NUMBER OF IMAGES: 2     FLUOROSCOPY TIME: 47 seconds.     FLUOROSCOPIC DOSE: 8.2 mGy.     FINDINGS: 2 fluoroscopic images demonstrate the navigational  bronchoscopy peripherally in the right upper lung zone. Please see the  operative report separately.          Impression:      Operative images, as discussed.        This report was signed and finalized on 4/26/2024 1:12 PM by Dr. Kaveh Sterling MD.       FL C Arm During Surgery [[id number]] Resulted: 04/26/24 1209     Updated: 04/26/24 1209    Narrative:      This procedure was auto-finalized with no dictation required.          Chest x-ray: No pneumothorax no pleural effusion.  Chest tube in place.    Physical Exam:  General: Alert, oriented. No apparent distress.   Cardiovascular: Regular rate and rhythm without murmur, rubs, or gallops.    Pulmonary: Clear to auscultation  bilaterally without wheezing, rubs, or rales.  Chest: Thoracic incisions clean, dry, and intact.  Right chest tube to 20 cm H2O suction. No air leak. Fluid is serosanguineous.   Abdomen: Soft, non distended, and non tender.  Extremities: Warm, moves all extremities. No edema.   Neurologic:  Grossly intact with no focal deficits.           Impression:  Stage III severe COPD by GOLD classification  Long-term tobacco abuse  Right upper lobe pulmonary nodule  Coronary artery disease  Coronary artery bypass grafting x 2 on 5/16/2022  Chronic back pain  Hypertension  Hyperlipidemia  GERD  History of seizures    Plan:  Continue multimodality pain control strategies  Encourage pulmonary toileting and ambulation  Added Lyrica 25 mg p.o. twice daily  Added Percocet 7.5 mg p.o. every 4 hours as needed pain  Chest tube placed to water seal  Daily chest x-ray  Follow-up on lung pathology     Possible discharge home tomorrow  Discussed plan of care with patient        IZABEL Shell  04/27/24  11:43 CDT      Electronically signed by Lauren Stone APRN at 04/27/24 1157          Discharge Summary        Danielle Domingo APRN at 04/29/24 0828                        [facility] Cardiothoracic Surgery  DISCHARGE SUMMARY        Date of Admission: 4/26/2024  Date of Discharge:  4/29/2024  Primary Care Physician: Tara Chaudhry APRN    Discharge Diagnoses:  Active Hospital Problems    Diagnosis     **Lung nodule     Lung cancer        Procedures Performed:   Diagnostic Bronchoscopy, Right Robotic Assisted video-assisted thoracoscopic (VATS) Wedge Resection RUL, Right Robot Assisted video-assisted thoracoscopic (VATS) Mediastinal Lymph Node Dissection by Dr. Minor on 4/26/2024    HPI:  Mr. Stephen Molina is a 53 y.o. male who underwent CABG in 2022 by Dr. Minor.  On imaging obtained preoperatively, he was incidentally noted to have a right upper lobe small lung nodule that was followed and has enlarged over  time.  It is now 1.7 cm.  Given this Dr. Minor discussed with him proceeding with wedge resection with possible lobectomy.  Patient understood these risks and agreed to proceed.    Hospital Course: On 4/26/2024, Mr. Molina was taken to the operating room for diagnostic bronchoscopy, right robotic assisted VATS wedge resection and mediastinal lymph node dissection.  See op note by Dr. Minor detailing the operation.  Following surgery he was transferred to the PACU in stable condition.  After meeting PACU discharge criteria he was transferred to  for ongoing recovery.  The remainder of his hospital stay was significant for encouraging pulmonary toilet and ambulation as well as pain control.  The right chest tube was removed without remark on postop day 2.  Follow-up imaging revealed small right apical pneumothorax.  He was kept overnight for close monitoring.  Chest x-ray the following day reveals stable to slightly improved right lateral space following wedge resection.  He meets criteria for discharge home on postop day 3.  The patient is agreeable to this plan.    Final pathology is pending at the time of discharge.  This will be discussed at his postop follow-up visit.  Routine follow-up with me in 1 week with chest x-ray before appointment    Condition on Discharge:  Neurologically intact and has good pain control.  He is eating well.  All thoracic incisions are healing nicely without evidence of thoracic hernia.  The heart is in normal sinus rhythm.         Discharge Disposition:  Home or Self Care [1]    Discharge Medications:     Discharge Medications        New Medications        Instructions Start Date   HYDROcodone-acetaminophen 5-325 MG per tablet  Commonly known as: Norco   1 tablet, Oral, Every 6 Hours PRN             Continue These Medications        Instructions Start Date   amLODIPine 5 MG tablet  Commonly known as: NORVASC   5 mg, Oral, Daily      aspirin 81 MG EC tablet   81 mg, Oral, Daily       Fluticasone-Umeclidin-Vilant 100-62.5-25 MCG/ACT inhaler  Commonly known as: TRELEGY   1 puff, Inhalation, Daily - RT      levETIRAcetam 500 MG tablet  Commonly known as: KEPPRA   500 mg, Oral, 2 Times Daily      lisinopril 40 MG tablet  Commonly known as: PRINIVIL,ZESTRIL   40 mg, Oral, Daily      nitroglycerin 0.4 MG SL tablet  Commonly known as: NITROSTAT   Place 1 tablet under the tongue Every 5 (Five) Minutes As Needed for Chest Pain.      OLANZapine-FLUoxetine 6-50 MG per capsule  Commonly known as: SYMBYAX   1 capsule, Oral, Every Evening               Discharge Diet: Regular diet     Discharge Care Plan / Instructions:   Keep incisions clean and open to air.  May wash with soap and water.  Chest tube sites with dressings to keep on for 48 hours.  Ok to reapply dressing as needed after removal.      Activity at Discharge:   No heavy lifting greater than 5 pounds or a gallon of milk and refrain from driving until cleared.      Tobacco: Patient has been counseled as to smoking cessation. We discussed its benefits in regards to immediate recovery and the long-term benefits of avoidance of tobacco products. We discussed the benefit of long-term health that tobacco cessation would convey.     BMI: BMI is >= 30 and <35. (Class 1 Obesity). The following options were offered after discussion;: referral to primary care      Follow-up Appointments: Stephen Molina  is requested to see Tara Chaudhry APRN within 1-2 weeks from time of discharge and to follow-up with IZABEL Gtz in 1 week with chest x-ray prior to appointment    Electronically signed by Danielle Domingo APRN at 04/29/24 4847

## 2024-04-29 NOTE — DISCHARGE SUMMARY
[facility] Cardiothoracic Surgery  DISCHARGE SUMMARY        Date of Admission: 4/26/2024  Date of Discharge:  4/29/2024  Primary Care Physician: Tara Chaudhry APRN    Discharge Diagnoses:  Active Hospital Problems    Diagnosis     **Lung nodule     Lung cancer        Procedures Performed:   Diagnostic Bronchoscopy, Right Robotic Assisted video-assisted thoracoscopic (VATS) Wedge Resection RUL, Right Robot Assisted video-assisted thoracoscopic (VATS) Mediastinal Lymph Node Dissection by Dr. Minor on 4/26/2024    HPI:  Mr. Stephen Molina is a 53 y.o. male who underwent CABG in 2022 by Dr. Minor.  On imaging obtained preoperatively, he was incidentally noted to have a right upper lobe small lung nodule that was followed and has enlarged over time.  It is now 1.7 cm.  Given this Dr. Minor discussed with him proceeding with wedge resection with possible lobectomy.  Patient understood these risks and agreed to proceed.    Hospital Course: On 4/26/2024, Mr. Molina was taken to the operating room for diagnostic bronchoscopy, right robotic assisted VATS wedge resection and mediastinal lymph node dissection.  See op note by Dr. Minor detailing the operation.  Following surgery he was transferred to the PACU in stable condition.  After meeting PACU discharge criteria he was transferred to  for ongoing recovery.  The remainder of his hospital stay was significant for encouraging pulmonary toilet and ambulation as well as pain control.  The right chest tube was removed without remark on postop day 2.  Follow-up imaging revealed small right apical pneumothorax.  He was kept overnight for close monitoring.  Chest x-ray the following day reveals stable to slightly improved right lateral space following wedge resection.  He meets criteria for discharge home on postop day 3.  The patient is agreeable to this plan.    Final pathology is pending at the time of discharge.  This will be discussed at  his postop follow-up visit.  Routine follow-up with me in 1 week with chest x-ray before appointment    Condition on Discharge:  Neurologically intact and has good pain control.  He is eating well.  All thoracic incisions are healing nicely without evidence of thoracic hernia.  The heart is in normal sinus rhythm.         Discharge Disposition:  Home or Self Care [1]    Discharge Medications:     Discharge Medications        New Medications        Instructions Start Date   HYDROcodone-acetaminophen 5-325 MG per tablet  Commonly known as: Norco   1 tablet, Oral, Every 6 Hours PRN             Continue These Medications        Instructions Start Date   amLODIPine 5 MG tablet  Commonly known as: NORVASC   5 mg, Oral, Daily      aspirin 81 MG EC tablet   81 mg, Oral, Daily      Fluticasone-Umeclidin-Vilant 100-62.5-25 MCG/ACT inhaler  Commonly known as: TRELEGY   1 puff, Inhalation, Daily - RT      levETIRAcetam 500 MG tablet  Commonly known as: KEPPRA   500 mg, Oral, 2 Times Daily      lisinopril 40 MG tablet  Commonly known as: PRINIVIL,ZESTRIL   40 mg, Oral, Daily      nitroglycerin 0.4 MG SL tablet  Commonly known as: NITROSTAT   Place 1 tablet under the tongue Every 5 (Five) Minutes As Needed for Chest Pain.      OLANZapine-FLUoxetine 6-50 MG per capsule  Commonly known as: SYMBYAX   1 capsule, Oral, Every Evening               Discharge Diet: Regular diet     Discharge Care Plan / Instructions:   Keep incisions clean and open to air.  May wash with soap and water.  Chest tube sites with dressings to keep on for 48 hours.  Ok to reapply dressing as needed after removal.      Activity at Discharge:   No heavy lifting greater than 5 pounds or a gallon of milk and refrain from driving until cleared.      Tobacco: Patient has been counseled as to smoking cessation. We discussed its benefits in regards to immediate recovery and the long-term benefits of avoidance of tobacco products. We discussed the benefit of long-term  health that tobacco cessation would convey.     BMI: BMI is >= 30 and <35. (Class 1 Obesity). The following options were offered after discussion;: referral to primary care      Follow-up Appointments: Stephen Molina  is requested to see Tara Chaudhry APRN within 1-2 weeks from time of discharge and to follow-up with IZABEL Gtz in 1 week with chest x-ray prior to appointment

## 2024-04-29 NOTE — PROGRESS NOTES
"Patient name: Stephen Molina  Patient : 1970  VISIT # [id number]  MR #[id number]    Procedure:Procedure(s):  RIGHT THORACOSCOPY WITH DAVINCI ROBOT WITH WEDGE RESECTION, MEDIASTINAL LYMPH NODE DISSECTION WITH DAVINCI ROBOT  BRONCHOSCOPY WITH ION ROBOT  Procedure Date:2024  POD:3 Days Post-Op    Subjective     Patient is tolerating room air.  Right chest tube removed without remark yesterday and post pull x-ray shows small right lateral pneumothorax.  He is hopeful for discharge home today.  Pain is well-controlled.       Objective     Visit Vitals  /93 (BP Location: Right arm, Patient Position: Sitting)   Pulse 83   Temp 98.5 °F (36.9 °C) (Oral)   Resp 18   Ht 182.5 cm (71.85\")   Wt 104 kg (229 lb 4.5 oz)   SpO2 97%   BMI 31.23 kg/m²       Intake/Output Summary (Last 24 hours) at 2024 0822  Last data filed at 2024 0542  Gross per 24 hour   Intake 784 ml   Output 1625 ml   Net -841 ml       Lab:     CBC:  Results from last 7 days   Lab Units 24  0322 24  1448 24  1028   WBC 10*3/mm3 9.76 11.44* 6.80   HEMATOCRIT % 45.9 47.9 50.1   PLATELETS 10*3/mm3 208 198 234          BMP:  Results from last 7 days   Lab Units 24  0322 24  1448 24  1028   SODIUM mmol/L 138 137 136   POTASSIUM mmol/L 4.3 5.1 4.4   CHLORIDE mmol/L 103 103 100   CO2 mmol/L 25.0 25.0 23.0   GLUCOSE mg/dL 115* 156* 107*   BUN mg/dL 11 9 6   CREATININE mg/dL 0.58* 0.66* 0.52*          COAG:  Results from last 7 days   Lab Units 24  1028   INR  0.98   APTT seconds 26.4       IMAGES:       Imaging Results (Last 24 Hours)       Procedure Component Value Units Date/Time    XR Chest PA & Lateral [[id number]] Collected: 24     Updated: 24    Narrative:      EXAM: XR CHEST PA AND LATERAL-      DATE: 2024 6:52 AM     HISTORY: pneumothorax; R91.1-Solitary pulmonary nodule       COMPARISON: 2024 at 3:09 a.m.     TECHNIQUE:  Frontal and lateral views of the " chest submitted.     FINDINGS:    Small right lateral pneumothorax is again noted may be slightly  decreased in size given differences in technique. There is right lateral  chest wall emphysema. Patient is status post median sternotomy and CABG.  Heart size is within normal limits. No significant effusion is seen.          Impression:      1. Postoperative chest and small right lateral pneumothorax may be  slightly decreased in size.     This report was signed and finalized on 4/29/2024 7:59 AM by Cain Nobles.       XR Chest 1 View [[id number]] Collected: 04/29/24 0728     Updated: 04/29/24 0733    Narrative:      XR CHEST 1 VW-     HISTORY: post op lung surgery     COMPARISON: 4/28/2024     FINDINGS: Frontal view of the chest obtained.     Overall similar small right-sided pneumothorax with mild subcutaneous  edema of the right lower chest wall. Right lung opacities may relate to  atelectasis, postoperative change, and/or developing consolidation.  Probable left basal atelectasis. Prior mediastinal surgery. Heart normal  in size.       Impression:      1. Similar small right-sided pneumothorax with no contralateral shifting  of the cardiomediastinal structures. Right lung opacities may represent  sequelae of recent surgery, atelectasis, or developing consolidation.     This report was signed and finalized on 4/29/2024 7:30 AM by Dr. Maribeth Bowman MD.       XR Chest PA & Lateral [[id number]] Collected: 04/28/24 1721     Updated: 04/28/24 1726    Narrative:      EXAMINATION: XR CHEST PA AND LATERAL-     4/28/2024 3:26 PM     HISTORY: Status post wedge resection: Eval for; R91.1-Solitary pulmonary  nodule     2 view chest x-ray.     COMPARISON:  Earlier today at 3:16 a.m.     Interval RIGHT chest tube removal.  A small RIGHT apical pneumothorax is now present.  Normal heart size.  The LEFT lung is clear.       Impression:      1. Small RIGHT apical pneumothorax after RIGHT chest tube removal.  The pleural  reflection of the lateral aspect of the apex lies 15 mm from  the bony margin of the RIGHT hemithorax.           This report was signed and finalized on 4/28/2024 5:23 PM by Dr. Stuart Gonzalez MD.             CXR: Stable right chest wall subcutaneous emphysema.  Stable to slightly improved right lateral space.    Physical Exam:  General: Alert, oriented. No apparent distress.  Obese  Cardiovascular: Regular rate and rhythm without murmur, rubs, or gallops.    Pulmonary: Clear to auscultation bilaterally without wheezing, rubs, or rales.  Chest: Thoracic incisions clean, dry, and intact.   Abdomen: Soft, nondistended, and nontender.  Extremities: Warm, moves all extremities. No edema.   Neurologic:  Grossly intact with no focal deficits.            Impression:  Lung nodule, right upper lobe  Current smoker  COPD  Obesity, BMI 31        Plan:  Chest x-ray this morning shows stable to slightly improved right lateral space.  Okay for discharge home today.  Routine follow-up with me in 1 week with chest x-ray prior to appointment.  Final pathology is pending at the time of discharge and will be discussed at his postop follow-up visit.  Encourage pulmonary toilet and ambulation  Discussed with patient      IZABEL Gtz  04/29/24  08:22 CDT

## 2024-04-29 NOTE — ANESTHESIA POSTPROCEDURE EVALUATION
Patient: Stephen Molina    Procedure Summary       Date: 04/26/24 Room / Location:  [facility]  /  [facility]    Anesthesia Start: 1122 Anesthesia Stop: 1435    Procedures:       RIGHT THORACOSCOPY WITH DAVINCI ROBOT WITH WEDGE RESECTION, MEDIASTINAL LYMPH NODE DISSECTION WITH DAVINCI ROBOT (Right: Chest)      BRONCHOSCOPY WITH ION ROBOT (Bronchus) Diagnosis:       Lung nodule      (Lung nodule [R91.1])    Surgeons: Ming Minor MD; Hamzah Santos MD Provider: Maximliian Pace CRNA    Anesthesia Type: general, Ileana ASA Status: 3            Anesthesia Type: general, [place]    Vitals  Vitals Value Taken Time   /86 04/26/24 1600   Temp 98.4 °F (36.9 °C) 04/26/24 1600   Pulse 85 04/26/24 1600   Resp 16 04/26/24 1600   SpO2 96 % 04/26/24 1600           Post Anesthesia Care and Evaluation      Comments: Discharged per PACU Criteria

## 2024-04-29 NOTE — OUTREACH NOTE
Prep Survey      Flowsheet Row Responses   [facility] facility patient discharged from? [facility]   Is LACE score < 7 ? No   Eligibility Readm Mgmt   Discharge diagnosis Lung nodule   Does the patient have one of the following disease processes/diagnoses(primary or secondary)? Cardiothoracic surgery   Does the patient have Home health ordered? No   Is there a DME ordered? No   Comments regarding appointments XR Chest 2 View, Complete by: May 06, 2024   Medication alerts for this patient Norco   Prep survey completed? Yes            Natalia ABDUL - Registered Nurse

## 2024-04-30 LAB
CYTO UR: NORMAL
LAB AP CASE REPORT: NORMAL
LAB AP SYNOPTIC CHECKLIST: NORMAL
Lab: NORMAL
Lab: NORMAL
PATH REPORT.FINAL DX SPEC: NORMAL
PATH REPORT.GROSS SPEC: NORMAL

## 2024-04-30 NOTE — PAYOR COMM NOTE
"REF:   [id number]     [facility]  FAX  [phone]     Stephen Solis (53 y.o. Male)       Date of Birth   1970    Social Security Number       Address   [address] [place] [address]    Home Phone   [phone]    MRN   [id number]       Muslim   [facility]    Marital Status   Single                            Admission Date   4/26/24    Admission Type   Elective    Admitting Provider   Ming Minor MD    Attending Provider       Department, Room/Bed   [facility] 3C, 396/1       Discharge Date   4/29/2024    Discharge Disposition   Home or Self Care    Discharge Destination                                 Attending Provider: (none)   Allergies: No Known Allergies    Isolation: None   Infection: None   Code Status: Prior    Ht: 182.5 cm (71.85\")   Wt: 104 kg (229 lb 4.5 oz)    Admission Cmt: None   Principal Problem: Lung nodule [R91.1]                   Active Insurance as of 4/26/2024       Primary Coverage       Payor Plan Insurance Group Employer/Plan Group    ANTHEM MEDICARE REPLACEMENT ANTHEM MEDICARE ADVANTAGE [id number]       Payor Plan Address Payor Plan Phone Number Payor Plan Fax Number Effective Dates    [address] [phone]  1/1/2022 - None Entered    [facility] [address]         Subscriber Name Subscriber Birth Date Member ID       STEPHEN SOLIS 1970 [id number]                     Emergency Contacts        (Rel.) Home Phone Work Phone Mobile Phone    Lois Echevarria (Mother) [phone] -- --                 Discharge Summary        Danielle Domingo APRN at 04/29/24 0834       Attestation signed by Ming Minor MD at 04/29/24 1255    I have personally seen and examined Stephen Solis and reviewed the record. Agree with the aforementioned plan rendered jointly with Danielle Domingo.    Ming Minor M.D.  Cardiothoracic Surgeon                                  [facility] Cardiothoracic " Surgery  DISCHARGE SUMMARY        Date of Admission: 4/26/2024  Date of Discharge:  4/29/2024  Primary Care Physician: Tara Chaudhry APRN    Discharge Diagnoses:  Active Hospital Problems    Diagnosis     **Lung nodule     Lung cancer        Procedures Performed:   Diagnostic Bronchoscopy, Right Robotic Assisted video-assisted thoracoscopic (VATS) Wedge Resection RUL, Right Robot Assisted video-assisted thoracoscopic (VATS) Mediastinal Lymph Node Dissection by Dr. Minor on 4/26/2024    HPI:  Mr. Stephen Molina is a 53 y.o. male who underwent CABG in 2022 by Dr. Minor.  On imaging obtained preoperatively, he was incidentally noted to have a right upper lobe small lung nodule that was followed and has enlarged over time.  It is now 1.7 cm.  Given this Dr. Minor discussed with him proceeding with wedge resection with possible lobectomy.  Patient understood these risks and agreed to proceed.    Hospital Course: On 4/26/2024, Mr. Molina was taken to the operating room for diagnostic bronchoscopy, right robotic assisted VATS wedge resection and mediastinal lymph node dissection.  See op note by Dr. Minor detailing the operation.  Following surgery he was transferred to the PACU in stable condition.  After meeting PACU discharge criteria he was transferred to  for ongoing recovery.  The remainder of his hospital stay was significant for encouraging pulmonary toilet and ambulation as well as pain control.  The right chest tube was removed without remark on postop day 2.  Follow-up imaging revealed small right apical pneumothorax.  He was kept overnight for close monitoring.  Chest x-ray the following day reveals stable to slightly improved right lateral space following wedge resection.  He meets criteria for discharge home on postop day 3.  The patient is agreeable to this plan.    Final pathology is pending at the time of discharge.  This will be discussed at his postop follow-up visit.  Routine follow-up with me in  1 week with chest x-ray before appointment    Condition on Discharge:  Neurologically intact and has good pain control.  He is eating well.  All thoracic incisions are healing nicely without evidence of thoracic hernia.  The heart is in normal sinus rhythm.         Discharge Disposition:  Home or Self Care [1]    Discharge Medications:     Discharge Medications        New Medications        Instructions Start Date   HYDROcodone-acetaminophen 5-325 MG per tablet  Commonly known as: Norco   1 tablet, Oral, Every 6 Hours PRN             Continue These Medications        Instructions Start Date   amLODIPine 5 MG tablet  Commonly known as: NORVASC   5 mg, Oral, Daily      aspirin 81 MG EC tablet   81 mg, Oral, Daily      Fluticasone-Umeclidin-Vilant 100-62.5-25 MCG/ACT inhaler  Commonly known as: TRELEGY   1 puff, Inhalation, Daily - RT      levETIRAcetam 500 MG tablet  Commonly known as: KEPPRA   500 mg, Oral, 2 Times Daily      lisinopril 40 MG tablet  Commonly known as: PRINIVIL,ZESTRIL   40 mg, Oral, Daily      nitroglycerin 0.4 MG SL tablet  Commonly known as: NITROSTAT   Place 1 tablet under the tongue Every 5 (Five) Minutes As Needed for Chest Pain.      OLANZapine-FLUoxetine 6-50 MG per capsule  Commonly known as: SYMBYAX   1 capsule, Oral, Every Evening               Discharge Diet: Regular diet     Discharge Care Plan / Instructions:   Keep incisions clean and open to air.  May wash with soap and water.  Chest tube sites with dressings to keep on for 48 hours.  Ok to reapply dressing as needed after removal.      Activity at Discharge:   No heavy lifting greater than 5 pounds or a gallon of milk and refrain from driving until cleared.      Tobacco: Patient has been counseled as to smoking cessation. We discussed its benefits in regards to immediate recovery and the long-term benefits of avoidance of tobacco products. We discussed the benefit of long-term health that tobacco cessation would convey.     BMI: BMI  is >= 30 and <35. (Class 1 Obesity). The following options were offered after discussion;: referral to primary care      Follow-up Appointments: Stephen Molina  is requested to see Tara Chaudhry APRN within 1-2 weeks from time of discharge and to follow-up with IZABEL Gtz in 1 week with chest x-ray prior to appointment    Electronically signed by Job Minor MD at 04/29/24 1258       Discharge Order (From admission, onward)       Start     Ordered    04/29/24 0828  Discharge patient  Once        Expected Discharge Date: 04/29/24   Discharge Disposition: Home or Self Care   Physician of Record for Attribution - Please select from Treatment Team: JOB MINOR [[id number]]   Review needed by CMO to determine Physician of Record: No      Question Answer Comment   Physician of Record for Attribution - Please select from Treatment Team JOB MINOR    Review needed by CMO to determine Physician of Record No        04/29/24 3396

## 2024-05-02 ENCOUNTER — TELEPHONE (OUTPATIENT)
Dept: CARDIAC SURGERY | Facility: CLINIC | Age: 54
End: 2024-05-02
Payer: MEDICARE

## 2024-05-02 NOTE — TELEPHONE ENCOUNTER
Pt calling today with complaints of shortness of breath. Pt did sound panicky on the phone. He has used his albuterol inhaler and this has not helped. He states that his pulse is increased, but he works for [employer], so working may contribute to increased pulse. Pt would like to know if he needs to come in, or if he should wait until Monday appointment 05/06 and go to ER if symptoms worsen. Pt can be reached at [phone].

## 2024-05-02 NOTE — TELEPHONE ENCOUNTER
Patient advised to present to ER.  He states currently he is in [place] but is agreeable to present to the closest ER for evaluation.

## 2024-05-03 ENCOUNTER — READMISSION MANAGEMENT (OUTPATIENT)
Dept: CALL CENTER | Facility: HOSPITAL | Age: 54
End: 2024-05-03
Payer: MEDICARE

## 2024-05-03 DIAGNOSIS — R91.1 LUNG NODULE: Primary | ICD-10-CM

## 2024-05-03 RX ORDER — TRAMADOL HYDROCHLORIDE 50 MG/1
50 TABLET ORAL EVERY 6 HOURS PRN
Qty: 25 TABLET | Refills: 0 | Status: SHIPPED | OUTPATIENT
Start: 2024-05-03

## 2024-05-03 NOTE — OUTREACH NOTE
CT Surgery Week 1 Survey      Flowsheet Row Responses   [facility] patient discharged from? [facility]   Does the patient have one of the following disease processes/diagnoses(primary or secondary)? Cardiothoracic surgery   Week 1 attempt successful? Yes   Call start time 1225   Call end time 1228   Discharge diagnosis Lung nodule   Meds reviewed with patient/caregiver? Yes   Is the patient having any side effects they believe may be caused by any medication additions or changes? No   Does the patient have all medications related to this admission filled (includes all antibiotics, pain medications, cardiac medications, etc.) Yes   Is the patient taking all medications as directed (includes completed medication regime)? Yes   Does the patient have a primary care provider?  Yes   Does the patient have an appointment scheduled with their C/T surgeon? Yes   Has the patient kept scheduled appointments due by today? N/A   Has home health visited the patient within 72 hours of discharge? N/A   Psychosocial issues? No   Did the patient receive a copy of their discharge instructions? Yes   Nursing interventions Reviewed instructions with patient   What is the patient's perception of their health status since discharge? Improving   Nursing interventions Nurse provided patient education   Is the patient/caregiver able to teach back normal signs of recovery? Pain or discomfort at incisional site   Nursing interventions Reassured on normal signs of recovery   Is the patient /caregiver able to teach back basic post-op care? Continue use of incentive spirometry at least 1 week post discharge, Hold pillow to support chest when coughing, Practice cough and deep breath every 4 hours while awake, Drive as instructed by MD in discharge instructions, Shower daily, No tub bath, swimming, or hot tub until instructed by MD, Use a clean wash cloth and antibacterial bar or liquid soap to clean incisions, If the steri-strips are falling  off, it is okay to remove them. (If applicable), Lifting as instructed by MD in discharge instructions   Is the patient/caregiver able to teach back signs and symptoms of incisional infection? Increased redness, swelling or pain at the incisonal site, Increased drainage or bleeding, Incisional warmth, Pus or odor from incision, Fever   Is the patient/caregiver able to teach back steps to recovery at home? Set small, achievable goals for return to baseline health, Rest and rebuild strength, gradually increase activity, Eat a well-balance diet   Is the patient/caregiver able to teach back the hierarchy of who to call/visit for symptoms/problems? PCP, Specialist, Home health nurse, Urgent Care, ED, 911 Yes   Week 1 call completed? Yes   Call end time 1228              Lois Young Registered Nurse

## 2024-05-06 ENCOUNTER — TELEPHONE (OUTPATIENT)
Dept: CARDIAC SURGERY | Facility: CLINIC | Age: 54
End: 2024-05-06
Payer: MEDICARE

## 2024-05-09 ENCOUNTER — HOSPITAL ENCOUNTER (OUTPATIENT)
Dept: GENERAL RADIOLOGY | Facility: HOSPITAL | Age: 54
Discharge: HOME | End: 2024-05-09
Admitting: NURSE PRACTITIONER
Payer: MEDICARE

## 2024-05-09 ENCOUNTER — OFFICE VISIT (OUTPATIENT)
Dept: CARDIAC SURGERY | Facility: CLINIC | Age: 54
End: 2024-05-09
Payer: MEDICARE

## 2024-05-09 VITALS
WEIGHT: 228 LBS | HEIGHT: 72 IN | SYSTOLIC BLOOD PRESSURE: 130 MMHG | BODY MASS INDEX: 30.88 KG/M2 | HEART RATE: 94 BPM | OXYGEN SATURATION: 97 % | DIASTOLIC BLOOD PRESSURE: 81 MMHG

## 2024-05-09 DIAGNOSIS — F17.200 CURRENT SMOKER: ICD-10-CM

## 2024-05-09 DIAGNOSIS — R91.1 LUNG NODULE: ICD-10-CM

## 2024-05-09 DIAGNOSIS — C34.91 MALIGNANT NEOPLASM OF RIGHT LUNG, UNSPECIFIED PART OF LUNG: Primary | ICD-10-CM

## 2024-05-09 PROCEDURE — 1160F RVW MEDS BY RX/DR IN RCRD: CPT | Performed by: NURSE PRACTITIONER

## 2024-05-09 PROCEDURE — 3075F SYST BP GE 130 - 139MM HG: CPT | Performed by: NURSE PRACTITIONER

## 2024-05-09 PROCEDURE — 1159F MED LIST DOCD IN RCRD: CPT | Performed by: NURSE PRACTITIONER

## 2024-05-09 PROCEDURE — 71046 X-RAY EXAM CHEST 2 VIEWS: CPT

## 2024-05-09 PROCEDURE — 3079F DIAST BP 80-89 MM HG: CPT | Performed by: NURSE PRACTITIONER

## 2024-05-09 PROCEDURE — 99024 POSTOP FOLLOW-UP VISIT: CPT | Performed by: NURSE PRACTITIONER

## 2024-05-09 RX ORDER — PREGABALIN 25 MG/1
25 CAPSULE ORAL 2 TIMES DAILY
Qty: 14 CAPSULE | Refills: 0 | Status: SHIPPED | OUTPATIENT
Start: 2024-05-09 | End: 2024-05-16

## 2024-05-13 ENCOUNTER — TELEPHONE (OUTPATIENT)
Dept: CARDIAC SURGERY | Facility: CLINIC | Age: 54
End: 2024-05-13
Payer: MEDICARE

## 2024-05-13 NOTE — TELEPHONE ENCOUNTER
Patient calling with questions regarding final pathology results.  Pathology reviewed with patient again and discussed the importance of oncology evaluation.  He verbalized understanding to all and states he will call oncology back to schedule follow-up as he was unsure if he needed this appointment.  I have advised him that he should keep follow-up with them and he verbalizes understanding

## 2024-05-14 ENCOUNTER — READMISSION MANAGEMENT (OUTPATIENT)
Dept: CALL CENTER | Facility: HOSPITAL | Age: 54
End: 2024-05-14
Payer: MEDICARE

## 2024-05-14 NOTE — OUTREACH NOTE
CT Surgery Week 2 Survey      Flowsheet Row Responses   [facility] patient discharged from? [facility]   Does the patient have one of the following disease processes/diagnoses(primary or secondary)? Cardiothoracic surgery   Week 2 attempt successful? Yes   Call start time 0949   Call end time 0950   Discharge diagnosis Lung nodule   Is the patient taking all medications as directed (includes completed medication regime)? Yes   Does the patient have a primary care provider?  Yes   Does the patient have an appointment scheduled with their C/T surgeon? Yes   Has the patient kept scheduled appointments due by today? Yes   Has home health visited the patient within 72 hours of discharge? N/A   Psychosocial issues? No   What is the patient's perception of their health status since discharge? Improving   Nursing interventions Nurse provided patient education  [advised to follow discharge instructions]   Is the patient/caregiver able to teach back the hierarchy of who to call/visit for symptoms/problems? PCP, Specialist, Home health nurse, Urgent Care, ED, 911 Yes   Week 2 call completed? Yes   Graduated Yes   Is the patient interested in additional calls from an ambulatory ? No   Would this patient benefit from a Referral to Amb Social Work? No   Wrap up additional comments Doing well, no further calls needed.   Call end time 0950            Padma BECKWITH - Registered Nurse

## 2024-05-21 ENCOUNTER — TELEPHONE (OUTPATIENT)
Dept: CARDIAC SURGERY | Facility: CLINIC | Age: 54
End: 2024-05-21

## 2024-05-21 NOTE — TELEPHONE ENCOUNTER
Caller: Stephen Molina     Relationship: SELF    Best call back number: [phone]     What is your medical concern? PATIENT STATES THAT THE PAIN IN HIS LOWER BACK HAS GOTTEN WORSE OVER THE LAST FEW WEEKS.    PATIENT WOULD LIKE TO SPEAK DIRECTLY TO JENNIFER PAIZ.  (PT DECLINED WARM TRANSFER TO CLINICAL)    How long has this issue been going on? 3 WEEKS     Is your provider already aware of this issue? NO     Have you been treated for this issue? SURGERY ON 4/26/24

## 2024-05-21 NOTE — TELEPHONE ENCOUNTER
Patient calling to report ongoing nerve pain on the right chest wall.  He states Lyrica has not been helping.  He has not yet tried lidocaine patches.  He has been using scheduled Tylenol and have advised him he can use ibuprofen if needed.  He denies shortness of breath.  I advised him to let me know if symptoms are not improving over the next couple of weeks with the addition of lidocaine patches.  He verbalizes understanding.

## 2024-05-29 ENCOUNTER — TELEPHONE (OUTPATIENT)
Dept: ONCOLOGY | Facility: CLINIC | Age: 54
End: 2024-05-29

## 2024-05-29 NOTE — TELEPHONE ENCOUNTER
Caller: Stephen Molina    Relationship to patient: Self    Best call back number: [phone]    Chief complaint: PT WANTS A MORNING APPOINTMENT     Type of visit: LAB AND NEW    Requested date: IN THE MORNING      If rescheduling, when is the original appointment: 6-5-24

## 2024-06-26 ENCOUNTER — TELEPHONE (OUTPATIENT)
Dept: PULMONOLOGY | Facility: CLINIC | Age: 54
End: 2024-06-26
Payer: MEDICARE

## 2024-06-26 NOTE — TELEPHONE ENCOUNTER
Called patient to reschedule their no show appointment that was originally scheduled on 06/18/2024 .      Unable to contact patient after trying all available phone numbers. No Show letter was mailed, which includes [employer] No Show Policy.

## 2025-01-29 NOTE — PROGRESS NOTES
Chief Complaint  Lung Nodule    Subjective    History of Present Illness {  Problem List  Visit Diagnosis   Encounters  Notes  Medications  Labs  Result Review Imaging  Media     Stephen Molina presents to [facility] for:    History of Present Illness  Mr. Molina is here for follow up. He had a ct chest completed in December 2024 showing some lung nodules and he is here for further evaluation. He underwent right upper lobe wedge resection by Dr. Minor in April 2024 for rul nodule that was positive for adenocarcinoma. He did not follow up with oncology or have follow up CT scans after that because he tells me he just didn't want to mess with it. Unfortunately he continues to smoke. He feels like his breathing is good. He continues on albuterol as needed. He denies weight loss. He denies hemoptysis.        Prior to Admission medications    Medication Sig Start Date End Date Taking? Authorizing Provider   amLODIPine (NORVASC) 5 MG tablet Take 1 tablet by mouth Daily.    ProviderMimi MD   aspirin 81 MG EC tablet Take 1 tablet by mouth Daily.    ProviderMimi MD   Fluticasone-Umeclidin-Vilant (TRELEGY) 100-62.5-25 MCG/ACT inhaler Inhale 1 puff Daily.    ProviderMimi MD   HYDROcodone-acetaminophen (Norco) 5-325 MG per tablet Take 1 tablet by mouth Every 6 (Six) Hours As Needed for Moderate Pain. 4/29/24   Danielle Domingo APRN   levETIRAcetam (KEPPRA) 500 MG tablet Take 1 tablet by mouth 2 (Two) Times a Day.    Mimi De La Cruz MD   lisinopril (PRINIVIL,ZESTRIL) 40 MG tablet Take 1 tablet by mouth Daily.    Mimi De La Cruz MD   nitroglycerin (NITROSTAT) 0.4 MG SL tablet Place 1 tablet under the tongue Every 5 (Five) Minutes As Needed for Chest Pain.    Mimi De La Cruz MD   OLANZapine-FLUoxetine (SYMBYAX) 6-50 MG per capsule Take 1 capsule by mouth Every Evening.    Mimi De La Cruz MD   pregabalin (Lyrica)  "25 MG capsule Take 1 capsule by mouth 2 (Two) Times a Day for 7 days. 5/9/24 5/16/24  Danielle Domingo APRN   traMADol (ULTRAM) 50 MG tablet Take 1 tablet by mouth Every 6 (Six) Hours As Needed for Moderate Pain. 5/3/24   Danielle Domingo APRN       Social History     Socioeconomic History    Marital status: Single   Tobacco Use    Smoking status: Every Day     Current packs/day: 2.00     Average packs/day: 2.0 packs/day for 35.1 years (70.2 ttl pk-yrs)     Types: Cigarettes     Start date: 1/1/1990     Passive exposure: Current    Smokeless tobacco: Never    Tobacco comments:     Smokes about 1.25 packs a day 2/4/25   Vaping Use    Vaping status: Never Used   Substance and Sexual Activity    Alcohol use: Not Currently     Comment: 18 CANS BEER PER DAY    Drug use: Yes     Types: Marijuana     Comment: NIGHTLY    Sexual activity: Defer       Objective   Vital Signs:   /84   Pulse 91   Ht 182.5 cm (71.85\")   Wt 101 kg (223 lb 6.4 oz)   SpO2 97% Comment: RA  BMI 30.43 kg/m²     Physical Exam  Constitutional:       General: He is not in acute distress.  HENT:      Head: Normocephalic.      Nose: Nose normal.      Mouth/Throat:      Mouth: Mucous membranes are moist.   Eyes:      General: No scleral icterus.  Cardiovascular:      Rate and Rhythm: Normal rate.   Pulmonary:      Effort: No respiratory distress.   Abdominal:      General: There is no distension.   Neurological:      Mental Status: He is alert and oriented to person, place, and time.   Psychiatric:         Mood and Affect: Mood normal.         Behavior: Behavior is cooperative.        Result Review :{ Labs  Result Review  Imaging  Med Tab  Media :          Results for orders placed during the hospital encounter of 04/22/24    Complete PFT - Pre & Post Bronchodilator    Narrative  [facility] - Pulmonary Function Test    [address] [place] [address]  [id number]  [phone]    Patient : Stephen Molina  MRN : [id number]  CSN " : [id number]  Pulmonologist : Jimmy Matthew MD  Date : 4/22/2024    ______________________________________________________________________    Interpretation :  1.  Spirometry is consistent with a mild obstructive ventilatory defect manifested by a decrease in mid flows and peak expiratory flow.  2.  There is improvement in spirometry postbronchodilator but a mild obstructive ventilatory defect manifested by a decrease in midflows and peak expiratory flow is still present.  3.  Lung volumes reveal significant elevation of the patient's expiratory reserve volume and otherwise are within normal limits.  4.  Diffusion capacity is supranormal although when corrected for alveolar volume it does decrease but still remains well within the normal range.  5.  Arterial blood gases on room air reveal a normal pO2, pCO2, and pH but there is a decreased O2 saturation of 87.4% with an elevated carboxyhemoglobin of 8.3%.  Clinical correlation is advised regarding the source of exposure to products of combustion.  6.  When current studies are compared to studies performed on May 4, 2022, the patient's current pre and postbronchodilator spirometry reveals significant improvement compared to previous baseline values.      Jimmy Matthew MD      Results for orders placed during the hospital encounter of 05/04/22    Pulmonary Function Test    Narrative  [facility] - Pulmonary Function Test    [address] [place] [address]  [place]  [address]  [phone]    Patient : Stephen Molina  MRN : [id number]  CSN : [id number]  Pulmonologist : Jimmy Matthew MD  Date : 5/5/2022    ______________________________________________________________________    Interpretation :  1.  Spirometry is consistent with a severe obstructive ventilatory defect.  2.  Arterial blood gases on room air reveal a mild respiratory acidosis and moderate hypoxemia.      Jimmy Matthew MD    - likely surgical changes from rul wedge  resection    Collin nodule       Collin nodule 4-2024          LLL nodule       LLL nodule 4-2024         CT outside films (12/30/2024 00:00)   My interpretation of imaging:  nodules appear stable compared to April 2024. RUL nodular area could be from surgical changes but short term follow up is favored         Stephen Molina  reports that he has been smoking cigarettes. He started smoking about 35 years ago. He has a 70.2 pack-year smoking history. He has been exposed to tobacco smoke. He has never used smokeless tobacco.       Assessment and Plan {CC Problem List  Visit Diagnosis  ROS  Review (Popup)  Health Maintenance  Quality  BestPractice  Medications  SmartSets  SnapShot Encounters  Media      Diagnoses and all orders for this visit:    1. Lung nodules (Primary)  Comments:  short term follow up CT.  Orders:  -     CT Chest Without Contrast; Future    2. Current smoker  Comments:  cessation recommended. yearly ldct.    3. Stage 1 mild COPD by GOLD classification  Comments:  albuterol as needed. stable    4. Adenocarcinoma, lung, right  Comments:  right upper lobe resected 4-2024- recommended continued CT surveillance           Plan as above. Office follow up in a few months to review CT. Call sooner if needed.     Bernice Tran, APRN  2/4/2025  14:23 CST    Follow Up {Instructions Charge Capture  Follow-up Communications   Return in about 6 weeks (around 3/18/2025) for Review CT.    Patient was given instructions and counseling regarding his condition or for health maintenance advice. Please see specific information pulled into the AVS if appropriate.

## 2025-02-04 ENCOUNTER — OFFICE VISIT (OUTPATIENT)
Dept: PULMONOLOGY | Facility: CLINIC | Age: 55
End: 2025-02-04
Payer: MEDICARE

## 2025-02-04 VITALS
BODY MASS INDEX: 30.26 KG/M2 | SYSTOLIC BLOOD PRESSURE: 132 MMHG | HEIGHT: 72 IN | WEIGHT: 223.4 LBS | DIASTOLIC BLOOD PRESSURE: 84 MMHG | OXYGEN SATURATION: 97 % | HEART RATE: 91 BPM

## 2025-02-04 DIAGNOSIS — R91.8 LUNG NODULES: Primary | Chronic | ICD-10-CM

## 2025-02-04 DIAGNOSIS — C34.91 ADENOCARCINOMA, LUNG, RIGHT: Chronic | ICD-10-CM

## 2025-02-04 DIAGNOSIS — J44.9 STAGE 1 MILD COPD BY GOLD CLASSIFICATION: Chronic | ICD-10-CM

## 2025-02-04 DIAGNOSIS — F17.200 CURRENT SMOKER: Chronic | ICD-10-CM

## 2025-02-04 PROCEDURE — 3075F SYST BP GE 130 - 139MM HG: CPT | Performed by: NURSE PRACTITIONER

## 2025-02-04 PROCEDURE — 1159F MED LIST DOCD IN RCRD: CPT | Performed by: NURSE PRACTITIONER

## 2025-02-04 PROCEDURE — 99214 OFFICE O/P EST MOD 30 MIN: CPT | Performed by: NURSE PRACTITIONER

## 2025-02-04 PROCEDURE — 1160F RVW MEDS BY RX/DR IN RCRD: CPT | Performed by: NURSE PRACTITIONER

## 2025-02-04 PROCEDURE — 3079F DIAST BP 80-89 MM HG: CPT | Performed by: NURSE PRACTITIONER

## 2025-02-04 RX ORDER — ALBUTEROL SULFATE 0.83 MG/ML
2.5 SOLUTION RESPIRATORY (INHALATION) EVERY 4 HOURS PRN
COMMUNITY
Start: 2024-12-05

## 2025-02-04 RX ORDER — ATORVASTATIN CALCIUM 20 MG/1
TABLET, FILM COATED ORAL
COMMUNITY
Start: 2024-12-19

## 2025-02-04 RX ORDER — FLUOXETINE HYDROCHLORIDE 60 MG/1
TABLET, FILM COATED ORAL; ORAL
COMMUNITY
Start: 2025-01-09

## 2025-02-25 RX ORDER — OLANZAPINE 5 MG/1
5 TABLET ORAL NIGHTLY
COMMUNITY
Start: 2024-12-05

## 2025-03-17 ENCOUNTER — TELEPHONE (OUTPATIENT)
Dept: PULMONOLOGY | Facility: CLINIC | Age: 55
End: 2025-03-17
Payer: MEDICARE

## 2025-03-17 NOTE — TELEPHONE ENCOUNTER
Patient called to cancel his appointment for tomorrow.  Patient was tearful telling me that he started coughing up blood today.  He states this has happened 1-2 times and does not happen every time he coughs.  He tells me he feels like it is over.  When asked if he is suicidal he adamantly denies.  He tells me he has transportation/financial issues.  I offered to consult with  to try to get him transportation to office visits.  He declines help from .  He tells me he just does not want to continue to pursue care with us currently.  We discussed treatment options regarding the lung nodules and his history of lung cancer and differential diagnosis in regards to recent hemoptysis.  Again he states that he does not want to pursue any further workup/care at this time.  PCP was notified of his decision to cease follow-up with our office.    Patient was instructed if he changes his mind I would be glad to see him.

## 2025-06-18 ENCOUNTER — TELEPHONE (OUTPATIENT)
Dept: PULMONOLOGY | Facility: CLINIC | Age: 55
End: 2025-06-18
Payer: MEDICARE

## 2025-06-18 NOTE — TELEPHONE ENCOUNTER
Called patient to reschedule their no show appointment that was originally scheduled on 05/30/2025 .      Unable to contact patient after trying all available phone numbers. No Show letter was mailed, which includes [employer] No Show Policy.

## (undated) DEVICE — DRAPE,UTILITY,TAPE,15X26,STERILE: Brand: MEDLINE

## (undated) DEVICE — GLV SURG BIOGEL M LTX PF 7 1/2

## (undated) DEVICE — ROTATING SURGICAL PUNCHES, 1 PER POUCH: Brand: A&E MEDICAL / ROTATING SURGICAL PUNCHES

## (undated) DEVICE — ADHS SKIN PREMIERPRO EXOFIN TOPICAL HI/VISC .5ML

## (undated) DEVICE — CONTAINER,SPECIMEN,OR STERILE,4OZ: Brand: MEDLINE

## (undated) DEVICE — ELECTRD DEFIB M/FUNC GEL LIQ RL PROPADZ

## (undated) DEVICE — SUT MNCRYL 4/0 PS2 27IN UD MCP426H

## (undated) DEVICE — OASIS DRAIN, SINGLE, INLINE & ATS COMPATIBLE: Brand: OASIS

## (undated) DEVICE — SUREFIT, DUAL DISPERSIVE ELECTRODE, CONTACT QUALITY MONITOR: Brand: SUREFIT

## (undated) DEVICE — BLAKE SILICONE DRAINS CARDIO CONNECTOR 2:1: Brand: BLAKE

## (undated) DEVICE — DRSNG BRDR MEPILEXLITE SLFADHR SIL 2X5

## (undated) DEVICE — ELECTRD BLD EZ CLN MOD 4IN

## (undated) DEVICE — TRAP FLD MINIVAC MEGADYNE 100ML

## (undated) DEVICE — CLTH CLENS READYCLEANSE PERI CARE PK/5

## (undated) DEVICE — SUT PROLN 4/0 RB1 36IN 4PK M8557

## (undated) DEVICE — SUT SILK 2/0 FS BLK 18IN 685G

## (undated) DEVICE — CUFF,BP,DISP,1 TUBE,ADULT,HP: Brand: MEDLINE

## (undated) DEVICE — ANTIBACTERIAL UNDYED BRAIDED (POLYGLACTIN 910), SYNTHETIC ABSORBABLE SUTURE: Brand: COATED VICRYL

## (undated) DEVICE — CATH IV ANGIOCATH FEP 14GA 1.88IN ORNG

## (undated) DEVICE — BLADELESS OBTURATOR: Brand: WECK VISTA

## (undated) DEVICE — BG OR ZSUT SADDLE 20IN CLR STRL

## (undated) DEVICE — STERNUM BLADE, OFFSET (32.0 X 0.8 X 6.3MM)

## (undated) DEVICE — TRAP,MUCUS SPECIMEN, 80CC: Brand: MEDLINE

## (undated) DEVICE — 32 FR RIGHT ANGLE – SOFT PVC CATHETER: Brand: PVC THORACIC CATHETERS

## (undated) DEVICE — DRSNG WND SIL OPTIFOAM GNTL BRDR ADHS 1.6X2IN

## (undated) DEVICE — NEEDLE, QUINCKE 22GX3.5": Brand: MEDLINE INDUSTRIES, INC.

## (undated) DEVICE — APPL CHLORAPREP HI/LITE 26ML ORNG

## (undated) DEVICE — TBG PENCL TELESCP MEGADYNE SMOKE EVAC 10FT

## (undated) DEVICE — ST TBG AIRSEAL FLTR TRI LUM

## (undated) DEVICE — E-PACK PROCEDURE KIT: Brand: E-PACK

## (undated) DEVICE — MARKR SKIN W/RULR AND LBL

## (undated) DEVICE — KT CLN CLEANOR SCPE

## (undated) DEVICE — THE CHANNEL CLEANING BRUSH IS A NYLON FLEXI BRUSH ATTACHED TO A FLEXIBLE PLASTIC SHEATH DESIGNED TO SAFELY REMOVE DEBRIS FROM FLEXIBLE ENDOSCOPES.

## (undated) DEVICE — KT ANTI FOG W/FLD AND SPNG

## (undated) DEVICE — REDUCER: Brand: ENDOWRIST

## (undated) DEVICE — PK TURNOVER RM ADV

## (undated) DEVICE — ELECTRD BLD MEGADYNE EZCLEAN STD 2.75IN XLNG

## (undated) DEVICE — DRAPE,SPLIT,CV,CLR ANES,STERILE: Brand: MEDLINE

## (undated) DEVICE — SINGLE USE SUCTION VALVE MAJ-209: Brand: SINGLE USE SUCTION VALVE (STERILE)

## (undated) DEVICE — SUREFORM 45: Brand: SUREFORM

## (undated) DEVICE — SUT SILK 2/0 SH CR8 18IN CR8 C012D

## (undated) DEVICE — SUREFORM 45 CURVED-TIP: Brand: SUREFORM

## (undated) DEVICE — DAVINCI: Brand: MEDLINE INDUSTRIES, INC.

## (undated) DEVICE — TOWEL,OR,DSP,ST,BLUE,DLX,10/PK,8PK/CS: Brand: MEDLINE

## (undated) DEVICE — CATH IV JELCO 18GA 10 1 3/4 IN

## (undated) DEVICE — PATIENT RETURN ELECTRODE, SINGLE-USE, CONTACT QUALITY MONITORING, ADULT, WITH 9FT CORD, FOR PATIENTS WEIGING OVER 33LBS. (15KG): Brand: MEGADYNE

## (undated) DEVICE — SYS VASOVIEW HEMOPRO ENDOSCOPIC HARVST VESL

## (undated) DEVICE — ELECTRD BLD EZ CLN MOD XLNG 2.75IN

## (undated) DEVICE — PK OPN HEART 30

## (undated) DEVICE — PK SET UP ANES OPN HEART 30

## (undated) DEVICE — SYS DISTNTION VEIN BONCHEK 300MMHG

## (undated) DEVICE — SEAL

## (undated) DEVICE — 1/2 FORCE SURGICAL SPRING CLIP: Brand: STEALTH® SPRING CLIP

## (undated) DEVICE — SPNG LAP CIGARETTE KITTNER 5MM STRL PK/5

## (undated) DEVICE — DRAIN,WOUND,ROUND,24FR,5/16",FULL-FLUTED: Brand: MEDLINE

## (undated) DEVICE — SINGLE USE BIOPSY VALVE MAJ-210: Brand: SINGLE USE BIOPSY VALVE (STERILE)

## (undated) DEVICE — DRSNG SURESITE123 4X10IN

## (undated) DEVICE — SYR LUERLOK 20CC BX/50

## (undated) DEVICE — PAD,NON-ADHERENT,3X8,STERILE,LF,1/PK: Brand: MEDLINE

## (undated) DEVICE — SENSR O2 OXIMAX FNGR A/ 18IN NONSTR

## (undated) DEVICE — GLV SURG DERMASSURE GRN LF PF 8.0

## (undated) DEVICE — OPTIFOAM GENTLE SA, POSTOP, 4X12: Brand: MEDLINE

## (undated) DEVICE — BLAKE CARDIO CONNECTOR 1:1: Brand: J-VAC

## (undated) DEVICE — SUT ETHIB 2/0 SH SH 36IN X523H

## (undated) DEVICE — TROC BLADLES ANCHORPORT/OPTI LP 12X120MM 1P/U

## (undated) DEVICE — GAUZE,SPONGE,4"X4",12PLY,STERILE,LF,2'S: Brand: MEDLINE

## (undated) DEVICE — Device: Brand: ZDRIVE™

## (undated) DEVICE — TOTAL TRAY, 16FR 10ML SIL FOLEY, URN: Brand: MEDLINE

## (undated) DEVICE — ARM DRAPE

## (undated) DEVICE — ADHS LIQ MASTISOL 2/3ML

## (undated) DEVICE — DRSNG SURESITE WNDW 2.38X2.75

## (undated) DEVICE — GLV SURG BIOGEL LTX PF 6 1/2

## (undated) DEVICE — SUT PROLN 5/0 RB1 D/A 36IN 8556H

## (undated) DEVICE — 3M™ STERI-STRIP™ REINFORCED ADHESIVE SKIN CLOSURES, R1547, 1/2 IN X 4 IN (12 MM X 100 MM), 6 STRIPS/ENVELOPE: Brand: 3M™ STERI-STRIP™